# Patient Record
Sex: FEMALE | Race: WHITE | NOT HISPANIC OR LATINO | Employment: OTHER | URBAN - NONMETROPOLITAN AREA
[De-identification: names, ages, dates, MRNs, and addresses within clinical notes are randomized per-mention and may not be internally consistent; named-entity substitution may affect disease eponyms.]

---

## 2017-02-08 ENCOUNTER — OFFICE VISIT (OUTPATIENT)
Dept: CARDIOLOGY | Facility: CLINIC | Age: 67
End: 2017-02-08

## 2017-02-08 VITALS
HEIGHT: 67 IN | SYSTOLIC BLOOD PRESSURE: 141 MMHG | HEART RATE: 101 BPM | DIASTOLIC BLOOD PRESSURE: 78 MMHG | OXYGEN SATURATION: 90 % | BODY MASS INDEX: 45.04 KG/M2 | WEIGHT: 287 LBS

## 2017-02-08 DIAGNOSIS — R60.9 EDEMA, UNSPECIFIED TYPE: ICD-10-CM

## 2017-02-08 DIAGNOSIS — I25.10 CORONARY ARTERIOSCLEROSIS IN NATIVE ARTERY: Primary | ICD-10-CM

## 2017-02-08 DIAGNOSIS — I50.32 CHRONIC DIASTOLIC CONGESTIVE HEART FAILURE (HCC): ICD-10-CM

## 2017-02-08 DIAGNOSIS — G45.9 TRANSIENT CEREBRAL ISCHEMIA, UNSPECIFIED TYPE: ICD-10-CM

## 2017-02-08 DIAGNOSIS — E78.2 MIXED HYPERLIPIDEMIA: ICD-10-CM

## 2017-02-08 DIAGNOSIS — R06.02 SHORTNESS OF BREATH: ICD-10-CM

## 2017-02-08 DIAGNOSIS — I51.89 DIASTOLIC DYSFUNCTION: ICD-10-CM

## 2017-02-08 DIAGNOSIS — I10 ESSENTIAL HYPERTENSION: ICD-10-CM

## 2017-02-08 DIAGNOSIS — R07.2 PRECORDIAL PAIN: ICD-10-CM

## 2017-02-08 DIAGNOSIS — R00.2 PALPITATIONS: ICD-10-CM

## 2017-02-08 PROCEDURE — 99214 OFFICE O/P EST MOD 30 MIN: CPT | Performed by: INTERNAL MEDICINE

## 2017-02-08 RX ORDER — POTASSIUM CHLORIDE 20 MEQ/1
20 TABLET, EXTENDED RELEASE ORAL DAILY
Qty: 30 TABLET | Refills: 5 | Status: SHIPPED | OUTPATIENT
Start: 2017-02-08 | End: 2017-02-20 | Stop reason: SDUPTHER

## 2017-02-08 RX ORDER — FLECAINIDE ACETATE 50 MG/1
50 TABLET ORAL EVERY 12 HOURS
Qty: 60 TABLET | Refills: 5 | Status: SHIPPED | OUTPATIENT
Start: 2017-02-08 | End: 2017-02-14

## 2017-02-13 ENCOUNTER — CLINICAL SUPPORT (OUTPATIENT)
Dept: CARDIOLOGY | Facility: CLINIC | Age: 67
End: 2017-02-13

## 2017-02-13 VITALS
WEIGHT: 292.2 LBS | SYSTOLIC BLOOD PRESSURE: 123 MMHG | HEART RATE: 96 BPM | DIASTOLIC BLOOD PRESSURE: 60 MMHG | BODY MASS INDEX: 45.86 KG/M2 | HEIGHT: 67 IN | OXYGEN SATURATION: 94 %

## 2017-02-13 DIAGNOSIS — R00.2 PALPITATIONS: Primary | ICD-10-CM

## 2017-02-13 DIAGNOSIS — I49.1 PAC (PREMATURE ATRIAL CONTRACTION): ICD-10-CM

## 2017-02-13 PROCEDURE — 99211 OFF/OP EST MAY X REQ PHY/QHP: CPT | Performed by: PHYSICIAN ASSISTANT

## 2017-02-13 PROCEDURE — 93000 ELECTROCARDIOGRAM COMPLETE: CPT | Performed by: PHYSICIAN ASSISTANT

## 2017-02-13 NOTE — PROGRESS NOTES
Mandi Jacob  1950 2/13/2017   ?   Chief Complaint   Patient presents with   • Follow-up     Day #1 EKG on Flecainide therapy due to PAC'S      ?   HPI:   ? Patient here for day # 1 EKG due to HX pac's and started Flecainide 50 mg po bid on Sunday am. Has had x3 total doses so far and has tolerated it well. States that before starting Flecainide she was having occas. Sharp chest pains across her chest and had lots of fluttering, but since starting flec. Has had a squeezing and sharp chest pains to center and left chest and still having some of the fluttering mostly at night. EKG done per orders. PH,LPN  ?   ?     Current Outpatient Prescriptions:   •  albuterol (PROVENTIL HFA;VENTOLIN HFA) 108 (90 BASE) MCG/ACT inhaler, Albuterol 90 MCG/ACT AERS; Patient Sig: Albuterol 90 MCG/ACT AERS INHALE 1 TO 2 PUFFS EVERY 4 TO 6 HOURS AS NEEDED.; 0; 11-Sep-2013; Active, Disp: , Rfl:   •  albuterol (PROVENTIL) (2.5 MG/3ML) 0.083% nebulizer solution, Albuterol Sulfate (2.5 MG/3ML) 0.083% Inhalation Nebulization Solution; Patient Sig: Albuterol Sulfate (2.5 MG/3ML) 0.083% Inhalation Nebulization Solution USE 1 UNIT DOSE IN NEBULIZER EVERY 4 TO 6 HOURS AS NEEDED.; 0; 11-Sep-2013; Active, Disp: , Rfl:   •  aspirin 81 MG tablet, Take 1 tablet by mouth daily., Disp: , Rfl:   •  Cholecalciferol (VITAMIN D-3) 1000 UNITS capsule, Take 3 tablets by mouth daily., Disp: , Rfl:   •  ferrous sulfate 325 (65 FE) MG tablet, Take 1 tablet by mouth daily., Disp: , Rfl:   •  flecainide (TAMBOCOR) 50 MG tablet, Take 1 tablet by mouth Every 12 (Twelve) Hours., Disp: 60 tablet, Rfl: 5  •  furosemide (LASIX) 40 MG tablet, TAKE 1 TABLET TWICE DAILY, Disp: 60 tablet, Rfl: 5  •  glipiZIDE (GLUCOTROL) 5 MG tablet, Take 1 tablet by mouth 2 (two) times a day., Disp: , Rfl:   •  Glucosamine-Chondroit-Vit C-Mn (GLUCOSAMINE-CHONDROITIN) tablet, Take 1 tablet by mouth 2 (two) times a day., Disp: , Rfl:   •  HYDROcodone-acetaminophen (NORCO) 5-325 MG per  tablet, Take  by mouth. 1 tablet every 4-6 hours as needed, Disp: , Rfl:   •  Insulin NPH Isophane & Regular (70-30) 100 UNIT/ML suspension pen-injector, Inject 60 Units under the skin 2 (Two) Times a Day., Disp: 1 pen, Rfl: 5  •  isosorbide mononitrate (IMDUR) 30 MG 24 hr tablet, Take 1 tablet by mouth daily., Disp: , Rfl:   •  losartan (COZAAR) 25 MG tablet, 25 mg 2 (two) times a day., Disp: , Rfl:   •  magnesium oxide (MAGOX) 400 (241.3 MG) MG tablet tablet, Take 1 tablet by mouth daily., Disp: , Rfl:   •  meloxicam (MOBIC) 7.5 MG tablet, Take 1 tablet by mouth 2 (two) times a day with meals., Disp: , Rfl:   •  metolazone (ZAROXOLYN) 2.5 MG tablet, Take 2.5 mg by mouth. 1 tablet 30 minutes prior to lasix as needed PRN , Disp: , Rfl:   •  metoprolol tartrate (LOPRESSOR) 25 MG tablet, Take 1 tablet by mouth 2 (two) times a day., Disp: 180 tablet, Rfl: 3  •  montelukast (SINGULAIR) 10 MG tablet, Take 1 tablet by mouth daily., Disp: , Rfl:   •  nitroglycerin (NITROSTAT) 0.4 MG SL tablet, Place  under the tongue., Disp: , Rfl:   •  Omega-3 Fatty Acids (FISH OIL) 1000 MG capsule capsule, Take 1 capsule by mouth daily., Disp: , Rfl:   •  omeprazole (PriLOSEC) 20 MG capsule, Take 1 capsule by mouth 2 (two) times a day., Disp: , Rfl:   •  potassium chloride (K-DUR,KLOR-CON) 20 MEQ CR tablet, Take 1 tablet by mouth every morning., Disp: 30 tablet, Rfl: 5  •  potassium chloride (K-DUR,KLOR-CON) 20 MEQ CR tablet, Take 1 tablet by mouth Daily. In the evening, Disp: 30 tablet, Rfl: 5  •  propranolol (INDERAL) 20 MG tablet, Take 1 tablet by mouth 2 (two) times a day., Disp: , Rfl:   •  tiotropium (SPIRIVA) 18 MCG per inhalation capsule, Place 1 capsule into inhaler and inhale daily., Disp: , Rfl:   •  triamterene-hydrochlorothiazide (MAXZIDE-25) 37.5-25 MG per tablet, Take 1 tablet by mouth daily., Disp: , Rfl:    ?   ?   Ace inhibitors; Budesonide-formoterol fumarate; Buspirone; Ciprofloxacin; Citalopram; Codeine; Diltiazem;  Incruse ellipta [umeclidinium bromide]; Ketorolac tromethamine; Liraglutide; Lisinopril; Lorazepam; Niacin; Sertraline; Statins; and Sulfamethoxazole-trimethoprim         ECG 12 Lead  Date/Time: 2/13/2017 2:05 PM  Performed by: ZAFAR JHAVERI  Authorized by: ZAFAR JHAVERI   Comments: EKG indication palpitations. EKG demonstrates sinus mechanism at 85 bpm. Normal axis, nonspecific IVCD, no acute ST changes             ?   Assessment/Plan    ?   ?   ?     1. Palpitations    2. HX PAC'S per recent ER visit.        EKG reviewed per CHERYL Candelaria verbal order to continue Flecainide therapy and keep f/u appt. Jaswinder. For day # 2 EKG. Patient aware, verbalized she understood. PH,LPN    EKG reviewed. Return tomorrow for repeat EKG

## 2017-02-14 ENCOUNTER — CLINICAL SUPPORT (OUTPATIENT)
Dept: CARDIOLOGY | Facility: CLINIC | Age: 67
End: 2017-02-14

## 2017-02-14 VITALS
HEIGHT: 67 IN | OXYGEN SATURATION: 93 % | BODY MASS INDEX: 45.83 KG/M2 | SYSTOLIC BLOOD PRESSURE: 147 MMHG | HEART RATE: 88 BPM | WEIGHT: 292 LBS | DIASTOLIC BLOOD PRESSURE: 76 MMHG

## 2017-02-14 DIAGNOSIS — I50.9 CONGESTIVE HEART FAILURE, UNSPECIFIED CONGESTIVE HEART FAILURE CHRONICITY, UNSPECIFIED CONGESTIVE HEART FAILURE TYPE: ICD-10-CM

## 2017-02-14 DIAGNOSIS — I49.1 PAC (PREMATURE ATRIAL CONTRACTION): ICD-10-CM

## 2017-02-14 DIAGNOSIS — Z79.899 LONG-TERM USE OF HIGH-RISK MEDICATION: ICD-10-CM

## 2017-02-14 DIAGNOSIS — R00.2 PALPITATIONS: ICD-10-CM

## 2017-02-14 DIAGNOSIS — Z79.01 CURRENT USE OF ANTICOAGULANT THERAPY: ICD-10-CM

## 2017-02-14 DIAGNOSIS — R06.02 SHORTNESS OF BREATH: Primary | ICD-10-CM

## 2017-02-14 DIAGNOSIS — R60.9 EDEMA, UNSPECIFIED TYPE: ICD-10-CM

## 2017-02-14 RX ORDER — METHYLPREDNISOLONE 4 MG/1
TABLET ORAL
Qty: 21 TABLET | Refills: 0 | Status: SHIPPED | OUTPATIENT
Start: 2017-02-14 | End: 2017-02-20

## 2017-02-14 NOTE — PROGRESS NOTES
Mandi Jacob  1950 2/14/2017   ?   Chief Complaint   Patient presents with   • Palpitations     PAC's, nurse visit       ?   HPI:   ? Patient was  Scheduled today for Day # 2 EKg for Flecainide therapy, but at 2:30 am she was awoken with facial numbness and edema, itching all over, and increased shortness of breath. She had taken 2 full days of the Flecainide. Will address all above with Leonardo Rosales, PAC and proceed accordingly. PH,LPN  ?   ?     Current Outpatient Prescriptions:   •  albuterol (PROVENTIL HFA;VENTOLIN HFA) 108 (90 BASE) MCG/ACT inhaler, Albuterol 90 MCG/ACT AERS; Patient Sig: Albuterol 90 MCG/ACT AERS INHALE 1 TO 2 PUFFS EVERY 4 TO 6 HOURS AS NEEDED.; 0; 11-Sep-2013; Active, Disp: , Rfl:   •  albuterol (PROVENTIL) (2.5 MG/3ML) 0.083% nebulizer solution, Albuterol Sulfate (2.5 MG/3ML) 0.083% Inhalation Nebulization Solution; Patient Sig: Albuterol Sulfate (2.5 MG/3ML) 0.083% Inhalation Nebulization Solution USE 1 UNIT DOSE IN NEBULIZER EVERY 4 TO 6 HOURS AS NEEDED.; 0; 11-Sep-2013; Active, Disp: , Rfl:   •  aspirin 81 MG tablet, Take 1 tablet by mouth daily., Disp: , Rfl:   •  Cholecalciferol (VITAMIN D-3) 1000 UNITS capsule, Take 3 tablets by mouth daily., Disp: , Rfl:   •  ferrous sulfate 325 (65 FE) MG tablet, Take 1 tablet by mouth daily., Disp: , Rfl:   •  furosemide (LASIX) 40 MG tablet, TAKE 1 TABLET TWICE DAILY, Disp: 60 tablet, Rfl: 5  •  glipiZIDE (GLUCOTROL) 5 MG tablet, Take 1 tablet by mouth 2 (two) times a day., Disp: , Rfl:   •  Glucosamine-Chondroit-Vit C-Mn (GLUCOSAMINE-CHONDROITIN) tablet, Take 1 tablet by mouth 2 (two) times a day., Disp: , Rfl:   •  HYDROcodone-acetaminophen (NORCO) 5-325 MG per tablet, Take  by mouth. 1 tablet every 4-6 hours as needed, Disp: , Rfl:   •  Insulin NPH Isophane & Regular (70-30) 100 UNIT/ML suspension pen-injector, Inject 60 Units under the skin 2 (Two) Times a Day., Disp: 1 pen, Rfl: 5  •  isosorbide mononitrate (IMDUR) 30 MG 24 hr  tablet, Take 1 tablet by mouth daily., Disp: , Rfl:   •  losartan (COZAAR) 25 MG tablet, 25 mg 2 (two) times a day., Disp: , Rfl:   •  magnesium oxide (MAGOX) 400 (241.3 MG) MG tablet tablet, Take 1 tablet by mouth daily., Disp: , Rfl:   •  meloxicam (MOBIC) 7.5 MG tablet, Take 1 tablet by mouth 2 (two) times a day with meals., Disp: , Rfl:   •  metolazone (ZAROXOLYN) 2.5 MG tablet, Take 2.5 mg by mouth. 1 tablet 30 minutes prior to lasix as needed PRN , Disp: , Rfl:   •  metoprolol tartrate (LOPRESSOR) 25 MG tablet, Take 1 tablet by mouth 2 (two) times a day., Disp: 180 tablet, Rfl: 3  •  montelukast (SINGULAIR) 10 MG tablet, Take 1 tablet by mouth daily., Disp: , Rfl:   •  Omega-3 Fatty Acids (FISH OIL) 1000 MG capsule capsule, Take 1 capsule by mouth daily., Disp: , Rfl:   •  omeprazole (PriLOSEC) 20 MG capsule, Take 1 capsule by mouth 2 (two) times a day., Disp: , Rfl:   •  potassium chloride (K-DUR,KLOR-CON) 20 MEQ CR tablet, Take 1 tablet by mouth every morning., Disp: 30 tablet, Rfl: 5  •  potassium chloride (K-DUR,KLOR-CON) 20 MEQ CR tablet, Take 1 tablet by mouth Daily. In the evening, Disp: 30 tablet, Rfl: 5  •  propranolol (INDERAL) 20 MG tablet, Take 1 tablet by mouth 2 (two) times a day., Disp: , Rfl:   •  tiotropium (SPIRIVA) 18 MCG per inhalation capsule, Place 1 capsule into inhaler and inhale daily., Disp: , Rfl:   •  triamterene-hydrochlorothiazide (MAXZIDE-25) 37.5-25 MG per tablet, Take 1 tablet by mouth daily., Disp: , Rfl:   •  nitroglycerin (NITROSTAT) 0.4 MG SL tablet, Place  under the tongue., Disp: , Rfl:    ?   ?   Ace inhibitors; Budesonide-formoterol fumarate; Buspirone; Ciprofloxacin; Citalopram; Codeine; Diltiazem; Flecainide; Incruse ellipta [umeclidinium bromide]; Ketorolac tromethamine; Liraglutide; Lisinopril; Lorazepam; Niacin; Sertraline; Statins; and Sulfamethoxazole-trimethoprim       Procedures     ?   Assessment/Plan    ?   ?   ?     1. Palpitations    2. PAC'S      Leonardo  Bobby, PAC in to see patient. Verbal orders received for CXR, CBC,CMP,BNP,TRop. 1, DX; SOB,edema, chf, palpitations, pacs' d/c Flecainide, start Sotalol 80 mg po bid on Sunday am and come for EKG on Monday, medrol dose pack. Patient aware of all above, verbalized she understood. PH,LPN

## 2017-02-20 ENCOUNTER — CLINICAL SUPPORT (OUTPATIENT)
Dept: CARDIOLOGY | Facility: CLINIC | Age: 67
End: 2017-02-20

## 2017-02-20 VITALS
OXYGEN SATURATION: 93 % | BODY MASS INDEX: 45.11 KG/M2 | HEART RATE: 74 BPM | DIASTOLIC BLOOD PRESSURE: 66 MMHG | HEIGHT: 67 IN | WEIGHT: 287.4 LBS | SYSTOLIC BLOOD PRESSURE: 129 MMHG

## 2017-02-20 NOTE — PROGRESS NOTES
Mandi Jacob  1950 2/20/2017   ?   Chief Complaint   Patient presents with   • Follow-up     palps., PAC's      ?   HPI:   ?  Patient here for Day # 1 EKG for Sotalol therapy due to HX palps and PAC'S. Sotalol 80 mg po bid started yest. Am, so has had x3 doses so far and tolerating it well. Patient still with some fatigue and shortness of breath ( on 02 at 2 L via N/C) but breathing little better. States Fluttering is a little better. EKG done per orders. Will have CHERYL Candelaria review and proceed accordingly. PH,LPN  ?   ?     Current Outpatient Prescriptions:   •  albuterol (PROVENTIL HFA;VENTOLIN HFA) 108 (90 BASE) MCG/ACT inhaler, Albuterol 90 MCG/ACT AERS; Patient Sig: Albuterol 90 MCG/ACT AERS INHALE 1 TO 2 PUFFS EVERY 4 TO 6 HOURS AS NEEDED.; 0; 11-Sep-2013; Active, Disp: , Rfl:   •  albuterol (PROVENTIL) (2.5 MG/3ML) 0.083% nebulizer solution, Albuterol Sulfate (2.5 MG/3ML) 0.083% Inhalation Nebulization Solution; Patient Sig: Albuterol Sulfate (2.5 MG/3ML) 0.083% Inhalation Nebulization Solution USE 1 UNIT DOSE IN NEBULIZER EVERY 4 TO 6 HOURS AS NEEDED.; 0; 11-Sep-2013; Active, Disp: , Rfl:   •  aspirin 81 MG tablet, Take 1 tablet by mouth daily., Disp: , Rfl:   •  Cholecalciferol (VITAMIN D-3) 1000 UNITS capsule, Take 3 tablets by mouth daily., Disp: , Rfl:   •  ferrous sulfate 325 (65 FE) MG tablet, Take 1 tablet by mouth daily., Disp: , Rfl:   •  furosemide (LASIX) 40 MG tablet, TAKE 1 TABLET TWICE DAILY, Disp: 60 tablet, Rfl: 5  •  glipiZIDE (GLUCOTROL) 5 MG tablet, Take 1 tablet by mouth 2 (two) times a day., Disp: , Rfl:   •  Glucosamine-Chondroit-Vit C-Mn (GLUCOSAMINE-CHONDROITIN) tablet, Take 1 tablet by mouth 2 (two) times a day., Disp: , Rfl:   •  Insulin NPH Isophane & Regular (70-30) 100 UNIT/ML suspension pen-injector, Inject 60 Units under the skin 2 (Two) Times a Day., Disp: 1 pen, Rfl: 5  •  isosorbide mononitrate (IMDUR) 30 MG 24 hr tablet, Take 1 tablet by mouth daily., Disp:  , Rfl:   •  losartan (COZAAR) 25 MG tablet, 25 mg Daily., Disp: , Rfl:   •  magnesium oxide (MAGOX) 400 (241.3 MG) MG tablet tablet, Take 1 tablet by mouth daily., Disp: , Rfl:   •  meloxicam (MOBIC) 7.5 MG tablet, Take 1 tablet by mouth 2 (two) times a day with meals., Disp: , Rfl:   •  metolazone (ZAROXOLYN) 2.5 MG tablet, Take 2.5 mg by mouth. 1 tablet 30 minutes prior to lasix as needed PRN , Disp: , Rfl:   •  metoprolol tartrate (LOPRESSOR) 25 MG tablet, Take 1 tablet by mouth 2 (two) times a day., Disp: 180 tablet, Rfl: 3  •  montelukast (SINGULAIR) 10 MG tablet, Take 1 tablet by mouth daily., Disp: , Rfl:   •  Omega-3 Fatty Acids (FISH OIL) 1000 MG capsule capsule, Take 1 capsule by mouth daily., Disp: , Rfl:   •  potassium chloride (K-DUR,KLOR-CON) 20 MEQ CR tablet, Take 1 tablet by mouth every morning. (Patient taking differently: Take 20 mEq by mouth 2 (Two) Times a Day.), Disp: 30 tablet, Rfl: 5  •  Sotalol HCl AF 80 MG tablet, Take 1 tablet by mouth 2 (Two) Times a Day. START ON Sunday AM, Disp: 60 tablet, Rfl: 5  •  tiotropium (SPIRIVA) 18 MCG per inhalation capsule, Place 1 capsule into inhaler and inhale daily., Disp: , Rfl:   •  triamterene-hydrochlorothiazide (MAXZIDE-25) 37.5-25 MG per tablet, Take 1 tablet by mouth daily., Disp: , Rfl:   •  nitroglycerin (NITROSTAT) 0.4 MG SL tablet, Place  under the tongue., Disp: , Rfl:    ?   ?   Ace inhibitors; Buspirone; Ciprofloxacin; Citalopram; Codeine; Diltiazem; Flecainide; Incruse ellipta [umeclidinium bromide]; Ketorolac tromethamine; Levaquin [levofloxacin]; Lipitor [atorvastatin]; Liraglutide; Lisinopril; Lorazepam; Niacin; Sertraline; Statins; and Sulfamethoxazole-trimethoprim       Procedures     ?   Assessment/Plan    ?   ?   ?     1. Palpitations    2. Pac's    EKG reviewed by Leonardo Rosales, PAC and verbal order received to continue Sotalol therapy and schedule nurse visit for zacarias. Patient aware, verbalized ok. PH,LPN

## 2017-02-21 ENCOUNTER — CLINICAL SUPPORT (OUTPATIENT)
Dept: CARDIOLOGY | Facility: CLINIC | Age: 67
End: 2017-02-21

## 2017-02-21 VITALS
SYSTOLIC BLOOD PRESSURE: 144 MMHG | OXYGEN SATURATION: 91 % | HEART RATE: 90 BPM | BODY MASS INDEX: 45.04 KG/M2 | WEIGHT: 287 LBS | DIASTOLIC BLOOD PRESSURE: 70 MMHG | HEIGHT: 67 IN

## 2017-02-21 DIAGNOSIS — I49.1 PAC (PREMATURE ATRIAL CONTRACTION): ICD-10-CM

## 2017-02-21 DIAGNOSIS — R00.2 PALPITATIONS: Primary | ICD-10-CM

## 2017-02-21 PROCEDURE — 93000 ELECTROCARDIOGRAM COMPLETE: CPT | Performed by: PHYSICIAN ASSISTANT

## 2017-02-21 NOTE — PROGRESS NOTES
Mandi Jacob  1950 2/21/2017   ?   Chief Complaint   Patient presents with   • Follow-up     Day # 2 EKG for Sotalol therapy      ?   HPI:   ? Patient here for Day # 2 EKG for Sotalol therapy due to HX palps and PAC'S. Started on Sotalol 80 mg po bid on Sunday, so has had x5 doses of Sotalol so far and has tolerated it well. She states palps have decreased, no change in shortness of breath. EKG done per orders. PH,LPN  ?   ?     Current Outpatient Prescriptions:   •  albuterol (PROVENTIL HFA;VENTOLIN HFA) 108 (90 BASE) MCG/ACT inhaler, Albuterol 90 MCG/ACT AERS; Patient Sig: Albuterol 90 MCG/ACT AERS INHALE 1 TO 2 PUFFS EVERY 4 TO 6 HOURS AS NEEDED.; 0; 11-Sep-2013; Active, Disp: , Rfl:   •  albuterol (PROVENTIL) (2.5 MG/3ML) 0.083% nebulizer solution, Albuterol Sulfate (2.5 MG/3ML) 0.083% Inhalation Nebulization Solution; Patient Sig: Albuterol Sulfate (2.5 MG/3ML) 0.083% Inhalation Nebulization Solution USE 1 UNIT DOSE IN NEBULIZER EVERY 4 TO 6 HOURS AS NEEDED.; 0; 11-Sep-2013; Active, Disp: , Rfl:   •  aspirin 81 MG tablet, Take 1 tablet by mouth daily., Disp: , Rfl:   •  Cholecalciferol (VITAMIN D-3) 1000 UNITS capsule, Take 3 tablets by mouth daily., Disp: , Rfl:   •  ferrous sulfate 325 (65 FE) MG tablet, Take 1 tablet by mouth daily., Disp: , Rfl:   •  furosemide (LASIX) 40 MG tablet, TAKE 1 TABLET TWICE DAILY, Disp: 60 tablet, Rfl: 5  •  glipiZIDE (GLUCOTROL) 5 MG tablet, Take 1 tablet by mouth 2 (two) times a day., Disp: , Rfl:   •  Glucosamine-Chondroit-Vit C-Mn (GLUCOSAMINE-CHONDROITIN) tablet, Take 1 tablet by mouth 2 (two) times a day., Disp: , Rfl:   •  Insulin NPH Isophane & Regular (70-30) 100 UNIT/ML suspension pen-injector, Inject 60 Units under the skin 2 (Two) Times a Day., Disp: 1 pen, Rfl: 5  •  isosorbide mononitrate (IMDUR) 30 MG 24 hr tablet, Take 1 tablet by mouth daily., Disp: , Rfl:   •  losartan (COZAAR) 25 MG tablet, 25 mg Daily., Disp: , Rfl:   •  magnesium oxide (MAGOX) 400  (241.3 MG) MG tablet tablet, Take 1 tablet by mouth daily., Disp: , Rfl:   •  meloxicam (MOBIC) 7.5 MG tablet, Take 1 tablet by mouth 2 (two) times a day with meals., Disp: , Rfl:   •  metoprolol tartrate (LOPRESSOR) 25 MG tablet, Take 1 tablet by mouth 2 (two) times a day., Disp: 180 tablet, Rfl: 3  •  montelukast (SINGULAIR) 10 MG tablet, Take 1 tablet by mouth daily., Disp: , Rfl:   •  Omega-3 Fatty Acids (FISH OIL) 1000 MG capsule capsule, Take 1 capsule by mouth daily., Disp: , Rfl:   •  potassium chloride (K-DUR,KLOR-CON) 20 MEQ CR tablet, Take 1 tablet by mouth every morning. (Patient taking differently: Take 20 mEq by mouth 2 (Two) Times a Day.), Disp: 30 tablet, Rfl: 5  •  Sotalol HCl AF 80 MG tablet, Take 1 tablet by mouth 2 (Two) Times a Day. START ON Sunday AM, Disp: 60 tablet, Rfl: 5  •  tiotropium (SPIRIVA) 18 MCG per inhalation capsule, Place 1 capsule into inhaler and inhale daily., Disp: , Rfl:   •  triamterene-hydrochlorothiazide (MAXZIDE-25) 37.5-25 MG per tablet, Take 1 tablet by mouth daily., Disp: , Rfl:   •  metolazone (ZAROXOLYN) 2.5 MG tablet, Take 2.5 mg by mouth. 1 tablet 30 minutes prior to lasix as needed PRN , Disp: , Rfl:   •  nitroglycerin (NITROSTAT) 0.4 MG SL tablet, Place  under the tongue., Disp: , Rfl:    ?   ?   Ace inhibitors; Buspirone; Ciprofloxacin; Citalopram; Codeine; Diltiazem; Flecainide; Incruse ellipta [umeclidinium bromide]; Ketorolac tromethamine; Levaquin [levofloxacin]; Lipitor [atorvastatin]; Liraglutide; Lisinopril; Lorazepam; Niacin; Sertraline; Statins; and Sulfamethoxazole-trimethoprim         ECG 12 Lead  Date/Time: 2/21/2017 3:52 PM  Performed by: ZAFAR ROSALES  Authorized by: ZAFAR ROSALES                ?   Assessment/Plan    ?   ?   ?     1. Palpitations, on Sotalol therapy    2. PAC'S      EKG reviewed by Zafar Rosales, PAC and verbal order received to continue Sotalol as prescribed,make a 1 mo. F/u appt., monitor b/p and h/r, and call office  with any concerns/problems. Patient verbalized she understood. PH,LPN

## 2017-03-29 ENCOUNTER — OFFICE VISIT (OUTPATIENT)
Dept: CARDIOLOGY | Facility: CLINIC | Age: 67
End: 2017-03-29

## 2017-03-29 VITALS
SYSTOLIC BLOOD PRESSURE: 146 MMHG | OXYGEN SATURATION: 93 % | WEIGHT: 291.2 LBS | HEIGHT: 67 IN | HEART RATE: 70 BPM | DIASTOLIC BLOOD PRESSURE: 68 MMHG | BODY MASS INDEX: 45.71 KG/M2

## 2017-03-29 DIAGNOSIS — I49.1 PAC (PREMATURE ATRIAL CONTRACTION): ICD-10-CM

## 2017-03-29 DIAGNOSIS — R00.2 PALPITATION: Primary | ICD-10-CM

## 2017-03-29 DIAGNOSIS — I10 ESSENTIAL HYPERTENSION: ICD-10-CM

## 2017-03-29 PROCEDURE — 99213 OFFICE O/P EST LOW 20 MIN: CPT | Performed by: PHYSICIAN ASSISTANT

## 2017-03-29 RX ORDER — POTASSIUM CHLORIDE 750 MG/1
TABLET, EXTENDED RELEASE ORAL 2 TIMES DAILY
Refills: 5 | Status: ON HOLD | COMMUNITY
Start: 2017-03-12 | End: 2019-06-05

## 2017-03-29 RX ORDER — LOSARTAN POTASSIUM 50 MG/1
50 TABLET ORAL EVERY MORNING
Refills: 6 | COMMUNITY
Start: 2017-03-12 | End: 2021-01-19 | Stop reason: SDUPTHER

## 2017-03-29 RX ORDER — METOPROLOL TARTRATE 50 MG/1
25 TABLET, FILM COATED ORAL 2 TIMES DAILY
Refills: 2 | COMMUNITY
Start: 2017-03-12 | End: 2017-09-07 | Stop reason: ALTCHOICE

## 2017-03-29 NOTE — PROGRESS NOTES
Problem list     Subjective   Mandi Jacob is a 66 y.o. female     Chief Complaint   Patient presents with   • Follow-up     1 mo.   Problem List:  1.  Nonobstructive CAD by cath February 2012.  2.  Preserved systolic function  3.  History of CHF  4.  Diastolic dysfunction  5.  Hypertension      6.  Dyslipidemia. The patient reports she is intolerant to statins.      7.  Chronic palpitations.  Premature atrial contractions have been noted by telemetry, correlating with the patient's symptoms.          HPI  The patient presents back for evaluation in for review with medication change.  She was placed on flecainide for suppression of her PACs.  She has had significant issues with palpitations/PACs in the past.  Unfortunately, the patient cannot tolerate flecainide.  She was subsequent change to sotalol, 80 mg twice a day to the clinic.  We wanted to see her back today just to evaluate response to medications.  I did review with her the previous testing which was performed during admission at [facility] in September, 2016.  Her stress test and echo at that time were largely unremarkable.  Since being on sotalol, the patient notes increasing dyspnea and fatigue.  Her palpitations are improved however.  She reports that she feels very poorly on current dosing of her sotalol.  She would like to discuss a lower dose of that therapy.  The patient otherwise has no chest pain.  She has chronic, severe dyspnea secondary to her pulmonary status.  She has no further complaints otherwise.    Current Outpatient Prescriptions   Medication Sig Dispense Refill   • albuterol (PROVENTIL HFA;VENTOLIN HFA) 108 (90 BASE) MCG/ACT inhaler Albuterol 90 MCG/ACT AERS; Patient Sig: Albuterol 90 MCG/ACT AERS INHALE 1 TO 2 PUFFS EVERY 4 TO 6 HOURS AS NEEDED.; 0; 11-Sep-2013; Active     • albuterol (PROVENTIL) (2.5 MG/3ML) 0.083% nebulizer solution Albuterol Sulfate (2.5 MG/3ML) 0.083% Inhalation Nebulization Solution;  Patient Sig: Albuterol Sulfate (2.5 MG/3ML) 0.083% Inhalation Nebulization Solution USE 1 UNIT DOSE IN NEBULIZER EVERY 4 TO 6 HOURS AS NEEDED.; 0; 11-Sep-2013; Active     • aspirin 81 MG tablet Take 1 tablet by mouth daily.     • Cholecalciferol (VITAMIN D-3) 1000 UNITS capsule Take 3 tablets by mouth daily.     • ferrous sulfate 325 (65 FE) MG tablet Take 1 tablet by mouth daily.     • furosemide (LASIX) 40 MG tablet TAKE 1 TABLET TWICE DAILY 60 tablet 5   • Glucosamine-Chondroit-Vit C-Mn (GLUCOSAMINE-CHONDROITIN) tablet Take 1 tablet by mouth Daily.     • Insulin NPH Isophane & Regular (70-30) 100 UNIT/ML suspension pen-injector Inject 60 Units under the skin 2 (Two) Times a Day. 1 pen 5   • isosorbide mononitrate (IMDUR) 30 MG 24 hr tablet Take 1 tablet by mouth daily.     • losartan (COZAAR) 50 MG tablet Daily.  6   • magnesium oxide (MAGOX) 400 (241.3 MG) MG tablet tablet Take 1 tablet by mouth daily.     • meloxicam (MOBIC) 7.5 MG tablet Take 1 tablet by mouth 2 (two) times a day with meals.     • metolazone (ZAROXOLYN) 2.5 MG tablet Take 2.5 mg by mouth 1 (One) Time Per Week. 1 tablet 30 minutes prior to lasix as needed PRN       • metoprolol tartrate (LOPRESSOR) 50 MG tablet 25 mg 2 (Two) Times a Day.  2   • montelukast (SINGULAIR) 10 MG tablet Take 1 tablet by mouth daily.     • Omega-3 Fatty Acids (FISH OIL) 1000 MG capsule capsule Take 1 capsule by mouth daily.     • potassium chloride (K-DUR,KLOR-CON) 10 MEQ CR tablet Daily.  5   • potassium chloride (K-DUR,KLOR-CON) 20 MEQ CR tablet Take 1 tablet by mouth every morning. (Patient taking differently: Take 20 mEq by mouth Daily.) 30 tablet 5   • tiotropium (SPIRIVA) 18 MCG per inhalation capsule Place 1 capsule into inhaler and inhale daily.     • triamterene-hydrochlorothiazide (MAXZIDE-25) 37.5-25 MG per tablet Take 1 tablet by mouth daily.     • nitroglycerin (NITROSTAT) 0.4 MG SL tablet Place  under the tongue.       No current facility-administered  medications for this visit.        Ace inhibitors; Buspirone; Ciprofloxacin; Citalopram; Codeine; Diltiazem; Flecainide; Incruse ellipta [umeclidinium bromide]; Ketorolac tromethamine; Levaquin [levofloxacin]; Lipitor [atorvastatin]; Liraglutide; Lisinopril; Lorazepam; Niacin; Sertraline; Statins; and Sulfamethoxazole-trimethoprim    Past Medical History:   Diagnosis Date   • Asthma    • CAD (coronary artery disease)     non-obstructive by cath 02/2012   • CHF (congestive heart failure)    • COPD (chronic obstructive pulmonary disease)    • Diabetes mellitus    • Diastolic dysfunction    • Dizziness    • Edema    • Esophageal spasm    • Hyperlipidemia     intolerant to statins    • Hypertension    • Osteoarthritis    • Palpitations    • SOB (shortness of breath)    • Stroke-like symptoms    • Unstable angina        Social History     Social History   • Marital status:      Spouse name: N/A   • Number of children: N/A   • Years of education: N/A     Occupational History   • Not on file.     Social History Main Topics   • Smoking status: Former Smoker   • Smokeless tobacco: Not on file   • Alcohol use No   • Drug use: No   • Sexual activity: Not on file     Other Topics Concern   • Not on file     Social History Narrative       Family History   Problem Relation Age of Onset   • Heart attack Mother    • Heart failure Mother      congestive   • Hyperlipidemia Mother    • Hypertension Mother    • Peripheral vascular disease Mother    • Heart attack Father    • Heart failure Father      congestive   • Diabetes Father    • Hyperlipidemia Father    • Hypertension Father    • Peripheral vascular disease Father    • Heart failure Other      congestive   • Diabetes Other    • Hyperlipidemia Other    • Hypertension Other    • Peripheral vascular disease Other        Review of Systems   Constitutional: Positive for fatigue.   HENT: Negative.    Eyes: Positive for visual disturbance (wears glasses).   Respiratory: Positive  "for shortness of breath (wears 02 at 2 L via N/C).    Cardiovascular: Positive for chest pain (occas.) and leg swelling (occas.). Negative for palpitations.   Gastrointestinal: Positive for abdominal distention (with every meal) and nausea (with distention).   Endocrine: Negative.    Genitourinary: Negative.    Musculoskeletal: Positive for arthralgias, gait problem (ambulates with cane) and myalgias.   Skin: Negative.    Allergic/Immunologic: Positive for environmental allergies (seasonal).   Neurological: Positive for dizziness (occas.) and light-headedness (occas.).   Hematological: Bruises/bleeds easily.   Psychiatric/Behavioral: Positive for sleep disturbance.       Objective   /68 (BP Location: Left arm, Patient Position: Sitting)  Pulse 70  Ht 67\" (170.2 cm)  Wt 291 lb 3.2 oz (132 kg)  SpO2 93% Comment: WEARS 02 @ 2 LITERS VIA NC  BMI 45.61 kg/m2  Lab Results (most recent)     None        Physical Exam   Constitutional: She is oriented to person, place, and time. She appears well-developed and well-nourished. No distress (Patient appears dyspnic.  She is on O2 per NC.  The patient appears chronically ill. ).   HENT:   Head: Normocephalic and atraumatic.   Eyes: Conjunctivae and EOM are normal. Pupils are equal, round, and reactive to light.   Neck: Normal range of motion. Neck supple. No JVD present. No tracheal deviation present.   Cardiovascular: Normal rate, regular rhythm, normal heart sounds and intact distal pulses.    Pulmonary/Chest: Tachypnea noted. She has decreased breath sounds. She has wheezes. She has rales (Dry.).   Abdominal: Soft. Bowel sounds are normal. She exhibits no distension and no mass. There is no tenderness. There is no rebound and no guarding.   Musculoskeletal: Normal range of motion. She exhibits no edema, tenderness or deformity.   Neurological: She is alert and oriented to person, place, and time.   Skin: Skin is warm and dry. No rash noted. No erythema. No pallor. "   Psychiatric: She has a normal mood and affect. Her behavior is normal. Judgment and thought content normal.   Nursing note and vitals reviewed.        Procedure   Procedures       Assessment/Plan      Diagnosis Plan   1. Palpitation  Sotalol HCl AF 80 MG tablet   2. PAC (premature atrial contraction)     3. Essential hypertension         With regards to palpitations, the patient feels improved on sotalol.  Unfortunately, she has noted increasing fatigue and dyspnea.  We will decrease that to 80 mg half a pill twice a day.  I would like to see her back for an EKG within the next 3-5 days just to evaluate response to change in medications, symptoms, etc.  I will make no further changes in medications.  Outside of her palpitations, she seems stable from cardiac standpoint.  Further pending above.

## 2017-04-10 DIAGNOSIS — R60.9 EDEMA, UNSPECIFIED TYPE: ICD-10-CM

## 2017-04-10 RX ORDER — POTASSIUM CHLORIDE 750 MG/1
TABLET, EXTENDED RELEASE ORAL
Qty: 30 TABLET | Refills: 5 | Status: SHIPPED | OUTPATIENT
Start: 2017-04-10 | End: 2017-09-07 | Stop reason: ALTCHOICE

## 2017-04-12 ENCOUNTER — CLINICAL SUPPORT (OUTPATIENT)
Dept: CARDIOLOGY | Facility: CLINIC | Age: 67
End: 2017-04-12

## 2017-04-12 VITALS
SYSTOLIC BLOOD PRESSURE: 125 MMHG | HEIGHT: 67 IN | WEIGHT: 285 LBS | DIASTOLIC BLOOD PRESSURE: 72 MMHG | BODY MASS INDEX: 44.73 KG/M2 | OXYGEN SATURATION: 95 % | HEART RATE: 88 BPM

## 2017-04-12 DIAGNOSIS — R00.2 HEART PALPITATIONS: Primary | ICD-10-CM

## 2017-04-12 DIAGNOSIS — I49.1 PAC (PREMATURE ATRIAL CONTRACTION): ICD-10-CM

## 2017-04-12 PROCEDURE — 93000 ELECTROCARDIOGRAM COMPLETE: CPT | Performed by: PHYSICIAN ASSISTANT

## 2017-04-12 NOTE — PROGRESS NOTES
"Mandi Jacob  1950 4/12/2017   ?   Chief Complaint   Patient presents with   • Palpitations     h/o PAC's on Sotalol therapy      ?   HPI:   Pt here for nurse visit for EKG; due to H/O chronic palpitations and PAC's. Currently on Sotalol 80 mg. 1/2 tablet P.O.BID; since being on decreased Sotalol dosing patient states fatigue and SOB being a lot better; denies any \"squeezing\" type palpitations; stating she is feeling pretty good. EKG as ordered -[facility] ?   ?   ?     Current Outpatient Prescriptions:   •  albuterol (PROVENTIL HFA;VENTOLIN HFA) 108 (90 BASE) MCG/ACT inhaler, Albuterol 90 MCG/ACT AERS; Patient Sig: Albuterol 90 MCG/ACT AERS INHALE 1 TO 2 PUFFS EVERY 4 TO 6 HOURS AS NEEDED.; 0; 11-Sep-2013; Active, Disp: , Rfl:   •  albuterol (PROVENTIL) (2.5 MG/3ML) 0.083% nebulizer solution, Albuterol Sulfate (2.5 MG/3ML) 0.083% Inhalation Nebulization Solution; Patient Sig: Albuterol Sulfate (2.5 MG/3ML) 0.083% Inhalation Nebulization Solution USE 1 UNIT DOSE IN NEBULIZER EVERY 4 TO 6 HOURS AS NEEDED.; 0; 11-Sep-2013; Active, Disp: , Rfl:   •  aspirin 81 MG tablet, Take 1 tablet by mouth daily., Disp: , Rfl:   •  Cholecalciferol (VITAMIN D-3) 1000 UNITS capsule, Take 3 tablets by mouth daily., Disp: , Rfl:   •  ferrous sulfate 325 (65 FE) MG tablet, Take 1 tablet by mouth daily., Disp: , Rfl:   •  furosemide (LASIX) 40 MG tablet, TAKE 1 TABLET TWICE DAILY, Disp: 60 tablet, Rfl: 5  •  Glucosamine-Chondroit-Vit C-Mn (GLUCOSAMINE-CHONDROITIN) tablet, Take 1 tablet by mouth Daily., Disp: , Rfl:   •  Insulin NPH Isophane & Regular (70-30) 100 UNIT/ML suspension pen-injector, Inject 60 Units under the skin 2 (Two) Times a Day., Disp: 1 pen, Rfl: 5  •  isosorbide mononitrate (IMDUR) 30 MG 24 hr tablet, Take 1 tablet by mouth daily., Disp: , Rfl:   •  losartan (COZAAR) 50 MG tablet, Daily., Disp: , Rfl: 6  •  magnesium oxide (MAGOX) 400 (241.3 MG) MG tablet tablet, Take 1 tablet by mouth daily., Disp: , Rfl:   •  " meloxicam (MOBIC) 7.5 MG tablet, Take 1 tablet by mouth 2 (two) times a day with meals., Disp: , Rfl:   •  metolazone (ZAROXOLYN) 2.5 MG tablet, Take 2.5 mg by mouth 1 (One) Time Per Week. 1 tablet 30 minutes prior to lasix as needed PRN , Disp: , Rfl:   •  metoprolol tartrate (LOPRESSOR) 50 MG tablet, 25 mg 2 (Two) Times a Day., Disp: , Rfl: 2  •  montelukast (SINGULAIR) 10 MG tablet, Take 1 tablet by mouth daily., Disp: , Rfl:   •  nitroglycerin (NITROSTAT) 0.4 MG SL tablet, Place  under the tongue., Disp: , Rfl:   •  Omega-3 Fatty Acids (FISH OIL) 1000 MG capsule capsule, Take 1 capsule by mouth daily., Disp: , Rfl:   •  potassium chloride (K-DUR,KLOR-CON) 10 MEQ CR tablet, Daily., Disp: , Rfl: 5  •  potassium chloride (K-DUR,KLOR-CON) 10 MEQ CR tablet, TAKE 1 TABLET BY MOUTH EVERY NIGHT., Disp: 30 tablet, Rfl: 5  •  potassium chloride (K-DUR,KLOR-CON) 20 MEQ CR tablet, Take 1 tablet by mouth every morning. (Patient taking differently: Take 20 mEq by mouth Daily.), Disp: 30 tablet, Rfl: 5  •  Sotalol HCl AF 80 MG tablet, Take 0.5 tablets by mouth 2 (Two) Times a Day., Disp: 30 tablet, Rfl: 5  •  tiotropium (SPIRIVA) 18 MCG per inhalation capsule, Place 1 capsule into inhaler and inhale daily., Disp: , Rfl:   •  triamterene-hydrochlorothiazide (MAXZIDE-25) 37.5-25 MG per tablet, Take 1 tablet by mouth daily., Disp: , Rfl:    ?   ?   Ace inhibitors; Buspirone; Ciprofloxacin; Citalopram; Codeine; Diltiazem; Flecainide; Incruse ellipta [umeclidinium bromide]; Ketorolac tromethamine; Levaquin [levofloxacin]; Lipitor [atorvastatin]; Liraglutide; Lisinopril; Lorazepam; Niacin; Sertraline; Statins; and Sulfamethoxazole-trimethoprim         ECG 12 Lead  Date/Time: 4/12/2017 3:51 PM  Performed by: PABLO BRITO  Authorized by: PABLO BRITO   Comments: PALPITATIONS  PACs             ?   Assessment/Plan      1. Chronic Palpitations; on Sotalol therpay  2. PAC's    EKG reviewed by Pablo Brito PA-C; V/O received to  continue Sotalol @ decreased dosing; pt to return in 2 months for follow up ; patient to return/call office with any concerns/questions. V/O read back and verified by Mark Brito PA-C. Pt follow up appointment has been scheduled ; pat verbalized understanding.  [facility]   ?   ?   ?

## 2017-06-07 ENCOUNTER — OFFICE VISIT (OUTPATIENT)
Dept: CARDIOLOGY | Facility: CLINIC | Age: 67
End: 2017-06-07

## 2017-06-07 VITALS
HEART RATE: 77 BPM | HEIGHT: 67 IN | BODY MASS INDEX: 45.27 KG/M2 | OXYGEN SATURATION: 93 % | SYSTOLIC BLOOD PRESSURE: 136 MMHG | DIASTOLIC BLOOD PRESSURE: 60 MMHG | WEIGHT: 288.4 LBS

## 2017-06-07 DIAGNOSIS — I49.1 PAC (PREMATURE ATRIAL CONTRACTION): ICD-10-CM

## 2017-06-07 DIAGNOSIS — R00.2 PALPITATIONS: Primary | ICD-10-CM

## 2017-06-07 DIAGNOSIS — I10 ESSENTIAL HYPERTENSION: ICD-10-CM

## 2017-06-07 DIAGNOSIS — R06.02 SHORTNESS OF BREATH: ICD-10-CM

## 2017-06-07 PROCEDURE — 99213 OFFICE O/P EST LOW 20 MIN: CPT | Performed by: PHYSICIAN ASSISTANT

## 2017-06-07 NOTE — PROGRESS NOTES
Problem list     Subjective   Mandi Jacob is a 66 y.o. female     Chief Complaint   Patient presents with   • Follow-up     patient appears in office today for follow up    Problem List:  1. Nonobstructive CAD by cath February 2012.  2. Preserved systolic function  3. History of CHF  4. Diastolic dysfunction  5. Hypertension      6. Dyslipidemia. The patient reports she is intolerant to statins.      7. Chronic palpitations. Premature atrial contractions have been noted by telemetry, correlating with the patient's symptoms.           HPI  The patient presents back in today for routine evaluation and follow-up.  She had initially been placed on flecainide because of chronic recurrent palpitations.  This corresponded to PACs by telemetry.  She cannot tolerate flecainide.  This was discontinued in favor of sotalol.  She was placed initially on 80 mg twice a day.  She cannot tolerate that dosing because of fatigue and dyspnea.  We decreased that to half a tablet twice a day.  QT/QTC intervals remained normal.  The patient feels markedly improved at this time.  Her palpitations are basically nonexistent.  She has far less dyspnea and weakness on current dosing of sotalol.  She has stable, albeit severe, dyspnea.  This is related to her pulmonary condition.  She denies chest pain.  She has no dizziness or syncope.  She has no further complaints otherwise.    Current Outpatient Prescriptions   Medication Sig Dispense Refill   • albuterol (PROVENTIL HFA;VENTOLIN HFA) 108 (90 BASE) MCG/ACT inhaler Albuterol 90 MCG/ACT AERS; Patient Sig: Albuterol 90 MCG/ACT AERS INHALE 1 TO 2 PUFFS EVERY 4 TO 6 HOURS AS NEEDED.; 0; 11-Sep-2013; Active     • albuterol (PROVENTIL) (2.5 MG/3ML) 0.083% nebulizer solution Albuterol Sulfate (2.5 MG/3ML) 0.083% Inhalation Nebulization Solution; Patient Sig: Albuterol Sulfate (2.5 MG/3ML) 0.083% Inhalation Nebulization Solution USE 1 UNIT DOSE IN NEBULIZER EVERY 4 TO 6 HOURS AS NEEDED.; 0;  11-Sep-2013; Active     • aspirin 81 MG tablet Take 1 tablet by mouth daily.     • furosemide (LASIX) 40 MG tablet TAKE 1 TABLET TWICE DAILY 60 tablet 5   • Insulin NPH Isophane & Regular (70-30) 100 UNIT/ML suspension pen-injector Inject 60 Units under the skin 2 (Two) Times a Day. (Patient taking differently: Inject 60 Units under the skin 2 (Two) Times a Day. 65 units in AM; 55 units at lunch; 30 units of PM) 1 pen 5   • isosorbide mononitrate (IMDUR) 30 MG 24 hr tablet Take 1 tablet by mouth daily.     • losartan (COZAAR) 50 MG tablet Daily.  6   • magnesium oxide (MAGOX) 400 (241.3 MG) MG tablet tablet Take 1 tablet by mouth daily.     • meloxicam (MOBIC) 7.5 MG tablet Take 1 tablet by mouth 2 (two) times a day with meals.     • metolazone (ZAROXOLYN) 2.5 MG tablet Take 2.5 mg by mouth 1 (One) Time Per Week. 1 tablet 30 minutes prior to lasix as needed PRN       • metoprolol tartrate (LOPRESSOR) 50 MG tablet 25 mg 2 (Two) Times a Day.  2   • montelukast (SINGULAIR) 10 MG tablet Take 1 tablet by mouth daily.     • nitroglycerin (NITROSTAT) 0.4 MG SL tablet Place  under the tongue.     • Omega-3 Fatty Acids (FISH OIL) 1000 MG capsule capsule Take 1 capsule by mouth daily.     • potassium chloride (K-DUR,KLOR-CON) 10 MEQ CR tablet Daily.  5   • potassium chloride (K-DUR,KLOR-CON) 10 MEQ CR tablet TAKE 1 TABLET BY MOUTH EVERY NIGHT. 30 tablet 5   • potassium chloride (K-DUR,KLOR-CON) 20 MEQ CR tablet Take 1 tablet by mouth every morning. (Patient taking differently: Take 20 mEq by mouth Daily.) 30 tablet 5   • Sotalol HCl AF 80 MG tablet Take 0.5 tablets by mouth 2 (Two) Times a Day. 30 tablet 5   • tiotropium (SPIRIVA) 18 MCG per inhalation capsule Place 1 capsule into inhaler and inhale daily.     • triamterene-hydrochlorothiazide (MAXZIDE-25) 37.5-25 MG per tablet Take 1 tablet by mouth daily.       No current facility-administered medications for this visit.        Ace inhibitors; Buspirone; Ciprofloxacin;  Citalopram; Codeine; Diltiazem; Flecainide; Incruse ellipta [umeclidinium bromide]; Ketorolac tromethamine; Levaquin [levofloxacin]; Lipitor [atorvastatin]; Liraglutide; Lisinopril; Lorazepam; Niacin; Sertraline; Statins; and Sulfamethoxazole-trimethoprim    Past Medical History:   Diagnosis Date   • Asthma    • CAD (coronary artery disease)     non-obstructive by cath 02/2012   • CHF (congestive heart failure)    • COPD (chronic obstructive pulmonary disease)    • Diabetes mellitus    • Diastolic dysfunction    • Dizziness    • Edema    • Esophageal spasm    • Hyperlipidemia     intolerant to statins    • Hypertension    • Osteoarthritis    • Palpitations    • SOB (shortness of breath)    • Stroke-like symptoms    • Unstable angina        Social History     Social History   • Marital status:      Spouse name: N/A   • Number of children: N/A   • Years of education: N/A     Occupational History   • Not on file.     Social History Main Topics   • Smoking status: Former Smoker   • Smokeless tobacco: Never Used   • Alcohol use No   • Drug use: No   • Sexual activity: Defer     Other Topics Concern   • Not on file     Social History Narrative       Family History   Problem Relation Age of Onset   • Heart attack Mother    • Heart failure Mother      congestive   • Hyperlipidemia Mother    • Hypertension Mother    • Peripheral vascular disease Mother    • Heart attack Father    • Heart failure Father      congestive   • Diabetes Father    • Hyperlipidemia Father    • Hypertension Father    • Peripheral vascular disease Father    • Heart failure Other      congestive   • Diabetes Other    • Hyperlipidemia Other    • Hypertension Other    • Peripheral vascular disease Other        Review of Systems   Constitutional: Negative for fatigue.   HENT: Negative.    Eyes: Positive for visual disturbance (wears glasses daily).   Respiratory: Positive for shortness of breath. Negative for cough, chest tightness and wheezing.   "  Cardiovascular: Positive for leg swelling (occasional BLE swelling). Negative for chest pain and palpitations.   Gastrointestinal: Negative.  Negative for abdominal pain, nausea and vomiting.   Endocrine: Negative.  Negative for cold intolerance, heat intolerance and polyuria.   Genitourinary: Positive for frequency. Negative for difficulty urinating and urgency.   Musculoskeletal: Positive for arthralgias, back pain and myalgias. Negative for neck pain and neck stiffness.   Skin: Negative.  Negative for rash and wound.   Allergic/Immunologic: Positive for environmental allergies (seasonal allergies). Negative for food allergies.   Neurological: Negative for dizziness, weakness, light-headedness, numbness and headaches.   Hematological: Bruises/bleeds easily.   Psychiatric/Behavioral: Negative for agitation, confusion and sleep disturbance. The patient is not nervous/anxious.        Objective   /60 (BP Location: Left arm, Patient Position: Sitting)  Pulse 77  Ht 67\" (170.2 cm)  Wt 288 lb 6.4 oz (131 kg)  SpO2 93% Comment: @2L  BMI 45.17 kg/m2  Lab Results (most recent)     None        Physical Exam   Constitutional: She is oriented to person, place, and time. She appears well-developed and well-nourished. No distress (Patient appears dyspnic.  She is on O2 per NC.  The patient appears chronically ill. ).   HENT:   Head: Normocephalic and atraumatic.   Eyes: Conjunctivae and EOM are normal. Pupils are equal, round, and reactive to light.   Neck: Normal range of motion. Neck supple. No JVD present. No tracheal deviation present.   Cardiovascular: Normal rate, regular rhythm, normal heart sounds and intact distal pulses.    Pulmonary/Chest: Tachypnea noted. She has decreased breath sounds. She has wheezes. She has rales (Dry.).   Abdominal: Soft. Bowel sounds are normal. She exhibits no distension and no mass. There is no tenderness. There is no rebound and no guarding.   Musculoskeletal: Normal range of " motion. She exhibits no edema, tenderness or deformity.   Neurological: She is alert and oriented to person, place, and time.   Skin: Skin is warm and dry. No rash noted. No erythema. No pallor.   Psychiatric: She has a normal mood and affect. Her behavior is normal. Judgment and thought content normal.   Nursing note and vitals reviewed.        Procedure   Procedures       Assessment/Plan      Diagnosis Plan   1. Palpitations     2. PAC (premature atrial contraction)     3. Essential hypertension     4. Shortness of breath       The patient is markedly improved on sotalol therapy at current dosing.  Prior stress test and echo were unremarkable, as reviewed with the patient today.  I will make no changes.  She is very much stable.  We'll see her back in 3 months and reevaluate clinical course at that time.  She will call for any issues prior to follow-up.

## 2017-07-03 DIAGNOSIS — R60.9 EDEMA, UNSPECIFIED TYPE: ICD-10-CM

## 2017-07-05 RX ORDER — POTASSIUM CHLORIDE 20 MEQ/1
TABLET, EXTENDED RELEASE ORAL
Qty: 30 TABLET | Refills: 5 | Status: SHIPPED | OUTPATIENT
Start: 2017-07-05 | End: 2018-07-11 | Stop reason: SDUPTHER

## 2017-09-07 ENCOUNTER — OFFICE VISIT (OUTPATIENT)
Dept: CARDIOLOGY | Facility: CLINIC | Age: 67
End: 2017-09-07

## 2017-09-07 VITALS
SYSTOLIC BLOOD PRESSURE: 136 MMHG | BODY MASS INDEX: 44.54 KG/M2 | HEART RATE: 88 BPM | DIASTOLIC BLOOD PRESSURE: 72 MMHG | WEIGHT: 283.8 LBS | OXYGEN SATURATION: 95 % | HEIGHT: 67 IN

## 2017-09-07 DIAGNOSIS — R00.2 PALPITATIONS: Primary | ICD-10-CM

## 2017-09-07 DIAGNOSIS — I10 ESSENTIAL HYPERTENSION: ICD-10-CM

## 2017-09-07 DIAGNOSIS — R06.02 SHORTNESS OF BREATH: ICD-10-CM

## 2017-09-07 DIAGNOSIS — I49.1 PAC (PREMATURE ATRIAL CONTRACTION): ICD-10-CM

## 2017-09-07 PROCEDURE — 99213 OFFICE O/P EST LOW 20 MIN: CPT | Performed by: PHYSICIAN ASSISTANT

## 2017-09-07 RX ORDER — INSULIN DEGLUDEC 200 U/ML
100 INJECTION, SOLUTION SUBCUTANEOUS EVERY MORNING
COMMUNITY
Start: 2017-08-14

## 2017-09-07 RX ORDER — INSULIN ASPART 100 [IU]/ML
INJECTION, SOLUTION INTRAVENOUS; SUBCUTANEOUS
Status: ON HOLD | COMMUNITY
Start: 2017-08-23 | End: 2019-06-05

## 2017-09-07 RX ORDER — NITROGLYCERIN 0.4 MG/1
0.4 TABLET SUBLINGUAL
Qty: 25 TABLET | Refills: 2 | Status: SHIPPED | OUTPATIENT
Start: 2017-09-07

## 2017-09-07 NOTE — PROGRESS NOTES
Problem list     Subjective   Mandi Jacob is a 67 y.o. female     Chief Complaint   Patient presents with   • Coronary Artery Disease     Here for 3 mo. f/u   • Congestive Heart Failure   • Hypertension   • Hyperlipidemia   • Diabetes   • COPD   Problem List:  1. Nonobstructive CAD by cath February 2012.  2. Preserved systolic function  3. History of CHF  4. Diastolic dysfunction  5. Hypertension      6. Dyslipidemia. The patient reports she is intolerant to statins.      7. Chronic palpitations. Premature atrial contractions have been noted by telemetry, correlating with the patient's symptoms.           HPI  The patient presents in today for routine evaluation.  She has continued to do well since her last appointment here.  She reports minimal palpitations on current sotalol therapy.  She was recently discontinued from numerous medications by her primary care provider.  She feels markedly improved.  She has more energy.  She even seems to have less dyspnea.  She relates to no current chest pain.  Blood pressures up in well-controlled.  The patient relates to no further failure type symptoms.  She has no PND orthopnea.  Her edema is well-controlled on current medical regimen.  We did review her stress test and echo from September of last year.  Those studies indicated stable findings for this patient and were unremarkable otherwise.    Current Outpatient Prescriptions   Medication Sig Dispense Refill   • albuterol (PROVENTIL HFA;VENTOLIN HFA) 108 (90 BASE) MCG/ACT inhaler Albuterol 90 MCG/ACT AERS; Patient Sig: Albuterol 90 MCG/ACT AERS INHALE 1 TO 2 PUFFS EVERY 4 TO 6 HOURS AS NEEDED.; 0; 11-Sep-2013; Active     • albuterol (PROVENTIL) (2.5 MG/3ML) 0.083% nebulizer solution Albuterol Sulfate (2.5 MG/3ML) 0.083% Inhalation Nebulization Solution; Patient Sig: Albuterol Sulfate (2.5 MG/3ML) 0.083% Inhalation Nebulization Solution USE 1 UNIT DOSE IN NEBULIZER EVERY 4 TO 6 HOURS AS NEEDED.; 0; 11-Sep-2013; Active      • aspirin 81 MG tablet Take 1 tablet by mouth daily.     • furosemide (LASIX) 40 MG tablet TAKE 1 TABLET TWICE DAILY 60 tablet 5   • isosorbide mononitrate (IMDUR) 30 MG 24 hr tablet Take 1 tablet by mouth daily.     • losartan (COZAAR) 50 MG tablet Daily.  6   • magnesium oxide (MAGOX) 400 (241.3 MG) MG tablet tablet Take 1 tablet by mouth daily.     • meloxicam (MOBIC) 7.5 MG tablet Take 1 tablet by mouth 2 (two) times a day with meals.     • metolazone (ZAROXOLYN) 2.5 MG tablet Take 2.5 mg by mouth Daily As Needed. 1 tablet 30 minutes prior to lasix as needed PRN       • metoprolol tartrate (LOPRESSOR) 25 MG tablet 2 (Two) Times a Day.     • montelukast (SINGULAIR) 10 MG tablet Take 1 tablet by mouth daily.     • NOVOLOG FLEXPEN 100 UNIT/ML solution pen-injector sc pen 7 units tid     • potassium chloride (K-DUR,KLOR-CON) 10 MEQ CR tablet 10 mEq. In evening  5   • potassium chloride (K-DUR,KLOR-CON) 20 MEQ CR tablet Take 1 tablet by mouth every morning. (Patient taking differently: Take 20 mEq by mouth Daily.) 30 tablet 5   • potassium chloride (K-DUR,KLOR-CON) 20 MEQ CR tablet TAKE 1 TABLET BY MOUTH DAILY. IN THE EVENING (Patient taking differently: in am) 30 tablet 5   • Sotalol HCl AF 80 MG tablet Take 0.5 tablets by mouth 2 (Two) Times a Day. 30 tablet 5   • tiotropium (SPIRIVA) 18 MCG per inhalation capsule Place 1 capsule into inhaler and inhale daily.     • TRESIBA FLEXTOUCH 200 UNIT/ML solution pen-injector 95 units daily     • triamterene-hydrochlorothiazide (MAXZIDE-25) 37.5-25 MG per tablet Take 1 tablet by mouth daily.     • nitroglycerin (NITROSTAT) 0.4 MG SL tablet Place 1 tablet under the tongue Every 5 (Five) Minutes As Needed for Chest Pain. 25 tablet 2     No current facility-administered medications for this visit.        Ace inhibitors; Buspirone; Ciprofloxacin; Citalopram; Clindamycin/lincomycin; Codeine; Diltiazem; Flecainide; Incruse ellipta [umeclidinium bromide]; Ketorolac tromethamine;  Levaquin [levofloxacin]; Lipitor [atorvastatin]; Liraglutide; Lisinopril; Lorazepam; Niacin; Sertraline; Statins; Sulfamethoxazole-trimethoprim; and Trulicity [dulaglutide]    Past Medical History:   Diagnosis Date   • Asthma    • CAD (coronary artery disease)     non-obstructive by cath 02/2012   • CHF (congestive heart failure)    • COPD (chronic obstructive pulmonary disease)    • Diabetes mellitus    • Diastolic dysfunction    • Dizziness    • Edema    • Esophageal spasm    • Hyperlipidemia     intolerant to statins    • Hypertension    • Osteoarthritis    • Palpitations    • SOB (shortness of breath)    • Stroke-like symptoms    • Unstable angina        Social History     Social History   • Marital status:      Spouse name: N/A   • Number of children: N/A   • Years of education: N/A     Occupational History   • Not on file.     Social History Main Topics   • Smoking status: Former Smoker   • Smokeless tobacco: Never Used   • Alcohol use No   • Drug use: No   • Sexual activity: Defer     Other Topics Concern   • Not on file     Social History Narrative       Family History   Problem Relation Age of Onset   • Heart attack Mother    • Heart failure Mother      congestive   • Hyperlipidemia Mother    • Hypertension Mother    • Peripheral vascular disease Mother    • Heart attack Father    • Heart failure Father      congestive   • Diabetes Father    • Hyperlipidemia Father    • Hypertension Father    • Peripheral vascular disease Father    • Heart failure Other      congestive   • Diabetes Other    • Hyperlipidemia Other    • Hypertension Other    • Peripheral vascular disease Other        Review of Systems   Constitutional: Positive for fatigue (much improved).   HENT: Negative.    Eyes: Positive for visual disturbance (wears glasses).   Respiratory: Positive for shortness of breath (wears 02 at 2 L via N/C).    Cardiovascular: Positive for chest pain (only occas. usually with over exertion) and leg  "swelling (occas.). Negative for palpitations.   Gastrointestinal: Negative.    Endocrine: Negative.    Genitourinary: Negative.    Musculoskeletal: Positive for arthralgias and myalgias.   Skin: Negative.    Allergic/Immunologic: Positive for environmental allergies.   Neurological: Negative.    Hematological: Negative.    Psychiatric/Behavioral: Positive for sleep disturbance (off and on, up to BR).       Objective   /72 (BP Location: Left arm, Patient Position: Sitting)  Pulse 88  Ht 67\" (170.2 cm)  Wt 283 lb 12.8 oz (129 kg)  SpO2 95% Comment: wears 02 at 2 L via N/C  BMI 44.45 kg/m2  Lab Results (most recent)     None        Physical Exam   Constitutional: She is oriented to person, place, and time. She appears well-developed and well-nourished. No distress (Patient appears dyspnic.  She is on O2 per NC.  The patient appears chronically ill. ).   HENT:   Head: Normocephalic and atraumatic.   Eyes: Conjunctivae and EOM are normal. Pupils are equal, round, and reactive to light.   Neck: Normal range of motion. Neck supple. No JVD present. No tracheal deviation present.   Cardiovascular: Normal rate, regular rhythm, normal heart sounds and intact distal pulses.    Pulmonary/Chest: Tachypnea noted. She has decreased breath sounds. She has wheezes. She has rales (Dry.).   Abdominal: Soft. Bowel sounds are normal. She exhibits no distension and no mass. There is no tenderness. There is no rebound and no guarding.   Musculoskeletal: Normal range of motion. She exhibits no edema, tenderness or deformity.   Neurological: She is alert and oriented to person, place, and time.   Skin: Skin is warm and dry. No rash noted. No erythema. No pallor.   Psychiatric: She has a normal mood and affect. Her behavior is normal. Judgment and thought content normal.   Nursing note and vitals reviewed.        Procedure   Procedures       Assessment/Plan      Diagnosis Plan   1. Palpitations     2. PAC (premature atrial " contraction)     3. Essential hypertension     4. Shortness of breath       The patient is doing well at this time from cardiac standpoint.  Previous cardiac workup, as above, has been unremarkable with regards to her recent stress test and echocardiogram.  Symptoms of palpitations are now minimal on current medical regimen.  I think further at this time from cardiac standpoint.  We will see her back in 6 months, sooner if indicated by course.

## 2018-03-07 ENCOUNTER — OFFICE VISIT (OUTPATIENT)
Dept: CARDIOLOGY | Facility: CLINIC | Age: 68
End: 2018-03-07

## 2018-03-07 VITALS
WEIGHT: 284.2 LBS | HEART RATE: 99 BPM | BODY MASS INDEX: 44.61 KG/M2 | HEIGHT: 67 IN | SYSTOLIC BLOOD PRESSURE: 124 MMHG | DIASTOLIC BLOOD PRESSURE: 63 MMHG | OXYGEN SATURATION: 95 %

## 2018-03-07 DIAGNOSIS — I10 ESSENTIAL HYPERTENSION: ICD-10-CM

## 2018-03-07 DIAGNOSIS — R06.02 SHORTNESS OF BREATH: ICD-10-CM

## 2018-03-07 DIAGNOSIS — R06.02 SOB (SHORTNESS OF BREATH): Primary | ICD-10-CM

## 2018-03-07 DIAGNOSIS — R07.2 PRECORDIAL PAIN: ICD-10-CM

## 2018-03-07 PROCEDURE — 99214 OFFICE O/P EST MOD 30 MIN: CPT | Performed by: PHYSICIAN ASSISTANT

## 2018-03-07 PROCEDURE — 93000 ELECTROCARDIOGRAM COMPLETE: CPT | Performed by: PHYSICIAN ASSISTANT

## 2018-03-07 RX ORDER — METFORMIN HYDROCHLORIDE 500 MG/1
500 TABLET, EXTENDED RELEASE ORAL
Refills: 11 | COMMUNITY
Start: 2018-02-06

## 2018-03-07 RX ORDER — EMPAGLIFLOZIN 25 MG/1
25 TABLET, FILM COATED ORAL DAILY
COMMUNITY
Start: 2018-03-05 | End: 2018-05-15

## 2018-03-07 NOTE — PATIENT INSTRUCTIONS
"Fat and Cholesterol Restricted Diet  Getting too much fat and cholesterol in your diet may cause health problems. Following this diet helps keep your fat and cholesterol at normal levels. This can keep you from getting sick.  What types of fat should I choose?  · Choose monosaturated and polyunsaturated fats. These are found in foods such as olive oil, canola oil, flaxseeds, walnuts, almonds, and seeds.  · Eat more omega-3 fats. Good choices include salmon, mackerel, sardines, tuna, flaxseed oil, and ground flaxseeds.  · Limit saturated fats. These are in animal products such as meats, butter, and cream. They can also be in plant products such as palm oil, palm kernel oil, and coconut oil.  · Avoid foods with partially hydrogenated oils in them. These contain trans fats. Examples of foods that have trans fats are stick margarine, some tub margarines, cookies, crackers, and other baked goods.  What general guidelines do I need to follow?  · Check food labels. Look for the words \"trans fat\" and \"saturated fat.\"  · When preparing a meal:  ¨ Fill half of your plate with vegetables and green salads.  ¨ Fill one fourth of your plate with whole grains. Look for the word \"whole\" as the first word in the ingredient list.  ¨ Fill one fourth of your plate with lean protein foods.  · Eat more foods that have fiber, like apples, carrots, beans, peas, and barley.  · Eat more home-cooked foods. Eat less at restaurants and buffets.  · Limit or avoid alcohol.  · Limit foods high in starch and sugar.  · Limit fried foods.  · Cook foods without frying them. Baking, boiling, grilling, and broiling are all great options.  · Lose weight if you are overweight. Losing even a small amount of weight can help your overall health. It can also help prevent diseases such as diabetes and heart disease.  What foods can I eat?  Grains   Whole grains, such as whole wheat or whole grain breads, crackers, cereals, and pasta. Unsweetened oatmeal, " bulgur, barley, quinoa, or brown rice. Corn or whole wheat flour tortillas.  Vegetables   Fresh or frozen vegetables (raw, steamed, roasted, or grilled). Green salads.  Fruits   All fresh, canned (in natural juice), or frozen fruits.  Meat and Other Protein Products   Ground beef (85% or leaner), grass-fed beef, or beef trimmed of fat. Skinless chicken or turkey. Ground chicken or turkey. Pork trimmed of fat. All fish and seafood. Eggs. Dried beans, peas, or lentils. Unsalted nuts or seeds. Unsalted canned or dry beans.  Dairy   Low-fat dairy products, such as skim or 1% milk, 2% or reduced-fat cheeses, low-fat ricotta or cottage cheese, or plain low-fat yogurt.  Fats and Oils   Tub margarines without trans fats. Light or reduced-fat mayonnaise and salad dressings. Avocado. Olive, canola, sesame, or safflower oils. Natural peanut or almond butter (choose ones without added sugar and oil).  The items listed above may not be a complete list of recommended foods or beverages. Contact your dietitian for more options.   What foods are not recommended?  Grains   White bread. White pasta. White rice. Cornbread. Bagels, pastries, and croissants. Crackers that contain trans fat.  Vegetables   White potatoes. Corn. Creamed or fried vegetables. Vegetables in a cheese sauce.  Fruits   Dried fruits. Canned fruit in light or heavy syrup. Fruit juice.  Meat and Other Protein Products   Fatty cuts of meat. Ribs, chicken wings, perez, sausage, bologna, salami, chitterlings, fatback, hot dogs, bratwurst, and packaged luncheon meats. Liver and organ meats.  Dairy   Whole or 2% milk, cream, half-and-half, and cream cheese. Whole milk cheeses. Whole-fat or sweetened yogurt. Full-fat cheeses. Nondairy creamers and whipped toppings. Processed cheese, cheese spreads, or cheese curds.  Sweets and Desserts   Corn syrup, sugars, honey, and molasses. Candy. Jam and jelly. Syrup. Sweetened cereals. Cookies, pies, cakes, donuts, muffins, and ice  cream.  Fats and Oils   Butter, stick margarine, lard, shortening, ghee, or perez fat. Coconut, palm kernel, or palm oils.  Beverages   Alcohol. Sweetened drinks (such as sodas, lemonade, and fruit drinks or punches).  The items listed above may not be a complete list of foods and beverages to avoid. Contact your dietitian for more information.   This information is not intended to replace advice given to you by your health care provider. Make sure you discuss any questions you have with your health care provider.  Document Released: 06/18/2013 Document Revised: 08/24/2017 Document Reviewed: 03/19/2015  [employer] Patient Education © 2017 [employer]  BMI for Adults  Body mass index (BMI) is a number that is calculated from a person's weight and height. In most adults, the number is used to find how much of an adult's weight is made up of fat. BMI is not as accurate as a direct measure of body fat.  How is BMI calculated?  BMI is calculated by dividing weight in kilograms by height in meters squared. It can also be calculated by dividing weight in pounds by height in inches squared, then multiplying the resulting number by 703. Charts are available to help you find your BMI quickly and easily without doing this calculation.  How is BMI interpreted?  Health care professionals use BMI charts to identify whether an adult is underweight, at a normal weight, or overweight based on the following guidelines:  · Underweight: BMI less than 18.5.  · Normal weight: BMI between 18.5 and 24.9.  · Overweight: BMI between 25 and 29.9.  · Obese: BMI of 30 and above.  BMI is usually interpreted the same for males and females.  Weight includes both fat and muscle, so someone with a muscular build, such as an athlete, may have a BMI that is higher than 24.9. In cases like these, BMI may not accurately depict body fat. To determine if excess body fat is the cause of a BMI of 25 or higher, further assessments may need to be  done by a health care provider.  Why is BMI a useful tool?  BMI is used to identify a possible weight problem that may be related to a medical problem or may increase the risk for medical problems. BMI can also be used to promote changes to reach a healthy weight.  This information is not intended to replace advice given to you by your health care provider. Make sure you discuss any questions you have with your health care provider.  Document Released: 08/29/2005 Document Revised: 04/27/2017 Document Reviewed: 05/15/2015  [employer] Interactive Patient Education © 2017 [employer]

## 2018-03-07 NOTE — PROGRESS NOTES
Problem list     Subjective   Mandi Jacob is a 67 y.o. female     Chief Complaint   Patient presents with   • Follow-up     patient appears in office today for 6 month follow up    • Chest Pain   • Palpitations   • Shortness of Breath   Problem List:  1. Nonobstructive CAD by cath February 2012.  2. Preserved systolic function  3. History of CHF  4. Diastolic dysfunction  5. Hypertension      6. Dyslipidemia. The patient reports she is intolerant to statins.      7. Chronic palpitations. Premature atrial contractions have been noted by telemetry, correlating with the patient's symptoms.        HPI  The patient presents back in today for review.  She tells me that as of recent, she has started noticing more chest pain.  She describes a precordial sticking sensation.  She also has increasing tightness.  This occurs typically at rest.  She really has no exercise capacity because of limitation with chronic lung disease.  Her dyspnea seems to have worsened as well.  She saw her pulmonologist recently who felt that her pulmonary status was stable.  She did not feel that symptoms were coming from the same.  The patient denies PND or orthopnea.  She has tachycardia when hypoxic.  She has no significant dysrhythmic activity otherwise.  The patient has no dizziness or syncope.  She has no further complaints otherwise.    Current Outpatient Prescriptions   Medication Sig Dispense Refill   • albuterol (PROVENTIL HFA;VENTOLIN HFA) 108 (90 BASE) MCG/ACT inhaler Albuterol 90 MCG/ACT AERS; Patient Sig: Albuterol 90 MCG/ACT AERS INHALE 1 TO 2 PUFFS EVERY 4 TO 6 HOURS AS NEEDED.; 0; 11-Sep-2013; Active     • albuterol (PROVENTIL) (2.5 MG/3ML) 0.083% nebulizer solution Albuterol Sulfate (2.5 MG/3ML) 0.083% Inhalation Nebulization Solution; Patient Sig: Albuterol Sulfate (2.5 MG/3ML) 0.083% Inhalation Nebulization Solution USE 1 UNIT DOSE IN NEBULIZER EVERY 4 TO 6 HOURS AS NEEDED.; 0; 11-Sep-2013; Active     • aspirin 81 MG tablet  Take 1 tablet by mouth daily.     • furosemide (LASIX) 40 MG tablet TAKE 1 TABLET TWICE DAILY 60 tablet 5   • isosorbide mononitrate (IMDUR) 30 MG 24 hr tablet Take 30 mg by mouth Daily.     • JARDIANCE 25 MG tablet 25 mg Daily.     • losartan (COZAAR) 50 MG tablet Take 50 mg by mouth Daily.  6   • magnesium oxide (MAGOX) 400 (241.3 MG) MG tablet tablet Take 1 tablet by mouth daily.     • meloxicam (MOBIC) 7.5 MG tablet Take 1 tablet by mouth 2 (two) times a day with meals.     • metFORMIN ER (GLUCOPHAGE-XR) 500 MG 24 hr tablet Take 500 mg by mouth Daily With Breakfast.  11   • metolazone (ZAROXOLYN) 2.5 MG tablet Take 2.5 mg by mouth Daily As Needed. 1 tablet 30 minutes prior to lasix as needed PRN       • montelukast (SINGULAIR) 10 MG tablet Take 10 mg by mouth Daily.     • nitroglycerin (NITROSTAT) 0.4 MG SL tablet Place 1 tablet under the tongue Every 5 (Five) Minutes As Needed for Chest Pain. 25 tablet 2   • NOVOLOG FLEXPEN 100 UNIT/ML solution pen-injector sc pen 15 units with lunch; SS nightly     • potassium chloride (K-DUR,KLOR-CON) 10 MEQ CR tablet 10 mEq. In evening  5   • potassium chloride (K-DUR,KLOR-CON) 20 MEQ CR tablet TAKE 1 TABLET BY MOUTH DAILY. IN THE EVENING (Patient taking differently: in am) 30 tablet 5   • Sotalol HCl AF 80 MG tablet Take 0.5 tablets by mouth 2 (Two) Times a Day. 30 tablet 5   • tiotropium (SPIRIVA) 18 MCG per inhalation capsule Place 1 capsule into inhaler and inhale daily.     • TRESIBA FLEXTOUCH 200 UNIT/ML solution pen-injector 100 Units Daily. 100 units daily     • triamterene-hydrochlorothiazide (MAXZIDE-25) 37.5-25 MG per tablet Take 1 tablet by mouth daily.       No current facility-administered medications for this visit.        Ace inhibitors; Buspirone; Ciprofloxacin; Citalopram; Clindamycin/lincomycin; Codeine; Diltiazem; Flecainide; Incruse ellipta [umeclidinium bromide]; Ketorolac tromethamine; Levaquin [levofloxacin]; Lipitor [atorvastatin]; Liraglutide;  Lisinopril; Lorazepam; Niacin; Sertraline; Statins; Sulfamethoxazole-trimethoprim; and Trulicity [dulaglutide]    Past Medical History:   Diagnosis Date   • Asthma    • CAD (coronary artery disease)     non-obstructive by cath 02/2012   • CHF (congestive heart failure)    • COPD (chronic obstructive pulmonary disease)    • Diabetes mellitus    • Diastolic dysfunction    • Dizziness    • Edema    • Esophageal spasm    • Hyperlipidemia     intolerant to statins    • Hypertension    • Osteoarthritis    • Palpitations    • SOB (shortness of breath)    • Stroke-like symptoms    • Unstable angina        Social History     Social History   • Marital status:      Spouse name: N/A   • Number of children: N/A   • Years of education: N/A     Occupational History   • Not on file.     Social History Main Topics   • Smoking status: Former Smoker   • Smokeless tobacco: Never Used   • Alcohol use No   • Drug use: No   • Sexual activity: Defer     Other Topics Concern   • Not on file     Social History Narrative       Family History   Problem Relation Age of Onset   • Heart attack Mother    • Heart failure Mother      congestive   • Hyperlipidemia Mother    • Hypertension Mother    • Peripheral vascular disease Mother    • Heart attack Father    • Heart failure Father      congestive   • Diabetes Father    • Hyperlipidemia Father    • Hypertension Father    • Peripheral vascular disease Father    • Heart failure Other      congestive   • Diabetes Other    • Hyperlipidemia Other    • Hypertension Other    • Peripheral vascular disease Other        Review of Systems   Constitutional: Negative.  Negative for fatigue.   HENT: Positive for rhinorrhea (due to allergies) and sneezing (due to allergies). Negative for congestion and sore throat.    Eyes: Positive for visual disturbance (wears glasses daily).   Respiratory: Positive for shortness of breath (easily SOA ; worse on exertion ; H/O COPD). Negative for apnea, cough and chest  "tightness.         Wears continuous o2 @ 2L      Cardiovascular: Positive for chest pain (midsternal CP ; ) and leg swelling (BLE swelling/edema). Negative for palpitations (denies palpitations/flutters).   Gastrointestinal: Negative.  Negative for abdominal distention, abdominal pain, nausea and vomiting.   Endocrine: Negative.  Negative for cold intolerance, heat intolerance, polyphagia and polyuria.   Genitourinary: Positive for frequency (due to Lasix use). Negative for difficulty urinating and urgency.   Musculoskeletal: Positive for arthralgias (bacnk/knees/elbows and wrist) and back pain (from arthritis).   Skin: Negative.  Negative for rash and wound.   Allergic/Immunologic: Positive for environmental allergies (seasonal allergies). Negative for food allergies.   Neurological: Negative.  Negative for dizziness, weakness, light-headedness and headaches.   Hematological: Bruises/bleeds easily (bruises easily).   Psychiatric/Behavioral: Positive for sleep disturbance (does wake up on occasion smothering/SOA). Negative for agitation and confusion. The patient is not nervous/anxious.        Objective   Vitals:    03/07/18 1255   BP: 124/63   BP Location: Left arm   Patient Position: Sitting   Pulse: 99   SpO2: 95%   Weight: 129 kg (284 lb 3.2 oz)   Height: 170.2 cm (67\")      /63 (BP Location: Left arm, Patient Position: Sitting)  Pulse 99  Ht 170.2 cm (67\")  Wt 129 kg (284 lb 3.2 oz)  SpO2 95% Comment: @ 2L  BMI 44.51 kg/m2   Lab Results (most recent)     None        Physical Exam   Constitutional: She is oriented to person, place, and time. She appears well-developed and well-nourished. No distress (Patient appears dyspnic.  She is on O2 per NC.  The patient appears chronically ill. ).   HENT:   Head: Normocephalic and atraumatic.   Eyes: Conjunctivae and EOM are normal. Pupils are equal, round, and reactive to light.   Neck: Normal range of motion. Neck supple. No JVD present. No tracheal deviation " present.   Cardiovascular: Normal rate, regular rhythm, normal heart sounds and intact distal pulses.    Pulmonary/Chest: Tachypnea noted. She has decreased breath sounds. She has wheezes. She has rales (Dry.).   Abdominal: Soft. Bowel sounds are normal. She exhibits no distension and no mass. There is no tenderness. There is no rebound and no guarding.   Musculoskeletal: Normal range of motion. She exhibits no edema, tenderness or deformity.   Neurological: She is alert and oriented to person, place, and time.   Skin: Skin is warm and dry. No rash noted. No erythema. No pallor.   Psychiatric: She has a normal mood and affect. Her behavior is normal. Judgment and thought content normal.   Nursing note and vitals reviewed.        Procedure     ECG 12 Lead  Date/Time: 3/7/2018 1:03 PM  Performed by: PABLO LOPEZ  Authorized by: PABLO LOPEZ   Comments: SOB    Sinus rhythm at 98, normal axis, minor nonspecific ST and T-wave changes, no acute changes noted.               Assessment/Plan      Diagnosis Plan   1. SOB (shortness of breath)  ECG 12 Lead    Adult Transthoracic Echo Complete W/ Cont if Necessary Per Protocol    Stress Test With Myocardial Perfusion One Day   2. Precordial pain  Adult Transthoracic Echo Complete W/ Cont if Necessary Per Protocol    Stress Test With Myocardial Perfusion One Day   3. Shortness of breath  Adult Transthoracic Echo Complete W/ Cont if Necessary Per Protocol    Stress Test With Myocardial Perfusion One Day   4. Essential hypertension  Adult Transthoracic Echo Complete W/ Cont if Necessary Per Protocol    Stress Test With Myocardial Perfusion One Day     With recurrent chest pain, I will schedule the patient for nuclear stress test and an echo.  I will make no changes in medications.  We discussed statin therapy.  The patient cannot tolerate that previously.  We will try to review labs and discuss further options with her at some time.              Patient's BMI is above  normal parameters. Follow-up plan includes:  educational material and referral to primary care.             Electronically signed by:

## 2018-03-26 ENCOUNTER — HOSPITAL ENCOUNTER (OUTPATIENT)
Dept: CARDIOLOGY | Facility: HOSPITAL | Age: 68
Discharge: HOME | End: 2018-03-26

## 2018-03-26 ENCOUNTER — OUTSIDE FACILITY SERVICE (OUTPATIENT)
Dept: CARDIOLOGY | Facility: CLINIC | Age: 68
End: 2018-03-26

## 2018-03-26 LAB
MAXIMAL PREDICTED HEART RATE: 154 BPM
MAXIMAL PREDICTED HEART RATE: 154 BPM
STRESS TARGET HR: 131 BPM
STRESS TARGET HR: 131 BPM

## 2018-03-26 PROCEDURE — 93017 CV STRESS TEST TRACING ONLY: CPT

## 2018-03-26 PROCEDURE — 0 TECHNETIUM SESTAMIBI: Performed by: INTERNAL MEDICINE

## 2018-03-26 PROCEDURE — 25010000002 DOBUTAMINE PER 250 MG: Performed by: INTERNAL MEDICINE

## 2018-03-26 PROCEDURE — 78452 HT MUSCLE IMAGE SPECT MULT: CPT

## 2018-03-26 PROCEDURE — A9500 TC99M SESTAMIBI: HCPCS | Performed by: INTERNAL MEDICINE

## 2018-03-26 PROCEDURE — 93018 CV STRESS TEST I&R ONLY: CPT | Performed by: INTERNAL MEDICINE

## 2018-03-26 PROCEDURE — 93306 TTE W/DOPPLER COMPLETE: CPT | Performed by: INTERNAL MEDICINE

## 2018-03-26 PROCEDURE — 78452 HT MUSCLE IMAGE SPECT MULT: CPT | Performed by: INTERNAL MEDICINE

## 2018-03-26 PROCEDURE — 93306 TTE W/DOPPLER COMPLETE: CPT

## 2018-03-26 RX ORDER — DOBUTAMINE HYDROCHLORIDE 200 MG/100ML
10 INJECTION INTRAVENOUS
Status: COMPLETED | OUTPATIENT
Start: 2018-03-26 | End: 2018-03-26

## 2018-03-26 RX ADMIN — TECHNETIUM TC 99M SESTAMIBI 1 DOSE: 1 INJECTION INTRAVENOUS at 10:50

## 2018-03-26 RX ADMIN — DOBUTAMINE HYDROCHLORIDE 10 MCG/KG/MIN: 200 INJECTION INTRAVENOUS at 10:50

## 2018-03-26 RX ADMIN — TECHNETIUM TC 99M SESTAMIBI 1 DOSE: 1 INJECTION INTRAVENOUS at 08:15

## 2018-03-29 ENCOUNTER — DOCUMENTATION (OUTPATIENT)
Dept: CARDIOLOGY | Facility: CLINIC | Age: 68
End: 2018-03-29

## 2018-03-29 NOTE — PROGRESS NOTES
PATIENT WAS NOTIFIED OF STRESS TEST RESULTS AND TO KEEP FOLLOW UP AS SCHEDULED. PATIENT WAS NOTIFIED WE DO NOT HAVE ECHO RESULTS BACK YET AND WILL CALL WHEN WE RECEIVE THOSE. -SHAWNA;NATHANAEL

## 2018-04-04 ENCOUNTER — DOCUMENTATION (OUTPATIENT)
Dept: CARDIOLOGY | Facility: CLINIC | Age: 68
End: 2018-04-04

## 2018-04-12 DIAGNOSIS — R07.9 CHEST PAIN, UNSPECIFIED TYPE: Primary | ICD-10-CM

## 2018-04-12 RX ORDER — ISOSORBIDE MONONITRATE 30 MG/1
30 TABLET, EXTENDED RELEASE ORAL DAILY
Qty: 30 TABLET | Refills: 6 | Status: SHIPPED | OUTPATIENT
Start: 2018-04-12 | End: 2018-09-26 | Stop reason: SDUPTHER

## 2018-05-15 ENCOUNTER — OFFICE VISIT (OUTPATIENT)
Dept: CARDIOLOGY | Facility: CLINIC | Age: 68
End: 2018-05-15

## 2018-05-15 VITALS
WEIGHT: 281 LBS | SYSTOLIC BLOOD PRESSURE: 149 MMHG | DIASTOLIC BLOOD PRESSURE: 90 MMHG | BODY MASS INDEX: 45.16 KG/M2 | HEART RATE: 110 BPM | OXYGEN SATURATION: 92 % | HEIGHT: 66 IN

## 2018-05-15 DIAGNOSIS — R06.02 SHORTNESS OF BREATH: ICD-10-CM

## 2018-05-15 DIAGNOSIS — R00.2 PALPITATIONS: ICD-10-CM

## 2018-05-15 DIAGNOSIS — R07.2 PRECORDIAL PAIN: Primary | ICD-10-CM

## 2018-05-15 PROCEDURE — 99213 OFFICE O/P EST LOW 20 MIN: CPT | Performed by: PHYSICIAN ASSISTANT

## 2018-05-15 RX ORDER — IBUPROFEN 600 MG/1
600 TABLET ORAL 3 TIMES DAILY
Qty: 30 TABLET | Refills: 0 | Status: ON HOLD | OUTPATIENT
Start: 2018-05-15 | End: 2019-06-05

## 2018-05-15 NOTE — PATIENT INSTRUCTIONS

## 2018-05-15 NOTE — PROGRESS NOTES
Problem list     Subjective   Mandi Jacob is a 67 y.o. female     Chief Complaint   Patient presents with   • Follow-up     patient appears in office today for follow up on test results    • Shortness of Breath   Problem List:  1. Nonobstructive CAD by cath February 2012.  2. Preserved systolic function  3. History of CHF  4. Diastolic dysfunction  5. Hypertension      6. Dyslipidemia. The patient reports she is intolerant to statins.      7. Chronic palpitations. Premature atrial contractions have been noted by telemetry, correlating with the patient's symptoms.    HPI  The patient present back to review stress and echo findings.  We had scheduled her for that because of episodes of chest discomfort at last visit.  Her stress test indicated no ischemia.  Echo indicated low normal systolic function.  Of note, EF was normal by stress test.  There was a small effusion noted by echo but not physiology.  the patient had no significant valvular issues.  she still has chest discomfort at times.  she describes a sharp sticking sensation worsen with deep breaths.  this is not necessarily positional.  she has no evidence of pericardial rub by exam.  the patient tells me she still has palpitations.  she has tachycardia at times.  this is nonproblematic for the most part and at baseline.  the patient has no syncope.  she has dizziness consistent with orthostasis.  the patient has no further complaints otherwise.    Current Outpatient Prescriptions   Medication Sig Dispense Refill   • albuterol (PROVENTIL HFA;VENTOLIN HFA) 108 (90 BASE) MCG/ACT inhaler Albuterol 90 MCG/ACT AERS; Patient Sig: Albuterol 90 MCG/ACT AERS INHALE 1 TO 2 PUFFS EVERY 4 TO 6 HOURS AS NEEDED.; 0; 11-Sep-2013; Active     • albuterol (PROVENTIL) (2.5 MG/3ML) 0.083% nebulizer solution Albuterol Sulfate (2.5 MG/3ML) 0.083% Inhalation Nebulization Solution; Patient Sig: Albuterol Sulfate (2.5 MG/3ML) 0.083% Inhalation Nebulization Solution USE 1 UNIT DOSE  IN NEBULIZER EVERY 4 TO 6 HOURS AS NEEDED.; 0; 11-Sep-2013; Active     • aspirin 81 MG tablet Take 1 tablet by mouth daily.     • furosemide (LASIX) 40 MG tablet TAKE 1 TABLET TWICE DAILY 60 tablet 5   • isosorbide mononitrate (IMDUR) 30 MG 24 hr tablet Take 1 tablet by mouth Daily. 30 tablet 6   • losartan (COZAAR) 50 MG tablet Take 50 mg by mouth Daily.  6   • magnesium oxide (MAGOX) 400 (241.3 MG) MG tablet tablet Take 1 tablet by mouth daily.     • meloxicam (MOBIC) 7.5 MG tablet Take 1 tablet by mouth 2 (two) times a day with meals.     • metFORMIN ER (GLUCOPHAGE-XR) 500 MG 24 hr tablet Take 500 mg by mouth Daily With Breakfast.  11   • metolazone (ZAROXOLYN) 2.5 MG tablet Take 2.5 mg by mouth Daily As Needed. 1 tablet 30 minutes prior to lasix as needed PRN       • montelukast (SINGULAIR) 10 MG tablet Take 10 mg by mouth Daily.     • nitroglycerin (NITROSTAT) 0.4 MG SL tablet Place 1 tablet under the tongue Every 5 (Five) Minutes As Needed for Chest Pain. 25 tablet 2   • NOVOLOG FLEXPEN 100 UNIT/ML solution pen-injector sc pen 15 units with lunch; SS nightly     • potassium chloride (K-DUR,KLOR-CON) 10 MEQ CR tablet 10 mEq. In evening  5   • potassium chloride (K-DUR,KLOR-CON) 20 MEQ CR tablet TAKE 1 TABLET BY MOUTH DAILY. IN THE EVENING (Patient taking differently: in am) 30 tablet 5   • Sotalol HCl AF 80 MG tablet Take 0.5 tablets by mouth 2 (Two) Times a Day. 30 tablet 5   • tiotropium (SPIRIVA) 18 MCG per inhalation capsule Place 1 capsule into inhaler and inhale daily.     • TRESIBA FLEXTOUCH 200 UNIT/ML solution pen-injector 100 Units Daily. 100 units daily     • triamterene-hydrochlorothiazide (MAXZIDE-25) 37.5-25 MG per tablet Take 1 tablet by mouth daily.       No current facility-administered medications for this visit.        Ace inhibitors; Buspirone; Ciprofloxacin; Citalopram; Clindamycin/lincomycin; Codeine; Diltiazem; Flecainide; Incruse ellipta [umeclidinium bromide]; Ketorolac tromethamine;  Levaquin [levofloxacin]; Lipitor [atorvastatin]; Liraglutide; Lisinopril; Lorazepam; Niacin; Sertraline; Statins; Sulfamethoxazole-trimethoprim; Trulicity [dulaglutide]; and Xigduo xr [dapagliflozin-metformin hcl er]    Past Medical History:   Diagnosis Date   • Arrhythmia    • Asthma    • CAD (coronary artery disease)     non-obstructive by cath 02/2012   • CHF (congestive heart failure)    • COPD (chronic obstructive pulmonary disease)    • Diabetes mellitus    • Diastolic dysfunction    • Dizziness    • Edema    • Esophageal spasm    • Hyperlipidemia     intolerant to statins    • Hypertension    • Osteoarthritis    • Palpitations    • SOB (shortness of breath)    • Stroke-like symptoms    • Unstable angina        Social History     Social History   • Marital status:      Spouse name: N/A   • Number of children: N/A   • Years of education: N/A     Occupational History   • Not on file.     Social History Main Topics   • Smoking status: Former Smoker     Packs/day: 1.50     Years: 41.00     Start date: 4/8/1968     Quit date: 3/22/2009   • Smokeless tobacco: Never Used   • Alcohol use No   • Drug use: No   • Sexual activity: No     Other Topics Concern   • Not on file     Social History Narrative   • No narrative on file       Family History   Problem Relation Age of Onset   • Heart attack Mother    • Heart failure Mother      congestive   • Hyperlipidemia Mother    • Hypertension Mother    • Peripheral vascular disease Mother    • Heart attack Father    • Heart failure Father      congestive   • Diabetes Father    • Hyperlipidemia Father    • Hypertension Father    • Peripheral vascular disease Father    • Heart failure Other      congestive   • Diabetes Other    • Hyperlipidemia Other    • Hypertension Other    • Peripheral vascular disease Other    • Hypertension Brother        Review of Systems   Constitutional: Negative.  Negative for fatigue.   HENT: Negative.  Negative for congestion, rhinorrhea,  "sneezing and sore throat.    Eyes: Positive for visual disturbance (wears glasses daily).   Respiratory: Positive for shortness of breath (always SOA; worse on exertion ). Negative for apnea, cough, chest tightness and wheezing.    Cardiovascular: Positive for chest pain (midsternal CP at random ) and palpitations (occasional flutters in PM). Negative for leg swelling.   Gastrointestinal: Negative.  Negative for abdominal distention, abdominal pain, nausea and vomiting.   Endocrine: Negative.  Negative for cold intolerance, heat intolerance, polyphagia and polyuria.   Genitourinary: Negative.  Negative for difficulty urinating, frequency and urgency.   Musculoskeletal: Positive for arthralgias (joints). Negative for back pain, myalgias, neck pain and neck stiffness.   Skin: Negative.  Negative for rash and wound.   Allergic/Immunologic: Negative.  Negative for environmental allergies (seasonal allergies) and food allergies.   Neurological: Negative.  Negative for dizziness (occasional dizziness), weakness, light-headedness and headaches.   Hematological: Bruises/bleeds easily (bruises easily).   Psychiatric/Behavioral: Negative.  Negative for agitation (easily aggitated), confusion and sleep disturbance (denies waking up smothering/SOA). The patient is not nervous/anxious.        Objective   Vitals:    05/15/18 1419   BP: 149/90   BP Location: Left arm   Patient Position: Sitting   Pulse: 110   SpO2: 92%   Weight: 127 kg (281 lb)   Height: 167.6 cm (66\")      /90 (BP Location: Left arm, Patient Position: Sitting)   Pulse 110   Ht 167.6 cm (66\")   Wt 127 kg (281 lb)   SpO2 92% Comment: @ 2L  BMI 45.35 kg/m²    Lab Results (most recent)     None        Physical Exam   Constitutional: She is oriented to person, place, and time. She appears well-developed and well-nourished. No distress (Patient appears dyspnic.  She is on O2 per NC.  The patient appears chronically ill. ).   HENT:   Head: Normocephalic and " atraumatic.   Eyes: Conjunctivae and EOM are normal. Pupils are equal, round, and reactive to light.   Neck: Normal range of motion. Neck supple. No JVD present. No tracheal deviation present.   Cardiovascular: Normal rate, regular rhythm, normal heart sounds and intact distal pulses.    Pulmonary/Chest: Tachypnea noted. She has decreased breath sounds. She has wheezes. She has rales (Dry.).   Abdominal: Soft. Bowel sounds are normal. She exhibits no distension and no mass. There is no tenderness. There is no rebound and no guarding.   Musculoskeletal: Normal range of motion. She exhibits no edema, tenderness or deformity.   Neurological: She is alert and oriented to person, place, and time.   Skin: Skin is warm and dry. No rash noted. No erythema. No pallor.   Psychiatric: She has a normal mood and affect. Her behavior is normal. Judgment and thought content normal.   Nursing note and vitals reviewed.        Procedure   Procedures       Assessment/Plan      Diagnosis Plan   1. Precordial pain     2. Shortness of breath     3. Palpitations         The patient stress and echo studies were basically unremarkable.  Her chest pain does persist.  She did have evidence of pericardial effusion by echo.  I will empirically treat that as a potential cause of chest pain.  We reviewed anti-inflammatories for pericarditis.  Her chest pain which is described as atypical for pericarditis, but I will empirically treated nonetheless.  We will give her NSAID therapy which is artery been called to her pharmacy.  I will see her back in 3 months for repeat evaluation.  We can reevaluate symptoms at that time.  She will call for any issues prior to that.         Patient's Body mass index is 45.35 kg/m². BMI is above normal parameters. Recommendations include: educational material and referral to primary care.             Electronically signed by:

## 2018-05-22 ENCOUNTER — TELEPHONE (OUTPATIENT)
Dept: CARDIOLOGY | Facility: CLINIC | Age: 68
End: 2018-05-22

## 2018-05-22 NOTE — TELEPHONE ENCOUNTER
----- Message from Maria A Baum LPN sent at 5/22/2018  9:34 AM EDT -----  Contact: PATIENT  STATED SAW PABLO LAST WEEK AND HE PUT HER  MG IBUPROFEN AND SHE WAS SUPPOSED TO CALL BACK THIS WEEK WITH SX UPDATE. STATES STILL WITH C/P AND PALPS WORSE. PLEASE CALL HER BACK [phone].

## 2018-05-22 NOTE — TELEPHONE ENCOUNTER
Called patient and discussed sx's with her. She stated fluttering and CP has increased since starting Ibuprofen. Flutters were worse and increased and still having mid sternal CP that is not radiating at this time.      After discussion with Mark Brito PA-C patient is to D/C Ibuprofen at this time and call me back on Thursday and let me know now she is doing. Patient verbalized understanding and had no further questions at this time. -;NATHANAEL

## 2018-05-24 ENCOUNTER — TELEPHONE (OUTPATIENT)
Dept: CARDIOLOGY | Facility: CLINIC | Age: 68
End: 2018-05-24

## 2018-05-24 RX ORDER — PREDNISONE 10 MG/1
TABLET ORAL
Qty: 10 TABLET | Refills: 0 | Status: SHIPPED | OUTPATIENT
Start: 2018-05-24 | End: 2018-08-15

## 2018-05-24 NOTE — TELEPHONE ENCOUNTER
After discussion with Mark Brito PA-C it was decided for patient to take Prednisone. Patient was notified of rx and to call back if this did not help. -SHAWNA;NATHANAEL

## 2018-05-24 NOTE — TELEPHONE ENCOUNTER
----- Message from Elsa Abarca MA sent at 5/24/2018  9:24 AM EDT -----  Patient called back stating that she is feeling better since stopping ibuprofen

## 2018-05-29 ENCOUNTER — TELEPHONE (OUTPATIENT)
Dept: CARDIOLOGY | Facility: CLINIC | Age: 68
End: 2018-05-29

## 2018-05-29 NOTE — TELEPHONE ENCOUNTER
Called patient and discussed s/s with her. She stated she was much better cardiac wise, however sugar was elevated. Patient was notified while on prednisone to watch her diet while on Prednisone and call with any cardiac issues. -SHAWNA;NATHANAEL

## 2018-05-29 NOTE — TELEPHONE ENCOUNTER
----- Message from Elsa Abarca MA sent at 5/29/2018 10:18 AM EDT -----  Patient called stating she is feeling ok with her heart but now she feels like crap because the prednisone has her sugar up to 300  Call back is [phone]

## 2018-07-11 ENCOUNTER — TELEPHONE (OUTPATIENT)
Dept: CARDIOLOGY | Facility: CLINIC | Age: 68
End: 2018-07-11

## 2018-07-11 DIAGNOSIS — R60.9 EDEMA, UNSPECIFIED TYPE: ICD-10-CM

## 2018-07-11 RX ORDER — POTASSIUM CHLORIDE 20 MEQ/1
20 TABLET, EXTENDED RELEASE ORAL EVERY EVENING
Qty: 30 TABLET | Refills: 5 | Status: SHIPPED | OUTPATIENT
Start: 2018-07-11 | End: 2019-02-18

## 2018-07-11 NOTE — TELEPHONE ENCOUNTER
----- Message from Elsa Abarca MA sent at 7/11/2018 11:34 AM EDT -----  Patient is needing potassium refill [facility]

## 2018-08-15 ENCOUNTER — OFFICE VISIT (OUTPATIENT)
Dept: CARDIOLOGY | Facility: CLINIC | Age: 68
End: 2018-08-15

## 2018-08-15 VITALS
BODY MASS INDEX: 43.98 KG/M2 | OXYGEN SATURATION: 94 % | WEIGHT: 280.2 LBS | SYSTOLIC BLOOD PRESSURE: 115 MMHG | DIASTOLIC BLOOD PRESSURE: 66 MMHG | HEIGHT: 67 IN

## 2018-08-15 DIAGNOSIS — R06.02 SHORTNESS OF BREATH: ICD-10-CM

## 2018-08-15 DIAGNOSIS — I10 ESSENTIAL HYPERTENSION: ICD-10-CM

## 2018-08-15 DIAGNOSIS — R00.2 PALPITATIONS: ICD-10-CM

## 2018-08-15 DIAGNOSIS — I25.10 CORONARY ARTERIOSCLEROSIS IN NATIVE ARTERY: Primary | ICD-10-CM

## 2018-08-15 PROCEDURE — 99213 OFFICE O/P EST LOW 20 MIN: CPT | Performed by: PHYSICIAN ASSISTANT

## 2018-08-15 RX ORDER — NABUMETONE 750 MG/1
750 TABLET, FILM COATED ORAL 2 TIMES DAILY PRN
Refills: 1 | COMMUNITY
Start: 2018-07-30 | End: 2019-02-18

## 2018-08-15 RX ORDER — DAPAGLIFLOZIN 10 MG/1
1 TABLET, FILM COATED ORAL DAILY
Refills: 3 | COMMUNITY
Start: 2018-07-11 | End: 2019-02-18

## 2018-08-15 NOTE — PROGRESS NOTES
Problem list     Subjective   Mandi Jacob is a 68 y.o. female     Chief Complaint   Patient presents with   • Follow-up     patient appears in office today for follow up    • Chest Pain   • Shortness of Breath   Problem List:  1. Nonobstructive CAD by cath February 2012.  2. Preserved systolic function  3. History of CHF  4. Diastolic dysfunction  5. Hypertension      6. Dyslipidemia. The patient reports she is intolerant to statins.      7. Chronic palpitations. Premature atrial contractions have been noted by telemetry, correlating with the patient's symptoms.    HPI  The patient presents today for follow-up.  She tells me that she has done progressively better at home.  She has started physical therapy for her back.  Now that she can be more mobile, she feels markedly improved.  Her palpitations have basically resolved.  Her dyspnea is chronic and severe, but at baseline secondary to pulmonary status.  She denies chest pain.  She has tachycardia only when overexerting.  She denies dizziness or syncope.  Blood pressures have been very well-controlled.  She feels better now than she has in several months.    Current Outpatient Prescriptions   Medication Sig Dispense Refill   • albuterol (PROVENTIL HFA;VENTOLIN HFA) 108 (90 BASE) MCG/ACT inhaler Albuterol 90 MCG/ACT AERS; Patient Sig: Albuterol 90 MCG/ACT AERS INHALE 1 TO 2 PUFFS EVERY 4 TO 6 HOURS AS NEEDED.; 0; 11-Sep-2013; Active     • albuterol (PROVENTIL) (2.5 MG/3ML) 0.083% nebulizer solution Albuterol Sulfate (2.5 MG/3ML) 0.083% Inhalation Nebulization Solution; Patient Sig: Albuterol Sulfate (2.5 MG/3ML) 0.083% Inhalation Nebulization Solution USE 1 UNIT DOSE IN NEBULIZER EVERY 4 TO 6 HOURS AS NEEDED.; 0; 11-Sep-2013; Active     • aspirin 81 MG tablet Take 1 tablet by mouth daily.     • FARXIGA 10 MG tablet Take 1 tablet by mouth Daily.  3   • furosemide (LASIX) 40 MG tablet TAKE 1 TABLET TWICE DAILY 60 tablet 5   • ibuprofen (ADVIL,MOTRIN) 600 MG tablet  Take 1 tablet by mouth 3 (Three) Times a Day. 30 tablet 0   • isosorbide mononitrate (IMDUR) 30 MG 24 hr tablet Take 1 tablet by mouth Daily. 30 tablet 6   • losartan (COZAAR) 50 MG tablet Take 50 mg by mouth Daily.  6   • magnesium oxide (MAGOX) 400 (241.3 MG) MG tablet tablet Take 1 tablet by mouth daily.     • metFORMIN ER (GLUCOPHAGE-XR) 500 MG 24 hr tablet Take 500 mg by mouth Daily With Breakfast.  11   • metolazone (ZAROXOLYN) 2.5 MG tablet Take 2.5 mg by mouth Daily As Needed. 1 tablet 30 minutes prior to lasix as needed PRN       • metoprolol tartrate (LOPRESSOR) 25 MG tablet Take 25 mg by mouth 2 (Two) Times a Day.  1   • montelukast (SINGULAIR) 10 MG tablet Take 10 mg by mouth Daily.     • nabumetone (RELAFEN) 750 MG tablet Take 750 mg by mouth 2 (Two) Times a Day As Needed.  1   • nitroglycerin (NITROSTAT) 0.4 MG SL tablet Place 1 tablet under the tongue Every 5 (Five) Minutes As Needed for Chest Pain. 25 tablet 2   • NOVOLOG FLEXPEN 100 UNIT/ML solution pen-injector sc pen 15 units with lunch; SS nightly     • potassium chloride (K-DUR,KLOR-CON) 10 MEQ CR tablet 10 mEq. In evening  5   • potassium chloride (K-DUR,KLOR-CON) 20 MEQ CR tablet Take 1 tablet by mouth Every Evening. 30 tablet 5   • Sotalol HCl AF 80 MG tablet Take 0.5 tablets by mouth 2 (Two) Times a Day. 30 tablet 5   • tiotropium (SPIRIVA) 18 MCG per inhalation capsule Place 1 capsule into inhaler and inhale daily.     • TRESIBA FLEXTOUCH 200 UNIT/ML solution pen-injector 100 Units Daily. 100 units daily     • triamterene-hydrochlorothiazide (MAXZIDE-25) 37.5-25 MG per tablet Take 1 tablet by mouth daily.       No current facility-administered medications for this visit.        Ace inhibitors; Buspirone; Ciprofloxacin; Citalopram; Clindamycin/lincomycin; Codeine; Diltiazem; Flecainide; Incruse ellipta [umeclidinium bromide]; Ketorolac tromethamine; Levaquin [levofloxacin]; Lipitor [atorvastatin]; Liraglutide; Lisinopril; Lorazepam; Niacin;  Sertraline; Statins; Sulfamethoxazole-trimethoprim; Trulicity [dulaglutide]; and Xigduo xr [dapagliflozin-metformin hcl er]    Past Medical History:   Diagnosis Date   • Arrhythmia    • Asthma    • CAD (coronary artery disease)     non-obstructive by cath 02/2012   • CHF (congestive heart failure) (CMS/[facility])    • COPD (chronic obstructive pulmonary disease) (CMS/[facility])    • Diabetes mellitus (CMS/[facility])    • Diastolic dysfunction    • Dizziness    • Edema    • Esophageal spasm    • Hyperlipidemia     intolerant to statins    • Hypertension    • Osteoarthritis    • Palpitations    • SOB (shortness of breath)    • Stroke-like symptoms    • Unstable angina (CMS/[facility])        Social History     Social History   • Marital status:      Spouse name: N/A   • Number of children: N/A   • Years of education: N/A     Occupational History   • Not on file.     Social History Main Topics   • Smoking status: Former Smoker     Packs/day: 1.50     Years: 41.00     Start date: 4/8/1968     Quit date: 3/22/2009   • Smokeless tobacco: Never Used   • Alcohol use No   • Drug use: No   • Sexual activity: No     Other Topics Concern   • Not on file     Social History Narrative   • No narrative on file       Family History   Problem Relation Age of Onset   • Heart attack Mother    • Heart failure Mother         congestive   • Hyperlipidemia Mother    • Hypertension Mother    • Peripheral vascular disease Mother    • Heart attack Father    • Heart failure Father         congestive   • Diabetes Father    • Hyperlipidemia Father    • Hypertension Father    • Peripheral vascular disease Father    • Heart failure Other         congestive   • Diabetes Other    • Hyperlipidemia Other    • Hypertension Other    • Peripheral vascular disease Other    • Hypertension Brother        Review of Systems   Constitutional: Negative.  Negative for fatigue.   HENT: Negative.  Negative for congestion, rhinorrhea, sneezing and sore throat.    Eyes: Positive for  "visual disturbance (wears glasses daily).   Respiratory: Positive for shortness of breath (easily SOA; worse on exertion ). Negative for apnea, cough, chest tightness and wheezing.         Wears o2 continuous     Cardiovascular: Negative.  Negative for chest pain (denies CP since last OV), palpitations (denies palpitations) and leg swelling.   Gastrointestinal: Negative.  Negative for abdominal distention, abdominal pain, nausea and vomiting.   Endocrine: Negative.  Negative for cold intolerance, heat intolerance, polyphagia and polyuria.   Genitourinary: Positive for frequency (urine frequency due to water pill). Negative for difficulty urinating and urgency.   Musculoskeletal: Positive for arthralgias (joints). Negative for back pain, myalgias, neck pain and neck stiffness.   Skin: Negative.  Negative for rash and wound.   Allergic/Immunologic: Positive for environmental allergies (seasonal allergies). Negative for food allergies.   Neurological: Negative.  Negative for dizziness, syncope, weakness, light-headedness and headaches.   Hematological: Bruises/bleeds easily (bruises easily).   Psychiatric/Behavioral: Positive for agitation (easily agitated ). Negative for confusion and sleep disturbance (denies waking up smothering/SOA). The patient is not nervous/anxious.        Objective   Vitals:    08/15/18 1308   BP: 115/66   BP Location: Left arm   Patient Position: Sitting   SpO2: 94%   Weight: 127 kg (280 lb 3.2 oz)   Height: 170.2 cm (67\")      /66 (BP Location: Left arm, Patient Position: Sitting)   Ht 170.2 cm (67\")   Wt 127 kg (280 lb 3.2 oz)   SpO2 94% Comment: @2L  BMI 43.89 kg/m²    Lab Results (most recent)     None        Physical Exam   Constitutional: She is oriented to person, place, and time. She appears well-developed and well-nourished. No distress (Patient appears dyspnic.  She is on O2 per NC.  The patient appears chronically ill. ).   HENT:   Head: Normocephalic and atraumatic. "   Eyes: Pupils are equal, round, and reactive to light. Conjunctivae and EOM are normal.   Neck: Normal range of motion. Neck supple. No JVD present. No tracheal deviation present.   Cardiovascular: Normal rate, regular rhythm, normal heart sounds and intact distal pulses.    Pulmonary/Chest: Tachypnea noted. She has decreased breath sounds. She has wheezes. She has rales (Dry.).   Abdominal: Soft. Bowel sounds are normal. She exhibits no distension and no mass. There is no tenderness. There is no rebound and no guarding.   Musculoskeletal: Normal range of motion. She exhibits no edema, tenderness or deformity.   Neurological: She is alert and oriented to person, place, and time.   Skin: Skin is warm and dry. No rash noted. No erythema. No pallor.   Psychiatric: She has a normal mood and affect. Her behavior is normal. Judgment and thought content normal.   Nursing note and vitals reviewed.        Procedure   Procedures       Assessment/Plan      Diagnosis Plan   1. Coronary arteriosclerosis in native artery     2. Essential hypertension     3. Palpitations     4. Shortness of breath         The patient is stable at this time.  She feels that she is doing very well from cardiovascular standpoint.  She has no specific cardiac issues.  I will make no changes.  We can see the patient back in 6 months, sooner if indicated by course.           Patient's Body mass index is 43.89 kg/m². BMI is above normal parameters. Recommendations include: educational material and referral to primary care.             Electronically signed by:

## 2018-08-15 NOTE — PATIENT INSTRUCTIONS

## 2018-09-10 ENCOUNTER — TELEPHONE (OUTPATIENT)
Dept: CARDIOLOGY | Facility: CLINIC | Age: 68
End: 2018-09-10

## 2018-09-10 NOTE — TELEPHONE ENCOUNTER
Called patient @ 0752 PM; she stated her PCP started her on Praluent 75 mg every 2 weeks injectable. -SHAWNA;NATHANAEL

## 2018-09-10 NOTE — TELEPHONE ENCOUNTER
----- Message from Maria A Baum LPN sent at 9/10/2018  3:12 PM EDT -----  PATIENT L/M STATED SHE SAW GOLDY BUTLER AND WAS TOLD BY HER TO LET OUR OFFICE KNOW SHE IS STARTING HER ON PRALUENT 75 MG TWICE WEEKLY FOR HER CHOLESTEROL.

## 2018-09-12 ENCOUNTER — TELEPHONE (OUTPATIENT)
Dept: CARDIOLOGY | Facility: CLINIC | Age: 68
End: 2018-09-12

## 2018-09-12 NOTE — TELEPHONE ENCOUNTER
Called patient, she stated she had labs done last month at Dr Montelongo's office. PCP to fax labs to office for med NATHANAEL [facility]     ----- Message from Maria A Baum LPN sent at 9/12/2018  1:34 PM EDT -----  NANO FROM [facility] NEEDING RECENT LABS AND OFFICE NOTES FAXED TO THEM [phone].

## 2018-09-26 DIAGNOSIS — R07.9 CHEST PAIN, UNSPECIFIED TYPE: ICD-10-CM

## 2018-09-26 RX ORDER — ISOSORBIDE MONONITRATE 30 MG/1
TABLET, EXTENDED RELEASE ORAL
Qty: 90 TABLET | Refills: 11 | Status: SHIPPED | OUTPATIENT
Start: 2018-09-26 | End: 2019-10-23 | Stop reason: SDUPTHER

## 2019-01-14 ENCOUNTER — TELEPHONE (OUTPATIENT)
Dept: CARDIOLOGY | Facility: CLINIC | Age: 69
End: 2019-01-14

## 2019-01-14 NOTE — TELEPHONE ENCOUNTER
----- Message from Bisi Barker sent at 1/11/2019 10:50 AM EST -----  Regarding: MEDICATIONS  Contact: [phone]  LORENE @ DR MORALES OFFICE CALLED AND WAS CONCERNED ABOUT A POSSIBLE INTERACTION BETWEEN SOTALOL  AND LEXAPRO. THE PT IS DOING WELL ON LEXAPRO AND WANTS TO CONTINUE BUT DR GALVEZ JUST WANTED TO MAKE SURE THAT WOULD BE OKAY WITH PABLO.     OFFICE IS CLOSED UNTIL Monday.

## 2019-01-14 NOTE — TELEPHONE ENCOUNTER
Comments   01/14/2019: spoke with Sheeba @ 0154 PM, notified her provider reviewed patients chart and advised it is okay for patient to continue Sotalol and Lexapro. -SHAWNA;NATHANAEL  01/11/2019: tried returning call @ 1143 AM. Office closed, will try again on Monday. Patient is okay to proceed with taking both Lexapro and Sotalol. -SHAWNA;NATHANAEL        01/14/2019: called @ 1151 AM and left message to return call. -SHAWNA;NATHANAEL

## 2019-02-18 ENCOUNTER — OFFICE VISIT (OUTPATIENT)
Dept: CARDIOLOGY | Facility: CLINIC | Age: 69
End: 2019-02-18

## 2019-02-18 VITALS
HEART RATE: 85 BPM | WEIGHT: 286.6 LBS | BODY MASS INDEX: 46.06 KG/M2 | DIASTOLIC BLOOD PRESSURE: 72 MMHG | SYSTOLIC BLOOD PRESSURE: 139 MMHG | HEIGHT: 66 IN | OXYGEN SATURATION: 95 %

## 2019-02-18 DIAGNOSIS — R06.02 SHORTNESS OF BREATH: Primary | ICD-10-CM

## 2019-02-18 DIAGNOSIS — R42 DIZZINESS: ICD-10-CM

## 2019-02-18 DIAGNOSIS — R00.2 PALPITATIONS: ICD-10-CM

## 2019-02-18 PROCEDURE — 99213 OFFICE O/P EST LOW 20 MIN: CPT | Performed by: PHYSICIAN ASSISTANT

## 2019-02-18 PROCEDURE — 93000 ELECTROCARDIOGRAM COMPLETE: CPT | Performed by: PHYSICIAN ASSISTANT

## 2019-02-18 RX ORDER — CELECOXIB 200 MG/1
200 CAPSULE ORAL EVERY MORNING
COMMUNITY
End: 2019-10-15

## 2019-02-18 NOTE — PATIENT INSTRUCTIONS
"Fat and Cholesterol Restricted Diet  Getting too much fat and cholesterol in your diet may cause health problems. Following this diet helps keep your fat and cholesterol at normal levels. This can keep you from getting sick.  What types of fat should I choose?  · Choose monosaturated and polyunsaturated fats. These are found in foods such as olive oil, canola oil, flaxseeds, walnuts, almonds, and seeds.  · Eat more omega-3 fats. Good choices include salmon, mackerel, sardines, tuna, flaxseed oil, and ground flaxseeds.  · Limit saturated fats. These are in animal products such as meats, butter, and cream. They can also be in plant products such as palm oil, palm kernel oil, and coconut oil.  · Avoid foods with partially hydrogenated oils in them. These contain trans fats. Examples of foods that have trans fats are stick margarine, some tub margarines, cookies, crackers, and other baked goods.  What general guidelines do I need to follow?  · Check food labels. Look for the words \"trans fat\" and \"saturated fat.\"  · When preparing a meal:  ? Fill half of your plate with vegetables and green salads.  ? Fill one fourth of your plate with whole grains. Look for the word \"whole\" as the first word in the ingredient list.  ? Fill one fourth of your plate with lean protein foods.  · Eat more foods that have fiber, like apples, carrots, beans, peas, and barley.  · Eat more home-cooked foods. Eat less at restaurants and buffets.  · Limit or avoid alcohol.  · Limit foods high in starch and sugar.  · Limit fried foods.  · Cook foods without frying them. Baking, boiling, grilling, and broiling are all great options.  · Lose weight if you are overweight. Losing even a small amount of weight can help your overall health. It can also help prevent diseases such as diabetes and heart disease.  What foods can I eat?  Grains  Whole grains, such as whole wheat or whole grain breads, crackers, cereals, and pasta. Unsweetened oatmeal, " bulgur, barley, quinoa, or brown rice. Corn or whole wheat flour tortillas.  Vegetables  Fresh or frozen vegetables (raw, steamed, roasted, or grilled). Green salads.  Fruits  All fresh, canned (in natural juice), or frozen fruits.  Meat and Other Protein Products  Ground beef (85% or leaner), grass-fed beef, or beef trimmed of fat. Skinless chicken or turkey. Ground chicken or turkey. Pork trimmed of fat. All fish and seafood. Eggs. Dried beans, peas, or lentils. Unsalted nuts or seeds. Unsalted canned or dry beans.  Dairy  Low-fat dairy products, such as skim or 1% milk, 2% or reduced-fat cheeses, low-fat ricotta or cottage cheese, or plain low-fat yogurt.  Fats and Oils  Tub margarines without trans fats. Light or reduced-fat mayonnaise and salad dressings. Avocado. Olive, canola, sesame, or safflower oils. Natural peanut or almond butter (choose ones without added sugar and oil).  The items listed above may not be a complete list of recommended foods or beverages. Contact your dietitian for more options.  What foods are not recommended?  Grains  White bread. White pasta. White rice. Cornbread. Bagels, pastries, and croissants. Crackers that contain trans fat.  Vegetables  White potatoes. Corn. Creamed or fried vegetables. Vegetables in a cheese sauce.  Fruits  Dried fruits. Canned fruit in light or heavy syrup. Fruit juice.  Meat and Other Protein Products  Fatty cuts of meat. Ribs, chicken wings, perez, sausage, bologna, salami, chitterlings, fatback, hot dogs, bratwurst, and packaged luncheon meats. Liver and organ meats.  Dairy  Whole or 2% milk, cream, half-and-half, and cream cheese. Whole milk cheeses. Whole-fat or sweetened yogurt. Full-fat cheeses. Nondairy creamers and whipped toppings. Processed cheese, cheese spreads, or cheese curds.  Sweets and Desserts  Corn syrup, sugars, honey, and molasses. Candy. Jam and jelly. Syrup. Sweetened cereals. Cookies, pies, cakes, donuts, muffins, and ice  cream.  Fats and Oils  Butter, stick margarine, lard, shortening, ghee, or perez fat. Coconut, palm kernel, or palm oils.  Beverages  Alcohol. Sweetened drinks (such as sodas, lemonade, and fruit drinks or punches).  The items listed above may not be a complete list of foods and beverages to avoid. Contact your dietitian for more information.  This information is not intended to replace advice given to you by your health care provider. Make sure you discuss any questions you have with your health care provider.  Document Released: 06/18/2013 Document Revised: 08/24/2017 Document Reviewed: 03/19/2015  [employer] Interactive Patient Education © 2018 [employer]  BMI for Adults  Body mass index (BMI) is a number that is calculated from a person's weight and height. In most adults, the number is used to find how much of an adult's weight is made up of fat. BMI is not as accurate as a direct measure of body fat.  How is BMI calculated?  BMI is calculated by dividing weight in kilograms by height in meters squared. It can also be calculated by dividing weight in pounds by height in inches squared, then multiplying the resulting number by 703. Charts are available to help you find your BMI quickly and easily without doing this calculation.  How is BMI interpreted?  Health care professionals use BMI charts to identify whether an adult is underweight, at a normal weight, or overweight based on the following guidelines:  · Underweight: BMI less than 18.5.  · Normal weight: BMI between 18.5 and 24.9.  · Overweight: BMI between 25 and 29.9.  · Obese: BMI of 30 and above.    BMI is usually interpreted the same for males and females.  Weight includes both fat and muscle, so someone with a muscular build, such as an athlete, may have a BMI that is higher than 24.9. In cases like these, BMI may not accurately depict body fat. To determine if excess body fat is the cause of a BMI of 25 or higher, further assessments may need to be  done by a health care provider.  Why is BMI a useful tool?  BMI is used to identify a possible weight problem that may be related to a medical problem or may increase the risk for medical problems. BMI can also be used to promote changes to reach a healthy weight.  This information is not intended to replace advice given to you by your health care provider. Make sure you discuss any questions you have with your health care provider.  Document Released: 08/29/2005 Document Revised: 04/27/2017 Document Reviewed: 05/15/2015  [employer] Interactive Patient Education © 2018 [employer]

## 2019-02-18 NOTE — PROGRESS NOTES
Problem list     Subjective   Mandi Jacob is a 68 y.o. female     Chief Complaint   Patient presents with   • Coronary Artery Disease   • Shortness of Breath   Problem List:  1. Nonobstructive CAD by cath February 2012.  2. Preserved systolic function  3. History of CHF  4. Diastolic dysfunction  5. Hypertension      6. Dyslipidemia. The patient reports she is intolerant to statins.      7. Chronic palpitations. Premature atrial contractions have been noted by telemetry, correlating with the patient's symptoms.       HPI  The patient presents in today for routine evaluation follow-up.  She has had increasing dizziness recently.  She described mostly vertigo but describes orthostatic changes at times as well.  She does get presyncopal.  She has had no syncopal episodes.  She has only rare episodes of chest discomfort at this time which she feels is mostly pulmonary related.  She has chronic severe dyspnea again which she feels is pulmonary related.  She has no PND orthopnea.  She has stable lower extremity edema.  She has chronic palpitations which historically have correlated more PACs.  The patient has no further complaints otherwise.  For the most part, she feels at baseline.    Current Outpatient Medications   Medication Sig Dispense Refill   • albuterol (PROVENTIL HFA;VENTOLIN HFA) 108 (90 BASE) MCG/ACT inhaler Albuterol 90 MCG/ACT AERS; Patient Sig: Albuterol 90 MCG/ACT AERS INHALE 1 TO 2 PUFFS EVERY 4 TO 6 HOURS AS NEEDED.; 0; 11-Sep-2013; Active     • albuterol (PROVENTIL) (2.5 MG/3ML) 0.083% nebulizer solution Albuterol Sulfate (2.5 MG/3ML) 0.083% Inhalation Nebulization Solution; Patient Sig: Albuterol Sulfate (2.5 MG/3ML) 0.083% Inhalation Nebulization Solution USE 1 UNIT DOSE IN NEBULIZER EVERY 4 TO 6 HOURS AS NEEDED.; 0; 11-Sep-2013; Active     • Alirocumab 75 MG/ML solution pen-injector Inject 75 mg under the skin into the appropriate area as directed Every 14 (Fourteen) Days. 1 pen 2   • aspirin 81  MG tablet Take 1 tablet by mouth daily.     • celecoxib (CeleBREX) 200 MG capsule Take 200 mg by mouth Daily.     • furosemide (LASIX) 40 MG tablet TAKE 1 TABLET TWICE DAILY 60 tablet 5   • ibuprofen (ADVIL,MOTRIN) 600 MG tablet Take 1 tablet by mouth 3 (Three) Times a Day. 30 tablet 0   • isosorbide mononitrate (IMDUR) 30 MG 24 hr tablet TAKE 1 TABLET DAILY 90 tablet 11   • losartan (COZAAR) 50 MG tablet Take 50 mg by mouth Daily.  6   • magnesium oxide (MAGOX) 400 (241.3 MG) MG tablet tablet Take 1 tablet by mouth daily.     • metFORMIN ER (GLUCOPHAGE-XR) 500 MG 24 hr tablet Take 500 mg by mouth Daily With Breakfast.  11   • metolazone (ZAROXOLYN) 2.5 MG tablet Take 2.5 mg by mouth Daily As Needed. 1 tablet 30 minutes prior to lasix as needed PRN       • metoprolol tartrate (LOPRESSOR) 25 MG tablet Take 25 mg by mouth 2 (Two) Times a Day.  1   • montelukast (SINGULAIR) 10 MG tablet Take 10 mg by mouth Daily.     • nitroglycerin (NITROSTAT) 0.4 MG SL tablet Place 1 tablet under the tongue Every 5 (Five) Minutes As Needed for Chest Pain. 25 tablet 2   • NOVOLOG FLEXPEN 100 UNIT/ML solution pen-injector sc pen 20 Units. 20 units with lunch; SS nightly     • potassium chloride (K-DUR,KLOR-CON) 10 MEQ CR tablet 10 mEq. 20meq qam and 10meq at night  5   • Sotalol HCl AF 80 MG tablet Take 0.5 tablets by mouth 2 (Two) Times a Day. 30 tablet 5   • tiotropium (SPIRIVA) 18 MCG per inhalation capsule Place 1 capsule into inhaler and inhale daily.     • TRESIBA FLEXTOUCH 200 UNIT/ML solution pen-injector 100 Units Daily. 100 units daily     • triamterene-hydrochlorothiazide (MAXZIDE-25) 37.5-25 MG per tablet Take 1 tablet by mouth daily.       No current facility-administered medications for this visit.        Ace inhibitors; Buspirone; Ciprofloxacin; Citalopram; Clindamycin/lincomycin; Codeine; Diltiazem; Flecainide; Incruse ellipta [umeclidinium bromide]; Ketorolac tromethamine; Levaquin [levofloxacin]; Lipitor  [atorvastatin]; Liraglutide; Lisinopril; Lorazepam; Niacin; Sertraline; Statins; Sulfamethoxazole-trimethoprim; Trulicity [dulaglutide]; and Xigduo xr [dapagliflozin-metformin hcl er]    Past Medical History:   Diagnosis Date   • Arrhythmia    • Asthma    • CAD (coronary artery disease)     non-obstructive by cath 2012   • CHF (congestive heart failure) (CMS/[facility])    • COPD (chronic obstructive pulmonary disease) (CMS/[facility])    • Diabetes mellitus (CMS/[facility])    • Diastolic dysfunction    • Dizziness    • Edema    • Esophageal spasm    • Herniated lumbar intervertebral disc     L5   • Hyperlipidemia     intolerant to statins    • Hypertension    • Osteoarthritis    • Palpitations    • SOB (shortness of breath)    • Stroke-like symptoms    • TIA (transient ischemic attack)    • Unstable angina (CMS/[facility])        Social History     Socioeconomic History   • Marital status:      Spouse name: Not on file   • Number of children: Not on file   • Years of education: Not on file   • Highest education level: Not on file   Social Needs   • Financial resource strain: Not on file   • Food insecurity - worry: Not on file   • Food insecurity - inability: Not on file   • Transportation needs - medical: Not on file   • Transportation needs - non-medical: Not on file   Occupational History   • Not on file   Tobacco Use   • Smoking status: Former Smoker     Packs/day: 1.50     Years: 41.00     Pack years: 61.50     Start date: 1968     Last attempt to quit: 3/22/2009     Years since quittin.9   • Smokeless tobacco: Never Used   Substance and Sexual Activity   • Alcohol use: No   • Drug use: No   • Sexual activity: No   Other Topics Concern   • Not on file   Social History Narrative   • Not on file       Family History   Problem Relation Age of Onset   • Heart attack Mother    • Heart failure Mother         congestive   • Hyperlipidemia Mother    • Hypertension Mother    • Peripheral vascular disease Mother    • Heart  attack Father    • Heart failure Father         congestive   • Diabetes Father    • Hyperlipidemia Father    • Hypertension Father    • Peripheral vascular disease Father    • Heart failure Other         congestive   • Diabetes Other    • Hyperlipidemia Other    • Hypertension Other    • Peripheral vascular disease Other    • Hypertension Brother        Review of Systems   Constitutional: Positive for fatigue (Tired daily ). Negative for chills and fever.   HENT: Positive for rhinorrhea (Runny nose ). Negative for congestion and sore throat.    Eyes: Positive for visual disturbance. Photophobia: Glasses daily    Respiratory: Positive for chest tightness (Was having some chest tightness while on Lexapro, but it has eased up since stopping it. ) and shortness of breath (With cold temperatures ).         Uses oxygen at 2lpm per nasal cannula 24/7   Cardiovascular: Negative.  Negative for chest pain, palpitations and leg swelling.   Gastrointestinal: Negative.  Negative for abdominal pain, nausea and vomiting.   Endocrine: Positive for heat intolerance (Always hot ). Negative for cold intolerance.   Genitourinary: Positive for frequency (On diuretics ). Negative for dysuria and urgency.   Musculoskeletal: Positive for arthralgias (Joints ) and back pain (Lower back pain ).   Skin: Negative.  Negative for rash and wound.   Allergic/Immunologic: Negative.  Negative for environmental allergies and food allergies.   Neurological: Positive for light-headedness (Occasional lightheadedness ). Negative for dizziness and weakness.   Hematological: Bruises/bleeds easily (Bruising and bleeds easily ).   Psychiatric/Behavioral: Positive for agitation (Easily agitated ). Negative for confusion and sleep disturbance (Denies waking with smothering or SOA). The patient is not nervous/anxious.        Objective   Vitals:    02/18/19 1302   BP: 139/72   BP Location: Left arm   Patient Position: Sitting   Pulse: 85   SpO2: 95%   Weight:  "130 kg (286 lb 9.6 oz)   Height: 167.6 cm (66\")      /72 (BP Location: Left arm, Patient Position: Sitting)   Pulse 85   Ht 167.6 cm (66\")   Wt 130 kg (286 lb 9.6 oz)   SpO2 95%   BMI 46.26 kg/m²    Lab Results (most recent)     None        Physical Exam   Constitutional: She is oriented to person, place, and time. She appears well-developed and well-nourished. No distress (Patient appears dyspnic.  She is on O2 per NC.  The patient appears chronically ill. ).   HENT:   Head: Normocephalic and atraumatic.   Eyes: Conjunctivae and EOM are normal. Pupils are equal, round, and reactive to light.   Neck: Normal range of motion. Neck supple. No JVD present. No tracheal deviation present.   Cardiovascular: Normal rate, regular rhythm, normal heart sounds and intact distal pulses.  Extrasystoles are present.   Pulmonary/Chest: Tachypnea noted. She has decreased breath sounds. She has wheezes. She has rales (Dry.).   Abdominal: Soft. Bowel sounds are normal. She exhibits no distension and no mass. There is no tenderness. There is no rebound and no guarding.   Musculoskeletal: Normal range of motion. She exhibits no edema, tenderness or deformity.   Neurological: She is alert and oriented to person, place, and time.   Skin: Skin is warm and dry. No rash noted. No erythema. No pallor.   Psychiatric: She has a normal mood and affect. Her behavior is normal. Judgment and thought content normal.   Nursing note and vitals reviewed.        Procedure     ECG 12 Lead  Date/Time: 2/18/2019 1:18 PM  Performed by: Mark Brito PA  Authorized by: Mark Brito PA   Comments: SOA    Wandering atrial pacemaker, normal axis, minor nonspecific ST-T wave changes, overall low voltage in precordial leads, no acute changes noted.               Assessment/Plan      Diagnosis Plan   1. Shortness of breath  ECG 12 Lead    Adult Transthoracic Echo Complete W/ Cont if Necessary Per Protocol    Cardiac Event Monitor    Duplex " Carotid Ultrasound CAR   2. Palpitations  Adult Transthoracic Echo Complete W/ Cont if Necessary Per Protocol    Cardiac Event Monitor    Duplex Carotid Ultrasound CAR   3. Dizziness  Adult Transthoracic Echo Complete W/ Cont if Necessary Per Protocol    Cardiac Event Monitor    Duplex Carotid Ultrasound CAR     With increasing episodes of dizziness and presyncope, I will schedule the patient for an event monitor to evaluate for any further dysrhythmic activity.  She will need a carotid duplex as well.  I would like to schedule for an echo to evaluate her structurally because of symptoms and issues otherwise as above.  For now, I will make no changes in medical regimen.  We can see her back after the above and recommend further at that time.  She will call for any issues prior to follow-up.          Patient's Body mass index is 46.26 kg/m². BMI is above normal parameters. Recommendations include: educational material and referral to primary care.             Electronically signed by:

## 2019-02-27 ENCOUNTER — OUTSIDE FACILITY SERVICE (OUTPATIENT)
Dept: CARDIOLOGY | Facility: CLINIC | Age: 69
End: 2019-02-27

## 2019-02-27 PROCEDURE — 93228 REMOTE 30 DAY ECG REV/REPORT: CPT | Performed by: INTERNAL MEDICINE

## 2019-03-05 ENCOUNTER — TELEPHONE (OUTPATIENT)
Dept: CARDIOLOGY | Facility: CLINIC | Age: 69
End: 2019-03-05

## 2019-03-05 NOTE — TELEPHONE ENCOUNTER
Called and spoke with patient, notified her that monitor results had been received and she is still have episodes of A-Fib. Provider Mark Brito PA-C has reviewed chart and medications and verbal order was given to increase Sotalol to 80 mg BID. Patient verbalized understanding and had no further questions at this time, notified to call office if she has any new issues or questions. -;NATHANAEL

## 2019-03-18 ENCOUNTER — HOSPITAL ENCOUNTER (OUTPATIENT)
Dept: CARDIOLOGY | Facility: HOSPITAL | Age: 69
Discharge: HOME | End: 2019-03-18
Admitting: PHYSICIAN ASSISTANT

## 2019-03-18 ENCOUNTER — HOSPITAL ENCOUNTER (OUTPATIENT)
Dept: CARDIOLOGY | Facility: HOSPITAL | Age: 69
Discharge: HOME | End: 2019-03-18

## 2019-03-18 DIAGNOSIS — R00.2 PALPITATIONS: ICD-10-CM

## 2019-03-18 DIAGNOSIS — R42 DIZZINESS: ICD-10-CM

## 2019-03-18 DIAGNOSIS — R06.02 SHORTNESS OF BREATH: ICD-10-CM

## 2019-03-18 LAB
BH CV ECHO MEAS - ACS: 1.9 CM
BH CV ECHO MEAS - AO MEAN PG: 4 MMHG
BH CV ECHO MEAS - AO ROOT AREA (BSA CORRECTED): 1.2
BH CV ECHO MEAS - AO ROOT AREA: 6 CM^2
BH CV ECHO MEAS - AO ROOT DIAM: 2.8 CM
BH CV ECHO MEAS - AO V2 MEAN: 93.9 CM/SEC
BH CV ECHO MEAS - AO V2 VTI: 30.5 CM
BH CV ECHO MEAS - BSA(HAYCOCK): 2.5 M^2
BH CV ECHO MEAS - BSA(HAYCOCK): 2.5 M^2
BH CV ECHO MEAS - BSA: 2.3 M^2
BH CV ECHO MEAS - BSA: 2.3 M^2
BH CV ECHO MEAS - BZI_BMI: 46.2 KILOGRAMS/M^2
BH CV ECHO MEAS - BZI_BMI: 46.2 KILOGRAMS/M^2
BH CV ECHO MEAS - BZI_METRIC_HEIGHT: 167.6 CM
BH CV ECHO MEAS - BZI_METRIC_HEIGHT: 167.6 CM
BH CV ECHO MEAS - BZI_METRIC_WEIGHT: 129.7 KG
BH CV ECHO MEAS - BZI_METRIC_WEIGHT: 129.7 KG
BH CV ECHO MEAS - EDV(CUBED): 50.2 ML
BH CV ECHO MEAS - EDV(MOD-SP4): 38 ML
BH CV ECHO MEAS - EDV(TEICH): 57.7 ML
BH CV ECHO MEAS - EF(CUBED): 68.2 %
BH CV ECHO MEAS - EF(MOD-SP4): 31.6 %
BH CV ECHO MEAS - EF(TEICH): 60.6 %
BH CV ECHO MEAS - ESV(CUBED): 16 ML
BH CV ECHO MEAS - ESV(MOD-SP4): 26 ML
BH CV ECHO MEAS - ESV(TEICH): 22.7 ML
BH CV ECHO MEAS - FS: 31.7 %
BH CV ECHO MEAS - IVS/LVPW: 1
BH CV ECHO MEAS - IVSD: 1.5 CM
BH CV ECHO MEAS - LA DIMENSION: 4.8 CM
BH CV ECHO MEAS - LA/AO: 1.8
BH CV ECHO MEAS - LV DIASTOLIC VOL/BSA (35-75): 16.3 ML/M^2
BH CV ECHO MEAS - LV IVRT: 0.1 SEC
BH CV ECHO MEAS - LV MASS(C)D: 209.1 GRAMS
BH CV ECHO MEAS - LV MASS(C)DI: 89.8 GRAMS/M^2
BH CV ECHO MEAS - LV SYSTOLIC VOL/BSA (12-30): 11.2 ML/M^2
BH CV ECHO MEAS - LVIDD: 3.7 CM
BH CV ECHO MEAS - LVIDS: 2.5 CM
BH CV ECHO MEAS - LVLD AP4: 5.8 CM
BH CV ECHO MEAS - LVLS AP4: 5.5 CM
BH CV ECHO MEAS - LVOT AREA (M): 4.5 CM^2
BH CV ECHO MEAS - LVOT AREA: 4.5 CM^2
BH CV ECHO MEAS - LVOT DIAM: 2.4 CM
BH CV ECHO MEAS - LVPWD: 1.5 CM
BH CV ECHO MEAS - MV A MAX VEL: 82.9 CM/SEC
BH CV ECHO MEAS - MV DEC SLOPE: 504.7 CM/SEC^2
BH CV ECHO MEAS - MV E MAX VEL: 106.6 CM/SEC
BH CV ECHO MEAS - MV E/A: 1.3
BH CV ECHO MEAS - RVDD: 3 CM
BH CV ECHO MEAS - SI(AO): 78.5 ML/M^2
BH CV ECHO MEAS - SI(CUBED): 14.7 ML/M^2
BH CV ECHO MEAS - SI(MOD-SP4): 5.2 ML/M^2
BH CV ECHO MEAS - SI(TEICH): 15 ML/M^2
BH CV ECHO MEAS - SV(AO): 182.7 ML
BH CV ECHO MEAS - SV(CUBED): 34.2 ML
BH CV ECHO MEAS - SV(MOD-SP4): 12 ML
BH CV ECHO MEAS - SV(TEICH): 35 ML
BH CV XLRA MEAS LEFT BULB EDV: 34.4 CM/SEC
BH CV XLRA MEAS LEFT BULB PSV: 150 CM/SEC
BH CV XLRA MEAS LEFT CCA RATIO VEL: 96.3 CM/SEC
BH CV XLRA MEAS LEFT DIST CCA EDV: 22 CM/SEC
BH CV XLRA MEAS LEFT DIST CCA PSV: 96.3 CM/SEC
BH CV XLRA MEAS LEFT DIST ICA EDV: -41.9 CM/SEC
BH CV XLRA MEAS LEFT DIST ICA PSV: -174 CM/SEC
BH CV XLRA MEAS LEFT ICA RATIO VEL: -221 CM/SEC
BH CV XLRA MEAS LEFT ICA/CCA RATIO: -2.3
BH CV XLRA MEAS LEFT MID CCA EDV: 20.6 CM/SEC
BH CV XLRA MEAS LEFT MID CCA PSV: 122 CM/SEC
BH CV XLRA MEAS LEFT MID ICA EDV: -52.6 CM/SEC
BH CV XLRA MEAS LEFT MID ICA PSV: -192 CM/SEC
BH CV XLRA MEAS LEFT PROX CCA EDV: 20.6 CM/SEC
BH CV XLRA MEAS LEFT PROX CCA PSV: 153 CM/SEC
BH CV XLRA MEAS LEFT PROX ECA EDV: 0 CM/SEC
BH CV XLRA MEAS LEFT PROX ECA PSV: -143 CM/SEC
BH CV XLRA MEAS LEFT PROX ICA EDV: -54.8 CM/SEC
BH CV XLRA MEAS LEFT PROX ICA PSV: -221 CM/SEC
BH CV XLRA MEAS LEFT VERTEBRAL A EDV: 10.7 CM/SEC
BH CV XLRA MEAS LEFT VERTEBRAL A PSV: 49.6 CM/SEC
BH CV XLRA MEAS RIGHT BULB EDV: 14.6 CM/SEC
BH CV XLRA MEAS RIGHT BULB PSV: 125 CM/SEC
BH CV XLRA MEAS RIGHT CCA RATIO VEL: 119 CM/SEC
BH CV XLRA MEAS RIGHT DIST CCA EDV: 16.3 CM/SEC
BH CV XLRA MEAS RIGHT DIST CCA PSV: 119 CM/SEC
BH CV XLRA MEAS RIGHT DIST ICA EDV: -21.3 CM/SEC
BH CV XLRA MEAS RIGHT DIST ICA PSV: -86.6 CM/SEC
BH CV XLRA MEAS RIGHT ICA RATIO VEL: -290 CM/SEC
BH CV XLRA MEAS RIGHT ICA/CCA RATIO: -2.4
BH CV XLRA MEAS RIGHT MID CCA EDV: 12.9 CM/SEC
BH CV XLRA MEAS RIGHT MID CCA PSV: 92 CM/SEC
BH CV XLRA MEAS RIGHT MID ICA EDV: -20.6 CM/SEC
BH CV XLRA MEAS RIGHT MID ICA PSV: -86.6 CM/SEC
BH CV XLRA MEAS RIGHT PROX CCA EDV: 0 CM/SEC
BH CV XLRA MEAS RIGHT PROX CCA PSV: 93.7 CM/SEC
BH CV XLRA MEAS RIGHT PROX ECA EDV: 0 CM/SEC
BH CV XLRA MEAS RIGHT PROX ECA PSV: -140 CM/SEC
BH CV XLRA MEAS RIGHT PROX ICA EDV: -66.8 CM/SEC
BH CV XLRA MEAS RIGHT PROX ICA PSV: -290 CM/SEC
BH CV XLRA MEAS RIGHT VERTEBRAL A EDV: 0 CM/SEC
BH CV XLRA MEAS RIGHT VERTEBRAL A PSV: 30.3 CM/SEC
MAXIMAL PREDICTED HEART RATE: 153 BPM
STRESS TARGET HR: 130 BPM

## 2019-03-18 PROCEDURE — 93306 TTE W/DOPPLER COMPLETE: CPT

## 2019-03-18 PROCEDURE — 93880 EXTRACRANIAL BILAT STUDY: CPT

## 2019-03-18 PROCEDURE — 93306 TTE W/DOPPLER COMPLETE: CPT | Performed by: INTERNAL MEDICINE

## 2019-03-18 PROCEDURE — 93880 EXTRACRANIAL BILAT STUDY: CPT | Performed by: INTERNAL MEDICINE

## 2019-03-20 ENCOUNTER — TELEPHONE (OUTPATIENT)
Dept: CARDIOLOGY | Facility: CLINIC | Age: 69
End: 2019-03-20

## 2019-03-20 DIAGNOSIS — I65.23 SYMPTOMATIC STENOSIS OF BOTH CAROTID ARTERIES WITHOUT INFARCTION: Primary | ICD-10-CM

## 2019-03-20 DIAGNOSIS — R42 DIZZINESS: ICD-10-CM

## 2019-03-20 DIAGNOSIS — R00.2 PALPITATIONS: ICD-10-CM

## 2019-03-20 DIAGNOSIS — R06.02 SHORTNESS OF BREATH: ICD-10-CM

## 2019-03-20 NOTE — TELEPHONE ENCOUNTER
Called patient at 0528 PM, notified her of carotid u/s results. Provider recommends having CTA carotids. Patient was notified that we will place order for testing to be scheduled, notified to HOLD Metformin x24 hours prior to scan, she denies any allergy to contrast. Labs to be drawn prior to scan. -SHAWNA;NATHANAEL    ----- Message from RICKEY Yen sent at 3/18/2019  3:06 PM EDT -----  Consider CTA of the carotids.  Will need laboratories before that procedure if performed.  Screen for metformin, etc.      Result Notes for Duplex Carotid Ultrasound CAR     Notes recorded by Cate Santana MA on 3/19/2019 at 8:20 PM EDT  Called patient at 0820 PM and left message to return call. -SHAWNA;NATHANAEL  ------    Notes recorded by Mark Brito PA on 3/18/2019 at 3:06 PM EDT  Consider CTA of the carotids.  Will need laboratories before that procedure if performed.  Screen for metformin, etc.   Duplex Carotid Ultrasound CAR   Order: [id number]   Status:  Final result   Visible to patient:  Yes (MyChart) Dx:  Palpitations; Shortness of breath; Di...   Details     Reading Physician Reading Date Result Priority   Manpreet Mcgee MD 3/18/2019 Routine      Result Text     1.  Both common carotid arteries are patent.     2.  There is moderately severe calcified plaque in the bulb and bifurcation on the right extending into the proximal right internal carotid artery were 75% or greater stenosis is identified.  Dopplers compatible with 50-75% stenosis in the proximal left internal carotid artery with only mild to moderate bifurcation disease on that side.     3.  The antegrade flow is demonstrated in both vertebral arteries.     Summary: Potentially hemodynamically significant stenosis at the origin and proximally in the right internal carotid artery with a lesser degree (but still potentially hemodynamically significant) stenosis in the proximal and mid left internal carotid artery.  Antegrade flow in both vertebral arteries.

## 2019-04-11 ENCOUNTER — TELEPHONE (OUTPATIENT)
Dept: CARDIOLOGY | Facility: CLINIC | Age: 69
End: 2019-04-11

## 2019-04-11 DIAGNOSIS — I65.23 BILATERAL CAROTID ARTERY STENOSIS: Primary | ICD-10-CM

## 2019-04-11 NOTE — TELEPHONE ENCOUNTER
Called patient and notified her of carotid CTA results. Patient was notified that we will be referring her to CT surgeon for evaluation. Patient verbalized understanding and had no further questions at this time. Patient would like to be referred to someone with  facilities -;NATHANAEL

## 2019-04-17 ENCOUNTER — OFFICE VISIT (OUTPATIENT)
Dept: CARDIOLOGY | Facility: CLINIC | Age: 69
End: 2019-04-17

## 2019-04-17 VITALS
SYSTOLIC BLOOD PRESSURE: 143 MMHG | HEART RATE: 79 BPM | HEIGHT: 66 IN | DIASTOLIC BLOOD PRESSURE: 75 MMHG | WEIGHT: 284 LBS | OXYGEN SATURATION: 95 % | BODY MASS INDEX: 45.64 KG/M2

## 2019-04-17 DIAGNOSIS — I48.0 PAROXYSMAL ATRIAL FIBRILLATION (HCC): ICD-10-CM

## 2019-04-17 DIAGNOSIS — I10 ESSENTIAL HYPERTENSION: ICD-10-CM

## 2019-04-17 DIAGNOSIS — R00.2 PALPITATIONS: ICD-10-CM

## 2019-04-17 DIAGNOSIS — I65.23 BILATERAL CAROTID ARTERY STENOSIS: Primary | ICD-10-CM

## 2019-04-17 PROCEDURE — 99213 OFFICE O/P EST LOW 20 MIN: CPT | Performed by: PHYSICIAN ASSISTANT

## 2019-04-17 RX ORDER — PAROXETINE 10 MG/1
10 TABLET, FILM COATED ORAL EVERY MORNING
Status: ON HOLD | COMMUNITY
End: 2019-05-16

## 2019-04-17 NOTE — PROGRESS NOTES
Problem list     Subjective   Mandi Jacob is a 68 y.o. female     Chief Complaint   Patient presents with   • Shortness of Breath   • Palpitations   • Dizziness   Problem List:  1. Nonobstructive CAD by cath February 2012.  2. Preserved systolic function  3. History of CHF  4. Diastolic dysfunction  5. Hypertension      6. Dyslipidemia. The patient reports she is intolerant to statins.      7. Chronic palpitations.      7.1.  Recurrent PACs noted by telemetry historically.  The patient was treated with sotalol therapy for the same.      7.2.  A. fib with rapid ventricular response noted by event monitor, 2019.    HPI  The patient presents back today to review event monitor and carotid duplex findings.  Event monitor did suggest episodes of atrial fibrillation with intermittent rapid ventricular response rates.  Carotid duplex suggested potentially significant disease bilateral carotid systems.  She is now been referred up for consideration of intervention for carotid artery disease.  It would be preferable for this chronic pulmonary patient to have stenting if possible.  She is currently being evaluated by CT surgery in that regard.  She does me that since increasing sotalol therapy, originally used to treat her chronic palpitations/recurrent ectopic issues, that she has felt improved.  She has fewer episodes of rapid ventricular response rates.  Otherwise, she has chronic chest pain episodes.  This is unchanged from her baseline.  She has chronic dyspnea which can be severe at times, questionable secondary to pulmonary status.  The patient has no PND or orthopnea.  She has no further complaints.    Current Outpatient Medications   Medication Sig Dispense Refill   • albuterol (PROVENTIL HFA;VENTOLIN HFA) 108 (90 BASE) MCG/ACT inhaler Albuterol 90 MCG/ACT AERS; Patient Sig: Albuterol 90 MCG/ACT AERS INHALE 1 TO 2 PUFFS EVERY 4 TO 6 HOURS AS NEEDED.; 0; 11-Sep-2013; Active     • albuterol (PROVENTIL) (2.5 MG/3ML)  0.083% nebulizer solution Albuterol Sulfate (2.5 MG/3ML) 0.083% Inhalation Nebulization Solution; Patient Sig: Albuterol Sulfate (2.5 MG/3ML) 0.083% Inhalation Nebulization Solution USE 1 UNIT DOSE IN NEBULIZER EVERY 4 TO 6 HOURS AS NEEDED.; 0; 11-Sep-2013; Active     • Alirocumab 75 MG/ML solution pen-injector Inject 75 mg under the skin into the appropriate area as directed Every 14 (Fourteen) Days. 1 pen 2   • aspirin 81 MG tablet Take 1 tablet by mouth daily.     • celecoxib (CeleBREX) 200 MG capsule Take 200 mg by mouth Daily.     • furosemide (LASIX) 40 MG tablet TAKE 1 TABLET TWICE DAILY 60 tablet 5   • ibuprofen (ADVIL,MOTRIN) 600 MG tablet Take 1 tablet by mouth 3 (Three) Times a Day. 30 tablet 0   • isosorbide mononitrate (IMDUR) 30 MG 24 hr tablet TAKE 1 TABLET DAILY 90 tablet 11   • losartan (COZAAR) 50 MG tablet Take 50 mg by mouth Daily.  6   • magnesium oxide (MAGOX) 400 (241.3 MG) MG tablet tablet Take 1 tablet by mouth daily.     • metFORMIN ER (GLUCOPHAGE-XR) 500 MG 24 hr tablet Take 500 mg by mouth Daily With Breakfast.  11   • metolazone (ZAROXOLYN) 2.5 MG tablet Take 2.5 mg by mouth Daily As Needed. 1 tablet 30 minutes prior to lasix as needed PRN       • metoprolol tartrate (LOPRESSOR) 25 MG tablet Take 25 mg by mouth 2 (Two) Times a Day.  1   • montelukast (SINGULAIR) 10 MG tablet Take 10 mg by mouth Daily.     • nitroglycerin (NITROSTAT) 0.4 MG SL tablet Place 1 tablet under the tongue Every 5 (Five) Minutes As Needed for Chest Pain. 25 tablet 2   • NOVOLOG FLEXPEN 100 UNIT/ML solution pen-injector sc pen 20 Units. 20 units with lunch; SS nightly     • PARoxetine (PAXIL) 10 MG tablet Take 10 mg by mouth Every Morning. Takes 1/2 tablet qd     • potassium chloride (K-DUR,KLOR-CON) 10 MEQ CR tablet 10 mEq. 20meq qam and 10meq at night  5   • Sotalol HCl AF 80 MG tablet Take 1 tablet by mouth 2 (Two) Times a Day. 180 tablet 3   • tiotropium (SPIRIVA) 18 MCG per inhalation capsule Place 1 capsule  into inhaler and inhale daily.     • TRESIBA FLEXTOUCH 200 UNIT/ML solution pen-injector 100 Units Daily. 100 units daily     • triamterene-hydrochlorothiazide (MAXZIDE-25) 37.5-25 MG per tablet Take 1 tablet by mouth daily.       No current facility-administered medications for this visit.        Ace inhibitors; Buspirone; Ciprofloxacin; Citalopram; Clindamycin/lincomycin; Codeine; Diltiazem; Flecainide; Incruse ellipta [umeclidinium bromide]; Ketorolac tromethamine; Levaquin [levofloxacin]; Lipitor [atorvastatin]; Liraglutide; Lisinopril; Lorazepam; Niacin; Sertraline; Statins; Sulfamethoxazole-trimethoprim; Trulicity [dulaglutide]; and Xigduo xr [dapagliflozin-metformin hcl er]    Past Medical History:   Diagnosis Date   • Arrhythmia    • Asthma    • CAD (coronary artery disease)     non-obstructive by cath 02/2012   • CHF (congestive heart failure) (CMS/[facility])    • COPD (chronic obstructive pulmonary disease) (CMS/[facility])    • Diabetes mellitus (CMS/[facility])    • Diastolic dysfunction    • Dizziness    • Edema    • Esophageal spasm    • Herniated lumbar intervertebral disc     L5   • Hyperlipidemia     intolerant to statins    • Hypertension    • Osteoarthritis    • Palpitations    • SOB (shortness of breath)    • Stroke-like symptoms    • TIA (transient ischemic attack)    • Unstable angina (CMS/[facility])        Social History     Socioeconomic History   • Marital status:      Spouse name: Not on file   • Number of children: Not on file   • Years of education: Not on file   • Highest education level: Not on file   Tobacco Use   • Smoking status: Former Smoker     Packs/day: 1.50     Years: 41.00     Pack years: 61.50     Start date: 4/8/1968     Last attempt to quit: 3/22/2009     Years since quitting: 10.0   • Smokeless tobacco: Never Used   Substance and Sexual Activity   • Alcohol use: No   • Drug use: No   • Sexual activity: No       Family History   Problem Relation Age of Onset   • Heart attack Mother    • Heart  failure Mother         congestive   • Hyperlipidemia Mother    • Hypertension Mother    • Peripheral vascular disease Mother    • Heart attack Father    • Heart failure Father         congestive   • Diabetes Father    • Hyperlipidemia Father    • Hypertension Father    • Peripheral vascular disease Father    • Heart failure Other         congestive   • Diabetes Other    • Hyperlipidemia Other    • Hypertension Other    • Peripheral vascular disease Other    • Hypertension Brother        Review of Systems   Constitutional: Negative for chills, fatigue and fever.   HENT: Positive for rhinorrhea (runny nose ). Negative for congestion and sore throat.    Eyes: Positive for visual disturbance (glasses daily ).   Respiratory: Positive for shortness of breath (some increased SOA). Negative for chest tightness.         Uses oxygen at 2-3 lpm per nasal cannula 24/7   Cardiovascular: Positive for palpitations (occasional flutters ). Negative for chest pain and leg swelling.   Gastrointestinal: Negative.  Negative for abdominal pain, nausea and vomiting.   Endocrine: Positive for heat intolerance (feets hot most of the time ). Negative for cold intolerance.   Genitourinary: Positive for frequency. Negative for dysuria and urgency.   Musculoskeletal: Positive for arthralgias (joints ) and gait problem (uses a walker ). Negative for back pain.   Skin: Positive for rash (rash on right elbow ). Negative for wound.   Allergic/Immunologic: Positive for environmental allergies (seasonal ). Negative for food allergies.   Neurological: Positive for light-headedness (when at rest ). Negative for dizziness and weakness.   Hematological: Does not bruise/bleed easily (bruises ).   Psychiatric/Behavioral: Positive for agitation. Negative for confusion and sleep disturbance. Behavioral problem: easily agitated  The patient is nervous/anxious (easily nervous ).         Has been more depressed since her  passed away from cancer in  "march. Recently changed from Lexapro to Paxil.        Objective   Vitals:    04/17/19 1137   BP: 143/75   BP Location: Left arm   Patient Position: Sitting   Pulse: 79   SpO2: 95%   Weight: 129 kg (284 lb)   Height: 167.6 cm (66\")      /75 (BP Location: Left arm, Patient Position: Sitting)   Pulse 79   Ht 167.6 cm (66\")   Wt 129 kg (284 lb)   SpO2 95%   BMI 45.84 kg/m²    Lab Results (most recent)     None        Physical Exam   Constitutional: She is oriented to person, place, and time. She appears well-developed and well-nourished. No distress (Patient appears dyspnic.  She is on O2 per NC.  The patient appears chronically ill. ).   HENT:   Head: Normocephalic and atraumatic.   Eyes: Conjunctivae and EOM are normal. Pupils are equal, round, and reactive to light.   Neck: Normal range of motion. Neck supple. No JVD present. No tracheal deviation present.   Cardiovascular: Normal rate, regular rhythm, normal heart sounds and intact distal pulses.  Extrasystoles are present.   Pulmonary/Chest: Tachypnea noted. She has decreased breath sounds. She has wheezes. She has rales (Dry.).   Abdominal: Soft. Bowel sounds are normal. She exhibits no distension and no mass. There is no tenderness. There is no rebound and no guarding.   Musculoskeletal: Normal range of motion. She exhibits no edema, tenderness or deformity.   Neurological: She is alert and oriented to person, place, and time.   Skin: Skin is warm and dry. No rash noted. No erythema. No pallor.   Psychiatric: She has a normal mood and affect. Her behavior is normal. Judgment and thought content normal.   Nursing note and vitals reviewed.        Procedure   Procedures       Assessment/Plan      Diagnosis Plan   1. Bilateral carotid artery stenosis  Ambulatory Referral to Neurosurgery   2. Paroxysmal atrial fibrillation (CMS/HCC)     3. Palpitations     4. Essential hypertension           The patient will be referred on for evaluation of carotid artery " disease.  She may have to have consideration for carotid stenting, if felt a candidate for the same.  I would be concerned about surgery for this patient given her pulmonary status.    Of note, since increasing sotalol therapy, she tells me she feels improved with regards to atrial fibrillation with rapid ventricular response rates.  I did discuss consideration for anticoagulation therapy given risk for CVA.  She acknowledges that.  She wants to hold on that for now.  If she changes her mind about anticoagulation, she will call to me.  I would is to do the same at that time.    Otherwise, blood pressures have been largely controlled on current antihypertensive regimen.  I will make no changes in that regard.    We will see the patient back within 1-2 months at which time she will have had evaluation for potential mechanical intervention of carotid systems.  She will call for any issues prior to follow-up.  At this time she is felt largely stable from general cardiac standpoint however.  Should that change, she will call immediately.           Patient's Body mass index is 45.84 kg/m². BMI is above normal parameters. Recommendations include: educational material and referral to primary care.             Electronically signed by:

## 2019-04-17 NOTE — PATIENT INSTRUCTIONS
"Fat and Cholesterol Restricted Eating Plan  Getting too much fat and cholesterol in your diet may cause health problems. Choosing the right foods helps keep your fat and cholesterol at normal levels. This can keep you from getting certain diseases.  Your doctor may recommend an eating plan that includes:  · Total fat: ______% or less of total calories a day.  · Saturated fat: ______% or less of total calories a day.  · Cholesterol: less than _________mg a day.  · Fiber: ______g a day.    What are tips for following this plan?  General tips  · Work with your doctor to lose weight if you need to.  · Avoid:  ? Foods with added sugar.  ? Fried foods.  ? Foods with partially hydrogenated oils.  · Limit alcohol intake to no more than 1 drink a day for nonpregnant women and 2 drinks a day for men. One drink equals 12 oz of beer, 5 oz of wine, or 1½ oz of hard liquor.  Reading food labels  · Check food labels for:  ? Trans fats.  ? Partially hydrogenated oils.  ? Saturated fat (g) in each serving.  ? Cholesterol (mg) in each serving.  ? Fiber (g) in each serving.  · Choose foods with healthy fats, such as:  ? Monounsaturated fats.  ? Polyunsaturated fats.  ? Omega-3 fats.  · Choose grain products that have whole grains. Look for the word \"whole\" as the first word in the ingredient list.  Cooking  · Cook foods using low-fat methods. These include baking, boiling, grilling, and broiling.  · Eat more home-cooked foods. Eat at restaurants and buffets less often.  · Avoid cooking using saturated fats, such as butter, cream, palm oil, palm kernel oil, and coconut oil.  Meal planning  · At meals, divide your plate into four equal parts:  ? Fill one-half of your plate with vegetables and green salads.  ? Fill one-fourth of your plate with whole grains.  ? Fill one-fourth of your plate with low-fat (lean) protein foods.  · Eat fish that is high in omega-3 fats at least two times a week. This includes mackerel, tuna, sardines, and " salmon.  · Eat foods that are high in fiber, such as whole grains, beans, apples, broccoli, carrots, peas, and barley.  Recommended foods  Grains  · Whole grains, such as whole wheat or whole grain breads, crackers, cereals, and pasta. Unsweetened oatmeal, bulgur, barley, quinoa, or brown rice. Corn or whole wheat flour tortillas.  Vegetables  · Fresh or frozen vegetables (raw, steamed, roasted, or grilled). Green salads.  Fruits  · All fresh, canned (in natural juice), or frozen fruits.  Meats and other protein foods  · Ground beef (85% or leaner), grass-fed beef, or beef trimmed of fat. Skinless chicken or turkey. Ground chicken or turkey. Pork trimmed of fat. All fish and seafood. Egg whites. Dried beans, peas, or lentils. Unsalted nuts or seeds. Unsalted canned beans. Nut butters without added sugar or oil.  Dairy  · Low-fat or nonfat dairy products, such as skim or 1% milk, 2% or reduced-fat cheeses, low-fat and fat-free ricotta or cottage cheese, or plain low-fat and nonfat yogurt.  Fats and oils  · Tub margarine without trans fats. Light or reduced-fat mayonnaise and salad dressings. Avocado. Olive, canola, sesame, or safflower oils.  The items listed above may not be a complete list of recommended foods or beverages. Contact your dietitian for more options.  Foods to avoid  Grains  · White bread. White pasta. White rice. Cornbread. Bagels, pastries, and croissants. Crackers and snack foods that contain trans fat and hydrogenated oils.  Vegetables  · Vegetables cooked in cheese, cream, or butter sauce. Fried vegetables.  Fruits  · Canned fruit in heavy syrup. Fruit in cream or butter sauce. Fried fruit.  Meats and other protein foods  · Fatty cuts of meat. Ribs, chicken wings, perez, sausage, bologna, salami, chitterlings, fatback, hot dogs, bratwurst, and packaged lunch meats. Liver and organ meats. Whole eggs and egg yolks. Chicken and turkey with skin. Fried meat.  Dairy  · Whole or 2% milk, cream,  half-and-half, and cream cheese. Whole milk cheeses. Whole-fat or sweetened yogurt. Full-fat cheeses. Nondairy creamers and whipped toppings. Processed cheese, cheese spreads, and cheese curds.  Beverages  · Alcohol. Sugar-sweetened drinks such as sodas, lemonade, and fruit drinks.  Fats and oils  · Butter, stick margarine, lard, shortening, ghee, or perez fat. Coconut, palm kernel, and palm oils.  Sweets and desserts  · Corn syrup, sugars, honey, and molasses. Candy. Jam and jelly. Syrup. Sweetened cereals. Cookies, pies, cakes, donuts, muffins, and ice cream.  The items listed above may not be a complete list of foods and beverages to avoid. Contact your dietitian for more information.  Summary  · Choosing the right foods helps keep your fat and cholesterol at normal levels. This can keep you from getting certain diseases.  · At meals, fill one-half of your plate with vegetables and green salads.  · Eat high-fiber foods, like whole grains, beans, apples, carrots, peas, and barley.  · Limit added sugar, saturated fats, alcohol, and fried foods.  This information is not intended to replace advice given to you by your health care provider. Make sure you discuss any questions you have with your health care provider.  Document Released: 06/18/2013 Document Revised: 09/04/2018 Document Reviewed: 09/04/2018  [employer] Interactive Patient Education © 2019 [employer]  BMI for Adults  Body mass index (BMI) is a number that is calculated from a person's weight and height. In most adults, the number is used to find how much of an adult's weight is made up of fat. BMI is not as accurate as a direct measure of body fat.  How is BMI calculated?  BMI is calculated by dividing weight in kilograms by height in meters squared. It can also be calculated by dividing weight in pounds by height in inches squared, then multiplying the resulting number by 703. Charts are available to help you find your BMI quickly and easily without  doing this calculation.  How is BMI interpreted?  Health care professionals use BMI charts to identify whether an adult is underweight, at a normal weight, or overweight based on the following guidelines:  · Underweight: BMI less than 18.5.  · Normal weight: BMI between 18.5 and 24.9.  · Overweight: BMI between 25 and 29.9.  · Obese: BMI of 30 and above.    BMI is usually interpreted the same for males and females.  Weight includes both fat and muscle, so someone with a muscular build, such as an athlete, may have a BMI that is higher than 24.9. In cases like these, BMI may not accurately depict body fat. To determine if excess body fat is the cause of a BMI of 25 or higher, further assessments may need to be done by a health care provider.  Why is BMI a useful tool?  BMI is used to identify a possible weight problem that may be related to a medical problem or may increase the risk for medical problems. BMI can also be used to promote changes to reach a healthy weight.  This information is not intended to replace advice given to you by your health care provider. Make sure you discuss any questions you have with your health care provider.  Document Released: 08/29/2005 Document Revised: 04/27/2017 Document Reviewed: 05/15/2015  [employer] Interactive Patient Education © 2018 [employer]

## 2019-04-19 ENCOUNTER — RESULTS ENCOUNTER (OUTPATIENT)
Dept: CARDIOLOGY | Facility: CLINIC | Age: 69
End: 2019-04-19

## 2019-04-19 DIAGNOSIS — I65.23 SYMPTOMATIC STENOSIS OF BOTH CAROTID ARTERIES WITHOUT INFARCTION: ICD-10-CM

## 2019-04-24 ENCOUNTER — OFFICE VISIT (OUTPATIENT)
Dept: NEUROSURGERY | Facility: CLINIC | Age: 69
End: 2019-04-24

## 2019-04-24 ENCOUNTER — PREP FOR SURGERY (OUTPATIENT)
Dept: OTHER | Facility: HOSPITAL | Age: 69
End: 2019-04-24

## 2019-04-24 VITALS
DIASTOLIC BLOOD PRESSURE: 60 MMHG | TEMPERATURE: 99.4 F | HEIGHT: 66 IN | SYSTOLIC BLOOD PRESSURE: 142 MMHG | WEIGHT: 286 LBS | BODY MASS INDEX: 45.96 KG/M2

## 2019-04-24 DIAGNOSIS — I65.21 CAROTID STENOSIS, ASYMPTOMATIC, RIGHT: Primary | ICD-10-CM

## 2019-04-24 PROCEDURE — 99204 OFFICE O/P NEW MOD 45 MIN: CPT | Performed by: RADIOLOGY

## 2019-04-24 RX ORDER — SODIUM CHLORIDE 9 MG/ML
100 INJECTION, SOLUTION INTRAVENOUS CONTINUOUS
Status: CANCELLED | OUTPATIENT
Start: 2019-04-24

## 2019-04-24 RX ORDER — SODIUM CHLORIDE 0.9 % (FLUSH) 0.9 %
3 SYRINGE (ML) INJECTION EVERY 12 HOURS SCHEDULED
Status: CANCELLED | OUTPATIENT
Start: 2019-04-24

## 2019-04-24 RX ORDER — SODIUM CHLORIDE 0.9 % (FLUSH) 0.9 %
1-10 SYRINGE (ML) INJECTION AS NEEDED
Status: CANCELLED | OUTPATIENT
Start: 2019-04-24

## 2019-04-24 NOTE — PROGRESS NOTES
NAME: COLE EUBANKS   DOS: 2019  : 1950  PCP: John Montelongo MD    Chief Complaint:    Chief Complaint   Patient presents with   • Carotid Artery Disease       History of Present Illness:  68 y.o. female presenting for evaluation of asymptomatic carotid disease.  She did suffer what sounds like a left hemispheric TIA/stroke in  with speech difficulties and right-sided facial droop.  She made a good recovery from this, and denies any recent symptoms of stroke or TIA, specifically no unilateral weakness/numbness, no recurrent speech or vision changes.  She does have extensive cardiac history and has newly diagnosed atrial fibrillation.  She also has oxygen dependent COPD.    She has history of orthostatic hypotension, but is having spells of dizziness and vertigo, and underwent noninvasive imaging study suggesting bilateral carotid artery disease.    Past Medical History:  Past Medical History:   Diagnosis Date   • Arrhythmia    • Asthma    • Atrial fibrillation (CMS/HCC)    • CAD (coronary artery disease)     non-obstructive by cath 2012   • CHF (congestive heart failure) (CMS/HCC)    • COPD (chronic obstructive pulmonary disease) (CMS/HCC)    • Diabetes mellitus (CMS/HCC)    • Diastolic dysfunction    • Dizziness    • Edema    • Esophageal spasm    • Herniated lumbar intervertebral disc     L5   • Hyperlipidemia     intolerant to statins    • Hypertension    • Osteoarthritis    • Palpitations    • SOB (shortness of breath)    • Stroke-like symptoms    • TIA (transient ischemic attack) 2015    left hemispheric TIA/CVA   • Unstable angina (CMS/HCC)        Past Surgical History:  Past Surgical History:   Procedure Laterality Date   • CARDIAC CATHETERIZATION     • TUBAL ABDOMINAL LIGATION     • WRIST SURGERY         Review of Systems:        Review of Systems   Constitutional: Positive for activity change and fatigue. Negative for appetite change, chills, diaphoresis, fever and unexpected  weight change.   HENT: Negative for congestion, dental problem, drooling, ear discharge, ear pain, facial swelling, hearing loss, mouth sores, nosebleeds, postnasal drip, rhinorrhea, sinus pressure, sneezing, sore throat, tinnitus, trouble swallowing and voice change.    Eyes: Negative for photophobia, pain, discharge, redness, itching and visual disturbance.   Respiratory: Negative for apnea, cough, choking, chest tightness, shortness of breath, wheezing and stridor.    Cardiovascular: Negative for chest pain, palpitations and leg swelling.   Gastrointestinal: Negative for abdominal distention, abdominal pain, anal bleeding, blood in stool, constipation, diarrhea, nausea, rectal pain and vomiting.   Endocrine: Negative for cold intolerance, heat intolerance, polydipsia, polyphagia and polyuria.   Genitourinary: Negative for decreased urine volume, difficulty urinating, dysuria, enuresis, flank pain, frequency, genital sores, hematuria and urgency.   Musculoskeletal: Negative for arthralgias, back pain, gait problem, joint swelling, myalgias, neck pain and neck stiffness.   Skin: Negative for color change, pallor, rash and wound.   Allergic/Immunologic: Negative for environmental allergies, food allergies and immunocompromised state.   Neurological: Positive for dizziness. Negative for tremors, seizures, syncope, facial asymmetry, speech difficulty, weakness, light-headedness, numbness and headaches.   Hematological: Negative for adenopathy. Does not bruise/bleed easily.   Psychiatric/Behavioral: Negative for agitation, behavioral problems, confusion, decreased concentration, dysphoric mood, hallucinations, self-injury, sleep disturbance and suicidal ideas. The patient is not nervous/anxious and is not hyperactive.         Medications    Current Outpatient Medications:   •  albuterol (PROVENTIL HFA;VENTOLIN HFA) 108 (90 BASE) MCG/ACT inhaler, Albuterol 90 MCG/ACT AERS; Patient Sig: Albuterol 90 MCG/ACT AERS INHALE 1  TO 2 PUFFS EVERY 4 TO 6 HOURS AS NEEDED.; 0; 11-Sep-2013; Active, Disp: , Rfl:   •  albuterol (PROVENTIL) (2.5 MG/3ML) 0.083% nebulizer solution, Albuterol Sulfate (2.5 MG/3ML) 0.083% Inhalation Nebulization Solution; Patient Sig: Albuterol Sulfate (2.5 MG/3ML) 0.083% Inhalation Nebulization Solution USE 1 UNIT DOSE IN NEBULIZER EVERY 4 TO 6 HOURS AS NEEDED.; 0; 11-Sep-2013; Active, Disp: , Rfl:   •  Alirocumab 75 MG/ML solution pen-injector, Inject 75 mg under the skin into the appropriate area as directed Every 14 (Fourteen) Days., Disp: 1 pen, Rfl: 2  •  aspirin 81 MG tablet, Take 1 tablet by mouth daily., Disp: , Rfl:   •  celecoxib (CeleBREX) 200 MG capsule, Take 200 mg by mouth Daily., Disp: , Rfl:   •  furosemide (LASIX) 40 MG tablet, TAKE 1 TABLET TWICE DAILY, Disp: 60 tablet, Rfl: 5  •  ibuprofen (ADVIL,MOTRIN) 600 MG tablet, Take 1 tablet by mouth 3 (Three) Times a Day., Disp: 30 tablet, Rfl: 0  •  isosorbide mononitrate (IMDUR) 30 MG 24 hr tablet, TAKE 1 TABLET DAILY, Disp: 90 tablet, Rfl: 11  •  losartan (COZAAR) 50 MG tablet, Take 50 mg by mouth Daily., Disp: , Rfl: 6  •  magnesium oxide (MAGOX) 400 (241.3 MG) MG tablet tablet, Take 1 tablet by mouth daily., Disp: , Rfl:   •  metFORMIN ER (GLUCOPHAGE-XR) 500 MG 24 hr tablet, Take 500 mg by mouth Daily With Breakfast., Disp: , Rfl: 11  •  metolazone (ZAROXOLYN) 2.5 MG tablet, Take 2.5 mg by mouth Daily As Needed. 1 tablet 30 minutes prior to lasix as needed PRN , Disp: , Rfl:   •  metoprolol tartrate (LOPRESSOR) 25 MG tablet, Take 25 mg by mouth 2 (Two) Times a Day., Disp: , Rfl: 1  •  montelukast (SINGULAIR) 10 MG tablet, Take 10 mg by mouth Daily., Disp: , Rfl:   •  nitroglycerin (NITROSTAT) 0.4 MG SL tablet, Place 1 tablet under the tongue Every 5 (Five) Minutes As Needed for Chest Pain., Disp: 25 tablet, Rfl: 2  •  NOVOLOG FLEXPEN 100 UNIT/ML solution pen-injector sc pen, 20 Units. 20 units with lunch; SS nightly, Disp: , Rfl:   •  PARoxetine (PAXIL)  10 MG tablet, Take 10 mg by mouth Every Morning. Takes 1/2 tablet qd, Disp: , Rfl:   •  potassium chloride (K-DUR,KLOR-CON) 10 MEQ CR tablet, 10 mEq. 20meq qam and 10meq at night, Disp: , Rfl: 5  •  Sotalol HCl AF 80 MG tablet, Take 1 tablet by mouth 2 (Two) Times a Day., Disp: 180 tablet, Rfl: 3  •  tiotropium (SPIRIVA) 18 MCG per inhalation capsule, Place 1 capsule into inhaler and inhale daily., Disp: , Rfl:   •  TRESIBA FLEXTOUCH 200 UNIT/ML solution pen-injector, 100 Units Daily. 100 units daily, Disp: , Rfl:   •  triamterene-hydrochlorothiazide (MAXZIDE-25) 37.5-25 MG per tablet, Take 1 tablet by mouth daily., Disp: , Rfl:     Allergies:  Allergies   Allergen Reactions   • Ace Inhibitors    • Buspirone    • Ciprofloxacin    • Citalopram    • Clindamycin/Lincomycin    • Codeine    • Diltiazem    • Flecainide      Facial numbness and edema, itching all over and shortness of breath   • Incruse Ellipta [Umeclidinium Bromide]    • Ketorolac Tromethamine    • Levaquin [Levofloxacin]    • Lipitor [Atorvastatin]    • Liraglutide    • Lisinopril    • Lorazepam    • Niacin    • Sertraline    • Statins    • Sulfamethoxazole-Trimethoprim    • Trulicity [Dulaglutide]    • Xigduo Xr [Dapagliflozin-Metformin Hcl Er] Diarrhea       Social Hx:  Social History     Tobacco Use   • Smoking status: Former Smoker     Packs/day: 1.50     Years: 41.00     Pack years: 61.50     Start date: 4/8/1968     Last attempt to quit: 3/22/2009     Years since quitting: 10.0   • Smokeless tobacco: Never Used   Substance Use Topics   • Alcohol use: No   • Drug use: No       Family Hx:  Family History   Problem Relation Age of Onset   • Heart attack Mother    • Heart failure Mother         congestive   • Hyperlipidemia Mother    • Hypertension Mother    • Peripheral vascular disease Mother    • Heart attack Father    • Heart failure Father         congestive   • Diabetes Father    • Hyperlipidemia Father    • Hypertension Father    • Peripheral  vascular disease Father    • Heart failure Other         congestive   • Diabetes Other    • Hyperlipidemia Other    • Hypertension Other    • Peripheral vascular disease Other    • Hypertension Brother        Review of Imaging:  Carotid duplex dated 3/18/2019 from [facility] was reviewed along with its corresponding radiologic report.  This demonstrates plaque formation at the bilateral carotid bifurcations (right greater than left).  Peak velocities within the right carotid vasculature are 290/67 cm/s, with an ICA/CCA ratio of 2.4.  Peak velocities within the left carotid vasculature are 221/55 cm/s, with a ratio of 2.3.  The report states that this represents a greater than 75% stenosis of her left carotid vasculature and a 50-75% stenosis of her right carotid vasculature.    The report of CT angiogram from [facility] dated 4/9/2019 is reviewed, the images are not available for direct interrogation.  The report suggests a greater than 80% stenosis of her right carotid vasculature and a 60% stenosis of her left carotid vasculature.  Her left vertebral artery is noted to be dominant in nature.    Physical Examination:  Vitals:    04/24/19 1404   BP: 142/60   Temp: 99.4 °F (37.4 °C)        General Appearance:   Well developed, well nourished, well groomed, alert, and cooperative.  Cardiovascular: Regular rate and rhythm. No carotid bruits      Neurological examination:  Neurologic Exam     Mental Status   Oriented to person, place, and time.   Speech: speech is normal   Level of consciousness: alert    Cranial Nerves   Cranial nerves II through XII intact.     Motor Exam     Strength   Strength 5/5 throughout.     Sensory Exam   Light touch normal.     Gait, Coordination, and Reflexes     Gait  Gait: normalAmbulates with assistance of a cane for stability purposes, predominantly secondary to arthritis and knee problems       Diagnoses/Plan:    Ms. Jacob is a  68 y.o. female with newly diagnosed atrial fibrillation and oxygen dependent COPD.  She has a history of orthostatic hypotension, but had noninvasive imaging studies for work-up of increasing dizziness and vertigo.  As part of her work-up/evaluation, she has had duplex and CT angiogram suggesting significant bilateral carotid artery disease.  I have reviewed the studies, and feel that she has a borderline hemodynamically significant right internal carotid stenosis, and that her left carotid vasculature is likely without hemodynamically significant disease.    I discussed these findings with Ms. Jacob and family, and offered them a catheter angiogram to definitively evaluate not only severity of her carotid occlusive disease, but also interrogate her posterior circulation for any stenosis/obstruction that might be contributing to her dizziness symptoms.  They are agreeable to this and wish to pursue angiography in the next 2-4 weeks, which certainly seems reasonable.  We will tentatively schedule her for a diagnostic angiogram, deferring any intervention pending results of the angiogram.    From a cerebrovascular standpoint, I think she certainly would benefit from a anticoagulation (as recommended by cardiology), and reiterated this to the patient/family.  She is going to contact her cardiologist, and is agreeable to any anticoagulation strategy for her atrial fibrillation.  She will NOT need to discontinue her anticoagulation for the diagnostic angiogram.

## 2019-04-26 ENCOUNTER — TELEPHONE (OUTPATIENT)
Dept: CARDIOLOGY | Facility: CLINIC | Age: 69
End: 2019-04-26

## 2019-04-26 NOTE — TELEPHONE ENCOUNTER
Returned patients call, she stated she had saw Dr. Brock and was scheduled for surgery. Patient has agreed to proceed with taking anticoagulation therapy medication. Both providers have agreed they would like to HOLD on this until after procedure has been performed. Patient verbalized understanding, was notified to call after procedure when recovered so we can initiate this medication. Patient will need labs prior to starting this mediation for renal protocol. -SHAWNA;NATHANAEL

## 2019-05-16 ENCOUNTER — HOSPITAL ENCOUNTER (OUTPATIENT)
Facility: HOSPITAL | Age: 69
Setting detail: HOSPITAL OUTPATIENT SURGERY
Discharge: HOME | End: 2019-05-16
Attending: RADIOLOGY | Admitting: RADIOLOGY

## 2019-05-16 ENCOUNTER — PREP FOR SURGERY (OUTPATIENT)
Dept: OTHER | Facility: HOSPITAL | Age: 69
End: 2019-05-16

## 2019-05-16 VITALS
BODY MASS INDEX: 44.21 KG/M2 | WEIGHT: 281.7 LBS | HEART RATE: 72 BPM | DIASTOLIC BLOOD PRESSURE: 81 MMHG | TEMPERATURE: 97 F | HEIGHT: 67 IN | RESPIRATION RATE: 18 BRPM | OXYGEN SATURATION: 95 % | SYSTOLIC BLOOD PRESSURE: 131 MMHG

## 2019-05-16 DIAGNOSIS — I65.21 CAROTID STENOSIS, ASYMPTOMATIC, RIGHT: ICD-10-CM

## 2019-05-16 DIAGNOSIS — I65.21 CAROTID STENOSIS, ASYMPTOMATIC, RIGHT: Primary | ICD-10-CM

## 2019-05-16 LAB
ANION GAP SERPL CALCULATED.3IONS-SCNC: 15 MMOL/L
BUN BLD-MCNC: 20 MG/DL (ref 8–23)
BUN/CREAT SERPL: 27.8 (ref 7–25)
CALCIUM SPEC-SCNC: 10.4 MG/DL (ref 8.6–10.5)
CHLORIDE SERPL-SCNC: 101 MMOL/L (ref 98–107)
CO2 SERPL-SCNC: 26 MMOL/L (ref 22–29)
CREAT BLD-MCNC: 0.72 MG/DL (ref 0.57–1)
DEPRECATED RDW RBC AUTO: 45.8 FL (ref 37–54)
ERYTHROCYTE [DISTWIDTH] IN BLOOD BY AUTOMATED COUNT: 13.8 % (ref 12.3–15.4)
GFR SERPL CREATININE-BSD FRML MDRD: 81 ML/MIN/1.73
GLUCOSE BLD-MCNC: 201 MG/DL (ref 65–99)
HCT VFR BLD AUTO: 42.3 % (ref 34–46.6)
HGB BLD-MCNC: 14 G/DL (ref 12–15.9)
MCH RBC QN AUTO: 29.7 PG (ref 26.6–33)
MCHC RBC AUTO-ENTMCNC: 33.1 G/DL (ref 31.5–35.7)
MCV RBC AUTO: 89.6 FL (ref 79–97)
PLATELET # BLD AUTO: 266 10*3/MM3 (ref 140–450)
PMV BLD AUTO: 12 FL (ref 6–12)
POTASSIUM BLD-SCNC: 3.9 MMOL/L (ref 3.5–5.2)
RBC # BLD AUTO: 4.72 10*6/MM3 (ref 3.77–5.28)
SODIUM BLD-SCNC: 142 MMOL/L (ref 136–145)
WBC NRBC COR # BLD: 11.16 10*3/MM3 (ref 3.4–10.8)

## 2019-05-16 PROCEDURE — 80048 BASIC METABOLIC PNL TOTAL CA: CPT | Performed by: RADIOLOGY

## 2019-05-16 PROCEDURE — C1769 GUIDE WIRE: HCPCS | Performed by: RADIOLOGY

## 2019-05-16 PROCEDURE — C1760 CLOSURE DEV, VASC: HCPCS | Performed by: RADIOLOGY

## 2019-05-16 PROCEDURE — 36223 PLACE CATH CAROTID/INOM ART: CPT | Performed by: RADIOLOGY

## 2019-05-16 PROCEDURE — 25010000002 FENTANYL CITRATE (PF) 100 MCG/2ML SOLUTION: Performed by: RADIOLOGY

## 2019-05-16 PROCEDURE — 0 IODIXANOL PER 1 ML: Performed by: RADIOLOGY

## 2019-05-16 PROCEDURE — C1894 INTRO/SHEATH, NON-LASER: HCPCS | Performed by: RADIOLOGY

## 2019-05-16 PROCEDURE — 25010000002 MIDAZOLAM PER 1 MG: Performed by: RADIOLOGY

## 2019-05-16 PROCEDURE — 36226 PLACE CATH VERTEBRAL ART: CPT | Performed by: RADIOLOGY

## 2019-05-16 PROCEDURE — 36415 COLL VENOUS BLD VENIPUNCTURE: CPT

## 2019-05-16 PROCEDURE — 85027 COMPLETE CBC AUTOMATED: CPT | Performed by: RADIOLOGY

## 2019-05-16 PROCEDURE — 36225 PLACE CATH SUBCLAVIAN ART: CPT | Performed by: RADIOLOGY

## 2019-05-16 RX ORDER — SODIUM CHLORIDE 0.9 % (FLUSH) 0.9 %
1-10 SYRINGE (ML) INJECTION AS NEEDED
Status: DISCONTINUED | OUTPATIENT
Start: 2019-05-16 | End: 2019-05-16 | Stop reason: HOSPADM

## 2019-05-16 RX ORDER — LIDOCAINE HYDROCHLORIDE 10 MG/ML
INJECTION, SOLUTION EPIDURAL; INFILTRATION; INTRACAUDAL; PERINEURAL AS NEEDED
Status: DISCONTINUED | OUTPATIENT
Start: 2019-05-16 | End: 2019-05-16 | Stop reason: HOSPADM

## 2019-05-16 RX ORDER — FENTANYL CITRATE 50 UG/ML
INJECTION, SOLUTION INTRAMUSCULAR; INTRAVENOUS AS NEEDED
Status: DISCONTINUED | OUTPATIENT
Start: 2019-05-16 | End: 2019-05-16 | Stop reason: HOSPADM

## 2019-05-16 RX ORDER — SODIUM CHLORIDE 0.9 % (FLUSH) 0.9 %
3 SYRINGE (ML) INJECTION EVERY 12 HOURS SCHEDULED
Status: CANCELLED | OUTPATIENT
Start: 2019-05-16

## 2019-05-16 RX ORDER — SODIUM CHLORIDE 9 MG/ML
75 INJECTION, SOLUTION INTRAVENOUS CONTINUOUS
Status: ACTIVE | OUTPATIENT
Start: 2019-05-16 | End: 2019-05-16

## 2019-05-16 RX ORDER — MIDAZOLAM HYDROCHLORIDE 1 MG/ML
INJECTION INTRAMUSCULAR; INTRAVENOUS AS NEEDED
Status: DISCONTINUED | OUTPATIENT
Start: 2019-05-16 | End: 2019-05-16 | Stop reason: HOSPADM

## 2019-05-16 RX ORDER — IODIXANOL 320 MG/ML
INJECTION, SOLUTION INTRAVASCULAR AS NEEDED
Status: DISCONTINUED | OUTPATIENT
Start: 2019-05-16 | End: 2019-05-16 | Stop reason: HOSPADM

## 2019-05-16 RX ORDER — CLOPIDOGREL BISULFATE 75 MG/1
75 TABLET ORAL DAILY
Qty: 30 TABLET | Refills: 5 | Status: ON HOLD | OUTPATIENT
Start: 2019-05-16 | End: 2019-06-05

## 2019-05-16 RX ORDER — SODIUM CHLORIDE 0.9 % (FLUSH) 0.9 %
3-10 SYRINGE (ML) INJECTION AS NEEDED
Status: DISCONTINUED | OUTPATIENT
Start: 2019-05-16 | End: 2019-05-16 | Stop reason: HOSPADM

## 2019-05-16 RX ORDER — SODIUM CHLORIDE 0.9 % (FLUSH) 0.9 %
1-10 SYRINGE (ML) INJECTION AS NEEDED
Status: CANCELLED | OUTPATIENT
Start: 2019-05-16

## 2019-05-16 RX ORDER — FUROSEMIDE 40 MG/1
40 TABLET ORAL 2 TIMES DAILY
COMMUNITY
End: 2021-01-19 | Stop reason: SDUPTHER

## 2019-05-16 RX ORDER — SODIUM CHLORIDE 0.9 % (FLUSH) 0.9 %
3 SYRINGE (ML) INJECTION EVERY 12 HOURS SCHEDULED
Status: DISCONTINUED | OUTPATIENT
Start: 2019-05-16 | End: 2019-05-16 | Stop reason: HOSPADM

## 2019-05-16 RX ORDER — SODIUM CHLORIDE 9 MG/ML
100 INJECTION, SOLUTION INTRAVENOUS CONTINUOUS
Status: DISCONTINUED | OUTPATIENT
Start: 2019-05-16 | End: 2019-05-16 | Stop reason: HOSPADM

## 2019-05-16 RX ORDER — SODIUM CHLORIDE 9 MG/ML
50 INJECTION, SOLUTION INTRAVENOUS CONTINUOUS
Status: CANCELLED | OUTPATIENT
Start: 2019-05-16

## 2019-05-16 RX ADMIN — SODIUM CHLORIDE 100 ML/HR: 9 INJECTION, SOLUTION INTRAVENOUS at 07:44

## 2019-06-05 ENCOUNTER — APPOINTMENT (OUTPATIENT)
Dept: CARDIOLOGY | Facility: HOSPITAL | Age: 69
End: 2019-06-05

## 2019-06-05 ENCOUNTER — HOSPITAL ENCOUNTER (INPATIENT)
Facility: HOSPITAL | Age: 69
LOS: 1 days | Discharge: HOME | End: 2019-06-06
Attending: RADIOLOGY | Admitting: RADIOLOGY

## 2019-06-05 DIAGNOSIS — I65.21 CAROTID STENOSIS, ASYMPTOMATIC, RIGHT: ICD-10-CM

## 2019-06-05 LAB
ACT BLD: 279 SECONDS (ref 82–152)
ANION GAP SERPL CALCULATED.3IONS-SCNC: 15 MMOL/L
BH CV ECHO MEAS - BSA(HAYCOCK): 2.5 M^2
BH CV ECHO MEAS - BSA: 2.3 M^2
BH CV ECHO MEAS - BZI_BMI: 44.2 KILOGRAMS/M^2
BH CV ECHO MEAS - BZI_METRIC_HEIGHT: 170.2 CM
BH CV ECHO MEAS - BZI_METRIC_WEIGHT: 127.9 KG
BH CV MID RIGHT ICA HIDDEN LRR: 1 CM
BH CV RIGHT CCA HIDDEN LRR: 1 CM/S
BH CV VAS CAROTID RIGHT DISTAL STENT EDV: 32 CM/S
BH CV VAS CAROTID RIGHT DISTAL STENT PSV: 152 CM/S
BH CV VAS CAROTID RIGHT DISTAL TO STENT NATIVE VESSEL E: 34 CM/S
BH CV VAS CAROTID RIGHT DISTAL TO STENT PSV: 134 CM/S
BH CV VAS CAROTID RIGHT MID STENT EDV: 14 CM/S
BH CV VAS CAROTID RIGHT MID STENT PSV: 84 CM/S
BH CV VAS CAROTID RIGHT PROXIMAL STENT EDV: 9 CM/S
BH CV VAS CAROTID RIGHT PROXIMAL STENT PSV: 64 CM/S
BH CV VAS CAROTID RIGHT STENT NATIVE VESSEL PROXIMAL EDV: 13 CM/S
BH CV VAS CAROTID RIGHT STENT NATIVE VESSEL PROXIMAL PS: 83 CM/S
BH CV XLRA MEAS RIGHT BULB EDV: 20.1 CM/SEC
BH CV XLRA MEAS RIGHT BULB PSV: 114.4 CM/SEC
BH CV XLRA MEAS RIGHT CCA RATIO VEL: 85.5 CM/SEC
BH CV XLRA MEAS RIGHT DIST CCA EDV: 15 CM/SEC
BH CV XLRA MEAS RIGHT DIST CCA PSV: 85 CM/SEC
BH CV XLRA MEAS RIGHT DIST ICA EDV: 27 CM/SEC
BH CV XLRA MEAS RIGHT DIST ICA PSV: 101 CM/SEC
BH CV XLRA MEAS RIGHT ICA RATIO VEL: 171 CM/SEC
BH CV XLRA MEAS RIGHT ICA/CCA RATIO: 2
BH CV XLRA MEAS RIGHT MID CCA EDV: 15 CM/SEC
BH CV XLRA MEAS RIGHT MID CCA PSV: 86 CM/SEC
BH CV XLRA MEAS RIGHT MID ICA EDV: 33 CM/SEC
BH CV XLRA MEAS RIGHT MID ICA PSV: 153 CM/SEC
BH CV XLRA MEAS RIGHT PROX CCA EDV: 12 CM/SEC
BH CV XLRA MEAS RIGHT PROX CCA PSV: 96 CM/SEC
BH CV XLRA MEAS RIGHT PROX ECA PSV: 227 CM/SEC
BH CV XLRA MEAS RIGHT PROX ICA EDV: 36 CM/SEC
BH CV XLRA MEAS RIGHT PROX ICA PSV: 172 CM/SEC
BH CV XLRA MEAS RIGHT PROX SCLA PSV: 175 CM/SEC
BH CV XLRA MEAS RIGHT VERTEBRAL A EDV: 14 CM/SEC
BH CV XLRA MEAS RIGHT VERTEBRAL A PSV: 78 CM/SEC
BH CVPROX RIGHT ICA HIDDEN LRR: 1 CM
BUN BLD-MCNC: 19 MG/DL (ref 8–23)
BUN/CREAT SERPL: 28.4 (ref 7–25)
CALCIUM SPEC-SCNC: 10.2 MG/DL (ref 8.6–10.5)
CHLORIDE SERPL-SCNC: 102 MMOL/L (ref 98–107)
CO2 SERPL-SCNC: 27 MMOL/L (ref 22–29)
CREAT BLD-MCNC: 0.67 MG/DL (ref 0.57–1)
DEPRECATED RDW RBC AUTO: 45.2 FL (ref 37–54)
ERYTHROCYTE [DISTWIDTH] IN BLOOD BY AUTOMATED COUNT: 13.7 % (ref 12.3–15.4)
GFR SERPL CREATININE-BSD FRML MDRD: 88 ML/MIN/1.73
GLUCOSE BLD-MCNC: 170 MG/DL (ref 65–99)
GLUCOSE BLDC GLUCOMTR-MCNC: 138 MG/DL (ref 70–130)
GLUCOSE BLDC GLUCOMTR-MCNC: 156 MG/DL (ref 70–130)
GLUCOSE BLDC GLUCOMTR-MCNC: 162 MG/DL (ref 70–130)
HCT VFR BLD AUTO: 42.1 % (ref 34–46.6)
HGB BLD-MCNC: 14 G/DL (ref 12–15.9)
MCH RBC QN AUTO: 29.7 PG (ref 26.6–33)
MCHC RBC AUTO-ENTMCNC: 33.3 G/DL (ref 31.5–35.7)
MCV RBC AUTO: 89.2 FL (ref 79–97)
PLATELET # BLD AUTO: 206 10*3/MM3 (ref 140–450)
PMV BLD AUTO: 11.1 FL (ref 6–12)
POTASSIUM BLD-SCNC: 3.8 MMOL/L (ref 3.5–5.2)
RBC # BLD AUTO: 4.72 10*6/MM3 (ref 3.77–5.28)
RIGHT ARM BP: NORMAL MMHG
SODIUM BLD-SCNC: 144 MMOL/L (ref 136–145)
WBC NRBC COR # BLD: 10.94 10*3/MM3 (ref 3.4–10.8)

## 2019-06-05 PROCEDURE — 36415 COLL VENOUS BLD VENIPUNCTURE: CPT

## 2019-06-05 PROCEDURE — 85347 COAGULATION TIME ACTIVATED: CPT

## 2019-06-05 PROCEDURE — 80048 BASIC METABOLIC PNL TOTAL CA: CPT | Performed by: RADIOLOGY

## 2019-06-05 PROCEDURE — C1725 CATH, TRANSLUMIN NON-LASER: HCPCS | Performed by: NEUROLOGICAL SURGERY

## 2019-06-05 PROCEDURE — 93882 EXTRACRANIAL UNI/LTD STUDY: CPT | Performed by: INTERNAL MEDICINE

## 2019-06-05 PROCEDURE — 25010000002 MIDAZOLAM PER 1 MG: Performed by: RADIOLOGY

## 2019-06-05 PROCEDURE — 25010000002 FENTANYL CITRATE (PF) 100 MCG/2ML SOLUTION: Performed by: RADIOLOGY

## 2019-06-05 PROCEDURE — C1894 INTRO/SHEATH, NON-LASER: HCPCS | Performed by: NEUROLOGICAL SURGERY

## 2019-06-05 PROCEDURE — 63710000001 INSULIN LISPRO (HUMAN) PER 5 UNITS: Performed by: INTERNAL MEDICINE

## 2019-06-05 PROCEDURE — 99222 1ST HOSP IP/OBS MODERATE 55: CPT | Performed by: INTERNAL MEDICINE

## 2019-06-05 PROCEDURE — 0 IODIXANOL PER 1 ML: Performed by: NEUROLOGICAL SURGERY

## 2019-06-05 PROCEDURE — 94640 AIRWAY INHALATION TREATMENT: CPT

## 2019-06-05 PROCEDURE — 25010000002 HEPARIN (PORCINE) PER 1000 UNITS: Performed by: NEUROLOGICAL SURGERY

## 2019-06-05 PROCEDURE — 85027 COMPLETE CBC AUTOMATED: CPT | Performed by: RADIOLOGY

## 2019-06-05 PROCEDURE — 63710000001 INSULIN DETEMIR PER 5 UNITS: Performed by: INTERNAL MEDICINE

## 2019-06-05 PROCEDURE — C1769 GUIDE WIRE: HCPCS | Performed by: NEUROLOGICAL SURGERY

## 2019-06-05 PROCEDURE — B313YZZ FLUOROSCOPY OF RIGHT COMMON CAROTID ARTERY USING OTHER CONTRAST: ICD-10-PCS | Performed by: RADIOLOGY

## 2019-06-05 PROCEDURE — 82962 GLUCOSE BLOOD TEST: CPT

## 2019-06-05 PROCEDURE — C1876 STENT, NON-COA/NON-COV W/DEL: HCPCS | Performed by: NEUROLOGICAL SURGERY

## 2019-06-05 PROCEDURE — 93882 EXTRACRANIAL UNI/LTD STUDY: CPT

## 2019-06-05 PROCEDURE — C1884 EMBOLIZATION PROTECT SYST: HCPCS | Performed by: NEUROLOGICAL SURGERY

## 2019-06-05 PROCEDURE — C1760 CLOSURE DEV, VASC: HCPCS | Performed by: NEUROLOGICAL SURGERY

## 2019-06-05 PROCEDURE — 94799 UNLISTED PULMONARY SVC/PX: CPT

## 2019-06-05 PROCEDURE — 037K3DZ DILATION OF RIGHT INTERNAL CAROTID ARTERY WITH INTRALUMINAL DEVICE, PERCUTANEOUS APPROACH: ICD-10-PCS | Performed by: RADIOLOGY

## 2019-06-05 PROCEDURE — 63710000001 INSULIN LISPRO (HUMAN) PER 5 UNITS: Performed by: RADIOLOGY

## 2019-06-05 PROCEDURE — 37215 TRANSCATH STENT CCA W/EPS: CPT | Performed by: NEUROLOGICAL SURGERY

## 2019-06-05 DEVICE — CLOSED CELL SELF-EXPANDING STENT
Type: IMPLANTABLE DEVICE | Status: FUNCTIONAL
Brand: CAROTID WALLSTENT®

## 2019-06-05 RX ORDER — FUROSEMIDE 40 MG/1
40 TABLET ORAL
Status: DISCONTINUED | OUTPATIENT
Start: 2019-06-05 | End: 2019-06-06 | Stop reason: HOSPADM

## 2019-06-05 RX ORDER — POTASSIUM CHLORIDE 750 MG/1
10 CAPSULE, EXTENDED RELEASE ORAL 2 TIMES DAILY WITH MEALS
Status: DISCONTINUED | OUTPATIENT
Start: 2019-06-05 | End: 2019-06-06 | Stop reason: HOSPADM

## 2019-06-05 RX ORDER — NITROGLYCERIN 20 MG/100ML
5-200 INJECTION INTRAVENOUS
Status: DISCONTINUED | OUTPATIENT
Start: 2019-06-05 | End: 2019-06-06 | Stop reason: HOSPADM

## 2019-06-05 RX ORDER — CLOPIDOGREL BISULFATE 75 MG/1
75 TABLET ORAL DAILY
Status: DISCONTINUED | OUTPATIENT
Start: 2019-06-06 | End: 2019-06-06 | Stop reason: HOSPADM

## 2019-06-05 RX ORDER — CELECOXIB 200 MG/1
200 CAPSULE ORAL DAILY
Status: DISCONTINUED | OUTPATIENT
Start: 2019-06-06 | End: 2019-06-06 | Stop reason: HOSPADM

## 2019-06-05 RX ORDER — SODIUM CHLORIDE 0.9 % (FLUSH) 0.9 %
3 SYRINGE (ML) INJECTION EVERY 12 HOURS SCHEDULED
Status: DISCONTINUED | OUTPATIENT
Start: 2019-06-05 | End: 2019-06-05

## 2019-06-05 RX ORDER — ASPIRIN 325 MG
325 TABLET, DELAYED RELEASE (ENTERIC COATED) ORAL DAILY
Status: DISCONTINUED | OUTPATIENT
Start: 2019-06-05 | End: 2019-06-06 | Stop reason: HOSPADM

## 2019-06-05 RX ORDER — SODIUM CHLORIDE 0.9 % (FLUSH) 0.9 %
3-10 SYRINGE (ML) INJECTION AS NEEDED
Status: DISCONTINUED | OUTPATIENT
Start: 2019-06-05 | End: 2019-06-06 | Stop reason: HOSPADM

## 2019-06-05 RX ORDER — IODIXANOL 320 MG/ML
INJECTION, SOLUTION INTRAVASCULAR AS NEEDED
Status: DISCONTINUED | OUTPATIENT
Start: 2019-06-05 | End: 2019-06-05 | Stop reason: HOSPADM

## 2019-06-05 RX ORDER — POTASSIUM CHLORIDE 750 MG/1
10 TABLET, EXTENDED RELEASE ORAL NIGHTLY
COMMUNITY

## 2019-06-05 RX ORDER — FENTANYL CITRATE 50 UG/ML
INJECTION, SOLUTION INTRAMUSCULAR; INTRAVENOUS AS NEEDED
Status: DISCONTINUED | OUTPATIENT
Start: 2019-06-05 | End: 2019-06-05 | Stop reason: HOSPADM

## 2019-06-05 RX ORDER — NITROGLYCERIN 0.4 MG/1
0.4 TABLET SUBLINGUAL
Status: DISCONTINUED | OUTPATIENT
Start: 2019-06-05 | End: 2019-06-06 | Stop reason: HOSPADM

## 2019-06-05 RX ORDER — CLOPIDOGREL BISULFATE 75 MG/1
75 TABLET ORAL DAILY
COMMUNITY
End: 2019-07-08 | Stop reason: ALTCHOICE

## 2019-06-05 RX ORDER — SODIUM CHLORIDE 9 MG/ML
50 INJECTION, SOLUTION INTRAVENOUS CONTINUOUS
Status: DISCONTINUED | OUTPATIENT
Start: 2019-06-05 | End: 2019-06-06 | Stop reason: HOSPADM

## 2019-06-05 RX ORDER — ALBUTEROL SULFATE 2.5 MG/3ML
2.5 SOLUTION RESPIRATORY (INHALATION) EVERY 6 HOURS PRN
Status: DISCONTINUED | OUTPATIENT
Start: 2019-06-05 | End: 2019-06-06 | Stop reason: HOSPADM

## 2019-06-05 RX ORDER — LIDOCAINE HYDROCHLORIDE 10 MG/ML
INJECTION, SOLUTION EPIDURAL; INFILTRATION; INTRACAUDAL; PERINEURAL AS NEEDED
Status: DISCONTINUED | OUTPATIENT
Start: 2019-06-05 | End: 2019-06-05 | Stop reason: HOSPADM

## 2019-06-05 RX ORDER — SOTALOL HYDROCHLORIDE 80 MG/1
80 TABLET ORAL EVERY 12 HOURS SCHEDULED
Status: DISCONTINUED | OUTPATIENT
Start: 2019-06-05 | End: 2019-06-06 | Stop reason: HOSPADM

## 2019-06-05 RX ORDER — HEPARIN SODIUM 1000 [USP'U]/ML
INJECTION, SOLUTION INTRAVENOUS; SUBCUTANEOUS AS NEEDED
Status: DISCONTINUED | OUTPATIENT
Start: 2019-06-05 | End: 2019-06-05 | Stop reason: HOSPADM

## 2019-06-05 RX ORDER — POTASSIUM CHLORIDE 750 MG/1
20 TABLET, EXTENDED RELEASE ORAL EVERY MORNING
COMMUNITY
End: 2020-03-17

## 2019-06-05 RX ORDER — METFORMIN HYDROCHLORIDE 500 MG/1
500 TABLET, EXTENDED RELEASE ORAL
Status: DISCONTINUED | OUTPATIENT
Start: 2019-06-05 | End: 2019-06-05

## 2019-06-05 RX ORDER — PHENYLEPHRINE HCL IN 0.9% NACL 0.5 MG/5ML
.5-3 SYRINGE (ML) INTRAVENOUS
Status: DISCONTINUED | OUTPATIENT
Start: 2019-06-05 | End: 2019-06-06 | Stop reason: HOSPADM

## 2019-06-05 RX ORDER — MIDAZOLAM HYDROCHLORIDE 1 MG/ML
INJECTION INTRAMUSCULAR; INTRAVENOUS AS NEEDED
Status: DISCONTINUED | OUTPATIENT
Start: 2019-06-05 | End: 2019-06-05 | Stop reason: HOSPADM

## 2019-06-05 RX ORDER — ASPIRIN 81 MG/1
81 TABLET, CHEWABLE ORAL DAILY
Status: DISCONTINUED | OUTPATIENT
Start: 2019-06-05 | End: 2019-06-05 | Stop reason: SDUPTHER

## 2019-06-05 RX ORDER — MONTELUKAST SODIUM 10 MG/1
10 TABLET ORAL NIGHTLY
Status: DISCONTINUED | OUTPATIENT
Start: 2019-06-05 | End: 2019-06-06 | Stop reason: HOSPADM

## 2019-06-05 RX ORDER — SODIUM CHLORIDE 0.9 % (FLUSH) 0.9 %
1-10 SYRINGE (ML) INJECTION AS NEEDED
Status: DISCONTINUED | OUTPATIENT
Start: 2019-06-05 | End: 2019-06-05

## 2019-06-05 RX ORDER — SODIUM CHLORIDE 9 MG/ML
75 INJECTION, SOLUTION INTRAVENOUS CONTINUOUS
Status: ACTIVE | OUTPATIENT
Start: 2019-06-05 | End: 2019-06-05

## 2019-06-05 RX ORDER — ALBUTEROL SULFATE 2.5 MG/3ML
2.5 SOLUTION RESPIRATORY (INHALATION) EVERY 6 HOURS PRN
Status: DISCONTINUED | OUTPATIENT
Start: 2019-06-05 | End: 2019-06-05 | Stop reason: SDUPTHER

## 2019-06-05 RX ORDER — SODIUM CHLORIDE 0.9 % (FLUSH) 0.9 %
3 SYRINGE (ML) INJECTION EVERY 12 HOURS SCHEDULED
Status: DISCONTINUED | OUTPATIENT
Start: 2019-06-05 | End: 2019-06-06 | Stop reason: HOSPADM

## 2019-06-05 RX ORDER — CLOPIDOGREL BISULFATE 75 MG/1
TABLET ORAL AS NEEDED
Status: DISCONTINUED | OUTPATIENT
Start: 2019-06-05 | End: 2019-06-05 | Stop reason: HOSPADM

## 2019-06-05 RX ADMIN — SOTALOL HYDROCHLORIDE 80 MG: 80 TABLET ORAL at 20:40

## 2019-06-05 RX ADMIN — Medication 400 MG: at 20:40

## 2019-06-05 RX ADMIN — MONTELUKAST SODIUM 10 MG: 10 TABLET, COATED ORAL at 20:40

## 2019-06-05 RX ADMIN — FUROSEMIDE 40 MG: 40 TABLET ORAL at 17:54

## 2019-06-05 RX ADMIN — ASPIRIN 325 MG: 325 TABLET, DELAYED RELEASE ORAL at 11:10

## 2019-06-05 RX ADMIN — INSULIN DETEMIR 18 UNITS: 100 INJECTION, SOLUTION SUBCUTANEOUS at 21:05

## 2019-06-05 RX ADMIN — IPRATROPIUM BROMIDE 0.5 MG: 0.5 SOLUTION RESPIRATORY (INHALATION) at 19:30

## 2019-06-05 RX ADMIN — INSULIN LISPRO 2 UNITS: 100 INJECTION, SOLUTION INTRAVENOUS; SUBCUTANEOUS at 21:05

## 2019-06-05 RX ADMIN — INSULIN LISPRO 6 UNITS: 100 INJECTION, SOLUTION INTRAVENOUS; SUBCUTANEOUS at 18:04

## 2019-06-05 RX ADMIN — SODIUM CHLORIDE 75 ML/HR: 9 INJECTION, SOLUTION INTRAVENOUS at 09:46

## 2019-06-05 RX ADMIN — INSULIN LISPRO 2 UNITS: 100 INJECTION, SOLUTION INTRAVENOUS; SUBCUTANEOUS at 12:08

## 2019-06-05 RX ADMIN — IPRATROPIUM BROMIDE 0.5 MG: 0.5 SOLUTION RESPIRATORY (INHALATION) at 15:32

## 2019-06-05 RX ADMIN — POTASSIUM CHLORIDE 10 MEQ: 750 CAPSULE, EXTENDED RELEASE ORAL at 17:54

## 2019-06-05 RX ADMIN — METOPROLOL TARTRATE 25 MG: 25 TABLET ORAL at 20:40

## 2019-06-05 RX ADMIN — SODIUM CHLORIDE, PRESERVATIVE FREE 3 ML: 5 INJECTION INTRAVENOUS at 20:40

## 2019-06-05 NOTE — BRIEF OP NOTE
CAROTID STENT  Progress Note    Mandi Jacob  6/5/2019    Pre-op Diagnosis:   Carotid stenosis, asymptomatic, right [I65.21]       Post-Op Diagnosis Codes:  Carotid stenosis, asymptomatic, right [I65.21]    Procedure/CPT® Codes:      Procedure(s):  Carotid Stent    Surgeon(s):  Alex Erickson MD Newman, Charles Benjamin, MD Newman, Charles Benjamin, MD  Anesthesia: Sedation    Staff:   Circulator: Carmenza Dumont RN; Rupinder Raines RN  Scrub Person: Jen Lemon  Documenter: Syeda Chairez    Estimated Blood Loss: minimal    Urine Voided: * No values recorded between 6/5/2019  8:13 AM and 6/5/2019  9:02 AM *    Specimens:                None          Drains:      Findings: High-grade right carotid stenosis treated with angioplasty/stent placement (Carotid Wallstent), restoring a near normal caliber lumen.    Complications: None apparent      Alex Erickson MD     Date: 6/5/2019  Time: 9:09 AM

## 2019-06-05 NOTE — PROGRESS NOTES
INTENSIVIST   PROGRESS NOTE     Hospital:  LOS: 0 days      S     Ms. Mandi Jacob, 68 y.o. female is followed for:  No chief complaint on file.      Carotid stenosis, asymptomatic, right    As an Intensivist, we provide an integrated approach to the ICU patient and family, medical management of comorbid conditions, including but not limited to electrolytes, glycemic control, organ dysfunction, lead interdisciplinary rounds and coordinate the care with all other services, including those from other specialists.     Interval History:  POD: Day of Surgery    Seen in 2B ICU admitted electively for a carotid stent.    She was having episodes of vertigo and dizziness and found to have bilateral carotid disease on carotid duplex.     Carotid angiogram on 5/16 found 80% right ICA stenosis and today she underwent right carotid stent placement per Dr. Erickson.     Post-procedure she is in the neuro ICU and complains of hunger and is ready for lunch. She denies dizziness, weakness, numbness, tingling, vision or speech disturbance.       PMH: She  has a past medical history of Arrhythmia, Asthma, Atrial fibrillation (CMS/[facility]), CAD (coronary artery disease), CHF (congestive heart failure) (CMS/[facility]), COPD (chronic obstructive pulmonary disease) (CMS/[facility]), Diabetes mellitus (CMS/[facility]), Diastolic dysfunction, Dizziness, Edema, Esophageal spasm, Herniated lumbar intervertebral disc, Hyperlipidemia, Hypertension, Osteoarthritis, Palpitations, SOB (shortness of breath), Stroke-like symptoms, TIA (transient ischemic attack) (2015), and Unstable angina (CMS/[facility]).   PSxH: She  has a past surgical history that includes Tubal ligation; Wrist surgery; Cardiac catheterization; Interventional radiology procedure (Bilateral, 5/16/2019); and pr tcat iv stent crv crtd art embolic protecj (N/A, 6/5/2019).      Medications:  No current facility-administered medications on file prior to encounter.      Current Outpatient Medications on  File Prior to Encounter   Medication Sig   • aspirin 81 MG tablet Take 1 tablet by mouth Daily. (Patient taking differently: Take 81 mg by mouth Every Morning.)   • celecoxib (CeleBREX) 200 MG capsule Take 200 mg by mouth Every Morning.   • clopidogrel (PLAVIX) 75 MG tablet Take 75 mg by mouth Daily.   • furosemide (LASIX) 40 MG tablet Take 40 mg by mouth 2 (Two) Times a Day.   • insulin aspart (novoLOG FLEXPEN) 100 UNIT/ML solution pen-injector sc pen Inject 15 Units under the skin into the appropriate area as directed Daily With Breakfast.   • insulin aspart (novoLOG FLEXPEN) 100 UNIT/ML solution pen-injector sc pen Inject 20 Units under the skin into the appropriate area as directed Daily With Lunch.   • insulin aspart (novoLOG FLEXPEN) 100 UNIT/ML solution pen-injector sc pen Inject  under the skin into the appropriate area as directed Daily Before Supper. Per Sliding Scale at home at night   • isosorbide mononitrate (IMDUR) 30 MG 24 hr tablet TAKE 1 TABLET DAILY (Patient taking differently: TAKE 1 TABLET EVERY MORNING)   • losartan (COZAAR) 50 MG tablet Take 50 mg by mouth Every Morning.   • magnesium oxide (MAGOX) 400 (241.3 MG) MG tablet tablet Take 400 mg by mouth Every Night.   • metoprolol tartrate (LOPRESSOR) 25 MG tablet Take 25 mg by mouth 2 (Two) Times a Day.   • montelukast (SINGULAIR) 10 MG tablet Take 10 mg by mouth Every Night.   • potassium chloride (K-DUR,KLOR-CON) 10 MEQ CR tablet Take 10 mEq by mouth Every Night.   • potassium chloride (K-DUR,KLOR-CON) 10 MEQ CR tablet Take 20 mEq by mouth Every Morning.   • Sotalol HCl AF 80 MG tablet Take 1 tablet by mouth 2 (Two) Times a Day.   • tiotropium (SPIRIVA) 18 MCG per inhalation capsule Place 1 capsule into inhaler and inhale Every Morning.   • TRESIBA FLEXTOUCH 200 UNIT/ML solution pen-injector Inject 100 Units under the skin into the appropriate area as directed Every Morning. 100 units daily   • triamterene-hydrochlorothiazide (MAXZIDE-25)  37.5-25 MG per tablet Take 1 tablet by mouth Every Morning.   • [DISCONTINUED] clopidogrel (PLAVIX) 75 MG tablet Take 1 tablet by mouth Daily. (Patient taking differently: Take 75 mg by mouth Every Morning.)   • [DISCONTINUED] NOVOLOG FLEXPEN 100 UNIT/ML solution pen-injector sc pen Inject  under the skin into the appropriate area as directed 3 (Three) Times a Day With Meals. 15 UNITS SQ QAM, 20 units SQ with lunch; SS nightly   • [DISCONTINUED] potassium chloride (K-DUR,KLOR-CON) 10 MEQ CR tablet Take  by mouth 2 (Two) Times a Day. 20meq qam and 10meq at night   • albuterol (PROVENTIL HFA;VENTOLIN HFA) 108 (90 BASE) MCG/ACT inhaler Inhale 2 puffs Every 4 (Four) Hours As Needed. Albuterol 90 MCG/ACT AERS; Patient Sig: Albuterol 90 MCG/ACT AERS INHALE 1 TO 2 PUFFS EVERY 4 TO 6 HOURS AS NEEDED.; 0; 11-Sep-2013; Active   • albuterol (PROVENTIL) (2.5 MG/3ML) 0.083% nebulizer solution Albuterol Sulfate (2.5 MG/3ML) 0.083% Inhalation Nebulization Solution; Patient Sig: Albuterol Sulfate (2.5 MG/3ML) 0.083% Inhalation Nebulization Solution USE 1 UNIT DOSE IN NEBULIZER EVERY 4 TO 6 HOURS AS NEE   • Alirocumab 75 MG/ML solution pen-injector Inject 75 mg under the skin into the appropriate area as directed Every 14 (Fourteen) Days.   • metFORMIN ER (GLUCOPHAGE-XR) 500 MG 24 hr tablet Take 500 mg by mouth Daily With Breakfast.   • metolazone (ZAROXOLYN) 2.5 MG tablet Take 2.5 mg by mouth Daily As Needed. 1 tablet 30 minutes prior to lasix as needed PRN     • nitroglycerin (NITROSTAT) 0.4 MG SL tablet Place 1 tablet under the tongue Every 5 (Five) Minutes As Needed for Chest Pain.   • [DISCONTINUED] ibuprofen (ADVIL,MOTRIN) 600 MG tablet Take 1 tablet by mouth 3 (Three) Times a Day.       Allergies: She is allergic to ace inhibitors; incruse ellipta [umeclidinium bromide]; lisinopril; buspirone; citalopram; codeine; diltiazem; flecainide; ketorolac tromethamine; lipitor [atorvastatin]; liraglutide; lorazepam; niacin;  sertraline; statins; sulfamethoxazole-trimethoprim; trulicity [dulaglutide]; xigduo xr [dapagliflozin-metformin hcl er]; ciprofloxacin; clindamycin/lincomycin; and levaquin [levofloxacin].   FH: Her family history includes Diabetes in her father and other; Heart attack in her father and mother; Heart failure in her father, mother, and other; Hyperlipidemia in her father, mother, and other; Hypertension in her brother, father, mother, and other; Peripheral vascular disease in her father, mother, and other.   SH: She  reports that she quit smoking about 10 years ago. She started smoking about 51 years ago. She has a 61.50 pack-year smoking history. She has never used smokeless tobacco. She reports that she does not drink alcohol or use drugs.     The patient's relevant past medical, surgical and social history were reviewed and updated in Epic as appropriate.     ROS:   See H+P done by Dr. Erickson on 6/5/2019.       O     Vitals:  Temp: 97.8 °F (36.6 °C) (06/05/19 0915) Temp  Min: 97.5 °F (36.4 °C)  Max: 97.8 °F (36.6 °C)   BP: (!) 84/65 (06/05/19 1030) BP  Min: 84/65  Max: 144/72   Pulse: 59 (06/05/19 1030) Pulse  Min: 53  Max: 77   Resp: 20 (06/05/19 0915) Resp  Min: 16  Max: 20   SpO2: 97 % (06/05/19 1030) SpO2  Min: 94 %  Max: 99 %   Device: nasal cannula (06/05/19 0915)    Flow Rate: 2 (06/05/19 0915) Flow (L/min)  Min: 2  Max: 2     Medications (drips):    nitroglycerin    phenylephrine    sodium chloride    sodium chloride Last Rate: 75 mL/hr (06/05/19 0946)       Physical Examination  Telemetry:  Rhythm: sinus bradycardia (06/05/19 1000)     QTc Interval (Sec): 0.4 (06/05/19 0915)   Constitutional:  No acute distress.   Cardiovascular: Normal rate, regular and rhythm. Normal heart sounds.  No murmurs, gallop or rub.   Respiratory: No respiratory distress.  Normal breath sounds  No adventitious sounds    Abdominal:  Soft with no tenderness  No distension. No HSM.   Extremities: Warm with no cyanosis.  No  peripheral edema.  Right groin site no hematoma   Neurological:   Alert and Oriented to person, place, and time.  Best Eye Response: 4-->(E4) spontaneous (06/05/19 1015)  Best Motor Response: 6-->(M6) obeys commands (06/05/19 1015)  Best Verbal Response: 5-->(V5) oriented (06/05/19 1015)  [place] Coma Scale Score: 15 (06/05/19 1015)   Lines/Drains/Airways: Peripheral IV(s)       Hematology:  Results from last 7 days   Lab Units 06/05/19  0635   WBC 10*3/mm3 10.94*   HEMOGLOBIN g/dL 14.0   MCV fL 89.2   PLATELETS 10*3/mm3 206       Chemistry:  Estimated Creatinine Clearance: 95 mL/min (by C-G formula based on SCr of 0.67 mg/dL).    Results from last 7 days   Lab Units 06/05/19  0635   SODIUM mmol/L 144   POTASSIUM mmol/L 3.8   CHLORIDE mmol/L 102   CO2 mmol/L 27.0   ANION GAP mmol/L 15.0   GLUCOSE mg/dL 170*   CALCIUM mg/dL 10.2     Results from last 7 days   Lab Units 06/05/19  0635   BUN mg/dL 19   CREATININE mg/dL 0.67     Images:  No radiology results for the last day    Echo:  Results for orders placed during the hospital encounter of 03/18/19   Adult Transthoracic Echo Complete W/ Cont if Necessary Per Protocol    Narrative 1.  Significantly technically limited study.    2.  Preserved LV size and low normal global LV systolic function with mild   concentric left ventricular hypertrophy, no focal wall motion   abnormalities, and grade 2 diastolic dysfunction.    3.  No significant pericardial or great vessel pathology is identified on   limited imaging.    4.  Pulmonary artery pressures cannot be calculated but are likely not   significantly elevated.       Results: Reviewed.  I reviewed the patient's new laboratory and imaging results.  I independently reviewed the patient's new images.    Medications: Reviewed.    Assessment/Plan   A / P     Assessment:    68 y.o.female, admitted on 6/5/2019 with Carotid stenosis, asymptomatic, right [I65.21]  Carotid stenosis, asymptomatic, right [I65.21]:     1. Carotid  Artery Disease  1. S/p carotid/cerebral angiogram, 5/16/19: 80% right stenosis, 40% left stenosis  Procedure(s) (LRB):  Carotid Stent (N/A)   Dr. Erickson  06/05/19     2. COPD on Home O2  1. Former tobacco, quit 10 y ago.  3. Cardiac  1. CHF  1. EF 46-50%, grade II LV diastolic dysfunction per echo, 3/2019  2. CAD  4. GERD  5. T2DM X 25 yrs on oral antidiabetic medications and insulin.        No results found for: HGBA1C    Nutrition Support: Patient isn't on Tube Feeding   Modulars: Patient doesn't have any tube feeding modular orders   Diet: Diet Regular; Consistent Carbohydrate, Cardiac   Advance Directives: Code Status and Medical Interventions:   Ordered at: 06/05/19 0908     Level Of Support Discussed With:    Patient     Code Status:    CPR     Medical Interventions (Level of Support Prior to Arrest):    Full        Plan:    1. ICU post operative medical management.  2. Hemodynamic management. Adequate preload.  3. Urinary Output > = 0.5 mL/kg/hr  4. Watch for bleeeding.  5. Insulin SQ. Avoid oral antidiabetic medications. Target glucose < = 180 mg/dL, Insulin Basal, prandial and correction.    Plan of care and goals reviewed during interdisciplinary rounds.  I discussed the patient's findings and my recommendations with patient, family and nursing staff    Level of Risk is High due to:  illness with threat to life or bodily function.     FLOWER Alarcon, [facility]AY[facility]-BC  Pulmonary & Critical Care    I have personally seen, interviewed and examined the patient and verified all the key components of the history, physical examination, assessment and plan with FLOWER Alarcon, LOR-BC and reviewed the note, which reflects my changes and contributions.     Nasim Travis MD, FACP, FCCP, CNSC  Intensive Care Medicine, Nutrition Support and Pulmonary Medicine     []  Primary Attending  [x]  Consultant

## 2019-06-05 NOTE — PROGRESS NOTES
Discharge Planning Assessment  [facility]     Patient Name: Mandi Jacob  MRN: [id number]  Today's Date: 6/5/2019    Admit Date: 6/5/2019    Discharge Needs Assessment     Row Name 06/05/19 1054       Living Environment    Lives With  alone    Current Living Arrangements  home/apartment/condo    Primary Care Provided by  self    Provides Primary Care For  no one    Family Caregiver if Needed  child(marija), adult    Family Caregiver Names  Anna  dtr    Quality of Family Relationships  helpful;involved;supportive    Able to Return to Prior Arrangements  yes    Living Arrangement Comments  Pt lives alone in [place], [place]. Her daughter Anna is available to assist if needed.       Resource/Environmental Concerns    Resource/Environmental Concerns  none       Transition Planning    Patient/Family Anticipates Transition to  home    Patient/Family Anticipated Services at Transition  none    Transportation Anticipated  family or friend will provide       Discharge Needs Assessment    Readmission Within the Last 30 Days  no previous admission in last 30 days    Concerns to be Addressed  no discharge needs identified    Equipment Currently Used at Home  cane, straight;oxygen;rollator;wheelchair;walker, rolling;grab bar;shower chair pt has walk in shower with bench. She gets her home 02 thru [employer].    Anticipated Changes Related to Illness  none    Equipment Needed After Discharge  none        Discharge Plan     Row Name 06/05/19 1057       Plan    Plan  Home    Patient/Family in Agreement with Plan  yes    Plan Comments  Met with patient and daughter Anna at . Pt lives alone in [place], [place]. Pt does use st cane to ambulate. She has not had HH. She has insurance with Rx coverage.  Her PCP is John Montelongo.    Final Discharge Disposition Code  01 - home or self-care        Destination      No service coordination in this encounter.      Durable Medical Equipment      No service coordination in this encounter.       Dialysis/Infusion      No service coordination in this encounter.      Home Medical Care      No service coordination in this encounter.      Therapy      No service coordination in this encounter.      Community Resources      No service coordination in this encounter.        Expected Discharge Date and Time     Expected Discharge Date Expected Discharge Time    Jun 8, 2019         Demographic Summary     Row Name 06/05/19 1053       General Information    Admission Type  inpatient    Arrived From  home    Referral Source  admission list    Reason for Consult  discharge planning    Preferred Language  English       Contact Information    Permission Granted to Share Info With          Functional Status     Row Name 06/05/19 1053       Functional Status    Usual Activity Tolerance  good       Functional Status, IADL    Medications  independent    Meal Preparation  independent    Housekeeping  independent    Laundry  independent    Shopping  assistive equipment        Psychosocial    No documentation.       Abuse/Neglect    No documentation.       Legal    No documentation.       Substance Abuse    No documentation.       Patient Forms    No documentation.           Sheeba Lawson RN

## 2019-06-05 NOTE — H&P
"NAME: COLE EUBANKS  : 1950  PCP: John Montelongo MD  Attending MD: Alex Erickson MD    Date of Admission:  2019  Date of Service: 2019    History of Present Illness:  68 y.o.  female known to the neuro interventional service, having been previously seen in consultation regarding asymptomatic carotid disease.  She did suffer what sounds like a left hemispheric TIA/stroke in 2015 with speech difficulties and right-sided facial droop, but made an excellent recovery from this.  She has had no recent episodes of stroke or TIA, specifically no unilateral weakness/numbness, no recurrent speech or vision changes.    She had some dizziness and vertigo (history of orthostatic hypotension), and underwent noninvasive imaging study suggesting bilateral carotid disease.  She subsequently underwent catheter angiogram on 2019, revealing the presence of a \"high-grade\" right internal carotid stenosis.  There was no evidence of vertebrobasilar insufficiency.    She does have extensive comorbidities of atrial fibrillation, oxygen dependent COPD, and extensive cardiac history.  Given her comorbidities, she is deemed a \"high surgical risk\" for endarterectomy and presents today for angioplasty/stent placement for her asymptomatic right carotid disease.    Past Medical History:  Past Medical History:   Diagnosis Date   • Arrhythmia    • Asthma    • Atrial fibrillation (CMS/[facility]ly [facility])    • CAD (coronary artery disease)     non-obstructive by cath 2012   • CHF (congestive heart failure) (CMS/Formerly [facility])    • COPD (chronic obstructive pulmonary disease) (CMS/Formerly [facility])    • Diabetes mellitus (CMS/[facility]ly [facility])    • Diastolic dysfunction    • Dizziness    • Edema    • Esophageal spasm    • Herniated lumbar intervertebral disc     L5   • Hyperlipidemia     intolerant to statins    • Hypertension    • Osteoarthritis    • Palpitations    • SOB (shortness of breath)    • Stroke-like symptoms    • TIA (transient ischemic attack) " 2015    left hemispheric TIA/CVA   • Unstable angina (CMS/HCC)        Past Surgical History:  Past Surgical History:   Procedure Laterality Date   • CARDIAC CATHETERIZATION     • INTERVENTIONAL RADIOLOGY PROCEDURE Bilateral 5/16/2019    Procedure: Carotid Cerebral Angiogram;  Surgeon: Alex Erickson MD;  Location: [facility] INVASIVE LOCATION;  Service: Interventional Radiology   • TUBAL ABDOMINAL LIGATION     • WRIST SURGERY           Medications  Medications Prior to Admission   Medication Sig Dispense Refill Last Dose   • aspirin 81 MG tablet Take 1 tablet by mouth Daily. (Patient taking differently: Take 81 mg by mouth Every Morning.) 30 tablet 5 6/4/2019 at Unknown time   • celecoxib (CeleBREX) 200 MG capsule Take 200 mg by mouth Every Morning.   6/4/2019 at Unknown time   • clopidogrel (PLAVIX) 75 MG tablet Take 1 tablet by mouth Daily. (Patient taking differently: Take 75 mg by mouth Every Morning.) 30 tablet 5 6/4/2019 at Unknown time   • furosemide (LASIX) 40 MG tablet Take 40 mg by mouth 2 (Two) Times a Day.   6/4/2019 at Unknown time   • isosorbide mononitrate (IMDUR) 30 MG 24 hr tablet TAKE 1 TABLET DAILY (Patient taking differently: TAKE 1 TABLET EVERY MORNING) 90 tablet 11 6/4/2019 at Unknown time   • losartan (COZAAR) 50 MG tablet Take 50 mg by mouth Every Morning.  6 6/4/2019 at Unknown time   • magnesium oxide (MAGOX) 400 (241.3 MG) MG tablet tablet Take 400 mg by mouth Every Night.   6/4/2019 at Unknown time   • metoprolol tartrate (LOPRESSOR) 25 MG tablet Take 25 mg by mouth 2 (Two) Times a Day.  1 6/4/2019 at Unknown time   • montelukast (SINGULAIR) 10 MG tablet Take 10 mg by mouth Every Night.   6/4/2019 at Unknown time   • NOVOLOG FLEXPEN 100 UNIT/ML solution pen-injector sc pen Inject  under the skin into the appropriate area as directed 3 (Three) Times a Day With Meals. 15 UNITS SQ QAM, 20 units SQ with lunch; SS nightly   6/4/2019 at Unknown time   • potassium chloride (K-DUR,KLOR-CON) 10  MEQ CR tablet Take  by mouth 2 (Two) Times a Day. 20meq qam and 10meq at night  5 6/4/2019 at Unknown time   • Sotalol HCl AF 80 MG tablet Take 1 tablet by mouth 2 (Two) Times a Day. 180 tablet 3 6/4/2019 at 1800   • tiotropium (SPIRIVA) 18 MCG per inhalation capsule Place 1 capsule into inhaler and inhale Every Morning.   6/4/2019 at Unknown time   • TRESIBA FLEXTOUCH 200 UNIT/ML solution pen-injector Inject 100 Units under the skin into the appropriate area as directed Every Morning. 100 units daily   6/4/2019 at Unknown time   • triamterene-hydrochlorothiazide (MAXZIDE-25) 37.5-25 MG per tablet Take 1 tablet by mouth Every Morning.   6/4/2019 at Unknown time   • albuterol (PROVENTIL HFA;VENTOLIN HFA) 108 (90 BASE) MCG/ACT inhaler Inhale 2 puffs Every 4 (Four) Hours As Needed. Albuterol 90 MCG/ACT AERS; Patient Sig: Albuterol 90 MCG/ACT AERS INHALE 1 TO 2 PUFFS EVERY 4 TO 6 HOURS AS NEEDED.; 0; 11-Sep-2013; Active   More than a month at Unknown time   • albuterol (PROVENTIL) (2.5 MG/3ML) 0.083% nebulizer solution Albuterol Sulfate (2.5 MG/3ML) 0.083% Inhalation Nebulization Solution; Patient Sig: Albuterol Sulfate (2.5 MG/3ML) 0.083% Inhalation Nebulization Solution USE 1 UNIT DOSE IN NEBULIZER EVERY 4 TO 6 HOURS AS NEE   More than a month at Unknown time   • Alirocumab 75 MG/ML solution pen-injector Inject 75 mg under the skin into the appropriate area as directed Every 14 (Fourteen) Days. 1 pen 2 5/24/2019   • metFORMIN ER (GLUCOPHAGE-XR) 500 MG 24 hr tablet Take 500 mg by mouth Daily With Breakfast.  11 6/2/2019   • metolazone (ZAROXOLYN) 2.5 MG tablet Take 2.5 mg by mouth Daily As Needed. 1 tablet 30 minutes prior to lasix as needed PRN     More than a month at Unknown time   • nitroglycerin (NITROSTAT) 0.4 MG SL tablet Place 1 tablet under the tongue Every 5 (Five) Minutes As Needed for Chest Pain. 25 tablet 2 More than a month at Unknown time       Allergies:  Allergies   Allergen Reactions   • Ace  Inhibitors Anaphylaxis   • Incruse Ellipta [Umeclidinium Bromide] Shortness Of Breath   • Lisinopril Anaphylaxis and Hives   • Buspirone Other (See Comments)     THROAT AND LIPS SWELLED    • Citalopram Unknown (See Comments)     PT UNSURE    • Codeine Other (See Comments)     SEVERE HEART BURN   • Diltiazem Angioedema   • Flecainide      Facial numbness and edema, itching all over and shortness of breath   • Ketorolac Tromethamine Arrhythmia   • Lipitor [Atorvastatin] Diarrhea and Other (See Comments)     HEADACHE   • Liraglutide    • Lorazepam Mental Status Change   • Niacin Swelling     LIPS AND TONGUE   • Sertraline Unknown (See Comments)     PT UNSURE OF REACTION    • Statins Diarrhea and Other (See Comments)     HEADACHE   • Sulfamethoxazole-Trimethoprim Unknown (See Comments)     PT UNSURE OF REACTION   • Trulicity [Dulaglutide] Diarrhea     SEVERE DIARRHEA   • Xigduo Xr [Dapagliflozin-Metformin Hcl Er] Diarrhea   • Ciprofloxacin Rash   • Clindamycin/Lincomycin Rash   • Levaquin [Levofloxacin] Rash       Social Hx:  Social History     Socioeconomic History   • Marital status:      Spouse name: Not on file   • Number of children: Not on file   • Years of education: Not on file   • Highest education level: Not on file   Tobacco Use   • Smoking status: Former Smoker     Packs/day: 1.50     Years: 41.00     Pack years: 61.50     Start date: 4/8/1968     Last attempt to quit: 3/22/2009     Years since quitting: 10.2   • Smokeless tobacco: Never Used   Substance and Sexual Activity   • Alcohol use: No   • Drug use: No   • Sexual activity: No       Family Hx:  Family History   Problem Relation Age of Onset   • Heart attack Mother    • Heart failure Mother         congestive   • Hyperlipidemia Mother    • Hypertension Mother    • Peripheral vascular disease Mother    • Heart attack Father    • Heart failure Father         congestive   • Diabetes Father    • Hyperlipidemia Father    • Hypertension Father    •  "Peripheral vascular disease Father    • Heart failure Other         congestive   • Diabetes Other    • Hyperlipidemia Other    • Hypertension Other    • Peripheral vascular disease Other    • Hypertension Brother        Review of Imaging:  Catheter angiogram from 5/16/2019:  1.  Advanced atherosclerotic plaque formation at the right carotid bifurcation, with high-grade (80%) stenosis involving the right internal carotid artery origin.     2.  Mild-moderate plaque formation at the left carotid bifurcation, but without hemodynamically significant disease.     3.  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     4.  Widely patent left vertebral artery and intracranial vertebrobasilar system, without evidence of vertebrobasilar insufficiency or vascular etiology for \"dizziness\".    Laboratory Result:  Lab Results   Component Value Date    WBC 10.94 (H) 06/05/2019    HGB 14.0 06/05/2019    HCT 42.1 06/05/2019    MCV 89.2 06/05/2019     06/05/2019     Lab Results   Component Value Date    GLUCOSE 201 (H) 05/16/2019    CALCIUM 10.4 05/16/2019     05/16/2019    K 3.9 05/16/2019    CO2 26.0 05/16/2019     05/16/2019    BUN 20 05/16/2019    CREATININE 0.72 05/16/2019    EGFRIFAFRI 94 09/21/2015    EGFRIFNONA 81 05/16/2019    BCR 27.8 (H) 05/16/2019    ANIONGAP 15.0 05/16/2019     No results found for: HGBA1C  Lab Results   Component Value Date    CHLPL 200 (H) 09/21/2015    TRIG 255 (H) 09/21/2015    HDL 43 09/21/2015     (H) 09/21/2015       Physical Examination:  Vitals:    06/05/19 0648   BP: 138/72   Pulse:    Resp:    Temp:    SpO2:         General Appearance:   Well developed, well nourished, well groomed, alert, and " "cooperative.  Cardiovascular: Regular rate and rhythm. No carotid bruits    Neurological examination:  Alert and oriented x3.  Nonfocal neurologic exam.  Speech is clear.  No facial droop or pronator drift.  Tongue protrudes midline.  Extraocular muscles are full.  I do not elicit any gross visual field deficits.  She does ambulate with assistance of a cane secondary to instability and arthritis/knee problems    Diagnoses/Plan:    Ms. Jacob is a 68 y.o. female asymptomatic right carotid stenosis.  She presents today for angioplasty/stent placement for her \"high-grade\" right carotid stenosis.                "

## 2019-06-05 NOTE — PLAN OF CARE
Problem: Patient Care Overview  Goal: Plan of Care Review  Outcome: Ongoing (interventions implemented as appropriate)   06/05/19 1377   Coping/Psychosocial   Plan of Care Reviewed With patient;daughter   Plan of Care Review   Progress improving   OTHER   Outcome Summary Post right carotid stent placement this am, doing well. Cath site C/D/I. VSS, will continue to monitor.        Problem: Fall Risk (Adult)  Goal: Identify Related Risk Factors and Signs and Symptoms  Outcome: Ongoing (interventions implemented as appropriate)    Goal: Absence of Fall  Outcome: Ongoing (interventions implemented as appropriate)      Problem: Cardiac Catheterization (Diagnostic/Interventional) (Adult)  Goal: Signs and Symptoms of Listed Potential Problems Will be Absent, Minimized or Managed (Cardiac Catheterization)  Outcome: Ongoing (interventions implemented as appropriate)

## 2019-06-06 VITALS
WEIGHT: 282.85 LBS | SYSTOLIC BLOOD PRESSURE: 134 MMHG | OXYGEN SATURATION: 94 % | HEART RATE: 59 BPM | BODY MASS INDEX: 44.39 KG/M2 | HEIGHT: 67 IN | RESPIRATION RATE: 16 BRPM | TEMPERATURE: 98.6 F | DIASTOLIC BLOOD PRESSURE: 60 MMHG

## 2019-06-06 LAB
ANION GAP SERPL CALCULATED.3IONS-SCNC: 8 MMOL/L
BASOPHILS # BLD AUTO: 0.01 10*3/MM3 (ref 0–0.2)
BASOPHILS NFR BLD AUTO: 0.1 % (ref 0–1.5)
BUN BLD-MCNC: 15 MG/DL (ref 8–23)
BUN/CREAT SERPL: 24.2 (ref 7–25)
CALCIUM SPEC-SCNC: 9.5 MG/DL (ref 8.6–10.5)
CHLORIDE SERPL-SCNC: 102 MMOL/L (ref 98–107)
CO2 SERPL-SCNC: 30 MMOL/L (ref 22–29)
CREAT BLD-MCNC: 0.62 MG/DL (ref 0.57–1)
DEPRECATED RDW RBC AUTO: 46.4 FL (ref 37–54)
EOSINOPHIL # BLD AUTO: 0.12 10*3/MM3 (ref 0–0.4)
EOSINOPHIL NFR BLD AUTO: 1.6 % (ref 0.3–6.2)
ERYTHROCYTE [DISTWIDTH] IN BLOOD BY AUTOMATED COUNT: 13.9 % (ref 12.3–15.4)
GFR SERPL CREATININE-BSD FRML MDRD: 96 ML/MIN/1.73
GLUCOSE BLD-MCNC: 136 MG/DL (ref 65–99)
GLUCOSE BLDC GLUCOMTR-MCNC: 152 MG/DL (ref 70–130)
HCT VFR BLD AUTO: 35.5 % (ref 34–46.6)
HGB BLD-MCNC: 11.5 G/DL (ref 12–15.9)
IMM GRANULOCYTES # BLD AUTO: 0.01 10*3/MM3 (ref 0–0.05)
IMM GRANULOCYTES NFR BLD AUTO: 0.1 % (ref 0–0.5)
LYMPHOCYTES # BLD AUTO: 1.95 10*3/MM3 (ref 0.7–3.1)
LYMPHOCYTES NFR BLD AUTO: 26.2 % (ref 19.6–45.3)
MCH RBC QN AUTO: 29.2 PG (ref 26.6–33)
MCHC RBC AUTO-ENTMCNC: 32.4 G/DL (ref 31.5–35.7)
MCV RBC AUTO: 90.1 FL (ref 79–97)
MONOCYTES # BLD AUTO: 0.84 10*3/MM3 (ref 0.1–0.9)
MONOCYTES NFR BLD AUTO: 11.3 % (ref 5–12)
NEUTROPHILS # BLD AUTO: 4.51 10*3/MM3 (ref 1.7–7)
NEUTROPHILS NFR BLD AUTO: 60.7 % (ref 42.7–76)
PLATELET # BLD AUTO: 162 10*3/MM3 (ref 140–450)
PMV BLD AUTO: 11.4 FL (ref 6–12)
POTASSIUM BLD-SCNC: 3.7 MMOL/L (ref 3.5–5.2)
RBC # BLD AUTO: 3.94 10*6/MM3 (ref 3.77–5.28)
SODIUM BLD-SCNC: 140 MMOL/L (ref 136–145)
WBC NRBC COR # BLD: 7.44 10*3/MM3 (ref 3.4–10.8)

## 2019-06-06 PROCEDURE — 85025 COMPLETE CBC W/AUTO DIFF WBC: CPT | Performed by: RADIOLOGY

## 2019-06-06 PROCEDURE — 63710000001 INSULIN LISPRO (HUMAN) PER 5 UNITS: Performed by: INTERNAL MEDICINE

## 2019-06-06 PROCEDURE — 80048 BASIC METABOLIC PNL TOTAL CA: CPT | Performed by: RADIOLOGY

## 2019-06-06 PROCEDURE — 82962 GLUCOSE BLOOD TEST: CPT

## 2019-06-06 PROCEDURE — 63710000001 INSULIN LISPRO (HUMAN) PER 5 UNITS: Performed by: RADIOLOGY

## 2019-06-06 PROCEDURE — 94799 UNLISTED PULMONARY SVC/PX: CPT

## 2019-06-06 RX ADMIN — INSULIN LISPRO 6 UNITS: 100 INJECTION, SOLUTION INTRAVENOUS; SUBCUTANEOUS at 08:08

## 2019-06-06 RX ADMIN — FUROSEMIDE 40 MG: 40 TABLET ORAL at 08:08

## 2019-06-06 RX ADMIN — INSULIN LISPRO 2 UNITS: 100 INJECTION, SOLUTION INTRAVENOUS; SUBCUTANEOUS at 08:09

## 2019-06-06 RX ADMIN — SOTALOL HYDROCHLORIDE 80 MG: 80 TABLET ORAL at 08:08

## 2019-06-06 RX ADMIN — CELECOXIB 200 MG: 200 CAPSULE ORAL at 08:08

## 2019-06-06 RX ADMIN — POTASSIUM CHLORIDE 10 MEQ: 750 CAPSULE, EXTENDED RELEASE ORAL at 08:08

## 2019-06-06 RX ADMIN — ALBUTEROL SULFATE 2.5 MG: 2.5 SOLUTION RESPIRATORY (INHALATION) at 02:53

## 2019-06-06 RX ADMIN — ASPIRIN 325 MG: 325 TABLET, DELAYED RELEASE ORAL at 08:08

## 2019-06-06 RX ADMIN — IPRATROPIUM BROMIDE 0.5 MG: 0.5 SOLUTION RESPIRATORY (INHALATION) at 07:47

## 2019-06-06 RX ADMIN — METOPROLOL TARTRATE 25 MG: 25 TABLET ORAL at 08:08

## 2019-06-06 RX ADMIN — CLOPIDOGREL BISULFATE 75 MG: 75 TABLET ORAL at 08:08

## 2019-06-06 RX ADMIN — SODIUM CHLORIDE 50 ML/HR: 9 INJECTION, SOLUTION INTRAVENOUS at 04:31

## 2019-06-06 NOTE — PLAN OF CARE
Problem: Patient Care Overview  Goal: Plan of Care Review  Outcome: Ongoing (interventions implemented as appropriate)   06/06/19 0530   Coping/Psychosocial   Plan of Care Reviewed With patient   Plan of Care Review   Progress improving   OTHER   Outcome Summary VSS throughout shift. Afebrile. Cath site remains CDI. patient anticipating discharge today. daughter at bedside. No new concerns or complaints. Will continue to monitor.       Problem: Fall Risk (Adult)  Goal: Absence of Fall  Outcome: Ongoing (interventions implemented as appropriate)   06/06/19 0530   Fall Risk (Adult)   Absence of Fall making progress toward outcome       Problem: Cardiac Catheterization (Diagnostic/Interventional) (Adult)  Goal: Signs and Symptoms of Listed Potential Problems Will be Absent, Minimized or Managed (Cardiac Catheterization)  Outcome: Ongoing (interventions implemented as appropriate)   06/05/19 1507   Goal/Outcome Evaluation   Problems Assessed (Cardiac Catheterization) all   Problems Present (Cardiac Cath) none

## 2019-06-06 NOTE — DISCHARGE SUMMARY
"PATIENT:  COLE JACOB  YOB: 1950  VISIT ID:  [id number]  PRIMARY CARE:  John Montelongo MD  ADMITTING PHYSICIAN:  Alex Erickson MD    DATE OF ADMISSION:  6/5/2019  DATE OF DISCHARGE:  06/06/19      ADMITTING DIAGNOSIS:  1.  High-grade right carotid stenosis, asymptomatic  2.  O2 dependent COPD  3.  Atrial fibrillation  4.  CHF    DISCHARGE DIAGNOSIS:  1.  High-grade right carotid stenosis, asymptomatic, s/p stent placement.  2.  O2 dependent COPD  3.  Atrial fibrillation  4.  CHF    PROCEDURES:  1.  Right cervical carotid angioplasty/stent placement, with EPD  2.  Carotid duplex    BRIEF HOSPITAL COURSE:  Ms. Jacob is a 68 y.o. female known to the neuro interventional service, have been previously seen in consultation regarding asymptomatic carotid disease.  She has had a prior left hemispheric TIA/stroke in 2015, but with no new or recurrent stroke or TIA-like symptoms.  She had an episode of orthostatic hypotension, which prompted noninvasive imaging studies which suggested a \"high-grade\" right carotid stenosis.  She subsequently underwent diagnostic catheter angiography on 5/16/2019, which confirmed the presence of a high-grade right carotid stenosis.  Given her comorbidities of atrial fibrillation, oxygen dependent COPD, and coronary artery disease/congestive heart failure, she was deemed a \"high surgical risk\" for endarterectomy.  She presented on 6/5/2019 for right carotid angioplasty/stent placement, and this was performed with placement of a 10 x 24 mm Carotid Wallstent, restoring a near normal caliber lumen and resulting in improved perfusion to the right cerebral hemisphere.  She made an uneventful recovery in the neuro intensive care unit and was discharged home at her neurologic baseline on 6/6/2019.    She will need to continue with dual antiplatelet regimen for the next month or so, and then at that point I would advocate her following up with her cardiologist to " see about anticoagulation for her atrial fibrillation.  She will follow-up with Dr. Erickson in the neuro interventional clinic in 3-4 weeks time.      DISCHARGE MEDICATIONS:     Discharge Medications      Changes to Medications      Instructions Start Date   aspirin 81 MG tablet  What changed:  when to take this   81 mg, Oral, Daily      isosorbide mononitrate 30 MG 24 hr tablet  Commonly known as:  IMDUR  What changed:    · how much to take  · how to take this  · when to take this   TAKE 1 TABLET DAILY         Continue These Medications      Instructions Start Date   albuterol sulfate  (90 Base) MCG/ACT inhaler  Commonly known as:  PROVENTIL HFA;VENTOLIN HFA;PROAIR HFA   2 puffs, Inhalation, Every 4 Hours PRN, Albuterol 90 MCG/ACT AERS; Patient Sig: Albuterol 90 MCG/ACT AERS INHALE 1 TO 2 PUFFS EVERY 4 TO 6 HOURS AS NEEDED.; 0; 11-Sep-2013; Active      albuterol (2.5 MG/3ML) 0.083% nebulizer solution  Commonly known as:  PROVENTIL   Albuterol Sulfate (2.5 MG/3ML) 0.083% Inhalation Nebulization Solution; Patient Sig: Albuterol Sulfate (2.5 MG/3ML) 0.083% Inhalation Nebulization Solution USE 1 UNIT DOSE IN NEBULIZER EVERY 4 TO 6 HOURS AS NEE      Alirocumab 75 MG/ML solution pen-injector   75 mg, Subcutaneous, Every 14 Days      celecoxib 200 MG capsule  Commonly known as:  CeleBREX   200 mg, Oral, Every Morning      clopidogrel 75 MG tablet  Commonly known as:  PLAVIX   75 mg, Oral, Daily      furosemide 40 MG tablet  Commonly known as:  LASIX   40 mg, Oral, 2 Times Daily      insulin aspart 100 UNIT/ML solution pen-injector sc pen  Commonly known as:  novoLOG FLEXPEN   15 Units, Subcutaneous, Daily With Breakfast      insulin aspart 100 UNIT/ML solution pen-injector sc pen  Commonly known as:  novoLOG FLEXPEN   20 Units, Subcutaneous, Daily With Lunch      insulin aspart 100 UNIT/ML solution pen-injector sc pen  Commonly known as:  novoLOG FLEXPEN   Subcutaneous, Daily Before Supper, Per Sliding Scale at home  at night       losartan 50 MG tablet  Commonly known as:  COZAAR   50 mg, Oral, Every Morning      magnesium oxide 400 (241.3 Mg) MG tablet tablet  Commonly known as:  MAGOX   400 mg, Oral, Nightly      metFORMIN  MG 24 hr tablet  Commonly known as:  GLUCOPHAGE-XR   500 mg, Oral, Daily With Breakfast      metOLazone 2.5 MG tablet  Commonly known as:  ZAROXOLYN   2.5 mg, Oral, Daily PRN, 1 tablet 30 minutes prior to lasix as needed PRN       metoprolol tartrate 25 MG tablet  Commonly known as:  LOPRESSOR   25 mg, Oral, 2 Times Daily      montelukast 10 MG tablet  Commonly known as:  SINGULAIR   10 mg, Oral, Nightly      nitroglycerin 0.4 MG SL tablet  Commonly known as:  NITROSTAT   0.4 mg, Sublingual, Every 5 Minutes PRN      potassium chloride 10 MEQ CR tablet  Commonly known as:  K-DUR,KLOR-CON   10 mEq, Oral, Nightly      potassium chloride 10 MEQ CR tablet  Commonly known as:  K-DUR,KLOR-CON   20 mEq, Oral, Every Morning      Sotalol HCl AF 80 MG tablet   80 mg, Oral, 2 Times Daily      tiotropium 18 MCG per inhalation capsule  Commonly known as:  SPIRIVA   1 capsule, Inhalation, Every Morning      TRESIBA FLEXTOUCH 200 UNIT/ML solution pen-injector  Generic drug:  Insulin Degludec   100 Units, Subcutaneous, Every Morning, 100 units daily      triamterene-hydrochlorothiazide 37.5-25 MG per tablet  Commonly known as:  MAXZIDE-25   1 tablet, Oral, Every Morning               ACTIVITY:  No strenuous activity or heavy lifting for 5-7 days    DIET:  Cardiac diet    FOLLOW UP:    Follow-up Information     John Montelongo MD Follow up.    Specialty:  Internal Medicine  Contact information:  [address] [facility]  [facility] [address] [phone]             Alex Erickson MD Follow up in 3 week(s).    Specialties:  Radiology, Neuroradiology, Neurosurgery  Contact information:  [address] [address] [phone]

## 2019-06-07 ENCOUNTER — READMISSION MANAGEMENT (OUTPATIENT)
Dept: CALL CENTER | Facility: HOSPITAL | Age: 69
End: 2019-06-07

## 2019-06-07 NOTE — OUTREACH NOTE
Prep Survey      Responses   Facility patient discharged from?  [facility]   Is patient eligible?  Yes   Discharge diagnosis  High-grade right carotid stenosis, asymptomatic, s/p stent placement.    Does the patient have one of the following disease processes/diagnoses(primary or secondary)?  Other   Does the patient have Home health ordered?  No   Is there a DME ordered?  No   Prep survey completed?  Yes          Blossom Ambrose RN

## 2019-06-09 ENCOUNTER — READMISSION MANAGEMENT (OUTPATIENT)
Dept: CALL CENTER | Facility: HOSPITAL | Age: 69
End: 2019-06-09

## 2019-06-09 NOTE — OUTREACH NOTE
Medical Week 1 Survey      Responses   Facility patient discharged from?  [facility]   Does the patient have one of the following disease processes/diagnoses(primary or secondary)?  Other   Is there a successful TCM telephone encounter documented?  No   Week 1 attempt successful?  Yes   Call start time  1746   Call end time  1749   Discharge diagnosis  High-grade right carotid stenosis, asymptomatic, s/p stent placement.    Meds reviewed with patient/caregiver?  Yes   Is the patient having any side effects they believe may be caused by any medication additions or changes?  No   Does the patient have all medications ordered at discharge?  N/A   Is the patient taking all medications as directed (includes completed medication regime)?  Yes   Medication comments  [facility] says they thought she had an adverse reaction to Atrovent.   Does the patient have a primary care provider?   Yes   Does the patient have an appointment with their PCP within 7 days of discharge?  Yes   Has the patient kept scheduled appointments due by today?  Yes   Has home health visited the patient within 72 hours of discharge?  N/A   Psychosocial issues?  No   Did the patient receive a copy of their discharge instructions?  Yes   Nursing interventions  Reviewed instructions with patient   What is the patient's perception of their health status since discharge?  Same   Is the patient/caregiver able to teach back signs and symptoms related to disease process for when to call PCP?  Yes   Is the patient/caregiver able to teach back signs and symptoms related to disease process for when to call 911?  Yes   Is the patient/caregiver able to teach back the hierarchy of who to call/visit for symptoms/problems? PCP, Specialist, Home health nurse, Urgent Care, ED, 911  Yes   Additional teach back comments  Feels better from cardio standpoint, but is concerned about continued shortness of breath and right lung pain.   Week 1 call completed?  Yes           Katie Jauregui, RN

## 2019-06-17 ENCOUNTER — READMISSION MANAGEMENT (OUTPATIENT)
Dept: CALL CENTER | Facility: HOSPITAL | Age: 69
End: 2019-06-17

## 2019-06-17 NOTE — OUTREACH NOTE
Medical Week 2 Survey      Responses   Facility patient discharged from?  [facility]   Does the patient have one of the following disease processes/diagnoses(primary or secondary)?  Other   Week 2 attempt successful?  Yes   Call start time  1611   Discharge diagnosis  High-grade right carotid stenosis, asymptomatic, s/p stent placement.    Call end time  1613   Meds reviewed with patient/caregiver?  Yes   Is the patient having any side effects they believe may be caused by any medication additions or changes?  No   Does the patient have all medications ordered at discharge?  N/A   Is the patient taking all medications as directed (includes completed medication regime)?  Yes   Does the patient have a primary care provider?   Yes   Does the patient have an appointment with their PCP within 7 days of discharge?  Yes   Comments regarding PCP  Dr. Montelongo   Has the patient kept scheduled appointments due by today?  Yes   Has home health visited the patient within 72 hours of discharge?  N/A   Psychosocial issues?  No   Did the patient receive a copy of their discharge instructions?  Yes   Nursing interventions  Reviewed instructions with patient   What is the patient's perception of their health status since discharge?  Improving   Is the patient/caregiver able to teach back signs and symptoms related to disease process for when to call PCP?  Yes   Is the patient/caregiver able to teach back signs and symptoms related to disease process for when to call 911?  Yes   Is the patient/caregiver able to teach back the hierarchy of who to call/visit for symptoms/problems? PCP, Specialist, Home health nurse, Urgent Care, ED, 911  Yes   Additional teach back comments  States she is feeling better. Denies any needs at this time.    Week 2 Call Completed?  Yes   Graduated  Yes   Did the patient feel the follow up calls were helpful during their recovery period?  Yes   Graduated/Revoked comments  All goals met.           Miroslava BARNETT  CHUNG Headley

## 2019-06-26 ENCOUNTER — OFFICE VISIT (OUTPATIENT)
Dept: NEUROSURGERY | Facility: CLINIC | Age: 69
End: 2019-06-26

## 2019-06-26 VITALS
HEIGHT: 67 IN | TEMPERATURE: 97.4 F | DIASTOLIC BLOOD PRESSURE: 64 MMHG | SYSTOLIC BLOOD PRESSURE: 110 MMHG | BODY MASS INDEX: 44.26 KG/M2 | WEIGHT: 282 LBS

## 2019-06-26 DIAGNOSIS — I65.21 CAROTID STENOSIS, ASYMPTOMATIC, RIGHT: Primary | ICD-10-CM

## 2019-06-26 PROCEDURE — 99024 POSTOP FOLLOW-UP VISIT: CPT | Performed by: RADIOLOGY

## 2019-06-26 RX ORDER — SOTALOL HYDROCHLORIDE 80 MG/1
TABLET ORAL
Qty: 180 TABLET | Refills: 3 | Status: SHIPPED | OUTPATIENT
Start: 2019-06-26 | End: 2020-05-21

## 2019-06-26 RX ORDER — CLOPIDOGREL BISULFATE 75 MG/1
75 TABLET ORAL DAILY
Qty: 30 TABLET | Refills: 5 | Status: SHIPPED | OUTPATIENT
Start: 2019-06-26 | End: 2019-07-08 | Stop reason: SDUPTHER

## 2019-06-26 NOTE — PROGRESS NOTES
"NAME: COLE EUBANKS   DOS: 2019  : 1950  PCP: John Montelongo MD    Chief Complaint:    Chief Complaint   Patient presents with   • Carotid Artery Disease     Status post right carotid stent placement on 2019       History of Present Illness:  68 y.o. female known to the neuro interventional service, have been previously seen in consultation regarding asymptomatic carotid disease.  She has had a prior left hemispheric TIA/stroke in 2015, but with no new or recurrent stroke or TIA-like symptoms.  She had an episode of orthostatic hypotension, which prompted noninvasive imaging studies which suggested a \"high-grade\" right carotid stenosis.  She subsequently underwent diagnostic catheter angiography on 2019, which confirmed the presence of a high-grade right carotid stenosis. Given her comorbidities of atrial fibrillation, oxygen dependent COPD, and coronary artery disease/congestive heart failure, she was deemed a \"high surgical risk\" for endarterectomy.  She presented on 2019 for right carotid angioplasty/stent placement, and this was performed with placement of a 10 x 24 mm Carotid Wallstent, restoring a near normal caliber lumen and resulting in improved perfusion to the right cerebral hemisphere.  She made an uneventful recovery in the neuro intensive care unit and was discharged home at her neurologic baseline on 2019.     She is done very well from a neurovascular standpoint since her hospitalization, reporting no new stroke or TIA-like symptoms.  She presents today for routine follow-up.     Past Medical History:  Past Medical History:   Diagnosis Date   • Arrhythmia    • Asthma    • Atrial fibrillation (CMS/[facility])    • CAD (coronary artery disease)     non-obstructive by cath 2012   • CHF (congestive heart failure) (CMS/[facility])    • COPD (chronic obstructive pulmonary disease) (CMS/[facility])    • Diabetes mellitus (CMS/[facility])    • Diastolic dysfunction    • Dizziness    • " Edema    • Esophageal spasm    • Herniated lumbar intervertebral disc     L5   • Hyperlipidemia     intolerant to statins    • Hypertension    • Osteoarthritis    • Palpitations    • SOB (shortness of breath)    • Stroke-like symptoms    • TIA (transient ischemic attack) 2015    left hemispheric TIA/CVA   • Unstable angina (CMS/HCC)        Past Surgical History:  Past Surgical History:   Procedure Laterality Date   • CARDIAC CATHETERIZATION     • INTERVENTIONAL RADIOLOGY PROCEDURE Bilateral 5/16/2019    Procedure: Carotid Cerebral Angiogram;  Surgeon: Alex Erickson MD;  Location: [facility] INVASIVE LOCATION;  Service: Interventional Radiology   • ID TCAT IV STENT CRV CRTD ART EMBOLIC PROTECJ N/A 6/5/2019    Right Carotid Stent    • TUBAL ABDOMINAL LIGATION     • WRIST SURGERY         Review of Systems:        Review of Systems   HENT: Positive for hearing loss.    Respiratory: Positive for shortness of breath.    All other systems reviewed and are negative.       Medications    Current Outpatient Medications:   •  albuterol (PROVENTIL HFA;VENTOLIN HFA) 108 (90 BASE) MCG/ACT inhaler, Inhale 2 puffs Every 4 (Four) Hours As Needed. Albuterol 90 MCG/ACT AERS; Patient Sig: Albuterol 90 MCG/ACT AERS INHALE 1 TO 2 PUFFS EVERY 4 TO 6 HOURS AS NEEDED.; 0; 11-Sep-2013; Active, Disp: , Rfl:   •  albuterol (PROVENTIL) (2.5 MG/3ML) 0.083% nebulizer solution, Albuterol Sulfate (2.5 MG/3ML) 0.083% Inhalation Nebulization Solution; Patient Sig: Albuterol Sulfate (2.5 MG/3ML) 0.083% Inhalation Nebulization Solution USE 1 UNIT DOSE IN NEBULIZER EVERY 4 TO 6 HOURS AS NEE, Disp: , Rfl:   •  Alirocumab 75 MG/ML solution pen-injector, Inject 75 mg under the skin into the appropriate area as directed Every 14 (Fourteen) Days., Disp: 1 pen, Rfl: 2  •  aspirin 81 MG tablet, Take 1 tablet by mouth Daily. (Patient taking differently: Take 81 mg by mouth Every Morning.), Disp: 30 tablet, Rfl: 5  •  celecoxib (CeleBREX) 200 MG capsule,  Take 200 mg by mouth Every Morning., Disp: , Rfl:   •  clopidogrel (PLAVIX) 75 MG tablet, Take 75 mg by mouth Daily., Disp: , Rfl:   •  clopidogrel (PLAVIX) 75 MG tablet, TAKE 1 TABLET BY MOUTH DAILY., Disp: 30 tablet, Rfl: 5  •  furosemide (LASIX) 40 MG tablet, Take 40 mg by mouth 2 (Two) Times a Day., Disp: , Rfl:   •  insulin aspart (novoLOG FLEXPEN) 100 UNIT/ML solution pen-injector sc pen, Inject 15 Units under the skin into the appropriate area as directed Daily With Breakfast., Disp: , Rfl:   •  insulin aspart (novoLOG FLEXPEN) 100 UNIT/ML solution pen-injector sc pen, Inject 20 Units under the skin into the appropriate area as directed Daily With Lunch., Disp: , Rfl:   •  insulin aspart (novoLOG FLEXPEN) 100 UNIT/ML solution pen-injector sc pen, Inject  under the skin into the appropriate area as directed Daily Before Supper. Per Sliding Scale at home at night, Disp: , Rfl:   •  isosorbide mononitrate (IMDUR) 30 MG 24 hr tablet, TAKE 1 TABLET DAILY (Patient taking differently: TAKE 1 TABLET EVERY MORNING), Disp: 90 tablet, Rfl: 11  •  losartan (COZAAR) 50 MG tablet, Take 50 mg by mouth Every Morning., Disp: , Rfl: 6  •  magnesium oxide (MAGOX) 400 (241.3 MG) MG tablet tablet, Take 400 mg by mouth Every Night., Disp: , Rfl:   •  metFORMIN ER (GLUCOPHAGE-XR) 500 MG 24 hr tablet, Take 500 mg by mouth Daily With Breakfast., Disp: , Rfl: 11  •  metolazone (ZAROXOLYN) 2.5 MG tablet, Take 2.5 mg by mouth Daily As Needed. 1 tablet 30 minutes prior to lasix as needed PRN , Disp: , Rfl:   •  metoprolol tartrate (LOPRESSOR) 25 MG tablet, Take 25 mg by mouth 2 (Two) Times a Day., Disp: , Rfl: 1  •  montelukast (SINGULAIR) 10 MG tablet, Take 10 mg by mouth Every Night., Disp: , Rfl:   •  nitroglycerin (NITROSTAT) 0.4 MG SL tablet, Place 1 tablet under the tongue Every 5 (Five) Minutes As Needed for Chest Pain., Disp: 25 tablet, Rfl: 2  •  potassium chloride (K-DUR,KLOR-CON) 10 MEQ CR tablet, Take 10 mEq by mouth Every  Night., Disp: , Rfl:   •  potassium chloride (K-DUR,KLOR-CON) 10 MEQ CR tablet, Take 20 mEq by mouth Every Morning., Disp: , Rfl:   •  sotalol (BETAPACE) 80 MG tablet, TAKE 1 TABLET TWICE DAILY, Disp: 180 tablet, Rfl: 3  •  tiotropium (SPIRIVA) 18 MCG per inhalation capsule, Place 1 capsule into inhaler and inhale Every Morning., Disp: , Rfl:   •  TRESIBA FLEXTOUCH 200 UNIT/ML solution pen-injector, Inject 100 Units under the skin into the appropriate area as directed Every Morning. 100 units daily, Disp: , Rfl:   •  triamterene-hydrochlorothiazide (MAXZIDE-25) 37.5-25 MG per tablet, Take 1 tablet by mouth Every Morning., Disp: , Rfl:     Allergies:  Allergies   Allergen Reactions   • Ace Inhibitors Anaphylaxis   • Incruse Ellipta [Umeclidinium Bromide] Shortness Of Breath   • Lisinopril Anaphylaxis and Hives   • Buspirone Other (See Comments)     THROAT AND LIPS SWELLED    • Citalopram Unknown (See Comments)     PT UNSURE    • Codeine Other (See Comments)     SEVERE HEART BURN   • Diltiazem Angioedema   • Flecainide      Facial numbness and edema, itching all over and shortness of breath   • Ketorolac Tromethamine Arrhythmia   • Lipitor [Atorvastatin] Diarrhea and Other (See Comments)     HEADACHE   • Liraglutide    • Lorazepam Mental Status Change   • Niacin Swelling     LIPS AND TONGUE   • Sertraline Unknown (See Comments)     PT UNSURE OF REACTION    • Statins Diarrhea and Other (See Comments)     HEADACHE   • Sulfamethoxazole-Trimethoprim Unknown (See Comments)     PT UNSURE OF REACTION   • Trulicity [Dulaglutide] Diarrhea     SEVERE DIARRHEA   • Xigduo Xr [Dapagliflozin-Metformin Hcl Er] Diarrhea   • Ciprofloxacin Rash   • Clindamycin/Lincomycin Rash   • Levaquin [Levofloxacin] Rash       Social Hx:  Social History     Tobacco Use   • Smoking status: Former Smoker     Packs/day: 1.50     Years: 41.00     Pack years: 61.50     Start date: 4/8/1968     Last attempt to quit: 3/22/2009     Years since quitting:  10.2   • Smokeless tobacco: Never Used   Substance Use Topics   • Alcohol use: No   • Drug use: No       Family Hx:  Family History   Problem Relation Age of Onset   • Heart attack Mother    • Heart failure Mother         congestive   • Hyperlipidemia Mother    • Hypertension Mother    • Peripheral vascular disease Mother    • Heart attack Father    • Heart failure Father         congestive   • Diabetes Father    • Hyperlipidemia Father    • Hypertension Father    • Peripheral vascular disease Father    • Heart failure Other         congestive   • Diabetes Other    • Hyperlipidemia Other    • Hypertension Other    • Peripheral vascular disease Other    • Hypertension Brother        Review of Imaging:  No new imaging    Physical Examination:  Vitals:    06/26/19 1332   BP: 110/64   Temp: 97.4 °F (36.3 °C)        General Appearance:   Well developed, well nourished, well groomed, alert, and cooperative.  Cardiovascular: Regular rate and rhythm. No carotid bruits      Neurological examination:  Neurologic Exam     Mental Status   Oriented to person, place, and time.   Speech: speech is normal   Level of consciousness: alert    Cranial Nerves   Cranial nerves II through XII intact.     Motor Exam     Strength   Strength 5/5 throughout.     Sensory Exam   Light touch normal.     Gait, Coordination, and Reflexes     Gait  Gait: normalAmbulates with assistance of a cane, uses a wheelchair for longer distances, predominantly secondary to COPD and arthritis.       Diagnoses/Plan:    Ms. Jacob is a 68 y.o. female status post angioplasty/stent placement for a high-grade right carotid stenosis.  She is done very well from a neurovascular standpoint with no new stroke or TIA-like symptoms.  At this point, my main concern is her atrial fibrillation, and I think it is reasonable for her to start anticoagulation therapy.  She is going to follow-up with her cardiologist, and I think it is reasonable to discontinue her Plavix and  start a NOAC (and 81 mg ASA) for her atrial fibrillation.    I was planning on following up with her in 6 months time with carotid duplex to ensure stability and exclude any recurrent/progression of disease and might necessitate further treatment/intervention; however, she states it is quite a struggle for her to get the [place], and is going to see if she can have her follow-up carotid duplexes obtained through her cardiologist.  This is certainly reasonable from my standpoint, but I would be happy to see her back at any point should any new issue or concern arise regarding her carotid/cerebral vasculature.

## 2019-06-26 NOTE — TELEPHONE ENCOUNTER
Provider:  Dre  Caller: dickson  Time of call:  n/a   Phone #:   n/a  Last visit: 4/24/19    Next visit:  6/26/19 (today)      Reason for call:         Automated refill request.

## 2019-07-08 ENCOUNTER — OFFICE VISIT (OUTPATIENT)
Dept: CARDIOLOGY | Facility: CLINIC | Age: 69
End: 2019-07-08

## 2019-07-08 VITALS
BODY MASS INDEX: 44.83 KG/M2 | HEART RATE: 60 BPM | SYSTOLIC BLOOD PRESSURE: 155 MMHG | DIASTOLIC BLOOD PRESSURE: 68 MMHG | HEIGHT: 67 IN | OXYGEN SATURATION: 94 % | WEIGHT: 285.6 LBS

## 2019-07-08 DIAGNOSIS — I73.9 PVD (PERIPHERAL VASCULAR DISEASE) (HCC): ICD-10-CM

## 2019-07-08 DIAGNOSIS — I25.10 CORONARY ARTERIOSCLEROSIS IN NATIVE ARTERY: ICD-10-CM

## 2019-07-08 DIAGNOSIS — I48.0 PAROXYSMAL ATRIAL FIBRILLATION (HCC): Primary | ICD-10-CM

## 2019-07-08 PROCEDURE — 99213 OFFICE O/P EST LOW 20 MIN: CPT | Performed by: PHYSICIAN ASSISTANT

## 2019-07-08 NOTE — PROGRESS NOTES
Problem list     Subjective   Mandi Jacob is a 68 y.o. female     Chief Complaint   Patient presents with   • Coronary Artery Disease     Problem List:  1. Nonobstructive CAD by cath February 2012.  2. Preserved systolic function  3. History of CHF  4. Diastolic dysfunction  5. Hypertension      6. Dyslipidemia. The patient reports she is intolerant to statins.      7. Chronic palpitations.      7.1.  Recurrent PACs noted by telemetry historically.  The patient was treated with sotalol therapy for the same.      7.2.  A. fib with rapid ventricular response noted by event monitor, 2019.      8.  Peripheral vascular disease, status post angioplasty and stenting to the right carotid artery system, 06/19 with Dr. erickson      HPI  The patient presents in today for evaluation of follow-up and for evaluation after seeing Dr. Erickson.  She had no neurologic symptoms or complications during the procedure and has had none since.  He did recommend as was discussed with our practice that the patient have consideration for anticoagulation.  She now feels that she is ready to institute that.  We had recommended that before intervention with Dr. Erickson.  He now encouraged her and she does agree to proceed with that.  Symptomatically she is doing well.  She has baseline dyspnea.  She has no chest pain.  She has no PND orthopnea.  She has only rare episodes of tachycardia, certainly no sustained dysrhythmic activity.  She has no further complaints otherwise.    Current Outpatient Medications   Medication Sig Dispense Refill   • albuterol (PROVENTIL HFA;VENTOLIN HFA) 108 (90 BASE) MCG/ACT inhaler Inhale 2 puffs Every 4 (Four) Hours As Needed. Albuterol 90 MCG/ACT AERS; Patient Sig: Albuterol 90 MCG/ACT AERS INHALE 1 TO 2 PUFFS EVERY 4 TO 6 HOURS AS NEEDED.; 0; 11-Sep-2013; Active     • albuterol (PROVENTIL) (2.5 MG/3ML) 0.083% nebulizer solution Albuterol Sulfate (2.5 MG/3ML) 0.083% Inhalation Nebulization Solution; Patient  Sig: Albuterol Sulfate (2.5 MG/3ML) 0.083% Inhalation Nebulization Solution USE 1 UNIT DOSE IN NEBULIZER EVERY 4 TO 6 HOURS AS NEE     • Alirocumab 75 MG/ML solution pen-injector Inject 75 mg under the skin into the appropriate area as directed Every 14 (Fourteen) Days. 1 pen 2   • apixaban (ELIQUIS) 5 MG tablet tablet Take 1 tablet by mouth Every 12 (Twelve) Hours. 60 tablet 11   • aspirin 81 MG tablet Take 1 tablet by mouth Daily. (Patient taking differently: Take 81 mg by mouth Every Morning.) 30 tablet 5   • celecoxib (CeleBREX) 200 MG capsule Take 200 mg by mouth Every Morning.     • furosemide (LASIX) 40 MG tablet Take 40 mg by mouth 2 (Two) Times a Day.     • insulin aspart (novoLOG FLEXPEN) 100 UNIT/ML solution pen-injector sc pen Inject 15 Units under the skin into the appropriate area as directed Daily With Breakfast.     • insulin aspart (novoLOG FLEXPEN) 100 UNIT/ML solution pen-injector sc pen Inject 20 Units under the skin into the appropriate area as directed Daily With Lunch.     • insulin aspart (novoLOG FLEXPEN) 100 UNIT/ML solution pen-injector sc pen Inject  under the skin into the appropriate area as directed Daily Before Supper. Per Sliding Scale at home at night     • isosorbide mononitrate (IMDUR) 30 MG 24 hr tablet TAKE 1 TABLET DAILY (Patient taking differently: TAKE 1 TABLET EVERY MORNING) 90 tablet 11   • losartan (COZAAR) 50 MG tablet Take 50 mg by mouth Every Morning.  6   • magnesium oxide (MAGOX) 400 (241.3 MG) MG tablet tablet Take 400 mg by mouth Every Night.     • metFORMIN ER (GLUCOPHAGE-XR) 500 MG 24 hr tablet Take 500 mg by mouth Daily With Breakfast.  11   • metolazone (ZAROXOLYN) 2.5 MG tablet Take 2.5 mg by mouth Daily As Needed. 1 tablet 30 minutes prior to lasix as needed PRN       • metoprolol tartrate (LOPRESSOR) 25 MG tablet Take 25 mg by mouth 2 (Two) Times a Day.  1   • montelukast (SINGULAIR) 10 MG tablet Take 10 mg by mouth Every Night.     • nitroglycerin  (NITROSTAT) 0.4 MG SL tablet Place 1 tablet under the tongue Every 5 (Five) Minutes As Needed for Chest Pain. 25 tablet 2   • potassium chloride (K-DUR,KLOR-CON) 10 MEQ CR tablet Take 10 mEq by mouth Every Night.     • potassium chloride (K-DUR,KLOR-CON) 10 MEQ CR tablet Take 20 mEq by mouth Every Morning.     • sotalol (BETAPACE) 80 MG tablet TAKE 1 TABLET TWICE DAILY 180 tablet 3   • tiotropium (SPIRIVA) 18 MCG per inhalation capsule Place 1 capsule into inhaler and inhale Every Morning.     • TRESIBA FLEXTOUCH 200 UNIT/ML solution pen-injector Inject 100 Units under the skin into the appropriate area as directed Every Morning. 100 units daily     • triamterene-hydrochlorothiazide (MAXZIDE-25) 37.5-25 MG per tablet Take 1 tablet by mouth Every Morning.       No current facility-administered medications for this visit.        Ace inhibitors; Incruse ellipta [umeclidinium bromide]; Lisinopril; Buspirone; Citalopram; Codeine; Diltiazem; Flecainide; Ketorolac tromethamine; Lipitor [atorvastatin]; Liraglutide; Lorazepam; Niacin; Sertraline; Statins; Sulfamethoxazole-trimethoprim; Trulicity [dulaglutide]; Xigduo xr [dapagliflozin-metformin hcl er]; Ciprofloxacin; Clindamycin/lincomycin; and Levaquin [levofloxacin]    Past Medical History:   Diagnosis Date   • Arrhythmia    • Asthma    • Atrial fibrillation (CMS/[facility])    • CAD (coronary artery disease)     non-obstructive by cath 02/2012   • CHF (congestive heart failure) (CMS/[facility])    • COPD (chronic obstructive pulmonary disease) (CMS/[facility])    • Diabetes mellitus (CMS/[facility])    • Diastolic dysfunction    • Dizziness    • Edema    • Esophageal spasm    • Herniated lumbar intervertebral disc     L5   • Hyperlipidemia     intolerant to statins    • Hypertension    • Osteoarthritis    • Palpitations    • SOB (shortness of breath)    • Stroke-like symptoms    • TIA (transient ischemic attack) 2015    left hemispheric TIA/CVA   • Unstable angina (CMS/[facility])        Social History      Socioeconomic History   • Marital status:      Spouse name: Not on file   • Number of children: Not on file   • Years of education: Not on file   • Highest education level: Not on file   Tobacco Use   • Smoking status: Former Smoker     Packs/day: 1.50     Years: 41.00     Pack years: 61.50     Start date: 4/8/1968     Last attempt to quit: 3/22/2009     Years since quitting: 10.3   • Smokeless tobacco: Never Used   Substance and Sexual Activity   • Alcohol use: No   • Drug use: No   • Sexual activity: No       Family History   Problem Relation Age of Onset   • Heart attack Mother    • Heart failure Mother         congestive   • Hyperlipidemia Mother    • Hypertension Mother    • Peripheral vascular disease Mother    • Heart attack Father    • Heart failure Father         congestive   • Diabetes Father    • Hyperlipidemia Father    • Hypertension Father    • Peripheral vascular disease Father    • Heart failure Other         congestive   • Diabetes Other    • Hyperlipidemia Other    • Hypertension Other    • Peripheral vascular disease Other    • Hypertension Brother        Review of Systems   Constitutional: Positive for fatigue (easily fatigued).   HENT: Negative.  Negative for congestion, rhinorrhea and sneezing.    Eyes: Positive for visual disturbance (wears glasses daily).   Respiratory: Positive for cough (occasional cough), shortness of breath (easily SOA; worse on exertion ) and wheezing (occasional wheezing). Negative for chest tightness.         Wears continuous o2   Cardiovascular: Positive for leg swelling (BLE swelling/edema on occasion). Negative for chest pain and palpitations.   Gastrointestinal: Negative.  Negative for abdominal pain, nausea and vomiting.   Endocrine: Negative.  Negative for cold intolerance and heat intolerance.   Genitourinary: Negative.  Negative for difficulty urinating, frequency and urgency.   Musculoskeletal: Positive for arthralgias (joints) and back pain (back  "pain from arthritis). Negative for neck pain and neck stiffness.   Skin: Negative.  Negative for rash and wound.   Allergic/Immunologic: Positive for environmental allergies (seasonal allergies). Negative for food allergies.   Neurological: Positive for weakness (generalized weakness). Negative for dizziness, syncope, light-headedness and headaches.   Hematological: Negative.  Does not bruise/bleed easily.   Psychiatric/Behavioral: Negative.  Negative for agitation, confusion and sleep disturbance (denies waking up smothering/SOA). The patient is not nervous/anxious.        Objective   Vitals:    07/08/19 1142   BP: 155/68   BP Location: Left arm   Patient Position: Sitting   Pulse: 60   SpO2: 94%   Weight: 130 kg (285 lb 9.6 oz)   Height: 170.2 cm (67\")      /68 (BP Location: Left arm, Patient Position: Sitting)   Pulse 60   Ht 170.2 cm (67\")   Wt 130 kg (285 lb 9.6 oz)   SpO2 94%   BMI 44.73 kg/m²    Lab Results (most recent)     None        Physical Exam   Constitutional: She is oriented to person, place, and time. She appears well-developed and well-nourished. No distress (Patient appears dyspnic.  She is on O2 per NC.  The patient appears chronically ill. ).   HENT:   Head: Normocephalic and atraumatic.   Eyes: Conjunctivae and EOM are normal. Pupils are equal, round, and reactive to light.   Neck: Normal range of motion. Neck supple. No JVD present. No tracheal deviation present.   Cardiovascular: Normal rate, regular rhythm, normal heart sounds and intact distal pulses.  Extrasystoles are present.   Pulmonary/Chest: Tachypnea noted. She has decreased breath sounds. She has wheezes. She has rales (Dry.).   Abdominal: Soft. Bowel sounds are normal. She exhibits no distension and no mass. There is no tenderness. There is no rebound and no guarding.   Musculoskeletal: Normal range of motion. She exhibits no edema, tenderness or deformity.   Neurological: She is alert and oriented to person, place, and " time.   Skin: Skin is warm and dry. No rash noted. No erythema. No pallor.   Psychiatric: She has a normal mood and affect. Her behavior is normal. Judgment and thought content normal.   Nursing note and vitals reviewed.        Procedure   Procedures       Assessment/Plan      Diagnosis Plan   1. Paroxysmal atrial fibrillation (CMS/HCC)     2. Coronary arteriosclerosis in native artery     3. PVD (peripheral vascular disease) (CMS/HCC)       1.  The patient is doing well status post recent carotid stenting.  She has had no neurologic issues or complications otherwise.  Her post procedure.  She does follow long-term with Dr. Erickson who performed the procedure.  The patient would like consideration for scheduling carotid duplex 6 months through the office to save a trip to [place].  We will try to coordinate that with Dr. Erickson's office as able.    2.  The patient tells me that her A. fib and coronary artery disease issues otherwise are stable.  On current medications, A. fib seems to be suppressed for the most part.  Rarely will she have dysrhythmic symptoms.  I am concerned about her long-term CVA risk.  We discussed anticoagulation with her in the past and she now agrees to proceed with that.  We will discontinue Plavix and institute Eliquis at 5 mg twice daily.  She will continue aspirin therapy.    3.  We will continue medications otherwise.  We will continue to see her on routine follow-ups.  I would like to see her back in 3 months just to evaluate clinical course, in particular with medication change.  We reviewed precautions with anticoagulation therapy, etc.  She acknowledges her understanding.  She will call to the clinic for any issues prior to follow-up.         Patient's Body mass index is 44.73 kg/m². BMI is above normal parameters. Recommendations include: educational material and referral to primary care.             Electronically signed by:

## 2019-07-08 NOTE — PATIENT INSTRUCTIONS

## 2019-07-11 ENCOUNTER — TELEPHONE (OUTPATIENT)
Dept: CARDIOLOGY | Facility: CLINIC | Age: 69
End: 2019-07-11

## 2019-07-11 NOTE — TELEPHONE ENCOUNTER
Patient states she is having more SOA since adding Eliquis 5 mg BID two days ago.  Patient would like you to consider changing to something else.  She took 1/2 tab this morning instead of full dose.  Please advise.

## 2019-07-12 ENCOUNTER — TELEPHONE (OUTPATIENT)
Dept: CARDIOLOGY | Facility: CLINIC | Age: 69
End: 2019-07-12

## 2019-07-12 NOTE — TELEPHONE ENCOUNTER
Called patients cell and home number. No answer at either, left message on cell to return call. -SHAWNA;NATHANAEL    --- Message from RICKEY Yen sent at 7/12/2019  1:33 PM EDT -----  As prior orders state, try this medication for now.  If persistent, we can consider changing to Xarelto.  If she is insistent, we will change now.  ----- Message -----  From: Cate Santana MA  Sent: 7/12/2019   8:39 AM  To: RICKEY Yen    Patient called to report since starting Eliquis she has had nausea and diarrhea . She cut it back to 1/2 tablet herself yesterday and this helped with nausea but still having diarrhea. She denies having any blood in her stool at this time. Please advise? -BRADLEY

## 2019-07-12 NOTE — TELEPHONE ENCOUNTER
Consider continuation of the same for now.  If symptoms persist, particularly greater than a week to 2 weeks, I will consider alternate therapy at that time.  If she is insistent, we will change to Xarelto.

## 2019-09-25 DIAGNOSIS — R60.9 EDEMA, UNSPECIFIED TYPE: ICD-10-CM

## 2019-09-25 RX ORDER — POTASSIUM CHLORIDE 20 MEQ/1
20 TABLET, EXTENDED RELEASE ORAL EVERY EVENING
Qty: 90 TABLET | Refills: 3 | Status: SHIPPED | OUTPATIENT
Start: 2019-09-25 | End: 2020-08-20

## 2019-09-25 NOTE — TELEPHONE ENCOUNTER
Pharmacist called about correct dosage of eliquis for patient. Her record showed 5 mg. info given to pharmacist.

## 2019-10-15 ENCOUNTER — OFFICE VISIT (OUTPATIENT)
Dept: CARDIOLOGY | Facility: CLINIC | Age: 69
End: 2019-10-15

## 2019-10-15 VITALS
SYSTOLIC BLOOD PRESSURE: 151 MMHG | WEIGHT: 280.6 LBS | HEART RATE: 65 BPM | BODY MASS INDEX: 44.04 KG/M2 | HEIGHT: 67 IN | DIASTOLIC BLOOD PRESSURE: 74 MMHG | OXYGEN SATURATION: 96 %

## 2019-10-15 DIAGNOSIS — I65.23 BILATERAL CAROTID ARTERY STENOSIS: ICD-10-CM

## 2019-10-15 DIAGNOSIS — R06.02 SHORTNESS OF BREATH: ICD-10-CM

## 2019-10-15 DIAGNOSIS — I48.0 PAROXYSMAL ATRIAL FIBRILLATION (HCC): Primary | ICD-10-CM

## 2019-10-15 PROCEDURE — 93000 ELECTROCARDIOGRAM COMPLETE: CPT | Performed by: PHYSICIAN ASSISTANT

## 2019-10-15 PROCEDURE — 99213 OFFICE O/P EST LOW 20 MIN: CPT | Performed by: PHYSICIAN ASSISTANT

## 2019-10-15 NOTE — PROGRESS NOTES
Problem list     Subjective   Mandi Jacob is a 69 y.o. female     Chief Complaint   Patient presents with   • Atrial Fibrillation     Here for a follow up    • Coronary Artery Disease   Problem List:  1. Nonobstructive CAD by cath February 2012.  2. Preserved systolic function  3. History of CHF  4. Diastolic dysfunction  5. Hypertension      6. Dyslipidemia. The patient reports she is intolerant to statins.      7. Chronic palpitations.      7.1.  Recurrent PACs noted by telemetry historically.  The patient was treated with sotalol therapy for the same.      7.2.  A. fib with rapid ventricular response noted by event monitor, 2019.      8.  Peripheral vascular disease, status post angioplasty and stenting to the right carotid artery system, 06/19 with Dr. Vinod ANN  The patient presents in today for routine follow-up.  Since last evaluation here, the patient tells me that she is continued to do well.  The patient reports mild chest discomfort which is very rare nonproblematic.  She has chronic and fairly severe dyspnea felt secondary to pulmonary status.  Symptoms of A. fib are fairly well treated on antidysrhythmic therapy and anticoagulation at this time.  The patient reports normotensive status for the most part when checked at home.  Labs are followed with her primary care provider and felt to be normal by patient.  She has no further complaints otherwise and feels that she is doing well.    Current Outpatient Medications   Medication Sig Dispense Refill   • albuterol (PROVENTIL HFA;VENTOLIN HFA) 108 (90 BASE) MCG/ACT inhaler Inhale 2 puffs Every 4 (Four) Hours As Needed. Albuterol 90 MCG/ACT AERS; Patient Sig: Albuterol 90 MCG/ACT AERS INHALE 1 TO 2 PUFFS EVERY 4 TO 6 HOURS AS NEEDED.; 0; 11-Sep-2013; Active     • albuterol (PROVENTIL) (2.5 MG/3ML) 0.083% nebulizer solution Albuterol Sulfate (2.5 MG/3ML) 0.083% Inhalation Nebulization Solution; Patient Sig: Albuterol Sulfate (2.5 MG/3ML)  0.083% Inhalation Nebulization Solution USE 1 UNIT DOSE IN NEBULIZER EVERY 4 TO 6 HOURS AS NEE     • Alirocumab 75 MG/ML solution pen-injector Inject 75 mg under the skin into the appropriate area as directed Every 14 (Fourteen) Days. 1 pen 2   • apixaban (ELIQUIS) 5 MG tablet tablet Take 1 tablet by mouth Every 12 (Twelve) Hours. 60 tablet 11   • aspirin 81 MG tablet Take 1 tablet by mouth Daily. (Patient taking differently: Take 81 mg by mouth Every Morning.) 30 tablet 5   • furosemide (LASIX) 40 MG tablet Take 40 mg by mouth 2 (Two) Times a Day.     • insulin aspart (novoLOG FLEXPEN) 100 UNIT/ML solution pen-injector sc pen Inject 15 Units under the skin into the appropriate area as directed Daily With Breakfast.     • insulin aspart (novoLOG FLEXPEN) 100 UNIT/ML solution pen-injector sc pen Inject 20 Units under the skin into the appropriate area as directed Daily With Lunch.     • insulin aspart (novoLOG FLEXPEN) 100 UNIT/ML solution pen-injector sc pen Inject  under the skin into the appropriate area as directed Daily Before Supper. Per Sliding Scale at home at night     • isosorbide mononitrate (IMDUR) 30 MG 24 hr tablet TAKE 1 TABLET DAILY (Patient taking differently: TAKE 1 TABLET EVERY MORNING) 90 tablet 11   • losartan (COZAAR) 50 MG tablet Take 50 mg by mouth Every Morning.  6   • magnesium oxide (MAGOX) 400 (241.3 MG) MG tablet tablet Take 400 mg by mouth Every Night.     • metFORMIN ER (GLUCOPHAGE-XR) 500 MG 24 hr tablet Take 500 mg by mouth Daily With Breakfast.  11   • metolazone (ZAROXOLYN) 2.5 MG tablet Take 2.5 mg by mouth Daily As Needed. 1 tablet 30 minutes prior to lasix as needed PRN       • metoprolol tartrate (LOPRESSOR) 25 MG tablet Take 25 mg by mouth 2 (Two) Times a Day.  1   • montelukast (SINGULAIR) 10 MG tablet Take 10 mg by mouth Every Night.     • nitroglycerin (NITROSTAT) 0.4 MG SL tablet Place 1 tablet under the tongue Every 5 (Five) Minutes As Needed for Chest Pain. 25 tablet 2    • potassium chloride (K-DUR,KLOR-CON) 10 MEQ CR tablet Take 10 mEq by mouth Every Night.     • potassium chloride (K-DUR,KLOR-CON) 10 MEQ CR tablet Take 20 mEq by mouth Every Morning.     • potassium chloride (K-DUR,KLOR-CON) 20 MEQ CR tablet TAKE 1 TABLET BY MOUTH EVERY EVENING. 90 tablet 3   • sotalol (BETAPACE) 80 MG tablet TAKE 1 TABLET TWICE DAILY 180 tablet 3   • tiotropium (SPIRIVA) 18 MCG per inhalation capsule Place 1 capsule into inhaler and inhale Every Morning.     • TRESIBA FLEXTOUCH 200 UNIT/ML solution pen-injector Inject 100 Units under the skin into the appropriate area as directed Every Morning. 100 units daily     • triamterene-hydrochlorothiazide (MAXZIDE-25) 37.5-25 MG per tablet Take 1 tablet by mouth Every Morning.       No current facility-administered medications for this visit.        Ace inhibitors; Incruse ellipta [umeclidinium bromide]; Lisinopril; Buspirone; Citalopram; Codeine; Diltiazem; Flecainide; Ketorolac tromethamine; Lipitor [atorvastatin]; Liraglutide; Lorazepam; Niacin; Sertraline; Statins; Sulfamethoxazole-trimethoprim; Trulicity [dulaglutide]; Xigduo xr [dapagliflozin-metformin hcl er]; Ciprofloxacin; Clindamycin/lincomycin; and Levaquin [levofloxacin]    Past Medical History:   Diagnosis Date   • Arrhythmia    • Asthma    • Atrial fibrillation (CMS/[facility])    • CAD (coronary artery disease)     non-obstructive by cath 02/2012   • CHF (congestive heart failure) (CMS/[facility])    • COPD (chronic obstructive pulmonary disease) (CMS/[facility])    • Diabetes mellitus (CMS/[facility])    • Diastolic dysfunction    • Dizziness    • Edema    • Esophageal spasm    • Herniated lumbar intervertebral disc     L5   • Hyperlipidemia     intolerant to statins    • Hypertension    • Osteoarthritis    • Palpitations    • SOB (shortness of breath)    • Stroke (CMS/[facility])    • Stroke-like symptoms    • TIA (transient ischemic attack) 2015    left hemispheric TIA/CVA   • Unstable angina (CMS/[facility])        Social  History     Socioeconomic History   • Marital status:      Spouse name: Not on file   • Number of children: Not on file   • Years of education: Not on file   • Highest education level: Not on file   Tobacco Use   • Smoking status: Former Smoker     Packs/day: 1.50     Years: 41.00     Pack years: 61.50     Start date: 4/8/1968     Last attempt to quit: 3/22/2009     Years since quitting: 10.5   • Smokeless tobacco: Never Used   Substance and Sexual Activity   • Alcohol use: No   • Drug use: No   • Sexual activity: No       Family History   Problem Relation Age of Onset   • Heart attack Mother    • Heart failure Mother         congestive   • Hyperlipidemia Mother    • Hypertension Mother    • Peripheral vascular disease Mother    • Heart attack Father    • Heart failure Father         congestive   • Diabetes Father    • Hyperlipidemia Father    • Hypertension Father    • Peripheral vascular disease Father    • Asthma Father    • Heart failure Other         congestive   • Diabetes Other    • Hyperlipidemia Other    • Hypertension Other    • Peripheral vascular disease Other    • Hypertension Brother        Review of Systems   Constitutional: Positive for fatigue (easily fatigued).   HENT: Positive for rhinorrhea (runny nose from seasonal allergies). Negative for congestion and sneezing.    Eyes: Positive for visual disturbance (wears glasses daily).   Respiratory: Positive for shortness of breath (easily SOA ; worse on exertion ) and wheezing (occasional wheezing). Negative for cough and chest tightness.    Cardiovascular: Positive for palpitations (occasional palpitations) and leg swelling (BLE swelling/edema). Negative for chest pain.   Gastrointestinal: Negative.  Negative for abdominal pain and nausea.   Endocrine: Negative.  Negative for cold intolerance and heat intolerance.   Genitourinary: Positive for frequency (urinary frequency). Negative for difficulty urinating and urgency.   Musculoskeletal:  "Positive for arthralgias (joints). Negative for back pain, neck pain and neck stiffness.   Skin: Negative.  Negative for rash and wound.   Allergic/Immunologic: Positive for environmental allergies (seasonal allergies). Negative for food allergies.   Neurological: Negative.  Negative for dizziness, syncope, weakness, light-headedness and headaches.   Hematological: Bruises/bleeds easily (bruises and bleeds easily).   Psychiatric/Behavioral: Positive for agitation (easily agitated) and confusion (easily confused). Negative for sleep disturbance (denies waking up smothering/SOA). The patient is nervous/anxious (easily nervous/anxious).        Objective   Vitals:    10/15/19 1045   BP: 151/74   BP Location: Left arm   Patient Position: Sitting   Pulse: 65   SpO2: 96%   Weight: 127 kg (280 lb 9.6 oz)   Height: 170.2 cm (67\")      /74 (BP Location: Left arm, Patient Position: Sitting)   Pulse 65   Ht 170.2 cm (67\")   Wt 127 kg (280 lb 9.6 oz)   SpO2 96% Comment: 2L NC  BMI 43.95 kg/m²    Lab Results (most recent)     None        Physical Exam   Constitutional: She is oriented to person, place, and time. She appears well-developed and well-nourished. No distress (Patient appears dyspnic.  She is on O2 per NC.  The patient appears chronically ill. ).   HENT:   Head: Normocephalic and atraumatic.   Eyes: Conjunctivae and EOM are normal. Pupils are equal, round, and reactive to light.   Neck: Normal range of motion. Neck supple. No JVD present. No tracheal deviation present.   Cardiovascular: Normal rate, regular rhythm, normal heart sounds and intact distal pulses.  Extrasystoles are present.   Pulmonary/Chest: Tachypnea noted. She has decreased breath sounds. She has wheezes. She has rales (Dry.).   Abdominal: Soft. Bowel sounds are normal. She exhibits no distension and no mass. There is no tenderness. There is no rebound and no guarding.   Musculoskeletal: Normal range of motion. She exhibits no edema, " tenderness or deformity.   Neurological: She is alert and oriented to person, place, and time.   Skin: Skin is warm and dry. No rash noted. No erythema. No pallor.   Psychiatric: She has a normal mood and affect. Her behavior is normal. Judgment and thought content normal.   Nursing note and vitals reviewed.        Procedure     ECG 12 Lead  Date/Time: 10/15/2019 10:55 AM  Performed by: Mark Brito PA  Authorized by: Mark Brito PA   Comparison: compared with previous ECG from 2/18/2019  Comparison to previous ECG: Sinus rhythm, rate 74, normal axis, minor nonspecific ST-T wave changes, no acute changes noted.  Comments: A-fib               Assessment/Plan      Diagnosis Plan   1. Paroxysmal atrial fibrillation (CMS/HCC)  ECG 12 Lead   2. Bilateral carotid artery stenosis  Duplex Carotid Ultrasound CAR   3. Shortness of breath         1.  The patient's A. fib is well treated at this time with antidysrhythmic therapy and anticoagulation therapy.  She is tolerating those without complication.  I would continue sotalol and Eliquis without change.    2.  I would like to repeat a carotid duplex given carotid history.  We will have that available for Dr. Brock, who is scheduled to see the patient in December for follow-up.    3.  Shortness of air stable for this patient.  This is chronic and severe and felt secondary to pulmonary status.  She will follow with primary care and pulmonology in that regard.    4.  Otherwise, we will continue to see her on 6-month intervals.  I feel she is stable from general cardiovascular standpoint at this time.  She will call for complications prior to f/u.         Patient's Body mass index is 43.95 kg/m². BMI is above normal parameters. Recommendations include: educational material and referral to primary care.             Electronically signed by:

## 2019-10-15 NOTE — PATIENT INSTRUCTIONS
"Fat and Cholesterol Restricted Eating Plan  Getting too much fat and cholesterol in your diet may cause health problems. Choosing the right foods helps keep your fat and cholesterol at normal levels. This can keep you from getting certain diseases.  Your doctor may recommend an eating plan that includes:  · Total fat: ______% or less of total calories a day.  · Saturated fat: ______% or less of total calories a day.  · Cholesterol: less than _________mg a day.  · Fiber: ______g a day.  What are tips for following this plan?  General tips    · Work with your doctor to lose weight if you need to.  · Avoid:  ? Foods with added sugar.  ? Fried foods.  ? Foods with partially hydrogenated oils.  · Limit alcohol intake to no more than 1 drink a day for nonpregnant women and 2 drinks a day for men. One drink equals 12 oz of beer, 5 oz of wine, or 1½ oz of hard liquor.  Reading food labels  · Check food labels for:  ? Trans fats.  ? Partially hydrogenated oils.  ? Saturated fat (g) in each serving.  ? Cholesterol (mg) in each serving.  ? Fiber (g) in each serving.  · Choose foods with healthy fats, such as:  ? Monounsaturated fats.  ? Polyunsaturated fats.  ? Omega-3 fats.  · Choose grain products that have whole grains. Look for the word \"whole\" as the first word in the ingredient list.  Cooking  · Cook foods using low-fat methods. These include baking, boiling, grilling, and broiling.  · Eat more home-cooked foods. Eat at restaurants and buffets less often.  · Avoid cooking using saturated fats, such as butter, cream, palm oil, palm kernel oil, and coconut oil.  Meal planning    · At meals, divide your plate into four equal parts:  ? Fill one-half of your plate with vegetables and green salads.  ? Fill one-fourth of your plate with whole grains.  ? Fill one-fourth of your plate with low-fat (lean) protein foods.  · Eat fish that is high in omega-3 fats at least two times a week. This includes mackerel, tuna, sardines, and " salmon.  · Eat foods that are high in fiber, such as whole grains, beans, apples, broccoli, carrots, peas, and barley.  Recommended foods  Grains  · Whole grains, such as whole wheat or whole grain breads, crackers, cereals, and pasta. Unsweetened oatmeal, bulgur, barley, quinoa, or brown rice. Corn or whole wheat flour tortillas.  Vegetables  · Fresh or frozen vegetables (raw, steamed, roasted, or grilled). Green salads.  Fruits  · All fresh, canned (in natural juice), or frozen fruits.  Meats and other protein foods  · Ground beef (85% or leaner), grass-fed beef, or beef trimmed of fat. Skinless chicken or turkey. Ground chicken or turkey. Pork trimmed of fat. All fish and seafood. Egg whites. Dried beans, peas, or lentils. Unsalted nuts or seeds. Unsalted canned beans. Nut butters without added sugar or oil.  Dairy  · Low-fat or nonfat dairy products, such as skim or 1% milk, 2% or reduced-fat cheeses, low-fat and fat-free ricotta or cottage cheese, or plain low-fat and nonfat yogurt.  Fats and oils  · Tub margarine without trans fats. Light or reduced-fat mayonnaise and salad dressings. Avocado. Olive, canola, sesame, or safflower oils.  The items listed above may not be a complete list of recommended foods or beverages. Contact your dietitian for more options.  The items listed above may not be a complete list of foods and beverages [you/your child] can eat. Contact a dietitian for more information.  Foods to avoid  Grains  · White bread. White pasta. White rice. Cornbread. Bagels, pastries, and croissants. Crackers and snack foods that contain trans fat and hydrogenated oils.  Vegetables  · Vegetables cooked in cheese, cream, or butter sauce. Fried vegetables.  Fruits  · Canned fruit in heavy syrup. Fruit in cream or butter sauce. Fried fruit.  Meats and other protein foods  · Fatty cuts of meat. Ribs, chicken wings, perez, sausage, bologna, salami, chitterlings, fatback, hot dogs, bratwurst, and packaged  lunch meats. Liver and organ meats. Whole eggs and egg yolks. Chicken and turkey with skin. Fried meat.  Dairy  · Whole or 2% milk, cream, half-and-half, and cream cheese. Whole milk cheeses. Whole-fat or sweetened yogurt. Full-fat cheeses. Nondairy creamers and whipped toppings. Processed cheese, cheese spreads, and cheese curds.  Beverages  · Alcohol. Sugar-sweetened drinks such as sodas, lemonade, and fruit drinks.  Fats and oils  · Butter, stick margarine, lard, shortening, ghee, or perez fat. Coconut, palm kernel, and palm oils.  Sweets and desserts  · Corn syrup, sugars, honey, and molasses. Candy. Jam and jelly. Syrup. Sweetened cereals. Cookies, pies, cakes, donuts, muffins, and ice cream.  The items listed above may not be a complete list of foods and beverages to avoid. Contact your dietitian for more information.  The items listed above may not be a complete list of foods and beverages [you/your child] should avoid. Contact a dietitian for more information.  Summary  · Choosing the right foods helps keep your fat and cholesterol at normal levels. This can keep you from getting certain diseases.  · At meals, fill one-half of your plate with vegetables and green salads.  · Eat high-fiber foods, like whole grains, beans, apples, carrots, peas, and barley.  · Limit added sugar, saturated fats, alcohol, and fried foods.  This information is not intended to replace advice given to you by your health care provider. Make sure you discuss any questions you have with your health care provider.  Document Released: 06/18/2013 Document Revised: 09/04/2018 Document Reviewed: 09/04/2018  [employer] Interactive Patient Education © 2019 [employer]  BMI for Adults    Body mass index (BMI) is a number that is calculated from a person's weight and height. BMI may help to estimate how much of a person's weight is composed of fat. BMI can help identify those who may be at higher risk for certain medical problems.  How is BMI  "used with adults?  BMI is used as a screening tool to identify possible weight problems. It is used to check whether a person is obese, overweight, healthy weight, or underweight.  How is BMI calculated?  BMI measures your weight and compares it to your height. This can be done either in English ([place] or metric measurements. Note that charts are available to help you find your BMI quickly and easily without having to do these calculations yourself.  To calculate your BMI in English ([place] measurements, your health care provider will:  1. Measure your weight in pounds (lb).  2. Multiply the number of pounds by 703.  ? For example, for a person who weighs 180 lb, multiply that number by 703, which equals 126,540.  3. Measure your height in inches (in). Then multiply that number by itself to get a measurement called \"inches squared.\"  ? For example, for a person who is 70 in tall, the \"inches squared\" measurement is 70 in x 70 in, which equals 4900 inches squared.  4. Divide the total from Step 2 (number of lb x 703) by the total from Step 3 (inches squared): 126,540 ÷ 4900 = 25.8. This is your BMI.  To calculate your BMI in metric measurements, your health care provider will:  1. Measure your weight in kilograms (kg).  2. Measure your height in meters (m). Then multiply that number by itself to get a measurement called \"meters squared.\"  ? For example, for a person who is 1.75 m tall, the \"meters squared\" measurement is 1.75 m x 1.75 m, which is equal to 3.1 meters squared.  3. Divide the number of kilograms (your weight) by the meters squared number. In this example: 70 ÷ 3.1 = 22.6. This is your BMI.  How is BMI interpreted?  To interpret your results, your health care provider will use BMI charts to identify whether you are underweight, normal weight, overweight, or obese. The following guidelines will be used:  · Underweight: BMI less than 18.5.  · Normal weight: BMI between 18.5 and 24.9.  · Overweight: BMI " between 25 and 29.9.  · Obese: BMI of 30 and above.  Please note:  · Weight includes both fat and muscle, so someone with a muscular build, such as an athlete, may have a BMI that is higher than 24.9. In cases like these, BMI is not an accurate measure of body fat.  · To determine if excess body fat is the cause of a BMI of 25 or higher, further assessments may need to be done by a health care provider.  · BMI is usually interpreted in the same way for men and women.  Why is BMI a useful tool?  BMI is useful in two ways:  · Identifying a weight problem that may be related to a medical condition, or that may increase the risk for medical problems.  · Promoting lifestyle and diet changes in order to reach a healthy weight.  Summary  · Body mass index (BMI) is a number that is calculated from a person's weight and height.  · BMI may help to estimate how much of a person's weight is composed of fat. BMI can help identify those who may be at higher risk for certain medical problems.  · BMI can be measured using English measurements or metric measurements.  · To interpret your results, your health care provider will use BMI charts to identify whether you are underweight, normal weight, overweight, or obese.  This information is not intended to replace advice given to you by your health care provider. Make sure you discuss any questions you have with your health care provider.  Document Released: 08/29/2005 Document Revised: 10/31/2018 Document Reviewed: 10/31/2018  [employer] Interactive Patient Education © 2019 [employer]

## 2019-10-23 DIAGNOSIS — R07.9 CHEST PAIN, UNSPECIFIED TYPE: ICD-10-CM

## 2019-10-23 RX ORDER — ISOSORBIDE MONONITRATE 30 MG/1
TABLET, EXTENDED RELEASE ORAL
Qty: 90 TABLET | Refills: 11 | Status: SHIPPED | OUTPATIENT
Start: 2019-10-23 | End: 2020-11-16

## 2019-10-28 ENCOUNTER — APPOINTMENT (OUTPATIENT)
Dept: CARDIOLOGY | Facility: HOSPITAL | Age: 69
End: 2019-10-28

## 2019-11-25 ENCOUNTER — HOSPITAL ENCOUNTER (OUTPATIENT)
Dept: CARDIOLOGY | Facility: HOSPITAL | Age: 69
Discharge: HOME | End: 2019-11-25
Admitting: PHYSICIAN ASSISTANT

## 2019-11-25 DIAGNOSIS — I65.23 BILATERAL CAROTID ARTERY STENOSIS: ICD-10-CM

## 2019-11-25 PROCEDURE — 93880 EXTRACRANIAL BILAT STUDY: CPT | Performed by: INTERNAL MEDICINE

## 2019-11-25 PROCEDURE — 93880 EXTRACRANIAL BILAT STUDY: CPT

## 2019-12-08 LAB
BH CV ECHO MEAS - BSA(HAYCOCK): 2.5 M^2
BH CV ECHO MEAS - BSA: 2.3 M^2
BH CV ECHO MEAS - BZI_BMI: 43.9 KILOGRAMS/M^2
BH CV ECHO MEAS - BZI_METRIC_HEIGHT: 170.2 CM
BH CV ECHO MEAS - BZI_METRIC_WEIGHT: 127 KG
BH CV MID RIGHT ICA HIDDEN LRR: 1 CM
BH CV XLRA MEAS LEFT BULB EDV: -11.2 CM/SEC
BH CV XLRA MEAS LEFT BULB PSV: -164 CM/SEC
BH CV XLRA MEAS LEFT CCA RATIO VEL: 98 CM/SEC
BH CV XLRA MEAS LEFT DIST CCA EDV: 12 CM/SEC
BH CV XLRA MEAS LEFT DIST CCA PSV: 98 CM/SEC
BH CV XLRA MEAS LEFT DIST ICA EDV: -42.8 CM/SEC
BH CV XLRA MEAS LEFT DIST ICA PSV: -143.2 CM/SEC
BH CV XLRA MEAS LEFT ICA RATIO VEL: -216 CM/SEC
BH CV XLRA MEAS LEFT ICA/CCA RATIO: -2.2
BH CV XLRA MEAS LEFT MID ICA EDV: -50.5 CM/SEC
BH CV XLRA MEAS LEFT MID ICA PSV: -190.8 CM/SEC
BH CV XLRA MEAS LEFT PROX CCA EDV: 15.5 CM/SEC
BH CV XLRA MEAS LEFT PROX CCA PSV: 149 CM/SEC
BH CV XLRA MEAS LEFT PROX ECA EDV: -1.2 CM/SEC
BH CV XLRA MEAS LEFT PROX ECA PSV: -225 CM/SEC
BH CV XLRA MEAS LEFT PROX ICA EDV: -46 CM/SEC
BH CV XLRA MEAS LEFT PROX ICA PSV: -216 CM/SEC
BH CV XLRA MEAS LEFT VERTEBRAL A EDV: 7.6 CM/SEC
BH CV XLRA MEAS LEFT VERTEBRAL A PSV: 45.8 CM/SEC
BH CV XLRA MEAS RIGHT BULB EDV: 8.6 CM/SEC
BH CV XLRA MEAS RIGHT BULB PSV: 61.6 CM/SEC
BH CV XLRA MEAS RIGHT CCA RATIO VEL: 81.6 CM/SEC
BH CV XLRA MEAS RIGHT DIST CCA EDV: 15.5 CM/SEC
BH CV XLRA MEAS RIGHT DIST CCA PSV: 81.6 CM/SEC
BH CV XLRA MEAS RIGHT DIST ICA EDV: -36.8 CM/SEC
BH CV XLRA MEAS RIGHT DIST ICA PSV: -209 CM/SEC
BH CV XLRA MEAS RIGHT ICA RATIO VEL: -209 CM/SEC
BH CV XLRA MEAS RIGHT ICA/CCA RATIO: -2.6
BH CV XLRA MEAS RIGHT MID ICA EDV: -46.7 CM/SEC
BH CV XLRA MEAS RIGHT MID ICA PSV: -188 CM/SEC
BH CV XLRA MEAS RIGHT PROX CCA EDV: 3.4 CM/SEC
BH CV XLRA MEAS RIGHT PROX CCA PSV: 87.7 CM/SEC
BH CV XLRA MEAS RIGHT PROX ECA EDV: -14.7 CM/SEC
BH CV XLRA MEAS RIGHT PROX ECA PSV: -227 CM/SEC
BH CV XLRA MEAS RIGHT PROX ICA EDV: -33.1 CM/SEC
BH CV XLRA MEAS RIGHT PROX ICA PSV: -172 CM/SEC
BH CV XLRA MEAS RIGHT VERTEBRAL A EDV: 0 CM/SEC
BH CV XLRA MEAS RIGHT VERTEBRAL A PSV: 42.2 CM/SEC
BH CVPROX RIGHT ICA HIDDEN LRR: 1 CM

## 2019-12-18 ENCOUNTER — APPOINTMENT (OUTPATIENT)
Dept: CARDIOLOGY | Facility: HOSPITAL | Age: 69
End: 2019-12-18

## 2019-12-23 RX ORDER — ASPIRIN 81 MG/1
TABLET, COATED ORAL
Qty: 30 TABLET | Refills: 5 | Status: SHIPPED | OUTPATIENT
Start: 2019-12-23 | End: 2020-06-19

## 2019-12-23 NOTE — TELEPHONE ENCOUNTER
Provider:  Dre  Pharmacy: [facility]   Last visit:  6/26/19   Next visit:     SANTI:     Reason for call:       Automated refill request from patient's pharmacy

## 2020-01-27 ENCOUNTER — TELEPHONE (OUTPATIENT)
Dept: CARDIOLOGY | Facility: CLINIC | Age: 70
End: 2020-01-27

## 2020-03-17 ENCOUNTER — OFFICE VISIT (OUTPATIENT)
Dept: CARDIOLOGY | Facility: CLINIC | Age: 70
End: 2020-03-17

## 2020-03-17 VITALS
SYSTOLIC BLOOD PRESSURE: 147 MMHG | HEART RATE: 71 BPM | WEIGHT: 282 LBS | HEIGHT: 67 IN | BODY MASS INDEX: 44.26 KG/M2 | OXYGEN SATURATION: 92 % | DIASTOLIC BLOOD PRESSURE: 70 MMHG

## 2020-03-17 DIAGNOSIS — I48.0 PAROXYSMAL ATRIAL FIBRILLATION (HCC): Primary | ICD-10-CM

## 2020-03-17 DIAGNOSIS — I73.9 PVD (PERIPHERAL VASCULAR DISEASE) (HCC): ICD-10-CM

## 2020-03-17 DIAGNOSIS — I10 ESSENTIAL HYPERTENSION: ICD-10-CM

## 2020-03-17 PROCEDURE — 99213 OFFICE O/P EST LOW 20 MIN: CPT | Performed by: PHYSICIAN ASSISTANT

## 2020-03-17 NOTE — PATIENT INSTRUCTIONS
How to Quarantine at Home  Information for Patients and Families    These instructions are for people with confirmed or suspected COVID-19 who do not need to be hospitalized and those with confirmed COVID-19 who were hospitalized and discharged to care for themselves at home.    If you were tested through the Health Department  The Health Department will monitor your wellbeing.  If it is determined that you do not need to be hospitalized and can be isolated at home, you will be monitored by staff from your local or state health department.     If you were tested through a Commercial [facility]  You will need to monitor yourself and report changes in your symptoms to your doctor.  See the section below called Monitor Your Symptoms.    Follow these steps until a healthcare provider or local or state health department says you can return to your normal activities.    Stay home except to get medical care  • Restrict activities outside your home, except for getting medical care.   • Do not go to work, school, or public areas.   • Avoid using public transportation, ride-sharing, or taxis.    Separate yourself from other people and animals in your home  People  As much as possible, you should stay in a specific room and away from other people in your home. Also, you should use a separate bathroom, if available.    Animals  You should restrict contact with pets and other animals while you are sick with COVID-19, just like you would around other people. When possible, have another member of your household care for your animals while you are sick. If you are sick with COVID-19, avoid contact with your pet, including petting, snuggling, being kissed or licked, and sharing food. If you must care for your pet or be around animals while you are sick, wash your hands before and after you interact with pets and wear a facemask. See COVID-19 and Animals for more information.    Call ahead before visiting your doctor  If you have a medical  appointment, call the healthcare provider and tell them that you have or may have COVID-19. This information will help the healthcare provider’s office take steps to keep other people from getting infected or exposed.    Wear a facemask  You should wear a facemask when you are around other people (e.g., sharing a room or vehicle) or pets and before you enter a healthcare provider’s office.     If you are not able to wear a facemask (for example, because it causes trouble breathing), then people who live with you should not stay in the same room with you, or they should wear a facemask if they enter your room.    Cover your coughs and sneezes  • Cover your mouth and nose with a tissue when you cough or sneeze.   • Throw used tissues in a lined trash can.   • Immediately wash your hands with soap and water for at least 20 seconds or, if soap and water are not available, clean your hands with an alcohol-based hand  that contains at least 60% alcohol.    Clean your hands often  • Wash your hands often with soap and water for at least 20 seconds, especially after blowing your nose, coughing, or sneezing; going to the bathroom; and before eating or preparing food.     • If soap and water are not readily available, use an alcohol-based hand  with at least 60% alcohol, covering all surfaces of your hands and rubbing them together until they feel dry.    • Soap and water are the best option if hands are visibly dirty. Avoid touching your eyes, nose, and mouth with unwashed hands.    Avoid sharing personal household items  • You should not share dishes, drinking glasses, cups, eating utensils, towels, or bedding with other people or pets in your home.   • After using these items, they should be washed thoroughly with soap and water.    Clean all “high-touch” surfaces everyday  • High touch surfaces include counters, tabletops, doorknobs, bathroom fixtures, toilets, phones, keyboards, tablets, and bedside  tables.   • Also, clean any surfaces that may have blood, stool, or body fluids on them.   • Use a household cleaning spray or wipe, according to the label instructions. Labels contain instructions for safe and effective use of the cleaning product, including precautions you should take when applying the product, such as wearing gloves and making sure you have good ventilation during use of the product.    Monitor your symptoms  • Seek prompt medical attention if your illness is worsening (e.g., difficulty breathing).   • Before seeking care, call your healthcare provider and tell them that you have, or are being evaluated for, COVID-19.   • Put on a facemask before you enter the facility.     • These steps will help the healthcare provider’s office to keep other people in the office or waiting room from getting infected or exposed.   • Persons who are placed under active monitoring or facilitated self-monitoring should follow instructions provided by their local health department or occupational health professionals, as appropriate.  • If you have a medical emergency and need to call 911, notify the dispatch personnel that you have, or are being evaluated for COVID-19. If possible, put on a facemask before emergency medical services arrive.    Discontinuing home isolation  Patients with confirmed COVID-19 should remain under home isolation precautions until the risk of secondary transmission to others is thought to be low. The decision to discontinue home isolation precautions should be made on a case-by-case basis, in consultation with healthcare providers and state and local health departments.    The below content are for household members, intimate partners, and caregivers of a patient with symptomatic laboratory-confirmed COVID-19 or a patient under investigation:    Household members, intimate partners, and caregivers may have close contact with a person with symptomatic, laboratory-confirmed COVID-19 or a  person under investigation.     Close contacts should monitor their health; they should call their healthcare provider right away if they develop symptoms suggestive of COVID-19 (e.g., fever, cough, shortness of breath)     Close contacts should also follow these recommendations:  • Make sure that you understand and can help the patient follow their healthcare provider’s instructions for medication(s) and care. You should help the patient with basic needs in the home and provide support for getting groceries, prescriptions, and other personal needs.  • Monitor the patient’s symptoms. If the patient is getting sicker, call his or her healthcare provider and tell them that the patient has laboratory-confirmed COVID-19. This will help the healthcare provider’s office take steps to keep other people in the office or waiting room from getting infected. Ask the healthcare provider to call the local or [facility] health department for additional guidance. If the patient has a medical emergency and you need to call 911, notify the dispatch personnel that the patient has, or is being evaluated for COVID-19.  • Household members should stay in another room or be  from the patient as much as possible. Household members should use a separate bedroom and bathroom, if available.  • Prohibit visitors who do not have an essential need to be in the home.  • Household members should care for any pets in the home. Do not handle pets or other animals while sick.  For more information, see COVID-19 and Animals.  • Make sure that shared spaces in the home have good air flow, such as by an air conditioner or an opened window, weather permitting.  • Perform hand hygiene frequently. Wash your hands often with soap and water for at least 20 seconds or use an alcohol-based hand  that contains 60 to 95% alcohol, covering all surfaces of your hands and rubbing them together until they feel dry. Soap and water should be used  preferentially if hands are visibly dirty.  • Avoid touching your eyes, nose, and mouth with unwashed hands.  • The patient should wear a facemask when you are around other people. If the patient is not able to wear a facemask (for example, because it causes trouble breathing), you, as the caregiver, should wear a mask when you are in the same room as the patient.  • Wear a disposable facemask and gloves when you touch or have contact with the patient’s blood, stool, or body fluids, such as saliva, sputum, nasal mucus, vomit, or urine.   o Throw out disposable facemasks and gloves after using them. Do not reuse.  o When removing personal protective equipment, first remove and dispose of gloves. Then, immediately clean your hands with soap and water or alcohol-based hand . Next, remove and dispose of facemask, and immediately clean your hands again with soap and water or alcohol-based hand .  • Avoid sharing household items with the patient. You should not share dishes, drinking glasses, cups, eating utensils, towels, bedding, or other items. After the patient uses these items, you should wash them thoroughly (see below “Wash laundry thoroughly”).  • Clean all “high-touch” surfaces, such as counters, tabletops, doorknobs, bathroom fixtures, toilets, phones, keyboards, tablets, and bedside tables, every day. Also, clean any surfaces that may have blood, stool, or body fluids on them.   o Use a household cleaning spray or wipe, according to the label instructions. Labels contain instructions for safe and effective use of the cleaning product including precautions you should take when applying the product, such as wearing gloves and making sure you have good ventilation during use of the product.  • Wash laundry thoroughly.   o Immediately remove and wash clothes or bedding that have blood, stool, or body fluids on them.  o Wear disposable gloves while handling soiled items and keep soiled items away  from your body. Clean your hands (with soap and water or an alcohol-based hand ) immediately after removing your gloves.  o Read and follow directions on labels of laundry or clothing items and detergent. In general, using a normal laundry detergent according to washing machine instructions and dry thoroughly using the warmest temperatures recommended on the clothing label.  • Place all used disposable gloves, facemasks, and other contaminated items in a lined container before disposing of them with other household waste. Clean your hands (with soap and water or an alcohol-based hand ) immediately after handling these items. Soap and water should be used preferentially if hands are visibly dirty.  • Discuss any additional questions with your state or local health department or healthcare provider.    Adapted from information provided by the Centers for Disease Control and Prevention.  For more information, visit https://www.cdc.gov/coronavirus/2019-ncov/hcp/guidance-prevent-spread.htmlBMI for Adults    Body mass index (BMI) is a number that is calculated from a person's weight and height. BMI may help to estimate how much of a person's weight is composed of fat. BMI can help identify those who may be at higher risk for certain medical problems.  How is BMI used with adults?  BMI is used as a screening tool to identify possible weight problems. It is used to check whether a person is obese, overweight, healthy weight, or underweight.  How is BMI calculated?  BMI measures your weight and compares it to your height. This can be done either in English ([place] or metric measurements. Note that charts are available to help you find your BMI quickly and easily without having to do these calculations yourself.  To calculate your BMI in English ([place] measurements, your health care provider will:  1. Measure your weight in pounds (lb).  2. Multiply the number of pounds by 703.  ? For example, for a person  "who weighs 180 lb, multiply that number by 703, which equals 126,540.  3. Measure your height in inches (in). Then multiply that number by itself to get a measurement called \"inches squared.\"  ? For example, for a person who is 70 in tall, the \"inches squared\" measurement is 70 in x 70 in, which equals 4900 inches squared.  4. Divide the total from Step 2 (number of lb x 703) by the total from Step 3 (inches squared): 126,540 ÷ 4900 = 25.8. This is your BMI.  To calculate your BMI in metric measurements, your health care provider will:  1. Measure your weight in kilograms (kg).  2. Measure your height in meters (m). Then multiply that number by itself to get a measurement called \"meters squared.\"  ? For example, for a person who is 1.75 m tall, the \"meters squared\" measurement is 1.75 m x 1.75 m, which is equal to 3.1 meters squared.  3. Divide the number of kilograms (your weight) by the meters squared number. In this example: 70 ÷ 3.1 = 22.6. This is your BMI.  How is BMI interpreted?  To interpret your results, your health care provider will use BMI charts to identify whether you are underweight, normal weight, overweight, or obese. The following guidelines will be used:  · Underweight: BMI less than 18.5.  · Normal weight: BMI between 18.5 and 24.9.  · Overweight: BMI between 25 and 29.9.  · Obese: BMI of 30 and above.  Please note:  · Weight includes both fat and muscle, so someone with a muscular build, such as an athlete, may have a BMI that is higher than 24.9. In cases like these, BMI is not an accurate measure of body fat.  · To determine if excess body fat is the cause of a BMI of 25 or higher, further assessments may need to be done by a health care provider.  · BMI is usually interpreted in the same way for men and women.  Why is BMI a useful tool?  BMI is useful in two ways:  · Identifying a weight problem that may be related to a medical condition, or that may increase the risk for medical " problems.  · Promoting lifestyle and diet changes in order to reach a healthy weight.  Summary  · Body mass index (BMI) is a number that is calculated from a person's weight and height.  · BMI may help to estimate how much of a person's weight is composed of fat. BMI can help identify those who may be at higher risk for certain medical problems.  · BMI can be measured using English measurements or metric measurements.  · To interpret your results, your health care provider will use BMI charts to identify whether you are underweight, normal weight, overweight, or obese.  This information is not intended to replace advice given to you by your health care provider. Make sure you discuss any questions you have with your health care provider.  Document Released: 08/29/2005 Document Revised: 10/31/2018 Document Reviewed: 10/31/2018  [employer] Interactive Patient Education © 2020 [employer]

## 2020-03-17 NOTE — PROGRESS NOTES
Problem list     Subjective   Mandi Jacob is a 69 y.o. female     Chief Complaint   Patient presents with   • Follow-up     5 mth f/u   Problem List:  1. Nonobstructive CAD by cath February 2012.  2. Preserved systolic function  3. History of CHF  4. Diastolic dysfunction  5. Hypertension      6. Dyslipidemia. The patient reports she is intolerant to statins.      7. Chronic palpitations.      7.1.  Recurrent PACs noted by telemetry historically.  The patient was treated with sotalol therapy for the same.      7.2.  A. fib with rapid ventricular response noted by event monitor, 2019.      8.  Peripheral vascular disease, status post angioplasty and stenting to the right carotid artery system, 06/19 with Dr. Brock    HPI  She presents in today for evaluation and follow-up.  She was scheduled for carotid duplex in late last year.  She could not tolerate a trip to [place] just to have a carotid duplex and wanted to have that locally.  She was scheduled for that accordingly.  That suggested borderline hemodynamically significant disease in the proximal mid left internal carotid artery noted.  There is also noted to be 50% IntraStent restenosis in the previously stented right.  We did compare results to her previous carotid duplex and follow-up angiography studies.  Similar results were noted in the left carotid artery and that was not felt significant for intervention at that time.  I would like to forward those results on to Dr. Brock however.    Symptomatically, the patient appears to be fairly stable from cardiovascular standpoint.  She has infrequent palpitations, largely stable on current medical regimen.  The patient has no chest pain.  She has chronic but stable dyspnea.  She has stable lower extremity edema.  She infrequently has to utilize titrated diuretic therapies.  She has no PND orthopnea at this time.  Blood pressures appear to be fairly well controlled.  The patient has no further  complaints otherwise.    Current Outpatient Medications on File Prior to Visit   Medication Sig Dispense Refill   • albuterol (PROVENTIL HFA;VENTOLIN HFA) 108 (90 BASE) MCG/ACT inhaler Inhale 2 puffs Every 4 (Four) Hours As Needed. Albuterol 90 MCG/ACT AERS; Patient Sig: Albuterol 90 MCG/ACT AERS INHALE 1 TO 2 PUFFS EVERY 4 TO 6 HOURS AS NEEDED.; 0; 11-Sep-2013; Active     • albuterol (PROVENTIL) (2.5 MG/3ML) 0.083% nebulizer solution Albuterol Sulfate (2.5 MG/3ML) 0.083% Inhalation Nebulization Solution; Patient Sig: Albuterol Sulfate (2.5 MG/3ML) 0.083% Inhalation Nebulization Solution USE 1 UNIT DOSE IN NEBULIZER EVERY 4 TO 6 HOURS AS NEE     • Alirocumab 75 MG/ML solution pen-injector Inject 75 mg under the skin into the appropriate area as directed Every 14 (Fourteen) Days. (Patient taking differently: Inject 150 mg under the skin into the appropriate area as directed Every 14 (Fourteen) Days.) 1 pen 2   • apixaban (ELIQUIS) 5 MG tablet tablet Take 1 tablet by mouth Every 12 (Twelve) Hours. 60 tablet 11   • ASPIRIN LOW DOSE 81 MG EC tablet TAKE 1 TABLET BY MOUTH DAILY. 30 tablet 5   • furosemide (LASIX) 40 MG tablet Take 40 mg by mouth 2 (Two) Times a Day.     • insulin aspart (novoLOG FLEXPEN) 100 UNIT/ML solution pen-injector sc pen Inject 15 Units under the skin into the appropriate area as directed Daily With Breakfast.     • insulin aspart (novoLOG FLEXPEN) 100 UNIT/ML solution pen-injector sc pen Inject 20 Units under the skin into the appropriate area as directed Daily With Lunch.     • insulin aspart (novoLOG FLEXPEN) 100 UNIT/ML solution pen-injector sc pen Inject  under the skin into the appropriate area as directed Daily Before Supper. Per Sliding Scale at home at night     • isosorbide mononitrate (IMDUR) 30 MG 24 hr tablet TAKE 1 TABLET DAILY 90 tablet 11   • losartan (COZAAR) 50 MG tablet Take 50 mg by mouth Every Morning.  6   • magnesium oxide (MAGOX) 400 (241.3 MG) MG tablet tablet Take 400 mg  by mouth Every Night.     • metFORMIN ER (GLUCOPHAGE-XR) 500 MG 24 hr tablet Take 500 mg by mouth Daily With Breakfast.  11   • metolazone (ZAROXOLYN) 2.5 MG tablet Take 2.5 mg by mouth Daily As Needed. 1 tablet 30 minutes prior to lasix as needed PRN       • metoprolol tartrate (LOPRESSOR) 25 MG tablet Take 25 mg by mouth 2 (Two) Times a Day.  1   • montelukast (SINGULAIR) 10 MG tablet Take 10 mg by mouth Every Night.     • nitroglycerin (NITROSTAT) 0.4 MG SL tablet Place 1 tablet under the tongue Every 5 (Five) Minutes As Needed for Chest Pain. 25 tablet 2   • potassium chloride (K-DUR,KLOR-CON) 10 MEQ CR tablet Take 10 mEq by mouth Every Night.     • potassium chloride (K-DUR,KLOR-CON) 20 MEQ CR tablet TAKE 1 TABLET BY MOUTH EVERY EVENING. 90 tablet 3   • sotalol (BETAPACE) 80 MG tablet TAKE 1 TABLET TWICE DAILY 180 tablet 3   • tiotropium (SPIRIVA) 18 MCG per inhalation capsule Place 1 capsule into inhaler and inhale Every Morning.     • TRESIBA FLEXTOUCH 200 UNIT/ML solution pen-injector Inject 100 Units under the skin into the appropriate area as directed Every Morning. 100 units daily     • triamterene-hydrochlorothiazide (MAXZIDE-25) 37.5-25 MG per tablet Take 1 tablet by mouth Every Morning.     • [DISCONTINUED] potassium chloride (K-DUR,KLOR-CON) 10 MEQ CR tablet Take 20 mEq by mouth Every Morning.       No current facility-administered medications on file prior to visit.        Ace inhibitors; Incruse ellipta [umeclidinium bromide]; Lisinopril; Buspirone; Citalopram; Codeine; Diltiazem; Flecainide; Ketorolac tromethamine; Lipitor [atorvastatin]; Liraglutide; Lorazepam; Niacin; Sertraline; Statins; Sulfamethoxazole-trimethoprim; Trulicity [dulaglutide]; Xigduo xr [dapagliflozin-metformin hcl er]; Ciprofloxacin; Clindamycin/lincomycin; and Levaquin [levofloxacin]    Past Medical History:   Diagnosis Date   • Arrhythmia    • Asthma    • Atrial fibrillation (CMS/HCC)    • CAD (coronary artery disease)      non-obstructive by cath 02/2012   • CHF (congestive heart failure) (CMS/[facility])    • COPD (chronic obstructive pulmonary disease) (CMS/[facility])    • Diabetes mellitus (CMS/[facility])    • Diastolic dysfunction    • Dizziness    • Edema    • Esophageal spasm    • Herniated lumbar intervertebral disc     L5   • Hyperlipidemia     intolerant to statins    • Hypertension    • Osteoarthritis    • Palpitations    • SOB (shortness of breath)    • Stroke (CMS/[facility])    • Stroke-like symptoms    • TIA (transient ischemic attack) 2015    left hemispheric TIA/CVA   • Unstable angina (CMS/[facility])        Social History     Socioeconomic History   • Marital status:      Spouse name: Not on file   • Number of children: Not on file   • Years of education: Not on file   • Highest education level: Not on file   Tobacco Use   • Smoking status: Former Smoker     Packs/day: 1.50     Years: 41.00     Pack years: 61.50     Start date: 4/8/1968     Last attempt to quit: 3/22/2009     Years since quitting: 10.9   • Smokeless tobacco: Never Used   Substance and Sexual Activity   • Alcohol use: No   • Drug use: No   • Sexual activity: Never       Family History   Problem Relation Age of Onset   • Heart attack Mother    • Heart failure Mother         congestive   • Hyperlipidemia Mother    • Hypertension Mother    • Peripheral vascular disease Mother    • Heart attack Father    • Heart failure Father         congestive   • Diabetes Father    • Hyperlipidemia Father    • Hypertension Father    • Peripheral vascular disease Father    • Asthma Father    • Heart failure Other         congestive   • Diabetes Other    • Hyperlipidemia Other    • Hypertension Other    • Peripheral vascular disease Other    • Hypertension Brother        Review of Systems   Constitutional: Positive for fatigue (easily fatigued).   HENT: Positive for rhinorrhea. Negative for congestion and sore throat.    Eyes: Positive for visual disturbance (wears glasses daily).   Respiratory:  "Negative.  Negative for chest tightness, shortness of breath and wheezing.    Cardiovascular: Positive for chest pain (occsasional CP pain while sleeping and when over exertion). Negative for palpitations and leg swelling.   Gastrointestinal: Negative.  Negative for abdominal pain, nausea and vomiting.   Endocrine: Negative.  Negative for cold intolerance and heat intolerance.   Genitourinary: Positive for frequency (frequent urgency). Negative for difficulty urinating and urgency.   Musculoskeletal: Positive for arthralgias (joints), back pain (bulging disc) and neck pain (h/o whip lash).   Skin: Negative.  Negative for rash and wound.   Allergic/Immunologic: Positive for environmental allergies (seasonal allergies). Negative for food allergies.   Neurological: Positive for dizziness (occasional dizziness from BP). Negative for syncope and light-headedness.   Hematological: Bruises/bleeds easily (bruises/bldsa easily).   Psychiatric/Behavioral: Positive for agitation (gets agitated) and sleep disturbance (occasionally wakes up smothering/SOA, wears oxygen). Negative for confusion. The patient is nervous/anxious (gets nervous/anxious).        Objective   Vitals:    03/17/20 1016   BP: 147/70   BP Location: Left arm   Patient Position: Sitting   Pulse: 71   SpO2: 92%   Weight: 128 kg (282 lb)   Height: 170.2 cm (67\")      /70 (BP Location: Left arm, Patient Position: Sitting)   Pulse 71   Ht 170.2 cm (67\")   Wt 128 kg (282 lb)   SpO2 92% Comment: wears oxygen set on 3  BMI 44.17 kg/m²    Lab Results (most recent)     None        Physical Exam   Constitutional: She is oriented to person, place, and time. She appears well-developed and well-nourished. No distress (Patient appears dyspnic.  She is on O2 per NC.  The patient appears chronically ill. ).   HENT:   Head: Normocephalic and atraumatic.   Eyes: Pupils are equal, round, and reactive to light. Conjunctivae and EOM are normal.   Neck: Normal range of " motion. Neck supple. No JVD present. No tracheal deviation present.   Cardiovascular: Normal rate, regular rhythm, normal heart sounds and intact distal pulses.  Extrasystoles are present.   Pulmonary/Chest: Tachypnea noted. She has decreased breath sounds. She has wheezes. She has rales (Dry.).   Abdominal: Soft. Bowel sounds are normal. She exhibits no distension and no mass. There is no tenderness. There is no rebound and no guarding.   Musculoskeletal: Normal range of motion. She exhibits no edema, tenderness or deformity.   Neurological: She is alert and oriented to person, place, and time.   Skin: Skin is warm and dry. No rash noted. No erythema. No pallor.   Psychiatric: She has a normal mood and affect. Her behavior is normal. Judgment and thought content normal.   Nursing note and vitals reviewed.        Procedure   Procedures       Assessment/Plan      Diagnosis Plan   1. Paroxysmal atrial fibrillation (CMS/HCC)     2. PVD (peripheral vascular disease) (CMS/[facility])     3. Essential hypertension       1.  At this time, the patient appears stable from general cardiovascular standpoint.  She reports no symptoms of angina or failure.  The patient reports stable symptoms on medical regimen.  I will continue those without change.  Of note, LIAN ko appears to be doing very well on current medical regimen.  We will continue those therapies without change.    2.  We have reviewed her carotid duplex findings all as outlined above.  I would like to have results forwarded to Dr. Brock, which he does have available in the chart at this time.  She would like to eventually have follow-up with him to discuss those results of that study.    3.  Blood pressures appear to be fairly well controlled on current antihypertensive regimen.  The patient will continue those without change.  She will monitor blood pressures closely at home and will call to us for any issues.    4.  As the patient is stable from general cardiovascular  standpoint, nothing further.  We will continue to see her on 3 to 6-month follow-ups.  She will call for complications prior to follow-up.               Patient's Body mass index is 44.17 kg/m². BMI is above normal parameters. Recommendations include: educational material and referral to primary care.             Electronically signed by:

## 2020-05-21 RX ORDER — SOTALOL HYDROCHLORIDE 80 MG/1
TABLET ORAL
Qty: 180 TABLET | Refills: 3 | Status: SHIPPED | OUTPATIENT
Start: 2020-05-21 | End: 2021-01-19 | Stop reason: SDUPTHER

## 2020-05-21 RX ORDER — APIXABAN 5 MG/1
TABLET, FILM COATED ORAL
Qty: 60 TABLET | Refills: 11 | Status: SHIPPED | OUTPATIENT
Start: 2020-05-21 | End: 2021-01-19 | Stop reason: SDUPTHER

## 2020-06-19 RX ORDER — ASPIRIN 81 MG/1
TABLET, COATED ORAL
Qty: 30 TABLET | Refills: 5 | Status: SHIPPED | OUTPATIENT
Start: 2020-06-19 | End: 2020-12-17

## 2020-06-19 NOTE — TELEPHONE ENCOUNTER
Provider:Given   Caller: pharmacy    Surgery:  Carotid stent  Surgery Date: 06/05/19   Last visit: 06/26/19    Next visit: tbd           Reason for call:  Automated refill request, med pended.

## 2020-07-07 ENCOUNTER — OFFICE VISIT (OUTPATIENT)
Dept: CARDIOLOGY | Facility: CLINIC | Age: 70
End: 2020-07-07

## 2020-07-07 VITALS
WEIGHT: 279.6 LBS | HEART RATE: 75 BPM | OXYGEN SATURATION: 97 % | TEMPERATURE: 96.9 F | DIASTOLIC BLOOD PRESSURE: 56 MMHG | HEIGHT: 67 IN | BODY MASS INDEX: 43.88 KG/M2 | SYSTOLIC BLOOD PRESSURE: 169 MMHG

## 2020-07-07 DIAGNOSIS — R06.02 SHORTNESS OF BREATH: Primary | ICD-10-CM

## 2020-07-07 DIAGNOSIS — R00.2 PALPITATIONS: ICD-10-CM

## 2020-07-07 DIAGNOSIS — I73.9 PVD (PERIPHERAL VASCULAR DISEASE) (HCC): ICD-10-CM

## 2020-07-07 DIAGNOSIS — I48.0 PAROXYSMAL ATRIAL FIBRILLATION (HCC): ICD-10-CM

## 2020-07-07 DIAGNOSIS — R42 DIZZINESS: ICD-10-CM

## 2020-07-07 PROCEDURE — 93000 ELECTROCARDIOGRAM COMPLETE: CPT | Performed by: PHYSICIAN ASSISTANT

## 2020-07-07 PROCEDURE — 99214 OFFICE O/P EST MOD 30 MIN: CPT | Performed by: PHYSICIAN ASSISTANT

## 2020-07-07 RX ORDER — EVOLOCUMAB 140 MG/ML
INJECTION, SOLUTION SUBCUTANEOUS
COMMUNITY
Start: 2020-06-24 | End: 2021-01-19 | Stop reason: SDUPTHER

## 2020-07-07 RX ORDER — PAROXETINE HYDROCHLORIDE 20 MG/1
TABLET, FILM COATED ORAL DAILY
COMMUNITY
Start: 2020-06-19

## 2020-07-07 NOTE — PATIENT INSTRUCTIONS

## 2020-07-07 NOTE — PROGRESS NOTES
Problem list     Subjective   Mandi Jacob is a 69 y.o. female     Chief Complaint   Patient presents with   • Atrial Fibrillation     presents for 6 month f/u   • Palpitations   • Shortness of Breath   Problem List:  1. Nonobstructive CAD by cath February 2012.  2. Preserved systolic function  3. History of CHF  4. Diastolic dysfunction  5. Hypertension      6. Dyslipidemia. The patient reports she is intolerant to statins.      7. Chronic palpitations.      7.1.  Recurrent PACs noted by telemetry historically.  The patient was treated with sotalol therapy for the same.      7.2.  A. fib with rapid ventricular response noted by event monitor, 2019.      8.  Peripheral vascular disease, status post angioplasty and stenting to the right carotid artery system, 06/19 with Dr. Brock    HPI  The patient presents in today for routine evaluation and follow-up.  Since last appointment, the patient is remained stable for the most part from cardiovascular standpoint.  She has chronic dyspnea which has been severe, but reports baseline dyspnea.  She has no chest pain.  She denies PND or orthopnea.  She reports only intermittent palpitations and some degree of tachycardia, certainly nothing to suggest sustained dysrhythmic activity.  Exercise capacity is poor but at baseline.  She typically is normotensive.  She is tolerating medications currently without complication.  She has no further complaints.    Current Outpatient Medications on File Prior to Visit   Medication Sig Dispense Refill   • albuterol (PROVENTIL HFA;VENTOLIN HFA) 108 (90 BASE) MCG/ACT inhaler Inhale 2 puffs Every 4 (Four) Hours As Needed. Albuterol 90 MCG/ACT AERS; Patient Sig: Albuterol 90 MCG/ACT AERS INHALE 1 TO 2 PUFFS EVERY 4 TO 6 HOURS AS NEEDED.; 0; 11-Sep-2013; Active     • albuterol (PROVENTIL) (2.5 MG/3ML) 0.083% nebulizer solution Albuterol Sulfate (2.5 MG/3ML) 0.083% Inhalation Nebulization Solution; Patient Sig: Albuterol Sulfate (2.5  MG/3ML) 0.083% Inhalation Nebulization Solution USE 1 UNIT DOSE IN NEBULIZER EVERY 4 TO 6 HOURS AS NEE     • ASPIRIN LOW DOSE 81 MG EC tablet TAKE 1 TABLET BY MOUTH DAILY. 30 tablet 5   • ELIQUIS 5 MG tablet tablet TAKE 1 TABLET EVERY 12 HOURS 60 tablet 11   • furosemide (LASIX) 40 MG tablet Take 40 mg by mouth 2 (Two) Times a Day.     • insulin aspart (novoLOG FLEXPEN) 100 UNIT/ML solution pen-injector sc pen Inject 15 Units under the skin into the appropriate area as directed Daily With Breakfast.     • isosorbide mononitrate (IMDUR) 30 MG 24 hr tablet TAKE 1 TABLET DAILY 90 tablet 11   • losartan (COZAAR) 50 MG tablet Take 50 mg by mouth Every Morning.  6   • magnesium oxide (MAGOX) 400 (241.3 MG) MG tablet tablet Take 400 mg by mouth Every Night.     • metFORMIN ER (GLUCOPHAGE-XR) 500 MG 24 hr tablet Take 500 mg by mouth Daily With Breakfast.  11   • metolazone (ZAROXOLYN) 2.5 MG tablet Take 2.5 mg by mouth Daily As Needed. 1 tablet 30 minutes prior to lasix as needed PRN       • metoprolol tartrate (LOPRESSOR) 25 MG tablet Take 25 mg by mouth 2 (Two) Times a Day.  1   • montelukast (SINGULAIR) 10 MG tablet Take 10 mg by mouth Every Night.     • nitroglycerin (NITROSTAT) 0.4 MG SL tablet Place 1 tablet under the tongue Every 5 (Five) Minutes As Needed for Chest Pain. 25 tablet 2   • PARoxetine (PAXIL) 20 MG tablet Daily.     • potassium chloride (K-DUR,KLOR-CON) 10 MEQ CR tablet Take 10 mEq by mouth Every Night.     • potassium chloride (K-DUR,KLOR-CON) 20 MEQ CR tablet TAKE 1 TABLET BY MOUTH EVERY EVENING. 90 tablet 3   • REPATHA SURECLICK 140 MG/ML solution auto-injector      • sotalol (BETAPACE) 80 MG tablet TAKE 1 TABLET TWICE DAILY 180 tablet 3   • tiotropium (SPIRIVA) 18 MCG per inhalation capsule Place 1 capsule into inhaler and inhale Every Morning.     • TRESIBA FLEXTOUCH 200 UNIT/ML solution pen-injector Inject 100 Units under the skin into the appropriate area as directed Every Morning. 100 units  daily     • triamterene-hydrochlorothiazide (MAXZIDE-25) 37.5-25 MG per tablet Take 1 tablet by mouth Every Morning.     • [DISCONTINUED] Alirocumab 75 MG/ML solution pen-injector Inject 75 mg under the skin into the appropriate area as directed Every 14 (Fourteen) Days. (Patient taking differently: Inject 150 mg under the skin into the appropriate area as directed Every 14 (Fourteen) Days.) 1 pen 2   • [DISCONTINUED] insulin aspart (novoLOG FLEXPEN) 100 UNIT/ML solution pen-injector sc pen Inject 20 Units under the skin into the appropriate area as directed Daily With Lunch.     • [DISCONTINUED] insulin aspart (novoLOG FLEXPEN) 100 UNIT/ML solution pen-injector sc pen Inject  under the skin into the appropriate area as directed Daily Before Supper. Per Sliding Scale at home at night       No current facility-administered medications on file prior to visit.        Ace inhibitors; Diltiazem; Flecainide; Incruse ellipta [umeclidinium bromide]; Lisinopril; Niacin; Ketorolac tromethamine; Buspirone; Ciprofloxacin; Citalopram; Clindamycin/lincomycin; Codeine; Levaquin [levofloxacin]; Lipitor [atorvastatin]; Liraglutide; Lorazepam; Sertraline; Statins; Sulfamethoxazole-trimethoprim; Trulicity [dulaglutide]; and Xigduo xr [dapagliflozin-metformin hcl er]    Past Medical History:   Diagnosis Date   • Arrhythmia    • Asthma    • Atrial fibrillation (CMS/[facility])    • CAD (coronary artery disease)     non-obstructive by cath 02/2012   • CHF (congestive heart failure) (CMS/[facility])    • COPD (chronic obstructive pulmonary disease) (CMS/[facility])    • Diabetes mellitus (CMS/[facility])    • Diastolic dysfunction    • Dizziness    • Edema    • Esophageal spasm    • Herniated lumbar intervertebral disc     L5   • Hyperlipidemia     intolerant to statins    • Hypertension    • Osteoarthritis    • Palpitations    • SOB (shortness of breath)    • Stroke (CMS/[facility])    • Stroke-like symptoms    • TIA (transient ischemic attack) 2015    left hemispheric  TIA/CVA   • Unstable angina (CMS/HCC)        Social History     Socioeconomic History   • Marital status:      Spouse name: Not on file   • Number of children: Not on file   • Years of education: Not on file   • Highest education level: Not on file   Tobacco Use   • Smoking status: Former Smoker     Packs/day: 1.50     Years: 41.00     Pack years: 61.50     Start date: 1968     Last attempt to quit: 3/22/2009     Years since quittin.3   • Smokeless tobacco: Never Used   Substance and Sexual Activity   • Alcohol use: No   • Drug use: No   • Sexual activity: Never       Family History   Problem Relation Age of Onset   • Heart attack Mother    • Heart failure Mother         congestive   • Hyperlipidemia Mother    • Hypertension Mother    • Peripheral vascular disease Mother    • Heart attack Father    • Heart failure Father         congestive   • Diabetes Father    • Hyperlipidemia Father    • Hypertension Father    • Peripheral vascular disease Father    • Asthma Father    • Heart failure Other         congestive   • Diabetes Other    • Hyperlipidemia Other    • Hypertension Other    • Peripheral vascular disease Other    • Hypertension Brother        Review of Systems   Constitutional: Positive for fatigue. Negative for chills, diaphoresis and fever.   HENT: Positive for ear pain (right ear, allergies).    Eyes: Positive for visual disturbance.   Respiratory: Positive for apnea (02 2L) and shortness of breath (02 daily at 2 L). Negative for cough, chest tightness and wheezing.    Cardiovascular: Positive for palpitations (on exertion) and leg swelling (occas). Negative for chest pain.   Gastrointestinal: Positive for constipation and diarrhea. Negative for abdominal pain, blood in stool, nausea and vomiting.   Endocrine: Negative.    Genitourinary: Negative for hematuria.   Musculoskeletal: Positive for arthralgias, back pain, gait problem (uses a cane), myalgias and neck pain.   Skin: Negative.     "  Allergic/Immunologic: Positive for environmental allergies (seasonal). Negative for food allergies.   Neurological: Positive for dizziness (vertigo) and weakness. Negative for syncope, light-headedness and headaches.   Hematological: Bruises/bleeds easily.   Psychiatric/Behavioral: Negative.  Negative for agitation and sleep disturbance. The patient is not nervous/anxious.        Objective   Vitals:    07/07/20 1453   BP: 169/56   BP Location: Left arm   Patient Position: Sitting   Pulse: 75   Temp: 96.9 °F (36.1 °C)   SpO2: 97%   Weight: 127 kg (279 lb 9.6 oz)   Height: 170.2 cm (67.01\")      /56 (BP Location: Left arm, Patient Position: Sitting)   Pulse 75   Temp 96.9 °F (36.1 °C)   Ht 170.2 cm (67.01\")   Wt 127 kg (279 lb 9.6 oz)   SpO2 97% Comment: 2 L 02 via NC  BMI 43.78 kg/m²    Lab Results (most recent)     None        Physical Exam   Constitutional: She is oriented to person, place, and time. She appears well-developed and well-nourished. No distress (Patient appears dyspnic.  She is on O2 per NC.  The patient appears chronically ill. ).   HENT:   Head: Normocephalic and atraumatic.   Eyes: Pupils are equal, round, and reactive to light. Conjunctivae and EOM are normal.   Neck: Normal range of motion. Neck supple. No JVD present. No tracheal deviation present.   Cardiovascular: Normal rate, regular rhythm, normal heart sounds and intact distal pulses.  Extrasystoles are present.   Pulmonary/Chest: Tachypnea noted. She has decreased breath sounds. She has wheezes. She has rales (Dry.).   Abdominal: Soft. Bowel sounds are normal. She exhibits no distension and no mass. There is no tenderness. There is no rebound and no guarding.   Musculoskeletal: Normal range of motion. She exhibits no edema, tenderness or deformity.   Neurological: She is alert and oriented to person, place, and time.   Skin: Skin is warm and dry. No rash noted. No erythema. No pallor.   Psychiatric: She has a normal mood and " affect. Her behavior is normal. Judgment and thought content normal.   Nursing note and vitals reviewed.        Procedure     ECG 12 Lead  Date/Time: 7/7/2020 3:08 PM  Performed by: Mark Brito PA  Authorized by: Mark Brito PA   Comparison: compared with previous ECG from 10/15/2019  Comparison to previous ECG: Sinus rhythm, unusual P wave axis, normal axis, no acute changes noted.                 Assessment/Plan      Diagnosis Plan   1. Shortness of breath  ECG 12 Lead    Adult Transthoracic Echo Complete W/ Cont if Necessary Per Protocol    Duplex Carotid Ultrasound CAR   2. Palpitations  ECG 12 Lead    Adult Transthoracic Echo Complete W/ Cont if Necessary Per Protocol    Duplex Carotid Ultrasound CAR   3. Paroxysmal atrial fibrillation (CMS/HCC)  ECG 12 Lead    Adult Transthoracic Echo Complete W/ Cont if Necessary Per Protocol    Duplex Carotid Ultrasound CAR   4. PVD (peripheral vascular disease) (CMS/HCC)  Adult Transthoracic Echo Complete W/ Cont if Necessary Per Protocol    Duplex Carotid Ultrasound CAR   5. Dizziness  Adult Transthoracic Echo Complete W/ Cont if Necessary Per Protocol    Duplex Carotid Ultrasound CAR     1.  I would like to repeat a carotid duplex.  The patient had moderate disease noted in carotid systems by last study almost a full year ago.  I will repeat that to ensure stability and ensure no progression.  I have asked that a copy be sent to Dr. Brock as well, who has followed her carotid artery disease status in the past.    2.  We will schedule for an echo as well to ensure stability structurally.  We can evaluate LV size and function, right-sided parameters, valvular morphologies, etc.    3.  Otherwise, the patient appears stable from cardiovascular standpoint.  She reports stable dyspnea.  She has no symptoms of angina or failure otherwise.    4.  If the above studies are stable, we will continue to see her on 6-month intervals.  I would continue her medications  without change.  For complications, she will call immediately.           Mandi Jacob  reports that she quit smoking about 11 years ago. She started smoking about 52 years ago. She has a 61.50 pack-year smoking history. She has never used smokeless tobacco.          Patient's Body mass index is 43.78 kg/m². BMI is above normal parameters. Recommendations include: educational material.             Electronically signed by:

## 2020-07-20 ENCOUNTER — HOSPITAL ENCOUNTER (OUTPATIENT)
Dept: CARDIOLOGY | Facility: HOSPITAL | Age: 70
Discharge: HOME | End: 2020-07-20

## 2020-07-20 ENCOUNTER — HOSPITAL ENCOUNTER (OUTPATIENT)
Dept: CARDIOLOGY | Facility: HOSPITAL | Age: 70
Discharge: HOME | End: 2020-07-20
Admitting: PHYSICIAN ASSISTANT

## 2020-07-20 VITALS — WEIGHT: 279.98 LBS | BODY MASS INDEX: 43.94 KG/M2 | HEIGHT: 67 IN

## 2020-07-20 DIAGNOSIS — I73.9 PVD (PERIPHERAL VASCULAR DISEASE) (HCC): ICD-10-CM

## 2020-07-20 DIAGNOSIS — I48.0 PAROXYSMAL ATRIAL FIBRILLATION (HCC): ICD-10-CM

## 2020-07-20 DIAGNOSIS — R00.2 PALPITATIONS: ICD-10-CM

## 2020-07-20 DIAGNOSIS — R42 DIZZINESS: ICD-10-CM

## 2020-07-20 DIAGNOSIS — R06.02 SHORTNESS OF BREATH: ICD-10-CM

## 2020-07-20 PROCEDURE — 93306 TTE W/DOPPLER COMPLETE: CPT | Performed by: INTERNAL MEDICINE

## 2020-07-20 PROCEDURE — 93880 EXTRACRANIAL BILAT STUDY: CPT | Performed by: INTERNAL MEDICINE

## 2020-07-20 PROCEDURE — 93306 TTE W/DOPPLER COMPLETE: CPT

## 2020-07-20 PROCEDURE — 93880 EXTRACRANIAL BILAT STUDY: CPT

## 2020-07-24 ENCOUNTER — TELEPHONE (OUTPATIENT)
Dept: CARDIOLOGY | Facility: CLINIC | Age: 70
End: 2020-07-24

## 2020-07-26 LAB
AORTIC DIMENSIONLESS INDEX: 1 (DI)
BH CV ECHO MEAS - ACS: 1.8 CM
BH CV ECHO MEAS - AO MAX PG (FULL): -0.27 MMHG
BH CV ECHO MEAS - AO MAX PG: 4.8 MMHG
BH CV ECHO MEAS - AO MEAN PG (FULL): 0 MMHG
BH CV ECHO MEAS - AO MEAN PG: 3 MMHG
BH CV ECHO MEAS - AO ROOT AREA (BSA CORRECTED): 1.2
BH CV ECHO MEAS - AO ROOT AREA: 6.6 CM^2
BH CV ECHO MEAS - AO ROOT DIAM: 2.9 CM
BH CV ECHO MEAS - AO V2 MAX: 109 CM/SEC
BH CV ECHO MEAS - AO V2 MEAN: 72.7 CM/SEC
BH CV ECHO MEAS - AO V2 VTI: 26.2 CM
BH CV ECHO MEAS - BSA(HAYCOCK): 2.5 M^2
BH CV ECHO MEAS - BSA(HAYCOCK): 2.5 M^2
BH CV ECHO MEAS - BSA: 2.3 M^2
BH CV ECHO MEAS - BSA: 2.3 M^2
BH CV ECHO MEAS - BZI_BMI: 43.7 KILOGRAMS/M^2
BH CV ECHO MEAS - BZI_BMI: 43.7 KILOGRAMS/M^2
BH CV ECHO MEAS - BZI_METRIC_HEIGHT: 170.2 CM
BH CV ECHO MEAS - BZI_METRIC_HEIGHT: 170.2 CM
BH CV ECHO MEAS - BZI_METRIC_WEIGHT: 126.6 KG
BH CV ECHO MEAS - BZI_METRIC_WEIGHT: 126.6 KG
BH CV ECHO MEAS - EDV(CUBED): 222.5 ML
BH CV ECHO MEAS - EDV(TEICH): 184.1 ML
BH CV ECHO MEAS - EF(CUBED): 67.2 %
BH CV ECHO MEAS - EF(TEICH): 57.8 %
BH CV ECHO MEAS - ESV(CUBED): 73 ML
BH CV ECHO MEAS - ESV(TEICH): 77.7 ML
BH CV ECHO MEAS - FS: 31 %
BH CV ECHO MEAS - IVS/LVPW: 1.2
BH CV ECHO MEAS - IVSD: 1.1 CM
BH CV ECHO MEAS - LA A2CS (ATRIAL LENGTH): 6.2 CM
BH CV ECHO MEAS - LA DIMENSION: 5.2 CM
BH CV ECHO MEAS - LA/AO: 1.8
BH CV ECHO MEAS - LAT PEAK E' VEL: 7.5 CM/SEC
BH CV ECHO MEAS - LV IVRT: 0.11 SEC
BH CV ECHO MEAS - LV MASS(C)D: 265.2 GRAMS
BH CV ECHO MEAS - LV MASS(C)DI: 113.8 GRAMS/M^2
BH CV ECHO MEAS - LV MAX PG: 5 MMHG
BH CV ECHO MEAS - LV MEAN PG: 3 MMHG
BH CV ECHO MEAS - LV V1 MAX: 112 CM/SEC
BH CV ECHO MEAS - LV V1 MEAN: 76.6 CM/SEC
BH CV ECHO MEAS - LV V1 VTI: 32.4 CM
BH CV ECHO MEAS - LVIDD: 6.1 CM
BH CV ECHO MEAS - LVIDS: 4.2 CM
BH CV ECHO MEAS - LVPWD: 0.96 CM
BH CV ECHO MEAS - MED PEAK E' VEL: 6.1 CM/SEC
BH CV ECHO MEAS - MR MAX PG: 18.1 MMHG
BH CV ECHO MEAS - MR MAX VEL: 213 CM/SEC
BH CV ECHO MEAS - MV A MAX VEL: 38.4 CM/SEC
BH CV ECHO MEAS - MV DEC SLOPE: 675 CM/SEC^2
BH CV ECHO MEAS - MV DEC TIME: 0.22 SEC
BH CV ECHO MEAS - MV E MAX VEL: 119 CM/SEC
BH CV ECHO MEAS - MV E/A: 3.1
BH CV ECHO MEAS - MV MAX PG: 7.3 MMHG
BH CV ECHO MEAS - MV MEAN PG: 1 MMHG
BH CV ECHO MEAS - MV P1/2T MAX VEL: 138 CM/SEC
BH CV ECHO MEAS - MV P1/2T: 59.9 MSEC
BH CV ECHO MEAS - MV V2 MAX: 135 CM/SEC
BH CV ECHO MEAS - MV V2 MEAN: 46.8 CM/SEC
BH CV ECHO MEAS - MV V2 VTI: 29.5 CM
BH CV ECHO MEAS - MVA P1/2T LCG: 1.6 CM^2
BH CV ECHO MEAS - MVA(P1/2T): 3.7 CM^2
BH CV ECHO MEAS - PA MAX PG (FULL): 0.41 MMHG
BH CV ECHO MEAS - PA MAX PG: 4.2 MMHG
BH CV ECHO MEAS - PA MEAN PG (FULL): 1 MMHG
BH CV ECHO MEAS - PA MEAN PG: 3 MMHG
BH CV ECHO MEAS - PA V2 MAX: 103 CM/SEC
BH CV ECHO MEAS - PA V2 MEAN: 73.2 CM/SEC
BH CV ECHO MEAS - PA V2 VTI: 27.8 CM
BH CV ECHO MEAS - RAP SYSTOLE: 10 MMHG
BH CV ECHO MEAS - RV MAX PG: 3.8 MMHG
BH CV ECHO MEAS - RV MEAN PG: 2 MMHG
BH CV ECHO MEAS - RV V1 MAX: 97.9 CM/SEC
BH CV ECHO MEAS - RV V1 MEAN: 68.8 CM/SEC
BH CV ECHO MEAS - RV V1 VTI: 27.4 CM
BH CV ECHO MEAS - RVDD: 3.7 CM
BH CV ECHO MEAS - RVSP: 15.5 MMHG
BH CV ECHO MEAS - SI(AO): 74.3 ML/M^2
BH CV ECHO MEAS - SI(CUBED): 64.2 ML/M^2
BH CV ECHO MEAS - SI(TEICH): 45.7 ML/M^2
BH CV ECHO MEAS - SV(AO): 173.1 ML
BH CV ECHO MEAS - SV(CUBED): 149.5 ML
BH CV ECHO MEAS - SV(TEICH): 106.4 ML
BH CV ECHO MEAS - TR MAX VEL: 117 CM/SEC
BH CV ECHO MEASUREMENTS AVERAGE E/E' RATIO: 17.5
BH CV XLRA MEAS LEFT BULB EDV: -17.5 CM/SEC
BH CV XLRA MEAS LEFT BULB PSV: -103.9 CM/SEC
BH CV XLRA MEAS LEFT CCA RATIO VEL: -78.6 CM/SEC
BH CV XLRA MEAS LEFT DIST CCA EDV: -15.7 CM/SEC
BH CV XLRA MEAS LEFT DIST CCA PSV: -79.5 CM/SEC
BH CV XLRA MEAS LEFT DIST ICA EDV: -48.3 CM/SEC
BH CV XLRA MEAS LEFT DIST ICA PSV: -188.6 CM/SEC
BH CV XLRA MEAS LEFT ICA RATIO VEL: -199 CM/SEC
BH CV XLRA MEAS LEFT ICA/CCA RATIO: 2.5
BH CV XLRA MEAS LEFT MID ICA EDV: -47.1 CM/SEC
BH CV XLRA MEAS LEFT MID ICA PSV: -186.3 CM/SEC
BH CV XLRA MEAS LEFT PROX CCA EDV: 14.8 CM/SEC
BH CV XLRA MEAS LEFT PROX CCA PSV: 125.7 CM/SEC
BH CV XLRA MEAS LEFT PROX ECA EDV: -15.7 CM/SEC
BH CV XLRA MEAS LEFT PROX ECA PSV: -180.7 CM/SEC
BH CV XLRA MEAS LEFT PROX ICA EDV: -48.3 CM/SEC
BH CV XLRA MEAS LEFT PROX ICA PSV: -199.8 CM/SEC
BH CV XLRA MEAS LEFT VERTEBRAL A EDV: 14.7 CM/SEC
BH CV XLRA MEAS LEFT VERTEBRAL A PSV: 62.9 CM/SEC
BH CV XLRA MEAS RIGHT BULB EDV: 24.6 CM/SEC
BH CV XLRA MEAS RIGHT BULB PSV: 153.2 CM/SEC
BH CV XLRA MEAS RIGHT CCA RATIO VEL: 60.7 CM/SEC
BH CV XLRA MEAS RIGHT DIST CCA EDV: 14.3 CM/SEC
BH CV XLRA MEAS RIGHT DIST CCA PSV: 61.2 CM/SEC
BH CV XLRA MEAS RIGHT DIST ICA EDV: -30.3 CM/SEC
BH CV XLRA MEAS RIGHT DIST ICA PSV: -114.5 CM/SEC
BH CV XLRA MEAS RIGHT ICA RATIO VEL: -180 CM/SEC
BH CV XLRA MEAS RIGHT ICA/CCA RATIO: -3
BH CV XLRA MEAS RIGHT MID ICA EDV: -51.6 CM/SEC
BH CV XLRA MEAS RIGHT MID ICA PSV: -174 CM/SEC
BH CV XLRA MEAS RIGHT PROX CCA EDV: 9.4 CM/SEC
BH CV XLRA MEAS RIGHT PROX CCA PSV: 87.4 CM/SEC
BH CV XLRA MEAS RIGHT PROX ECA EDV: -27.9 CM/SEC
BH CV XLRA MEAS RIGHT PROX ECA PSV: -193.8 CM/SEC
BH CV XLRA MEAS RIGHT PROX ICA EDV: -50.6 CM/SEC
BH CV XLRA MEAS RIGHT PROX ICA PSV: -181.6 CM/SEC
BH CV XLRA MEAS RIGHT VERTEBRAL A EDV: -13.3 CM/SEC
BH CV XLRA MEAS RIGHT VERTEBRAL A PSV: -48.9 CM/SEC
LEFT ATRIUM VOLUME INDEX: 34 ML/M2
LEFT ATRIUM VOLUME: 80 CM3
MAXIMAL PREDICTED HEART RATE: 152 BPM
STRESS TARGET HR: 129 BPM

## 2020-07-29 ENCOUNTER — TELEPHONE (OUTPATIENT)
Dept: CARDIOLOGY | Facility: CLINIC | Age: 70
End: 2020-07-29

## 2020-07-29 NOTE — TELEPHONE ENCOUNTER
----- Message from RICKEY Yen sent at 7/29/2020  8:53 AM EDT -----  No changes.  Please let patient know.

## 2020-07-29 NOTE — TELEPHONE ENCOUNTER
Patient aware of carotid and echo results. She will keep follow up as scheduled. Ellie Leyva MA      ----- Message from RICKEY Yen sent at 7/29/2020  8:58 AM EDT -----  Routine follow-up.

## 2020-08-20 DIAGNOSIS — R60.9 EDEMA, UNSPECIFIED TYPE: ICD-10-CM

## 2020-08-20 RX ORDER — POTASSIUM CHLORIDE 20 MEQ/1
TABLET, EXTENDED RELEASE ORAL
Qty: 90 TABLET | Refills: 3 | Status: SHIPPED | OUTPATIENT
Start: 2020-08-20 | End: 2021-08-16

## 2020-11-05 ENCOUNTER — OFFICE VISIT (OUTPATIENT)
Dept: CARDIOLOGY | Facility: CLINIC | Age: 70
End: 2020-11-05

## 2020-11-05 VITALS
BODY MASS INDEX: 44.57 KG/M2 | HEIGHT: 67 IN | WEIGHT: 284 LBS | DIASTOLIC BLOOD PRESSURE: 66 MMHG | TEMPERATURE: 98.4 F | HEART RATE: 68 BPM | SYSTOLIC BLOOD PRESSURE: 145 MMHG | OXYGEN SATURATION: 94 %

## 2020-11-05 DIAGNOSIS — R00.2 PALPITATIONS: Primary | ICD-10-CM

## 2020-11-05 DIAGNOSIS — I10 ESSENTIAL HYPERTENSION: ICD-10-CM

## 2020-11-05 DIAGNOSIS — I48.0 PAROXYSMAL ATRIAL FIBRILLATION (HCC): ICD-10-CM

## 2020-11-05 DIAGNOSIS — R06.02 SOB (SHORTNESS OF BREATH): ICD-10-CM

## 2020-11-05 PROCEDURE — 93000 ELECTROCARDIOGRAM COMPLETE: CPT | Performed by: PHYSICIAN ASSISTANT

## 2020-11-05 PROCEDURE — 99213 OFFICE O/P EST LOW 20 MIN: CPT | Performed by: PHYSICIAN ASSISTANT

## 2020-11-05 NOTE — PROGRESS NOTES
Problem list     Subjective   Mandi Jacob is a 70 y.o. female     Chief Complaint   Patient presents with   • Atrial Fibrillation     presents for carotid and echo f/u   • Palpitations   • Shortness of Breath   Problem List:  1. Nonobstructive CAD by cath February 2012.  2. Preserved systolic function  3. History of CHF  4. Diastolic dysfunction  5. Hypertension      6. Dyslipidemia. The patient reports she is intolerant to statins.      7. Chronic palpitations.      7.1.  Recurrent PACs noted by telemetry historically.  The patient was treated with sotalol therapy for the same.      7.2.  A. fib with rapid ventricular response noted by event monitor, 2019.      8.  Peripheral vascular disease, status post angioplasty and stenting to the right carotid artery system, 06/19 with Dr. Vinod ANN  The patient presents into the clinic today for routine evaluation and follow-up.  For the most part, the patient is done well from cardiovascular standpoint since her last evaluation here.  Currently, the patient denies chest pain.  She has chronic but stable dyspnea and fatigue which are baseline symptoms.  The patient denies failure or dysrhythmic symptoms at this time of any significance.  Occasionally, she will have a sensation of palpitations but no sustained dysrhythmic activity.  Bradycardia has been an issue for her recently and she does feel slightly more weak when experiencing bradycardia.  The patient has no further complaints otherwise and feels that she is doing reasonably well from cardiovascular standpoint at this time.    Current Outpatient Medications on File Prior to Visit   Medication Sig Dispense Refill   • albuterol (PROVENTIL HFA;VENTOLIN HFA) 108 (90 BASE) MCG/ACT inhaler Inhale 2 puffs Every 4 (Four) Hours As Needed. Albuterol 90 MCG/ACT AERS; Patient Sig: Albuterol 90 MCG/ACT AERS INHALE 1 TO 2 PUFFS EVERY 4 TO 6 HOURS AS NEEDED.; 0; 11-Sep-2013; Active     • albuterol (PROVENTIL) (2.5  MG/3ML) 0.083% nebulizer solution Albuterol Sulfate (2.5 MG/3ML) 0.083% Inhalation Nebulization Solution; Patient Sig: Albuterol Sulfate (2.5 MG/3ML) 0.083% Inhalation Nebulization Solution USE 1 UNIT DOSE IN NEBULIZER EVERY 4 TO 6 HOURS AS NEE     • ASPIRIN LOW DOSE 81 MG EC tablet TAKE 1 TABLET BY MOUTH DAILY. 30 tablet 5   • ELIQUIS 5 MG tablet tablet TAKE 1 TABLET EVERY 12 HOURS 60 tablet 11   • furosemide (LASIX) 40 MG tablet Take 40 mg by mouth 2 (Two) Times a Day.     • insulin aspart (novoLOG FLEXPEN) 100 UNIT/ML solution pen-injector sc pen Inject 15 Units under the skin into the appropriate area as directed Daily With Breakfast.     • isosorbide mononitrate (IMDUR) 30 MG 24 hr tablet TAKE 1 TABLET DAILY 90 tablet 11   • losartan (COZAAR) 50 MG tablet Take 50 mg by mouth Every Morning.  6   • magnesium oxide (MAGOX) 400 (241.3 MG) MG tablet tablet Take 400 mg by mouth Every Night.     • metFORMIN ER (GLUCOPHAGE-XR) 500 MG 24 hr tablet Take 500 mg by mouth Daily With Breakfast.  11   • metolazone (ZAROXOLYN) 2.5 MG tablet Take 2.5 mg by mouth Daily As Needed. 1 tablet 30 minutes prior to lasix as needed PRN       • metoprolol tartrate (LOPRESSOR) 25 MG tablet Take 25 mg by mouth 2 (Two) Times a Day.  1   • montelukast (SINGULAIR) 10 MG tablet Take 10 mg by mouth Every Night.     • nitroglycerin (NITROSTAT) 0.4 MG SL tablet Place 1 tablet under the tongue Every 5 (Five) Minutes As Needed for Chest Pain. 25 tablet 2   • PARoxetine (PAXIL) 20 MG tablet Daily.     • potassium chloride (K-DUR,KLOR-CON) 10 MEQ CR tablet Take 10 mEq by mouth Every Night.     • potassium chloride (K-DUR,KLOR-CON) 20 MEQ CR tablet TAKE 1 TABLET IN THE MORNING 90 tablet 3   • REPATHA SURECLICK 140 MG/ML solution auto-injector      • sotalol (BETAPACE) 80 MG tablet TAKE 1 TABLET TWICE DAILY 180 tablet 3   • tiotropium (SPIRIVA) 18 MCG per inhalation capsule Place 1 capsule into inhaler and inhale Every Morning.     • TRESIBA FLEXTOUCH  200 UNIT/ML solution pen-injector Inject 100 Units under the skin into the appropriate area as directed Every Morning. 100 units daily     • triamterene-hydrochlorothiazide (MAXZIDE-25) 37.5-25 MG per tablet Take 1 tablet by mouth Every Morning.       No current facility-administered medications on file prior to visit.        Ace inhibitors, Diltiazem, Flecainide, Incruse ellipta [umeclidinium bromide], Lisinopril, Niacin, Ketorolac tromethamine, Buspirone, Ciprofloxacin, Citalopram, Clindamycin/lincomycin, Codeine, Levaquin [levofloxacin], Lipitor [atorvastatin], Liraglutide, Lorazepam, Sertraline, Statins, Sulfamethoxazole-trimethoprim, Trulicity [dulaglutide], and Xigduo xr [dapagliflozin-metformin hcl er]    Past Medical History:   Diagnosis Date   • Arrhythmia    • Asthma    • Atrial fibrillation (CMS/Formerly [facility])    • CAD (coronary artery disease)     non-obstructive by cath 2012   • CHF (congestive heart failure) (CMS/Formerly [facility])    • COPD (chronic obstructive pulmonary disease) (CMS/[facility]ly [facility])    • Diabetes mellitus (CMS/[facility]ly [facility])    • Diastolic dysfunction    • Dizziness    • Edema    • Esophageal spasm    • Herniated lumbar intervertebral disc     L5   • Hyperlipidemia     intolerant to statins    • Hypertension    • Osteoarthritis    • Palpitations    • SOB (shortness of breath)    • Stroke (CMS/[facility])    • Stroke-like symptoms    • TIA (transient ischemic attack) 2015    left hemispheric TIA/CVA   • Unstable angina (CMS/[facility])        Social History     Socioeconomic History   • Marital status:      Spouse name: Not on file   • Number of children: Not on file   • Years of education: Not on file   • Highest education level: Not on file   Tobacco Use   • Smoking status: Former Smoker     Packs/day: 1.50     Years: 41.00     Pack years: 61.50     Start date: 1968     Quit date: 3/22/2009     Years since quittin.6   • Smokeless tobacco: Never Used   Substance and Sexual Activity   • Alcohol use: No   • Drug use: No   •  "Sexual activity: Never       Family History   Problem Relation Age of Onset   • Heart attack Mother    • Heart failure Mother         congestive   • Hyperlipidemia Mother    • Hypertension Mother    • Peripheral vascular disease Mother    • Heart attack Father    • Heart failure Father         congestive   • Diabetes Father    • Hyperlipidemia Father    • Hypertension Father    • Peripheral vascular disease Father    • Asthma Father    • Heart failure Other         congestive   • Diabetes Other    • Hyperlipidemia Other    • Hypertension Other    • Peripheral vascular disease Other    • Hypertension Brother        Review of Systems   Constitutional: Negative for chills, diaphoresis, fatigue and fever.   HENT: Negative.         Sinus issues   Respiratory: Positive for apnea (02) and shortness of breath (02 daily at 2L). Negative for cough, chest tightness and wheezing.    Cardiovascular: Positive for leg swelling. Negative for chest pain and palpitations.   Gastrointestinal: Negative.  Negative for abdominal pain, blood in stool, constipation, diarrhea, nausea and vomiting.   Endocrine: Negative.    Genitourinary: Positive for frequency. Negative for hematuria.   Musculoskeletal: Positive for arthralgias, back pain, gait problem, myalgias and neck pain.   Skin: Negative.  Negative for rash and wound.   Allergic/Immunologic: Positive for environmental allergies. Negative for food allergies.   Neurological: Positive for dizziness (occas) and weakness. Negative for syncope, light-headedness, numbness and headaches.   Hematological: Bruises/bleeds easily.   Psychiatric/Behavioral: Positive for sleep disturbance (bathroom). Negative for agitation. The patient is not nervous/anxious.        Objective   Vitals:    11/05/20 1326   BP: 145/66   BP Location: Left arm   Patient Position: Sitting   Pulse: 68   Temp: 98.4 °F (36.9 °C)   SpO2: 94%   Weight: 129 kg (284 lb)   Height: 170 cm (66.93\")      /66 (BP Location: " "Left arm, Patient Position: Sitting)   Pulse 68   Temp 98.4 °F (36.9 °C)   Ht 170 cm (66.93\")   Wt 129 kg (284 lb)   SpO2 94% Comment: 2L 02  BMI 44.58 kg/m²    Lab Results (most recent)     None        Physical Exam  Vitals signs and nursing note reviewed.   Constitutional:       General: She is not in acute distress.     Appearance: She is well-developed.   HENT:      Head: Normocephalic and atraumatic.   Eyes:      Conjunctiva/sclera: Conjunctivae normal.      Pupils: Pupils are equal, round, and reactive to light.   Neck:      Musculoskeletal: Normal range of motion and neck supple.      Vascular: Carotid bruit (Soft, right.) present. No JVD.      Trachea: No tracheal deviation.   Cardiovascular:      Rate and Rhythm: Normal rate and regular rhythm.      Heart sounds: Heart sounds are distant. Murmur present. Systolic (LLSB) murmur present with a grade of 1/6.   Pulmonary:      Effort: Pulmonary effort is normal.      Breath sounds: Normal breath sounds. Decreased air movement present.   Abdominal:      General: Bowel sounds are normal. There is no distension.      Palpations: Abdomen is soft. There is no mass.      Tenderness: There is no abdominal tenderness. There is no guarding or rebound.   Musculoskeletal: Normal range of motion.         General: No tenderness or deformity.   Skin:     General: Skin is warm and dry.      Coloration: Skin is not pale.      Findings: No erythema or rash.   Neurological:      Mental Status: She is alert and oriented to person, place, and time.   Psychiatric:         Behavior: Behavior normal.         Thought Content: Thought content normal.         Judgment: Judgment normal.           Procedure     ECG 12 Lead    Date/Time: 11/5/2020 1:29 PM  Performed by: Mark Brito PA  Authorized by: Mark Brito PA   Comparison: compared with previous ECG from 7/7/2020  Comparison to previous ECG: Sinus rhythm noted on the EKG, but with junctional rhythm noted at times as " well.  There is probably a premature junctional contraction noted.  Normal axis otherwise.  No acute changes noted.                 Assessment/Plan      Diagnosis Plan   1. Palpitations  ECG 12 Lead   2. Essential hypertension  ECG 12 Lead   3. Paroxysmal atrial fibrillation (CMS/HCC)  ECG 12 Lead   4. SOB (shortness of breath)  ECG 12 Lead     1.  At this time, the patient appears to be doing fairly well from general cardiovascular standpoint.  The patient reports only rare episodes of palpitations, mostly correlating to what she had noticed in the past as PACs.  She has had no further episodes of atrial fibrillation since 2.  I would continue sotalol therapy.    2.  The patient does have bradycardia at times, typically symptomatic.  She will discontinue metoprolol at this time.    3.  Blood pressures appear to be fairly well treated and controlled.  She will continue to monitor blood pressures closely and call to us for any issues.    4.  The patient was recently evaluated with a carotid and echocardiogram study.  Those studies were unremarkable and at baseline for the patient's findings.  I do not feel anything further is warranted at this time.    5.  For any complications, the patient will call immediately.  We will make no further adjustments.  We will continue to follow her routinely through the clinic, every 6 months.           Mandi Jacob  reports that she quit smoking about 11 years ago. She started smoking about 52 years ago. She has a 61.50 pack-year smoking history. She has never used smokeless tobacco..        Patient's Body mass index is 44.58 kg/m². BMI is above normal parameters. Recommendations include: educational material.     Advance Care Planning   ACP discussion was held with the patient during this visit. Patient has an advance directive (not in EMR), copy requested.        Electronically signed by:

## 2020-11-11 NOTE — TELEPHONE ENCOUNTER
"Pt LVM stating she saw JK and he D/C'd her Metoprolol, now her sx are getting worse.   #[phone]      Per chart review, OV note from 11/5/2020 states:  \"The patient does have bradycardia at times, typically symptomatic.  She will discontinue metoprolol at this time.\"        Called pt for more info, stated that as of yesterday her sx got worse. She stated she's having worsening irregular heartbeat, BP is fluctuating more, really SoA, \"just feel like crap.\" States \"that weird heartbeat when it hits, takes my breath.\"   I asked for her BP readings, she stated she could only give me two: 154/71, 100/60, HR staying 71-jerel. Pt denies any changes other than stopping the Metoprolol.   "

## 2020-11-11 NOTE — TELEPHONE ENCOUNTER
Pt's son LVM stating that pt is getting worse and refuses ER, stated that he needs to know what to do.   #[phone]      Per Mark:   Can restart Metoprolol, but 1/2 tab BID.       Called and informed pt of the above, she verbalized understanding.

## 2020-11-16 DIAGNOSIS — R07.9 CHEST PAIN, UNSPECIFIED TYPE: ICD-10-CM

## 2020-11-16 RX ORDER — ISOSORBIDE MONONITRATE 30 MG/1
TABLET, EXTENDED RELEASE ORAL
Qty: 30 TABLET | Refills: 11 | Status: SHIPPED | OUTPATIENT
Start: 2020-11-16 | End: 2021-01-19 | Stop reason: SDUPTHER

## 2020-12-17 ENCOUNTER — TELEPHONE (OUTPATIENT)
Dept: CARDIOLOGY | Facility: CLINIC | Age: 70
End: 2020-12-17

## 2020-12-17 DIAGNOSIS — R42 DIZZINESS: ICD-10-CM

## 2020-12-17 DIAGNOSIS — R00.2 PALPITATIONS: Primary | ICD-10-CM

## 2020-12-17 RX ORDER — ASPIRIN 81 MG/1
TABLET ORAL
Qty: 30 TABLET | Refills: 5 | Status: SHIPPED | OUTPATIENT
Start: 2020-12-17 | End: 2021-08-12

## 2020-12-17 NOTE — TELEPHONE ENCOUNTER
Per Mark, may hold Eliquis 3 days prior to procedure. Continue ASA if able.     Letter faxed to [phone]

## 2020-12-17 NOTE — TELEPHONE ENCOUNTER
Sheeba reinoso/ Dr Montelongo clinic contacts our office stating pt had complaints of palpitations.  Dr Montelongo ordered pt a 24 hr holter monitor x 2.  No abnormal recordings were present.  Would like for our office to do an event monitor.      D/w RICKEY Sheikh.  We will do a 14 day event monitor and see pt after monitoring to follow up results. Sheeba @ Dr Montelongo office and pt were both notified.

## 2021-01-19 ENCOUNTER — OFFICE VISIT (OUTPATIENT)
Dept: CARDIOLOGY | Facility: CLINIC | Age: 71
End: 2021-01-19

## 2021-01-19 VITALS
BODY MASS INDEX: 44.42 KG/M2 | TEMPERATURE: 95.3 F | SYSTOLIC BLOOD PRESSURE: 141 MMHG | DIASTOLIC BLOOD PRESSURE: 71 MMHG | HEIGHT: 67 IN | HEART RATE: 64 BPM | WEIGHT: 283 LBS | OXYGEN SATURATION: 99 %

## 2021-01-19 DIAGNOSIS — R00.2 PALPITATIONS: ICD-10-CM

## 2021-01-19 DIAGNOSIS — I48.0 PAROXYSMAL ATRIAL FIBRILLATION (HCC): Primary | ICD-10-CM

## 2021-01-19 DIAGNOSIS — I73.9 PVD (PERIPHERAL VASCULAR DISEASE) (HCC): ICD-10-CM

## 2021-01-19 DIAGNOSIS — R06.02 SHORTNESS OF BREATH: ICD-10-CM

## 2021-01-19 PROCEDURE — 99214 OFFICE O/P EST MOD 30 MIN: CPT | Performed by: PHYSICIAN ASSISTANT

## 2021-01-19 RX ORDER — SOTALOL HYDROCHLORIDE 80 MG/1
80 TABLET ORAL 2 TIMES DAILY
Qty: 180 TABLET | Refills: 3 | Status: SHIPPED | OUTPATIENT
Start: 2021-01-19 | End: 2022-02-11

## 2021-01-19 RX ORDER — TRIAMTERENE AND HYDROCHLOROTHIAZIDE 37.5; 25 MG/1; MG/1
1 TABLET ORAL EVERY MORNING
Qty: 90 TABLET | Refills: 3 | Status: SHIPPED | OUTPATIENT
Start: 2021-01-19 | End: 2022-02-11

## 2021-01-19 RX ORDER — FUROSEMIDE 40 MG/1
40 TABLET ORAL 2 TIMES DAILY
Qty: 180 TABLET | Refills: 3 | Status: SHIPPED | OUTPATIENT
Start: 2021-01-19 | End: 2022-02-11

## 2021-01-19 RX ORDER — ISOSORBIDE MONONITRATE 30 MG/1
30 TABLET, EXTENDED RELEASE ORAL DAILY
Qty: 90 TABLET | Refills: 3 | Status: SHIPPED | OUTPATIENT
Start: 2021-01-19 | End: 2022-02-11

## 2021-01-19 RX ORDER — LOSARTAN POTASSIUM 50 MG/1
50 TABLET ORAL EVERY MORNING
Qty: 90 TABLET | Refills: 3 | Status: SHIPPED | OUTPATIENT
Start: 2021-01-19 | End: 2022-02-11

## 2021-01-19 RX ORDER — EVOLOCUMAB 140 MG/ML
140 INJECTION, SOLUTION SUBCUTANEOUS
Qty: 6 PEN | Refills: 3 | Status: SHIPPED | OUTPATIENT
Start: 2021-01-19 | End: 2022-03-08

## 2021-01-19 NOTE — PROGRESS NOTES
Problem list     Subjective   Mandi Jacob is a 70 y.o. female     Chief Complaint   Patient presents with   • Atrial Fibrillation     presents for monitor f/u   • Palpitations   • Shortness of Breath   Problem List:  1. Nonobstructive CAD by cath February 2012.  2. Preserved systolic function  3. History of CHF  4. Diastolic dysfunction  5. Hypertension      6. Dyslipidemia. The patient reports she is intolerant to statins.      7. Chronic palpitations.      7.1.  Recurrent PACs noted by telemetry historically.  The patient was treated with sotalol therapy for the same.      7.2.  A. fib with rapid ventricular response noted by event monitor, 2019.      8.  Peripheral vascular disease, status post angioplasty and stenting to the right carotid artery system, 06/19 with Dr. Vinod ANN  The patient presents into the clinic today for routine follow-up and to review event monitor findings.  This pleasant patient was seen previously where she complained of palpitations at times and also bradycardia.  We decreased metoprolol therapy at that time and did schedule for an event monitor.  That indicated sinus rhythm at baseline but with episodes of A. fib noted.  The patient also had what appears to be wandering atrial pacemaker at times.  We have reviewed that in detail with her.  The patient continues to take sotalol and feels that a lot of her palpitations have minimized at this time.  Since decreasing metoprolol, symptoms of bradycardia have now minimized.  The patient feels well otherwise.  She has chronic dyspnea which is at baseline.  The patient has no chest discomfort.  She denies failure symptoms.  She has no further complaints.    Current Outpatient Medications on File Prior to Visit   Medication Sig Dispense Refill   • albuterol (PROVENTIL HFA;VENTOLIN HFA) 108 (90 BASE) MCG/ACT inhaler Inhale 2 puffs Every 4 (Four) Hours As Needed. Albuterol 90 MCG/ACT AERS; Patient Sig: Albuterol 90 MCG/ACT AERS  INHALE 1 TO 2 PUFFS EVERY 4 TO 6 HOURS AS NEEDED.; 0; 11-Sep-2013; Active     • albuterol (PROVENTIL) (2.5 MG/3ML) 0.083% nebulizer solution Albuterol Sulfate (2.5 MG/3ML) 0.083% Inhalation Nebulization Solution; Patient Sig: Albuterol Sulfate (2.5 MG/3ML) 0.083% Inhalation Nebulization Solution USE 1 UNIT DOSE IN NEBULIZER EVERY 4 TO 6 HOURS AS NEE     • Aspirin Adult Low Strength 81 MG EC tablet TAKE 1 TABLET DAILY 30 tablet 5   • ELIQUIS 5 MG tablet tablet TAKE 1 TABLET EVERY 12 HOURS 60 tablet 11   • furosemide (LASIX) 40 MG tablet Take 40 mg by mouth 2 (Two) Times a Day.     • insulin aspart (novoLOG FLEXPEN) 100 UNIT/ML solution pen-injector sc pen Inject 15 Units under the skin into the appropriate area as directed Daily With Breakfast.     • isosorbide mononitrate (IMDUR) 30 MG 24 hr tablet TAKE 1 TABLET DAILY 30 tablet 11   • losartan (COZAAR) 50 MG tablet Take 50 mg by mouth Every Morning.  6   • magnesium oxide (MAGOX) 400 (241.3 MG) MG tablet tablet Take 400 mg by mouth Every Night.     • metFORMIN ER (GLUCOPHAGE-XR) 500 MG 24 hr tablet Take 500 mg by mouth Daily With Breakfast.  11   • metolazone (ZAROXOLYN) 2.5 MG tablet Take 2.5 mg by mouth Daily As Needed. 1 tablet 30 minutes prior to lasix as needed PRN       • metoprolol tartrate (LOPRESSOR) 25 MG tablet Take 0.5 tablets by mouth 2 (Two) Times a Day. 30 tablet 11   • montelukast (SINGULAIR) 10 MG tablet Take 10 mg by mouth Every Night.     • nitroglycerin (NITROSTAT) 0.4 MG SL tablet Place 1 tablet under the tongue Every 5 (Five) Minutes As Needed for Chest Pain. 25 tablet 2   • PARoxetine (PAXIL) 20 MG tablet Daily.     • potassium chloride (K-DUR,KLOR-CON) 10 MEQ CR tablet Take 10 mEq by mouth Every Night.     • potassium chloride (K-DUR,KLOR-CON) 20 MEQ CR tablet TAKE 1 TABLET IN THE MORNING 90 tablet 3   • REPATHA SURECLICK 140 MG/ML solution auto-injector      • sotalol (BETAPACE) 80 MG tablet TAKE 1 TABLET TWICE DAILY 180 tablet 3   •  tiotropium (SPIRIVA) 18 MCG per inhalation capsule Place 1 capsule into inhaler and inhale Every Morning.     • TRESIBA FLEXTOUCH 200 UNIT/ML solution pen-injector Inject 100 Units under the skin into the appropriate area as directed Every Morning. 100 units daily     • triamterene-hydrochlorothiazide (MAXZIDE-25) 37.5-25 MG per tablet Take 1 tablet by mouth Every Morning.       No current facility-administered medications on file prior to visit.        Ace inhibitors, Diltiazem, Flecainide, Incruse ellipta [umeclidinium bromide], Lisinopril, Niacin, Ketorolac tromethamine, Buspirone, Ciprofloxacin, Citalopram, Clindamycin/lincomycin, Codeine, Levaquin [levofloxacin], Lipitor [atorvastatin], Liraglutide, Lorazepam, Sertraline, Statins, Sulfamethoxazole-trimethoprim, Trulicity [dulaglutide], and Xigduo xr [dapagliflozin-metformin hcl er]    Past Medical History:   Diagnosis Date   • Arrhythmia    • Asthma    • Atrial fibrillation (CMS/[facility])    • CAD (coronary artery disease)     non-obstructive by cath 02/2012   • CHF (congestive heart failure) (CMS/[facility])    • COPD (chronic obstructive pulmonary disease) (CMS/[facility])    • Diabetes mellitus (CMS/[facility])    • Diastolic dysfunction    • Dizziness    • Edema    • Esophageal spasm    • Herniated lumbar intervertebral disc     L5   • Hyperlipidemia     intolerant to statins    • Hypertension    • Osteoarthritis    • Palpitations    • SOB (shortness of breath)    • Stroke (CMS/[facility])    • Stroke-like symptoms    • TIA (transient ischemic attack) 2015    left hemispheric TIA/CVA   • Unstable angina (CMS/[facility])        Social History     Socioeconomic History   • Marital status:      Spouse name: Not on file   • Number of children: Not on file   • Years of education: Not on file   • Highest education level: Not on file   Tobacco Use   • Smoking status: Former Smoker     Packs/day: 1.50     Years: 41.00     Pack years: 61.50     Start date: 4/8/1968     Quit date: 3/22/2009      Years since quittin.8   • Smokeless tobacco: Never Used   Substance and Sexual Activity   • Alcohol use: No   • Drug use: No   • Sexual activity: Never       Family History   Problem Relation Age of Onset   • Heart attack Mother    • Heart failure Mother         congestive   • Hyperlipidemia Mother    • Hypertension Mother    • Peripheral vascular disease Mother    • Heart attack Father    • Heart failure Father         congestive   • Diabetes Father    • Hyperlipidemia Father    • Hypertension Father    • Peripheral vascular disease Father    • Asthma Father    • Heart failure Other         congestive   • Diabetes Other    • Hyperlipidemia Other    • Hypertension Other    • Peripheral vascular disease Other    • Hypertension Brother        Review of Systems   Constitutional: Positive for fatigue. Negative for chills, diaphoresis and fever.   HENT: Positive for nosebleeds (last episode this morning, blows clots out of nose, does not drip or pour blood ).    Eyes: Positive for visual disturbance.   Respiratory: Positive for apnea (02), shortness of breath (02 daily at 2L) and wheezing. Negative for cough and chest tightness.    Cardiovascular: Positive for palpitations (flutters) and leg swelling. Negative for chest pain.   Gastrointestinal: Negative.  Negative for abdominal pain, blood in stool, constipation, diarrhea, nausea and vomiting.   Endocrine: Negative.    Genitourinary: Negative.  Negative for hematuria.   Musculoskeletal: Positive for arthralgias, back pain, gait problem, myalgias and neck pain.   Skin: Negative.    Allergic/Immunologic: Positive for environmental allergies. Negative for food allergies.   Neurological: Positive for dizziness (vertigo and dizziness with quick movement) and weakness. Negative for syncope, light-headedness, numbness and headaches.   Hematological: Bruises/bleeds easily.   Psychiatric/Behavioral: Negative.  Negative for agitation and sleep disturbance. The patient is not  "nervous/anxious.        Objective   Vitals:    21 0845   BP: 141/71   BP Location: Left arm   Patient Position: Sitting   Pulse: 64   Temp: 95.3 °F (35.2 °C)   SpO2: 99%   Weight: 128 kg (283 lb)   Height: 170 cm (66.93\")      /71 (BP Location: Left arm, Patient Position: Sitting)   Pulse 64   Temp 95.3 °F (35.2 °C)   Ht 170 cm (66.93\")   Wt 128 kg (283 lb)   SpO2 99% Comment: 02 @ 2L  BMI 44.42 kg/m²    Lab Results (most recent)     None        Physical Exam  Vitals signs and nursing note reviewed.   Constitutional:       General: She is not in acute distress.     Appearance: She is well-developed.   HENT:      Head: Normocephalic and atraumatic.   Eyes:      Conjunctiva/sclera: Conjunctivae normal.      Pupils: Pupils are equal, round, and reactive to light.   Neck:      Musculoskeletal: Normal range of motion and neck supple.      Vascular: Carotid bruit (Right.) present. No JVD.      Trachea: No tracheal deviation.   Cardiovascular:      Rate and Rhythm: Normal rate and regular rhythm.      Heart sounds: Murmur present. Systolic (LSB) murmur present with a grade of 1/6.   Pulmonary:      Effort: Pulmonary effort is normal.      Breath sounds: Decreased breath sounds present.   Abdominal:      General: Bowel sounds are normal. There is no distension.      Palpations: Abdomen is soft. There is no mass.      Tenderness: There is no abdominal tenderness. There is no guarding or rebound.   Musculoskeletal: Normal range of motion.         General: No tenderness or deformity.      Right lower le+ Edema present.      Left lower le+ Edema present.   Skin:     General: Skin is warm and dry.      Coloration: Skin is not pale.      Findings: No erythema or rash.   Neurological:      Mental Status: She is alert and oriented to person, place, and time.   Psychiatric:         Behavior: Behavior normal.         Thought Content: Thought content normal.         Judgment: Judgment normal. "           Procedure   Procedures       Assessment/Plan      Diagnosis Plan   1. Paroxysmal atrial fibrillation (CMS/[facility])     2. Shortness of breath  isosorbide mononitrate (IMDUR) 30 MG 24 hr tablet   3. PVD (peripheral vascular disease) (CMS/[facility])     4. Palpitations       1.  At this time, the patient appears to be doing fairly well from cardiovascular standpoint.  Event monitor did indicate sinus rhythm with brief episodes of A. fib.  Also noted was episodes felt consistent with wandering atrial pacemaker.    2.  At this time, the patient reports that symptoms remain minimal on sotalol therapy.  Since decreasing metoprolol, episodes of bradycardia have basically resolved.    3.  The patient feels well otherwise.  She is doing well on current medical regimen.  She denies symptoms of angina or failure otherwise.  I do not feel anything further is indicated as the patient is doing well at this time and had no significant dysrhythmic activity outside of baseline findings on her event monitor.  I would continue sotalol therapy alone to address her degree of dysrhythmic symptoms.  Again, she has responded well to that therapy.    4.  The patient will maintain a routine follow-up in 3 to 4 months.  We may discuss repeating a carotid duplex at that time.  She historically was evaluated by Dr. Brock for the same, all as above.  She would like to maintain routine maintenance studies locally.    5.  Nothing further otherwise.  The patient appears to be doing fairly well from general cardiovascular standpoint.  She will call immediately for any issues.  Of note, we did give her refills of requested medications which have now been sent to her pharmacy.           Mandi Jacob  reports that she quit smoking about 11 years ago. She started smoking about 52 years ago. She has a 61.50 pack-year smoking history. She has never used smokeless tobacco..      Patient's Body mass index is 44.42 kg/m². BMI is above normal  parameters. Recommendations include: educational material.     Advance Care Planning   ACP discussion was held with the patient during this visit. Patient has an advance directive (not in EMR), copy requested. Patient does not have an advance directive, declines further assistance.        Electronically signed by:

## 2021-01-19 NOTE — PATIENT INSTRUCTIONS

## 2021-07-19 ENCOUNTER — OFFICE VISIT (OUTPATIENT)
Dept: CARDIOLOGY | Facility: CLINIC | Age: 71
End: 2021-07-19

## 2021-07-19 VITALS
HEART RATE: 75 BPM | WEIGHT: 281.2 LBS | OXYGEN SATURATION: 94 % | DIASTOLIC BLOOD PRESSURE: 62 MMHG | BODY MASS INDEX: 44.13 KG/M2 | SYSTOLIC BLOOD PRESSURE: 138 MMHG | HEIGHT: 67 IN

## 2021-07-19 DIAGNOSIS — I73.9 PVD (PERIPHERAL VASCULAR DISEASE) (HCC): Primary | ICD-10-CM

## 2021-07-19 DIAGNOSIS — R42 DIZZINESS: ICD-10-CM

## 2021-07-19 DIAGNOSIS — R09.89 RIGHT CAROTID BRUIT: ICD-10-CM

## 2021-07-19 DIAGNOSIS — R06.02 SHORTNESS OF BREATH: ICD-10-CM

## 2021-07-19 DIAGNOSIS — I48.0 PAROXYSMAL ATRIAL FIBRILLATION (HCC): ICD-10-CM

## 2021-07-19 DIAGNOSIS — R00.2 PALPITATIONS: ICD-10-CM

## 2021-07-19 DIAGNOSIS — I65.22 OCCLUSION OF LEFT CAROTID ARTERY: ICD-10-CM

## 2021-07-19 PROCEDURE — 99214 OFFICE O/P EST MOD 30 MIN: CPT | Performed by: PHYSICIAN ASSISTANT

## 2021-07-19 PROCEDURE — 93000 ELECTROCARDIOGRAM COMPLETE: CPT | Performed by: PHYSICIAN ASSISTANT

## 2021-07-19 NOTE — PROGRESS NOTES
Problem list     Subjective   Mandi Jacob is a 70 y.o. female     Chief Complaint   Patient presents with   • Follow-up     6 month      Problem List:  1. Nonobstructive CAD by cath February 2012.  2. Preserved systolic function  3. History of CHF  4. Diastolic dysfunction  5. Hypertension      6. Dyslipidemia. The patient reports she is intolerant to statins.      7. Chronic palpitations.      7.1.  Recurrent PACs noted by telemetry historically.  The patient was treated with sotalol therapy for the same.      7.2.  A. fib with rapid ventricular response noted by event monitor, 2019.      8.  Peripheral vascular disease, status post angioplasty and stenting to the right carotid artery system, 06/19 with Dr. Vinod ANN     The patient presents in the clinic today for routine evaluation and follow-up.  She tells me that she has done very well since her last appointment here.  She feels as well now she has in months.  She currently denies dysrhythmic symptoms of any significance.  She has done well with that since institution of sotalol therapy.  The patient reports no chest pain.  She has chronic but stable dyspnea secondary to pulmonary status.  She has no failure or symptoms otherwise from cardiovascular standpoint at this time.  Blood pressures appear to be fairly well controlled.  She has no further complaints otherwise and feels well at this time by her report.  Current Outpatient Medications on File Prior to Visit   Medication Sig Dispense Refill   • albuterol (PROVENTIL HFA;VENTOLIN HFA) 108 (90 BASE) MCG/ACT inhaler Inhale 2 puffs Every 4 (Four) Hours As Needed. Albuterol 90 MCG/ACT AERS; Patient Sig: Albuterol 90 MCG/ACT AERS INHALE 1 TO 2 PUFFS EVERY 4 TO 6 HOURS AS NEEDED.; 0; 11-Sep-2013; Active     • albuterol (PROVENTIL) (2.5 MG/3ML) 0.083% nebulizer solution Albuterol Sulfate (2.5 MG/3ML) 0.083% Inhalation Nebulization Solution; Patient Sig: Albuterol Sulfate (2.5 MG/3ML) 0.083%  Inhalation Nebulization Solution USE 1 UNIT DOSE IN NEBULIZER EVERY 4 TO 6 HOURS AS NEE     • apixaban (Eliquis) 5 MG tablet tablet Take 1 tablet by mouth Every 12 (Twelve) Hours. 180 tablet 3   • Aspirin Adult Low Strength 81 MG EC tablet TAKE 1 TABLET DAILY 30 tablet 5   • furosemide (LASIX) 40 MG tablet Take 1 tablet by mouth 2 (Two) Times a Day. 180 tablet 3   • insulin aspart (novoLOG FLEXPEN) 100 UNIT/ML solution pen-injector sc pen Inject 15 Units under the skin into the appropriate area as directed Daily With Breakfast.     • isosorbide mononitrate (IMDUR) 30 MG 24 hr tablet Take 1 tablet by mouth Daily. 90 tablet 3   • losartan (COZAAR) 50 MG tablet Take 1 tablet by mouth Every Morning. 90 tablet 3   • magnesium oxide (MAGOX) 400 (241.3 MG) MG tablet tablet Take 400 mg by mouth Every Night.     • metFORMIN ER (GLUCOPHAGE-XR) 500 MG 24 hr tablet Take 500 mg by mouth Daily With Breakfast.  11   • metolazone (ZAROXOLYN) 2.5 MG tablet Take 2.5 mg by mouth Daily As Needed. 1 tablet 30 minutes prior to lasix as needed PRN       • metoprolol tartrate (LOPRESSOR) 25 MG tablet Take 0.5 tablets by mouth 2 (Two) Times a Day. 90 tablet 3   • montelukast (SINGULAIR) 10 MG tablet Take 10 mg by mouth Every Night.     • nitroglycerin (NITROSTAT) 0.4 MG SL tablet Place 1 tablet under the tongue Every 5 (Five) Minutes As Needed for Chest Pain. 25 tablet 2   • PARoxetine (PAXIL) 20 MG tablet Daily.     • potassium chloride (K-DUR,KLOR-CON) 10 MEQ CR tablet Take 10 mEq by mouth Every Night.     • potassium chloride (K-DUR,KLOR-CON) 20 MEQ CR tablet TAKE 1 TABLET IN THE MORNING 90 tablet 3   • Repatha SureClick solution auto-injector SureClick injection Inject 1 mL under the skin into the appropriate area as directed Every 14 (Fourteen) Days. 6 pen 3   • sotalol (BETAPACE) 80 MG tablet Take 1 tablet by mouth 2 (Two) Times a Day. 180 tablet 3   • tiotropium (SPIRIVA) 18 MCG per inhalation capsule Place 1 capsule into inhaler and  inhale Every Morning.     • TRESIBA FLEXTOUCH 200 UNIT/ML solution pen-injector Inject 100 Units under the skin into the appropriate area as directed Every Morning. 100 units daily     • triamterene-hydrochlorothiazide (MAXZIDE-25) 37.5-25 MG per tablet Take 1 tablet by mouth Every Morning. 90 tablet 3     No current facility-administered medications on file prior to visit.       Ace inhibitors, Diltiazem, Flecainide, Incruse ellipta [umeclidinium bromide], Lisinopril, Niacin, Ketorolac tromethamine, Buspirone, Ciprofloxacin, Citalopram, Clindamycin/lincomycin, Codeine, Levaquin [levofloxacin], Lipitor [atorvastatin], Liraglutide, Lorazepam, Sertraline, Statins, Sulfamethoxazole-trimethoprim, Trulicity [dulaglutide], and Xigduo xr [dapagliflozin-metformin hcl er]    Past Medical History:   Diagnosis Date   • Arrhythmia    • Asthma    • Atrial fibrillation (CMS/[facility])    • CAD (coronary artery disease)     non-obstructive by cath 2012   • CHF (congestive heart failure) (CMS/[facility])    • COPD (chronic obstructive pulmonary disease) (CMS/[facility])    • Diabetes mellitus (CMS/[facility])    • Diastolic dysfunction    • Dizziness    • Edema    • Esophageal spasm    • Herniated lumbar intervertebral disc     L5   • Hyperlipidemia     intolerant to statins    • Hypertension    • Osteoarthritis    • Palpitations    • SOB (shortness of breath)    • Stroke (CMS/[facility])    • Stroke-like symptoms    • TIA (transient ischemic attack) 2015    left hemispheric TIA/CVA   • Unstable angina (CMS/[facility])        Social History     Socioeconomic History   • Marital status:      Spouse name: Not on file   • Number of children: Not on file   • Years of education: Not on file   • Highest education level: Not on file   Tobacco Use   • Smoking status: Former Smoker     Packs/day: 1.50     Years: 41.00     Pack years: 61.50     Start date: 1968     Quit date: 3/22/2009     Years since quittin.3   • Smokeless tobacco: Never Used   Substance and  "Sexual Activity   • Alcohol use: No   • Drug use: No   • Sexual activity: Never       Family History   Problem Relation Age of Onset   • Heart attack Mother    • Heart failure Mother         congestive   • Hyperlipidemia Mother    • Hypertension Mother    • Peripheral vascular disease Mother    • Heart attack Father    • Heart failure Father         congestive   • Diabetes Father    • Hyperlipidemia Father    • Hypertension Father    • Peripheral vascular disease Father    • Asthma Father    • Heart failure Other         congestive   • Diabetes Other    • Hyperlipidemia Other    • Hypertension Other    • Peripheral vascular disease Other    • Hypertension Brother        Review of Systems   Constitutional: Positive for fatigue. Negative for chills and fever.   HENT: Positive for rhinorrhea. Negative for congestion and sore throat.    Eyes: Positive for visual disturbance (glasses).   Respiratory: Positive for shortness of breath. Negative for chest tightness and wheezing.    Cardiovascular: Negative for chest pain, palpitations and leg swelling.   Gastrointestinal: Negative.    Endocrine: Negative.    Genitourinary: Negative.    Musculoskeletal: Positive for arthralgias and back pain. Negative for neck pain.   Skin: Negative.  Negative for rash and wound.   Allergic/Immunologic: Positive for environmental allergies.   Neurological: Positive for dizziness, weakness and headaches. Negative for numbness.   Hematological: Bruises/bleeds easily (bruises/ bleeds).   Psychiatric/Behavioral: Positive for sleep disturbance (oxygen 24/7).       Objective   Vitals:    07/19/21 1016   BP: 138/62   BP Location: Left arm   Patient Position: Sitting   Pulse: 75   SpO2: 94%   Weight: 128 kg (281 lb 3.2 oz)   Height: 170 cm (66.93\")      /62 (BP Location: Left arm, Patient Position: Sitting)   Pulse 75   Ht 170 cm (66.93\")   Wt 128 kg (281 lb 3.2 oz)   SpO2 94%   BMI 44.13 kg/m²    Lab Results (most recent)     None        "     Physical Exam  Vitals and nursing note reviewed.   Constitutional:       General: She is not in acute distress.     Appearance: She is well-developed.   HENT:      Head: Normocephalic and atraumatic.   Eyes:      Conjunctiva/sclera: Conjunctivae normal.      Pupils: Pupils are equal, round, and reactive to light.   Neck:      Vascular: Carotid bruit (Right) present. No JVD.      Trachea: No tracheal deviation.   Cardiovascular:      Rate and Rhythm: Normal rate and regular rhythm.      Heart sounds: Murmur heard.   Systolic (LSB) murmur is present with a grade of 1/6.     Pulmonary:      Effort: Pulmonary effort is normal.      Breath sounds: Normal breath sounds. Decreased air movement present.   Abdominal:      General: Bowel sounds are normal. There is no distension.      Palpations: Abdomen is soft. There is no mass.      Tenderness: There is no abdominal tenderness. There is no guarding or rebound.   Musculoskeletal:         General: No tenderness or deformity. Normal range of motion.      Cervical back: Normal range of motion and neck supple.      Right lower le+ Edema present.      Left lower le+ Edema present.   Skin:     General: Skin is warm and dry.      Coloration: Skin is not pale.      Findings: No erythema or rash.   Neurological:      Mental Status: She is alert and oriented to person, place, and time.   Psychiatric:         Behavior: Behavior normal.         Thought Content: Thought content normal.         Judgment: Judgment normal.           Procedure     ECG 12 Lead    Date/Time: 2021 10:19 AM  Performed by: Mark Brito PA  Authorized by: Mark Brito PA   Comparison: compared with previous ECG from 2020  Comparison to previous ECG: Sinus rhythm, rate 63, normal axis, no acute changes noted.                 Assessment/Plan      Diagnosis Plan   1. PVD (peripheral vascular disease) (CMS/[facility])  Duplex Carotid Ultrasound CAR   2. Right carotid bruit  Duplex Carotid  Ultrasound CAR   3. Paroxysmal atrial fibrillation (CMS/[facility])     4. Shortness of breath       1.  I would like to repeat carotid duplex to evaluate carotid artery status.  She has history of intervention as outlined above.  She has a mild soft right carotid bruit.  We will evaluate a duplex to ensure stability in that regard.    2.  Otherwise, LIAN ko continues to be well treated with sotalol therapy and anticoagulation.  I would make no adjustments in that regimen at this time.    3.  Symptomatically, the patient appears to be doing well otherwise.  Her dyspnea is at baseline.  She denies angina, failure, or dysrhythmic symptoms at this time.  She is doing well, nothing further outside a carotid duplex as above.  We will make no adjustments of medications and continue to see the patient on 6-month follow-ups.                   Electronically signed by:

## 2021-07-19 NOTE — PATIENT INSTRUCTIONS

## 2021-08-05 ENCOUNTER — HOSPITAL ENCOUNTER (OUTPATIENT)
Dept: CARDIOLOGY | Facility: HOSPITAL | Age: 71
Discharge: HOME | End: 2021-08-05
Admitting: PHYSICIAN ASSISTANT

## 2021-08-05 DIAGNOSIS — R09.89 RIGHT CAROTID BRUIT: ICD-10-CM

## 2021-08-05 DIAGNOSIS — I73.9 PVD (PERIPHERAL VASCULAR DISEASE) (HCC): ICD-10-CM

## 2021-08-05 PROCEDURE — 93880 EXTRACRANIAL BILAT STUDY: CPT | Performed by: INTERNAL MEDICINE

## 2021-08-05 PROCEDURE — 93880 EXTRACRANIAL BILAT STUDY: CPT

## 2021-08-10 ENCOUNTER — APPOINTMENT (OUTPATIENT)
Dept: CARDIOLOGY | Facility: HOSPITAL | Age: 71
End: 2021-08-10

## 2021-08-11 DIAGNOSIS — Z86.73 HISTORY OF TIA (TRANSIENT ISCHEMIC ATTACK): Primary | ICD-10-CM

## 2021-08-12 RX ORDER — ASPIRIN 81 MG/1
TABLET, FILM COATED ORAL
Qty: 30 TABLET | Refills: 6 | Status: SHIPPED | OUTPATIENT
Start: 2021-08-12 | End: 2022-02-15

## 2021-08-12 NOTE — TELEPHONE ENCOUNTER
Provider:  Dre  Caller: IBRAHIMA  Time of call:     Phone #:    Surgery:    Surgery Date:    Last visit:   06/26/19  Next visit:     SANTI:         Reason for call:    Patient requests refill on Asa 81 mg.

## 2021-08-13 DIAGNOSIS — R60.9 EDEMA, UNSPECIFIED TYPE: ICD-10-CM

## 2021-08-16 RX ORDER — POTASSIUM CHLORIDE 20 MEQ/1
TABLET, EXTENDED RELEASE ORAL
Qty: 30 TABLET | Refills: 3 | Status: SHIPPED | OUTPATIENT
Start: 2021-08-16 | End: 2021-12-16

## 2021-08-22 LAB
BH CV ECHO MEAS - BSA(HAYCOCK): 2.5 M^2
BH CV ECHO MEAS - BSA: 2.3 M^2
BH CV ECHO MEAS - BZI_BMI: 45.4 KILOGRAMS/M^2
BH CV ECHO MEAS - BZI_METRIC_HEIGHT: 167.6 CM
BH CV ECHO MEAS - BZI_METRIC_WEIGHT: 127.5 KG
BH CV MID RIGHT ICA HIDDEN LRR: 1 CM
BH CV RIGHT CCA HIDDEN LRR: 1 CM/S
BH CV XLRA MEAS LEFT BULB EDV: -16.3 CM/SEC
BH CV XLRA MEAS LEFT BULB PSV: -76.1 CM/SEC
BH CV XLRA MEAS LEFT CCA RATIO VEL: 84.9 CM/SEC
BH CV XLRA MEAS LEFT DIST CCA EDV: 15.1 CM/SEC
BH CV XLRA MEAS LEFT DIST CCA PSV: 85.5 CM/SEC
BH CV XLRA MEAS LEFT DIST ICA EDV: -24.5 CM/SEC
BH CV XLRA MEAS LEFT DIST ICA PSV: -81.1 CM/SEC
BH CV XLRA MEAS LEFT ICA RATIO VEL: -175 CM/SEC
BH CV XLRA MEAS LEFT ICA/CCA RATIO: -2.1
BH CV XLRA MEAS LEFT MID ICA EDV: -43.7 CM/SEC
BH CV XLRA MEAS LEFT MID ICA PSV: -157.2 CM/SEC
BH CV XLRA MEAS LEFT PROX CCA EDV: 8.2 CM/SEC
BH CV XLRA MEAS LEFT PROX CCA PSV: 87.4 CM/SEC
BH CV XLRA MEAS LEFT PROX ECA EDV: -9.6 CM/SEC
BH CV XLRA MEAS LEFT PROX ECA PSV: -144.1 CM/SEC
BH CV XLRA MEAS LEFT PROX ICA EDV: -48 CM/SEC
BH CV XLRA MEAS LEFT PROX ICA PSV: -176.4 CM/SEC
BH CV XLRA MEAS LEFT VERTEBRAL A EDV: 13.6 CM/SEC
BH CV XLRA MEAS LEFT VERTEBRAL A PSV: 48.5 CM/SEC
BH CV XLRA MEAS RIGHT BULB EDV: -14.9 CM/SEC
BH CV XLRA MEAS RIGHT BULB PSV: -91.5 CM/SEC
BH CV XLRA MEAS RIGHT CCA RATIO VEL: 74.4 CM/SEC
BH CV XLRA MEAS RIGHT DIST CCA EDV: 12.7 CM/SEC
BH CV XLRA MEAS RIGHT DIST CCA PSV: 75 CM/SEC
BH CV XLRA MEAS RIGHT DIST ICA EDV: -20.7 CM/SEC
BH CV XLRA MEAS RIGHT DIST ICA PSV: -79.2 CM/SEC
BH CV XLRA MEAS RIGHT ICA RATIO VEL: -173 CM/SEC
BH CV XLRA MEAS RIGHT ICA/CCA RATIO: -2.3
BH CV XLRA MEAS RIGHT MID ICA EDV: -38.4 CM/SEC
BH CV XLRA MEAS RIGHT MID ICA PSV: -173.7 CM/SEC
BH CV XLRA MEAS RIGHT PROX CCA EDV: 16.5 CM/SEC
BH CV XLRA MEAS RIGHT PROX CCA PSV: 91.9 CM/SEC
BH CV XLRA MEAS RIGHT PROX ECA EDV: -12.2 CM/SEC
BH CV XLRA MEAS RIGHT PROX ECA PSV: -165.9 CM/SEC
BH CV XLRA MEAS RIGHT PROX ICA EDV: -28.8 CM/SEC
BH CV XLRA MEAS RIGHT PROX ICA PSV: -158.9 CM/SEC
BH CV XLRA MEAS RIGHT VERTEBRAL A EDV: -15.7 CM/SEC
BH CV XLRA MEAS RIGHT VERTEBRAL A PSV: -52.2 CM/SEC
BH CVPROX RIGHT ICA HIDDEN LRR: 1 CM
MAXIMAL PREDICTED HEART RATE: 150 BPM
STRESS TARGET HR: 128 BPM

## 2021-08-24 ENCOUNTER — TELEPHONE (OUTPATIENT)
Dept: CARDIOLOGY | Facility: CLINIC | Age: 71
End: 2021-08-24

## 2021-08-24 DIAGNOSIS — I65.22 OCCLUSION OF LEFT CAROTID ARTERY: Primary | ICD-10-CM

## 2021-08-24 NOTE — TELEPHONE ENCOUNTER
Spoke to patient on note below     Patient agrees to the CTA she verbalized understanding     [facility]                 Attempted to call patient NO VM / NO ANSWER     Carotid results - blockage has gotten a little worse when compared to the last study that was done.     Per Mark LANG - would like to do a CTA of the carotid to get a better percentage of the blockage.       [facility]             ----- Message from RICKEY Yen sent at 8/23/2021  8:44 AM EDT -----  See me on this.

## 2021-08-31 DIAGNOSIS — R00.2 PALPITATIONS: ICD-10-CM

## 2021-08-31 DIAGNOSIS — R06.02 SHORTNESS OF BREATH: Primary | ICD-10-CM

## 2021-08-31 DIAGNOSIS — R42 DIZZINESS: ICD-10-CM

## 2021-09-23 ENCOUNTER — TELEPHONE (OUTPATIENT)
Dept: CARDIOLOGY | Facility: CLINIC | Age: 71
End: 2021-09-23

## 2021-09-23 NOTE — TELEPHONE ENCOUNTER
Spoke to patent on CTA neck     It is showing an area of blockage - we have sent results to Dr. Brock for second opinion, we will call her after we hear from him on what the next step is.       Patent verbalized understanding     AT [facility]          ----- Message from RICKEY Yen sent at 9/22/2021  6:13 PM EDT -----  Forward to Dr. Brock and await his recommendations please.

## 2021-12-16 DIAGNOSIS — R60.9 EDEMA, UNSPECIFIED TYPE: ICD-10-CM

## 2021-12-16 RX ORDER — POTASSIUM CHLORIDE 20 MEQ/1
TABLET, EXTENDED RELEASE ORAL
Qty: 30 TABLET | Refills: 3 | Status: SHIPPED | OUTPATIENT
Start: 2021-12-16 | End: 2022-04-18 | Stop reason: SDUPTHER

## 2022-02-11 DIAGNOSIS — I10 ESSENTIAL HYPERTENSION: ICD-10-CM

## 2022-02-11 DIAGNOSIS — R60.9 EDEMA, UNSPECIFIED TYPE: ICD-10-CM

## 2022-02-11 DIAGNOSIS — I48.0 PAROXYSMAL ATRIAL FIBRILLATION: Primary | ICD-10-CM

## 2022-02-11 DIAGNOSIS — R06.02 SHORTNESS OF BREATH: ICD-10-CM

## 2022-02-11 DIAGNOSIS — R00.2 PALPITATIONS: ICD-10-CM

## 2022-02-11 RX ORDER — APIXABAN 5 MG/1
TABLET, FILM COATED ORAL
Qty: 60 TABLET | Refills: 5 | Status: SHIPPED | OUTPATIENT
Start: 2022-02-11 | End: 2022-04-14

## 2022-02-11 RX ORDER — SOTALOL HYDROCHLORIDE 80 MG/1
80 TABLET ORAL 2 TIMES DAILY
Qty: 60 TABLET | Refills: 5 | Status: SHIPPED | OUTPATIENT
Start: 2022-02-11 | End: 2022-04-14

## 2022-02-11 RX ORDER — LOSARTAN POTASSIUM 50 MG/1
50 TABLET ORAL EVERY MORNING
Qty: 30 TABLET | Refills: 5 | Status: SHIPPED | OUTPATIENT
Start: 2022-02-11

## 2022-02-11 RX ORDER — ISOSORBIDE MONONITRATE 30 MG/1
30 TABLET, EXTENDED RELEASE ORAL DAILY
Qty: 30 TABLET | Refills: 5 | Status: SHIPPED | OUTPATIENT
Start: 2022-02-11 | End: 2022-04-14

## 2022-02-11 RX ORDER — FUROSEMIDE 40 MG/1
TABLET ORAL
Qty: 60 TABLET | Refills: 5 | Status: SHIPPED | OUTPATIENT
Start: 2022-02-11 | End: 2022-04-14

## 2022-02-11 RX ORDER — TRIAMTERENE AND HYDROCHLOROTHIAZIDE 37.5; 25 MG/1; MG/1
1 TABLET ORAL EVERY MORNING
Qty: 30 TABLET | Refills: 5 | Status: SHIPPED | OUTPATIENT
Start: 2022-02-11 | End: 2022-04-14

## 2022-02-15 DIAGNOSIS — Z86.73 HISTORY OF TIA (TRANSIENT ISCHEMIC ATTACK): ICD-10-CM

## 2022-02-15 RX ORDER — ASPIRIN 81 MG/1
TABLET, COATED ORAL
Qty: 30 TABLET | Refills: 6 | Status: SHIPPED | OUTPATIENT
Start: 2022-02-15 | End: 2022-07-13

## 2022-02-15 NOTE — TELEPHONE ENCOUNTER
Provider:  Dre  Caller:  Automated refill request  Surgery:  Angio  Surgery Date:  05/16/2019  Last visit:  Office Visit with Alex Erickson MD (06/26/2019)  Next visit: NA    Reason for call:    Automated refill request for Aspirin. Medication pending.          Requested Prescriptions     Pending Prescriptions Disp Refills   • Aspirin Low Dose 81 MG EC tablet [Pharmacy Med Name: ASPIRIN 81MG EC TABLET 81 Tablet] 30 tablet 6     Sig: TAKE 1 TABLET DAILY

## 2022-02-21 ENCOUNTER — OFFICE VISIT (OUTPATIENT)
Dept: CARDIOLOGY | Facility: CLINIC | Age: 72
End: 2022-02-21

## 2022-02-21 VITALS
DIASTOLIC BLOOD PRESSURE: 77 MMHG | BODY MASS INDEX: 41.34 KG/M2 | OXYGEN SATURATION: 95 % | WEIGHT: 263.4 LBS | SYSTOLIC BLOOD PRESSURE: 142 MMHG | HEART RATE: 77 BPM

## 2022-02-21 DIAGNOSIS — R09.89 BRUIT OF LEFT CAROTID ARTERY: Primary | ICD-10-CM

## 2022-02-21 DIAGNOSIS — R06.02 SHORTNESS OF BREATH: ICD-10-CM

## 2022-02-21 DIAGNOSIS — I48.0 PAROXYSMAL ATRIAL FIBRILLATION: ICD-10-CM

## 2022-02-21 PROCEDURE — 93000 ELECTROCARDIOGRAM COMPLETE: CPT | Performed by: PHYSICIAN ASSISTANT

## 2022-02-21 PROCEDURE — 99213 OFFICE O/P EST LOW 20 MIN: CPT | Performed by: PHYSICIAN ASSISTANT

## 2022-02-21 NOTE — PROGRESS NOTES
Problem list     Subjective   Mandi Jacob is a 71 y.o. female     Chief Complaint   Patient presents with   • Follow-up     6 month    Problem List:  1. Nonobstructive CAD by cath February 2012.  2. Preserved systolic function  3. History of CHF  4. Diastolic dysfunction  5. Hypertension      6. Dyslipidemia. The patient reports she is intolerant to statins.      7. Chronic palpitations.      7.1.  Recurrent PACs noted by telemetry historically.  The patient was treated with sotalol therapy for the same.      7.2.  A. fib with rapid ventricular response noted by event monitor, 2019.      8.  Peripheral vascular disease, status post angioplasty and stenting to the right carotid artery system, 06/19 with Dr. Brock    HPI  The patient presents into the clinic today for routine evaluation and follow-up.  She did have a carotid duplex after last evaluation Which indicated patent stent to the right carotid system, and borderline hemodynamically significant disease to the left internal carotid artery estimated at approximately 70%.  There is antegrade flow in bilateral vertebral arteries.  As she has seen Dr. Erickson for this in the past, this was referred back to his office.  From cardiovascular standpoint, LIAN ko continues to be well treated with antidysrhythmic and anticoagulation therapy.  She describes atypical chest pain, nothing consistent with angina.  She has stable, at least moderate, dyspnea felt secondary to pulmonary status.  She has no further complaints otherwise and feels that she is doing well from general cardiovascular standpoint.    Current Outpatient Medications on File Prior to Visit   Medication Sig Dispense Refill   • albuterol (PROVENTIL HFA;VENTOLIN HFA) 108 (90 BASE) MCG/ACT inhaler Inhale 2 puffs Every 4 (Four) Hours As Needed. Albuterol 90 MCG/ACT AERS; Patient Sig: Albuterol 90 MCG/ACT AERS INHALE 1 TO 2 PUFFS EVERY 4 TO 6 HOURS AS NEEDED.; 0; 11-Sep-2013; Active     • albuterol  (PROVENTIL) (2.5 MG/3ML) 0.083% nebulizer solution Albuterol Sulfate (2.5 MG/3ML) 0.083% Inhalation Nebulization Solution; Patient Sig: Albuterol Sulfate (2.5 MG/3ML) 0.083% Inhalation Nebulization Solution USE 1 UNIT DOSE IN NEBULIZER EVERY 4 TO 6 HOURS AS NEE     • Aspirin Low Dose 81 MG EC tablet TAKE 1 TABLET DAILY 30 tablet 6   • Eliquis 5 MG tablet tablet TAKE 1 TABLET EVERY 12 HOURS 60 tablet 5   • furosemide (LASIX) 40 MG tablet TAKE 1 TABLET TWICE DAILY 60 tablet 5   • insulin aspart (novoLOG FLEXPEN) 100 UNIT/ML solution pen-injector sc pen Inject 15 Units under the skin into the appropriate area as directed Daily With Breakfast.     • isosorbide mononitrate (IMDUR) 30 MG 24 hr tablet TAKE 1 TABLET BY MOUTH DAILY. 30 tablet 5   • losartan (COZAAR) 50 MG tablet TAKE 1 TABLET BY MOUTH EVERY MORNING. 30 tablet 5   • magnesium oxide (MAGOX) 400 (241.3 MG) MG tablet tablet Take 400 mg by mouth Every Night.     • metFORMIN ER (GLUCOPHAGE-XR) 500 MG 24 hr tablet Take 500 mg by mouth Daily With Breakfast.  11   • metolazone (ZAROXOLYN) 2.5 MG tablet Take 2.5 mg by mouth Daily As Needed. 1 tablet 30 minutes prior to lasix as needed PRN       • metoprolol tartrate (LOPRESSOR) 25 MG tablet ~102Q.5 TAKE 1/2 TABLET TWICE A DAY 30 tablet 5   • montelukast (SINGULAIR) 10 MG tablet Take 10 mg by mouth Every Night.     • nitroglycerin (NITROSTAT) 0.4 MG SL tablet Place 1 tablet under the tongue Every 5 (Five) Minutes As Needed for Chest Pain. 25 tablet 2   • PARoxetine (PAXIL) 20 MG tablet Daily.     • potassium chloride (K-DUR,KLOR-CON) 10 MEQ CR tablet Take 10 mEq by mouth Every Night.     • potassium chloride (K-DUR,KLOR-CON) 20 MEQ CR tablet TAKE 1 TABLET EACH MORNING 30 tablet 3   • Repatha SureClick solution auto-injector SureClick injection Inject 1 mL under the skin into the appropriate area as directed Every 14 (Fourteen) Days. 6 pen 3   • sotalol (BETAPACE) 80 MG tablet TAKE 1 TABLET BY MOUTH 2 (TWO) TIMES A DAY.  60 tablet 5   • tiotropium (SPIRIVA) 18 MCG per inhalation capsule Place 1 capsule into inhaler and inhale Every Morning.     • TRESIBA FLEXTOUCH 200 UNIT/ML solution pen-injector Inject 100 Units under the skin into the appropriate area as directed Every Morning. 100 units daily     • triamterene-hydrochlorothiazide (MAXZIDE-25) 37.5-25 MG per tablet TAKE 1 TABLET BY MOUTH EVERY MORNING. 30 tablet 5     No current facility-administered medications on file prior to visit.       Ace inhibitors, Diltiazem, Flecainide, Incruse ellipta [umeclidinium bromide], Lisinopril, Niacin, Ketorolac tromethamine, Buspirone, Ciprofloxacin, Citalopram, Clindamycin/lincomycin, Codeine, Levaquin [levofloxacin], Lipitor [atorvastatin], Liraglutide, Lorazepam, Sertraline, Statins, Sulfamethoxazole-trimethoprim, Trulicity [dulaglutide], and Xigduo xr [dapagliflozin-metformin hcl er]    Past Medical History:   Diagnosis Date   • Arrhythmia    • Asthma    • Atrial fibrillation (HCC)    • CAD (coronary artery disease)     non-obstructive by cath 2012   • CHF (congestive heart failure) (HCC)    • COPD (chronic obstructive pulmonary disease) (HCC)    • Diabetes mellitus (HCC)    • Diastolic dysfunction    • Dizziness    • Edema    • Esophageal spasm    • Herniated lumbar intervertebral disc     L5   • Hyperlipidemia     intolerant to statins    • Hypertension    • Osteoarthritis    • Palpitations    • SOB (shortness of breath)    • Stroke (HCC)    • Stroke-like symptoms    • TIA (transient ischemic attack) 2015    left hemispheric TIA/CVA   • Unstable angina (HCC)        Social History     Socioeconomic History   • Marital status:    Tobacco Use   • Smoking status: Former Smoker     Packs/day: 1.50     Years: 41.00     Pack years: 61.50     Start date: 1968     Quit date: 3/22/2009     Years since quittin.9   • Smokeless tobacco: Never Used   Substance and Sexual Activity   • Alcohol use: No   • Drug use: No   • Sexual  activity: Never       Family History   Problem Relation Age of Onset   • Heart attack Mother    • Heart failure Mother         congestive   • Hyperlipidemia Mother    • Hypertension Mother    • Peripheral vascular disease Mother    • Heart attack Father    • Heart failure Father         congestive   • Diabetes Father    • Hyperlipidemia Father    • Hypertension Father    • Peripheral vascular disease Father    • Asthma Father    • Heart failure Other         congestive   • Diabetes Other    • Hyperlipidemia Other    • Hypertension Other    • Peripheral vascular disease Other    • Hypertension Brother        Review of Systems   Constitutional: Negative.  Negative for chills, fatigue and fever.   HENT: Negative.  Negative for congestion, rhinorrhea and sore throat.    Eyes: Positive for visual disturbance (glasses).   Respiratory: Positive for chest tightness and shortness of breath (oxygen 24/7). Negative for wheezing.    Cardiovascular: Positive for chest pain. Negative for palpitations and leg swelling.   Gastrointestinal: Negative.    Endocrine: Negative.    Genitourinary: Negative.    Musculoskeletal: Positive for arthralgias, back pain and neck pain.   Skin: Negative.  Negative for rash and wound.   Allergic/Immunologic: Positive for environmental allergies.   Neurological: Negative.  Negative for dizziness, weakness, numbness and headaches.   Hematological: Bruises/bleeds easily (bruises/ bleeds).   Psychiatric/Behavioral: Positive for sleep disturbance (oxygen 24/7).       Objective   Vitals:    02/21/22 1252   BP: 142/77   BP Location: Right arm   Patient Position: Sitting   Pulse: 77   SpO2: 95%   Weight: 119 kg (263 lb 6.4 oz)      /77 (BP Location: Right arm, Patient Position: Sitting)   Pulse 77   Wt 119 kg (263 lb 6.4 oz)   SpO2 95%   BMI 41.34 kg/m²    Lab Results (most recent)     None        Physical Exam  Vitals and nursing note reviewed.   Constitutional:       General: She is not in acute  distress.     Appearance: She is well-developed.   HENT:      Head: Normocephalic and atraumatic.   Eyes:      Conjunctiva/sclera: Conjunctivae normal.      Pupils: Pupils are equal, round, and reactive to light.   Neck:      Vascular: Carotid bruit (Left) present. No JVD.      Trachea: No tracheal deviation.   Cardiovascular:      Rate and Rhythm: Normal rate and regular rhythm.      Heart sounds: Murmur heard.    Systolic (LSB) murmur is present with a grade of 1/6.      Pulmonary:      Effort: Pulmonary effort is normal.      Breath sounds: Decreased air movement present.   Abdominal:      General: Bowel sounds are normal. There is no distension.      Palpations: Abdomen is soft. There is no mass.      Tenderness: There is no abdominal tenderness. There is no guarding or rebound.   Musculoskeletal:         General: No tenderness or deformity. Normal range of motion.      Cervical back: Normal range of motion and neck supple.   Skin:     General: Skin is warm and dry.      Coloration: Skin is not pale.      Findings: No erythema or rash.   Neurological:      Mental Status: She is alert and oriented to person, place, and time.   Psychiatric:         Behavior: Behavior normal.         Thought Content: Thought content normal.         Judgment: Judgment normal.           Procedure     ECG 12 Lead    Date/Time: 2/21/2022 12:59 PM  Performed by: Mark Brito PA  Authorized by: Mark Brito PA   Comparison: compared with previous ECG from 7/19/2021  Comparison to previous ECG: Sinus rhythm, diffuse artifact at baseline, normal axis, RSR prime V1 V2, no acute changes noted, premature atrial contractions noted, no acute changes noted.                 Assessment/Plan      Diagnosis Plan   1. Bruit of left carotid artery  US Carotid Bilateral   2. Paroxysmal atrial fibrillation (HCC)     3. Shortness of breath     1.  The patient needs a repeat carotid duplex.  We did perform that 6 months ago and have forwarded  that on to Dr. Erickson who follows her for the same.  She has requested to have a carotid duplex locally because of complication with transportation.    2.  Otherwise, LIAN ko is well treated with anticoagulation and antidysrhythmic therapy.  We will continue that without change.    3.  Symptomatically, her dyspnea is at baseline.  She describes atypical chest pain.  She really has no further cardiovascular issues and reports that she feels as well now from cardiovascular stand point as she has in some time.  I do not feel that further work-up is warranted.  We will make no adjustments.  We will continue to see her on 6-month intervals, sooner for complication.           Advance Care Planning   ACP discussion was held with the patient during this visit. Patient has an advance directive in EMR which is still valid. {          Electronically signed by:

## 2022-02-21 NOTE — PATIENT INSTRUCTIONS

## 2022-03-08 ENCOUNTER — HOSPITAL ENCOUNTER (OUTPATIENT)
Dept: CARDIOLOGY | Facility: HOSPITAL | Age: 72
Discharge: HOME | End: 2022-03-08
Admitting: PHYSICIAN ASSISTANT

## 2022-03-08 DIAGNOSIS — R09.89 BRUIT OF LEFT CAROTID ARTERY: ICD-10-CM

## 2022-03-08 DIAGNOSIS — I73.9 PVD (PERIPHERAL VASCULAR DISEASE): Primary | ICD-10-CM

## 2022-03-08 PROCEDURE — 93880 EXTRACRANIAL BILAT STUDY: CPT | Performed by: INTERNAL MEDICINE

## 2022-03-08 PROCEDURE — 93880 EXTRACRANIAL BILAT STUDY: CPT

## 2022-03-08 RX ORDER — EVOLOCUMAB 140 MG/ML
140 INJECTION, SOLUTION SUBCUTANEOUS
Qty: 6 PEN | Refills: 3 | Status: SHIPPED | OUTPATIENT
Start: 2022-03-08

## 2022-03-26 LAB
BH CV ECHO MEAS - BSA(HAYCOCK): 2.4 M^2
BH CV ECHO MEAS - BSA: 2.2 M^2
BH CV ECHO MEAS - BZI_BMI: 42.4 KILOGRAMS/M^2
BH CV ECHO MEAS - BZI_METRIC_HEIGHT: 167.6 CM
BH CV ECHO MEAS - BZI_METRIC_WEIGHT: 119.3 KG
BH CV MID RIGHT ICA HIDDEN LRR: 1 CM
BH CV RIGHT CCA HIDDEN LRR: 1 CM/S
BH CV VAS CAROTID RIGHT DISTAL TO STENT PSV: 156 CM/S
BH CV VAS CAROTID RIGHT MID STENT PSV: 169 CM/S
BH CV VAS CAROTID RIGHT PROXIMAL STENT PSV: 148 CM/S
BH CV XLRA MEAS LEFT BULB EDV: -19.6 CM/SEC
BH CV XLRA MEAS LEFT BULB PSV: -99.8 CM/SEC
BH CV XLRA MEAS LEFT CCA RATIO VEL: -78.1 CM/SEC
BH CV XLRA MEAS LEFT DIST CCA EDV: -16.4 CM/SEC
BH CV XLRA MEAS LEFT DIST CCA PSV: -78.7 CM/SEC
BH CV XLRA MEAS LEFT DIST ICA EDV: -38.3 CM/SEC
BH CV XLRA MEAS LEFT DIST ICA PSV: -88.4 CM/SEC
BH CV XLRA MEAS LEFT ICA RATIO VEL: -173 CM/SEC
BH CV XLRA MEAS LEFT ICA/CCA RATIO: 2.2
BH CV XLRA MEAS LEFT MID ICA EDV: -47.1 CM/SEC
BH CV XLRA MEAS LEFT MID ICA PSV: -157.1 CM/SEC
BH CV XLRA MEAS LEFT PROX CCA EDV: 11.2 CM/SEC
BH CV XLRA MEAS LEFT PROX CCA PSV: 119.8 CM/SEC
BH CV XLRA MEAS LEFT PROX ECA EDV: -16.3 CM/SEC
BH CV XLRA MEAS LEFT PROX ECA PSV: -218.7 CM/SEC
BH CV XLRA MEAS LEFT PROX ICA EDV: -50.3 CM/SEC
BH CV XLRA MEAS LEFT PROX ICA PSV: -174.7 CM/SEC
BH CV XLRA MEAS LEFT VERTEBRAL A EDV: 19.6 CM/SEC
BH CV XLRA MEAS LEFT VERTEBRAL A PSV: 58.9 CM/SEC
BH CV XLRA MEAS RIGHT BULB EDV: 11 CM/SEC
BH CV XLRA MEAS RIGHT BULB PSV: 45.6 CM/SEC
BH CV XLRA MEAS RIGHT CCA RATIO VEL: 39.6 CM/SEC
BH CV XLRA MEAS RIGHT DIST CCA EDV: 8.8 CM/SEC
BH CV XLRA MEAS RIGHT DIST CCA PSV: 39.9 CM/SEC
BH CV XLRA MEAS RIGHT DIST ICA EDV: -44.2 CM/SEC
BH CV XLRA MEAS RIGHT DIST ICA PSV: -157.1 CM/SEC
BH CV XLRA MEAS RIGHT ICA RATIO VEL: -169 CM/SEC
BH CV XLRA MEAS RIGHT ICA/CCA RATIO: -4.3
BH CV XLRA MEAS RIGHT MID ICA EDV: -50.1 CM/SEC
BH CV XLRA MEAS RIGHT MID ICA PSV: -169.9 CM/SEC
BH CV XLRA MEAS RIGHT PROX CCA EDV: 10.7 CM/SEC
BH CV XLRA MEAS RIGHT PROX CCA PSV: 76.1 CM/SEC
BH CV XLRA MEAS RIGHT PROX ECA EDV: -17.7 CM/SEC
BH CV XLRA MEAS RIGHT PROX ECA PSV: -184.6 CM/SEC
BH CV XLRA MEAS RIGHT PROX ICA EDV: -40.3 CM/SEC
BH CV XLRA MEAS RIGHT PROX ICA PSV: -149.3 CM/SEC
BH CV XLRA MEAS RIGHT VERTEBRAL A EDV: 10.5 CM/SEC
BH CV XLRA MEAS RIGHT VERTEBRAL A PSV: 48 CM/SEC
BH CVPROX RIGHT ICA HIDDEN LRR: 1 CM
MAXIMAL PREDICTED HEART RATE: 150 BPM
STRESS TARGET HR: 128 BPM

## 2022-03-29 ENCOUNTER — TELEPHONE (OUTPATIENT)
Dept: CARDIOLOGY | Facility: CLINIC | Age: 72
End: 2022-03-29

## 2022-03-29 NOTE — TELEPHONE ENCOUNTER
Spoke to patient on carotid results - there is decreased blood flow , we have sent a copy to Dr. Erickson for review     Patient verbalized understanding     AT [facility]           ----- Message from RICKEY Yen sent at 3/28/2022  9:06 AM EDT -----  Forward to Dr. Erickson please.

## 2022-04-14 DIAGNOSIS — R06.02 SHORTNESS OF BREATH: ICD-10-CM

## 2022-04-14 DIAGNOSIS — I48.0 PAROXYSMAL ATRIAL FIBRILLATION: ICD-10-CM

## 2022-04-14 DIAGNOSIS — R00.2 PALPITATIONS: ICD-10-CM

## 2022-04-14 DIAGNOSIS — R60.9 EDEMA, UNSPECIFIED TYPE: ICD-10-CM

## 2022-04-14 RX ORDER — SOTALOL HYDROCHLORIDE 80 MG/1
TABLET ORAL
Qty: 180 TABLET | Refills: 3 | Status: SHIPPED | OUTPATIENT
Start: 2022-04-14 | End: 2022-08-22 | Stop reason: SDUPTHER

## 2022-04-14 RX ORDER — APIXABAN 5 MG/1
TABLET, FILM COATED ORAL
Qty: 180 TABLET | Refills: 3 | Status: SHIPPED | OUTPATIENT
Start: 2022-04-14

## 2022-04-14 RX ORDER — ISOSORBIDE MONONITRATE 30 MG/1
30 TABLET, EXTENDED RELEASE ORAL DAILY
Qty: 90 TABLET | Refills: 3 | Status: SHIPPED | OUTPATIENT
Start: 2022-04-14

## 2022-04-14 RX ORDER — FUROSEMIDE 40 MG/1
TABLET ORAL
Qty: 180 TABLET | Refills: 3 | Status: SHIPPED | OUTPATIENT
Start: 2022-04-14

## 2022-04-14 RX ORDER — TRIAMTERENE AND HYDROCHLOROTHIAZIDE 37.5; 25 MG/1; MG/1
TABLET ORAL
Qty: 90 TABLET | Refills: 3 | Status: SHIPPED | OUTPATIENT
Start: 2022-04-14 | End: 2022-08-22 | Stop reason: SDUPTHER

## 2022-04-18 ENCOUNTER — TELEPHONE (OUTPATIENT)
Dept: CARDIOLOGY | Facility: CLINIC | Age: 72
End: 2022-04-18

## 2022-04-18 DIAGNOSIS — R60.9 EDEMA, UNSPECIFIED TYPE: ICD-10-CM

## 2022-04-18 RX ORDER — POTASSIUM CHLORIDE 20 MEQ/1
20 TABLET, EXTENDED RELEASE ORAL EVERY MORNING
Qty: 30 TABLET | Refills: 3 | Status: SHIPPED | OUTPATIENT
Start: 2022-04-18

## 2022-07-13 DIAGNOSIS — Z86.73 HISTORY OF TIA (TRANSIENT ISCHEMIC ATTACK): ICD-10-CM

## 2022-07-13 RX ORDER — ASPIRIN 81 MG/1
TABLET, COATED ORAL
Qty: 90 TABLET | Refills: 6 | Status: SHIPPED | OUTPATIENT
Start: 2022-07-13

## 2022-07-13 NOTE — TELEPHONE ENCOUNTER
"Provider:  Reed  Surgery/Procedure: Carotid Stent   Surgery/Procedure Date:  06/05/2019  Last visit: Office Visit with Alex Erickson MD (06/26/2019)    Next visit: NA       Reason for call:       Automated refill request for Aspirin.      Requested Prescriptions     Pending Prescriptions Disp Refills   • Aspirin Low Dose 81 MG EC tablet [Pharmacy Med Name: ASPIRIN 81MG EC TABLET 81 Tablet] 90 tablet 6     Sig: TAKE 1 TABLET ONCE DAILY     Per last OV note:    \"  Diagnoses/Plan:    Ms. Jacob is a 68 y.o. female status post angioplasty/stent placement for a high-grade right carotid stenosis.  She is done very well from a neurovascular standpoint with no new stroke or TIA-like symptoms.  At this point, my main concern is her atrial fibrillation, and I think it is reasonable for her to start anticoagulation therapy.  She is going to follow-up with her cardiologist, and I think it is reasonable to discontinue her Plavix and start a NOAC (and 81 mg ASA) for her atrial fibrillation.     I was planning on following up with her in 6 months time with carotid duplex to ensure stability and exclude any recurrent/progression of disease and might necessitate further treatment/intervention; however, she states it is quite a struggle for her to get the Weber, and is going to see if she can have her follow-up carotid duplexes obtained through her cardiologist.  This is certainly reasonable from my standpoint, but I would be happy to see her back at any point should any new issue or concern arise regarding her carotid/cerebral vasculature.\"         "

## 2022-08-22 ENCOUNTER — OFFICE VISIT (OUTPATIENT)
Dept: CARDIOLOGY | Facility: CLINIC | Age: 72
End: 2022-08-22

## 2022-08-22 VITALS
SYSTOLIC BLOOD PRESSURE: 143 MMHG | HEIGHT: 66 IN | HEART RATE: 78 BPM | DIASTOLIC BLOOD PRESSURE: 70 MMHG | OXYGEN SATURATION: 92 % | WEIGHT: 270 LBS | BODY MASS INDEX: 43.39 KG/M2

## 2022-08-22 DIAGNOSIS — I65.23 BILATERAL CAROTID ARTERY STENOSIS: Primary | ICD-10-CM

## 2022-08-22 DIAGNOSIS — R42 DIZZINESS: ICD-10-CM

## 2022-08-22 DIAGNOSIS — R00.2 PALPITATIONS: ICD-10-CM

## 2022-08-22 DIAGNOSIS — R60.9 EDEMA, UNSPECIFIED TYPE: ICD-10-CM

## 2022-08-22 DIAGNOSIS — I48.0 PAROXYSMAL ATRIAL FIBRILLATION: ICD-10-CM

## 2022-08-22 PROCEDURE — 93000 ELECTROCARDIOGRAM COMPLETE: CPT | Performed by: PHYSICIAN ASSISTANT

## 2022-08-22 PROCEDURE — 99214 OFFICE O/P EST MOD 30 MIN: CPT | Performed by: PHYSICIAN ASSISTANT

## 2022-08-22 RX ORDER — SOTALOL HYDROCHLORIDE 80 MG/1
80 TABLET ORAL 2 TIMES DAILY
Qty: 180 TABLET | Refills: 3 | Status: SHIPPED | OUTPATIENT
Start: 2022-08-22

## 2022-08-22 RX ORDER — TRIAMTERENE AND HYDROCHLOROTHIAZIDE 37.5; 25 MG/1; MG/1
1 TABLET ORAL EVERY MORNING
Qty: 90 TABLET | Refills: 3 | Status: SHIPPED | OUTPATIENT
Start: 2022-08-22

## 2022-08-22 NOTE — PROGRESS NOTES
Problem list     Subjective   Mandi Jacob is a 72 y.o. female     Chief Complaint   Patient presents with   • Atrial Fibrillation     6 month f/u   • Coronary Artery Disease   • Shortness of Breath   Problem List:  1. Nonobstructive CAD by cath February 2012.  2. Preserved systolic function  3. History of CHF  4. Diastolic dysfunction  5. Hypertension      6. Dyslipidemia. The patient reports she is intolerant to statins.      7. Chronic palpitations.      7.1.  Recurrent PACs noted by telemetry historically.  The patient was treated with sotalol therapy for the same.      7.2.  A. fib with rapid ventricular response noted by event monitor, 2019.      8.  Peripheral vascular disease, status post angioplasty and stenting to the right carotid artery system, 06/19 with Dr. Erickson    HPI  The patient presents into the clinic today for routine evaluation and follow-up.  Since last evaluation, the patient has done well.  She did have a carotid duplex after last evaluation to evaluate known disease.  She had a patent stent to the right internal carotid artery.  She had residual 50 to 69% disease in the left internal carotid artery stable from prior study.  We have requested that all that be sent to Dr. Erickson.  From general cardiovascular standpoint, she feels that she has done well.  She denies chest pain.  Dyspnea remains severe but at baseline and secondary to pulmonary status.  The patient denies failure symptoms.  She rarely experiences palpitations.  She has no further complaints at this time.    Current Outpatient Medications on File Prior to Visit   Medication Sig Dispense Refill   • albuterol (PROVENTIL HFA;VENTOLIN HFA) 108 (90 BASE) MCG/ACT inhaler Inhale 2 puffs Every 4 (Four) Hours As Needed. Albuterol 90 MCG/ACT AERS; Patient Sig: Albuterol 90 MCG/ACT AERS INHALE 1 TO 2 PUFFS EVERY 4 TO 6 HOURS AS NEEDED.; 0; 11-Sep-2013; Active     • albuterol (PROVENTIL) (2.5 MG/3ML) 0.083% nebulizer solution  Albuterol Sulfate (2.5 MG/3ML) 0.083% Inhalation Nebulization Solution; Patient Sig: Albuterol Sulfate (2.5 MG/3ML) 0.083% Inhalation Nebulization Solution USE 1 UNIT DOSE IN NEBULIZER EVERY 4 TO 6 HOURS AS NEE     • Aspirin Low Dose 81 MG EC tablet TAKE 1 TABLET ONCE DAILY 90 tablet 6   • Eliquis 5 MG tablet tablet TAKE 1 TABLET EVERY 12 HOURS 180 tablet 3   • furosemide (LASIX) 40 MG tablet TAKE 1 TABLET TWICE DAILY 180 tablet 3   • insulin aspart (novoLOG FLEXPEN) 100 UNIT/ML solution pen-injector sc pen Inject 15 Units under the skin into the appropriate area as directed Daily With Breakfast.     • isosorbide mononitrate (IMDUR) 30 MG 24 hr tablet TAKE 1 TABLET BY MOUTH DAILY. 90 tablet 3   • losartan (COZAAR) 50 MG tablet TAKE 1 TABLET BY MOUTH EVERY MORNING. 30 tablet 5   • magnesium oxide (MAGOX) 400 (241.3 MG) MG tablet tablet Take 400 mg by mouth Every Night.     • metFORMIN ER (GLUCOPHAGE-XR) 500 MG 24 hr tablet Take 500 mg by mouth Daily With Breakfast.  11   • metolazone (ZAROXOLYN) 2.5 MG tablet Take 2.5 mg by mouth Daily As Needed. 1 tablet 30 minutes prior to lasix as needed PRN       • metoprolol tartrate (LOPRESSOR) 25 MG tablet ~102Q.5 TAKE 1/2 TABLET TWICE A DAY 90 tablet 3   • montelukast (SINGULAIR) 10 MG tablet Take 10 mg by mouth Every Night.     • nitroglycerin (NITROSTAT) 0.4 MG SL tablet Place 1 tablet under the tongue Every 5 (Five) Minutes As Needed for Chest Pain. 25 tablet 2   • PARoxetine (PAXIL) 20 MG tablet Daily.     • potassium chloride (K-DUR,KLOR-CON) 10 MEQ CR tablet Take 10 mEq by mouth Every Night.     • potassium chloride (K-DUR,KLOR-CON) 20 MEQ CR tablet Take 1 tablet by mouth Every Morning. 30 tablet 3   • Repatha SureClick solution auto-injector SureClick injection INJECT 1 ML UNDER THE SKIN INTO THE APPROPRIATE AREA AS DIRECTED EVERY 14 (FOURTEEN) DAYS. 6 pen 3   • tiotropium (SPIRIVA) 18 MCG per inhalation capsule Place 1 capsule into inhaler and inhale Every Morning.      • TRESIBA FLEXTOUCH 200 UNIT/ML solution pen-injector Inject 100 Units under the skin into the appropriate area as directed Every Morning. 100 units daily     • [DISCONTINUED] sotalol (BETAPACE) 80 MG tablet TAKE 1 TABLET TWICE DAILY 180 tablet 3   • [DISCONTINUED] triamterene-hydrochlorothiazide (MAXZIDE-25) 37.5-25 MG per tablet TAKE 1 TABLET EACH MORNING 90 tablet 3     No current facility-administered medications on file prior to visit.       Ace inhibitors, Diltiazem, Flecainide, Incruse ellipta [umeclidinium bromide], Lisinopril, Niacin, Ketorolac tromethamine, Buspirone, Ciprofloxacin, Citalopram, Clindamycin/lincomycin, Codeine, Levaquin [levofloxacin], Lipitor [atorvastatin], Liraglutide, Lorazepam, Sertraline, Statins, Sulfamethoxazole-trimethoprim, Trulicity [dulaglutide], and Xigduo xr [dapagliflozin-metformin hcl er]    Past Medical History:   Diagnosis Date   • Arrhythmia    • Asthma    • Atrial fibrillation (HCC)    • CAD (coronary artery disease)     non-obstructive by cath 2012   • CHF (congestive heart failure) ([facility])    • COPD (chronic obstructive pulmonary disease) ([facility])    • Diabetes mellitus ([facility])    • Diastolic dysfunction    • Dizziness    • Edema    • Esophageal spasm    • Herniated lumbar intervertebral disc     L5   • Hyperlipidemia     intolerant to statins    • Hypertension    • Osteoarthritis    • Palpitations    • SOB (shortness of breath)    • Stroke ([facility])    • Stroke-like symptoms    • TIA (transient ischemic attack) 2015    left hemispheric TIA/CVA   • Unstable angina ([facility])        Social History     Socioeconomic History   • Marital status:    Tobacco Use   • Smoking status: Former Smoker     Packs/day: 1.50     Years: 41.00     Pack years: 61.50     Start date: 1968     Quit date: 3/22/2009     Years since quittin.4   • Smokeless tobacco: Never Used   Substance and Sexual Activity   • Alcohol use: No   • Drug use: No   • Sexual activity: Never       Family  "History   Problem Relation Age of Onset   • Heart attack Mother    • Heart failure Mother         congestive   • Hyperlipidemia Mother    • Hypertension Mother    • Peripheral vascular disease Mother    • Heart attack Father    • Heart failure Father         congestive   • Diabetes Father    • Hyperlipidemia Father    • Hypertension Father    • Peripheral vascular disease Father    • Asthma Father    • Heart failure Other         congestive   • Diabetes Other    • Hyperlipidemia Other    • Hypertension Other    • Peripheral vascular disease Other    • Hypertension Brother        Review of Systems   Constitutional: Positive for fatigue. Negative for chills, diaphoresis and fever.   HENT: Negative.    Eyes: Negative.  Negative for visual disturbance (cataract surgery this year).   Respiratory: Positive for apnea (2 L) and shortness of breath (2 L daily). Negative for cough, chest tightness and wheezing.    Cardiovascular: Negative.  Negative for chest pain, palpitations and leg swelling.   Gastrointestinal: Negative.  Negative for abdominal pain, blood in stool, constipation, diarrhea, nausea and vomiting.   Endocrine: Negative.    Genitourinary: Negative.  Negative for hematuria.   Musculoskeletal: Positive for arthralgias, back pain, myalgias and neck pain.   Skin: Negative.    Neurological: Negative.  Negative for dizziness, syncope, weakness, light-headedness, numbness and headaches.   Hematological: Bruises/bleeds easily (on Eliquis).   Psychiatric/Behavioral: Negative.  Negative for sleep disturbance (up to bathroom).       Objective   Vitals:    08/22/22 1437   BP: 143/70   BP Location: Left arm   Patient Position: Sitting   Pulse: 78   SpO2: 92%   Weight: 122 kg (270 lb)   Height: 167.6 cm (66\")      /70 (BP Location: Left arm, Patient Position: Sitting)   Pulse 78   Ht 167.6 cm (66\")   Wt 122 kg (270 lb)   SpO2 92% Comment: 2 L 02  BMI 43.58 kg/m²    Lab Results (most recent)     None    "     Physical Exam  Vitals and nursing note reviewed.   Constitutional:       General: She is not in acute distress.     Appearance: She is well-developed.   HENT:      Head: Normocephalic and atraumatic.   Eyes:      Conjunctiva/sclera: Conjunctivae normal.      Pupils: Pupils are equal, round, and reactive to light.   Neck:      Vascular: No JVD.      Trachea: No tracheal deviation.   Cardiovascular:      Rate and Rhythm: Normal rate and regular rhythm.      Heart sounds: Normal heart sounds.   Pulmonary:      Effort: Pulmonary effort is normal.      Breath sounds: Normal breath sounds.   Abdominal:      General: Bowel sounds are normal. There is no distension.      Palpations: Abdomen is soft. There is no mass.      Tenderness: There is no abdominal tenderness. There is no guarding or rebound.   Musculoskeletal:         General: No tenderness or deformity. Normal range of motion.      Cervical back: Normal range of motion and neck supple.   Skin:     General: Skin is warm and dry.      Coloration: Skin is not pale.      Findings: No erythema or rash.   Neurological:      Mental Status: She is alert and oriented to person, place, and time.   Psychiatric:         Behavior: Behavior normal.         Thought Content: Thought content normal.         Judgment: Judgment normal.           Procedure     ECG 12 Lead    Date/Time: 8/22/2022 2:42 PM  Performed by: Mark Brito PA  Authorized by: Mark Brito PA   Comparison: compared with previous ECG from 2/28/2022  Comparison to previous ECG: Sinus rhythm, rate 70, biphasic P wave, normal axis, no acute changes noted.                 Assessment & Plan      Diagnosis Plan   1. Bilateral carotid artery stenosis  Duplex Carotid Ultrasound CAR   2. Dizziness  Duplex Carotid Ultrasound CAR   3. Edema, unspecified type  triamterene-hydrochlorothiazide (MAXZIDE-25) 37.5-25 MG per tablet   4. Paroxysmal atrial fibrillation (HCC)  sotalol (BETAPACE) 80 MG tablet   5.  Palpitations       1.  At this time, patient appears to be doing well from general cardiovascular standpoint.  A. maikel is well treated on antidysrhythmic and anticoagulation therapy.  We will continue that without change.    2.  I would now repeat a carotid duplex as her last study was almost 6 months ago.  We have requested that carotid duplex findings be forwarded to Dr. Erickson.    3.  We will make no adjustments in medications.  We did give her refills of requested medications.    4.  If carotid duplex findings are stable and she remains mostly asymptomatic from cardiovascular standpoint, nothing further would be indicated.  We would see her still on 6-month intervals at that time.           Mandi Jacob  reports that she quit smoking about 13 years ago. She started smoking about 54 years ago. She has a 61.50 pack-year smoking history. She has never used smokeless tobacco..      Advance Care Planning   ACP discussion was held with the patient during this visit. Patient has an advance directive in EMR which is still valid.          Electronically signed by:

## 2022-09-15 ENCOUNTER — HOSPITAL ENCOUNTER (OUTPATIENT)
Dept: CARDIOLOGY | Facility: HOSPITAL | Age: 72
Discharge: HOME | End: 2022-09-15
Admitting: PHYSICIAN ASSISTANT

## 2022-09-15 DIAGNOSIS — R42 DIZZINESS: ICD-10-CM

## 2022-09-15 DIAGNOSIS — I65.23 BILATERAL CAROTID ARTERY STENOSIS: ICD-10-CM

## 2022-09-15 PROCEDURE — 93880 EXTRACRANIAL BILAT STUDY: CPT

## 2022-09-15 PROCEDURE — 93880 EXTRACRANIAL BILAT STUDY: CPT | Performed by: INTERNAL MEDICINE

## 2022-10-06 LAB
BH CV MID RIGHT ICA HIDDEN LRR: 1 CM
BH CV RIGHT CCA HIDDEN LRR: 1 CM/S
BH CV XLRA MEAS LEFT DIST CCA EDV: -16 CM/SEC
BH CV XLRA MEAS LEFT DIST CCA PSV: -81.1 CM/SEC
BH CV XLRA MEAS LEFT DIST ICA EDV: -41.6 CM/SEC
BH CV XLRA MEAS LEFT DIST ICA PSV: -105.3 CM/SEC
BH CV XLRA MEAS LEFT ICA/CCA RATIO: 2.27
BH CV XLRA MEAS LEFT MID ICA EDV: -42.4 CM/SEC
BH CV XLRA MEAS LEFT MID ICA PSV: -179.1 CM/SEC
BH CV XLRA MEAS LEFT PROX CCA EDV: 15.4 CM/SEC
BH CV XLRA MEAS LEFT PROX CCA PSV: 104.1 CM/SEC
BH CV XLRA MEAS LEFT PROX ECA EDV: -18.1 CM/SEC
BH CV XLRA MEAS LEFT PROX ECA PSV: -167.4 CM/SEC
BH CV XLRA MEAS LEFT PROX ICA EDV: -43.2 CM/SEC
BH CV XLRA MEAS LEFT PROX ICA PSV: -183.7 CM/SEC
BH CV XLRA MEAS LEFT VERTEBRAL A EDV: -9.8 CM/SEC
BH CV XLRA MEAS LEFT VERTEBRAL A PSV: -54.5 CM/SEC
BH CV XLRA MEAS RIGHT DIST CCA EDV: 14.8 CM/SEC
BH CV XLRA MEAS RIGHT DIST CCA PSV: 62.4 CM/SEC
BH CV XLRA MEAS RIGHT DIST ICA EDV: -36.7 CM/SEC
BH CV XLRA MEAS RIGHT DIST ICA PSV: -165.9 CM/SEC
BH CV XLRA MEAS RIGHT ICA/CCA RATIO: -2.7
BH CV XLRA MEAS RIGHT MID ICA EDV: -34.8 CM/SEC
BH CV XLRA MEAS RIGHT MID ICA PSV: -162.8 CM/SEC
BH CV XLRA MEAS RIGHT PROX CCA EDV: 14 CM/SEC
BH CV XLRA MEAS RIGHT PROX CCA PSV: 83.4 CM/SEC
BH CV XLRA MEAS RIGHT PROX ECA EDV: -38.7 CM/SEC
BH CV XLRA MEAS RIGHT PROX ECA PSV: -386.8 CM/SEC
BH CV XLRA MEAS RIGHT PROX ICA EDV: -39.3 CM/SEC
BH CV XLRA MEAS RIGHT PROX ICA PSV: -151.5 CM/SEC
BH CV XLRA MEAS RIGHT VERTEBRAL A EDV: 14.9 CM/SEC
BH CV XLRA MEAS RIGHT VERTEBRAL A PSV: 49.1 CM/SEC
BH CVPROX RIGHT ICA HIDDEN LRR: 1 CM
MAXIMAL PREDICTED HEART RATE: 149 BPM
STRESS TARGET HR: 127 BPM

## 2022-10-07 ENCOUNTER — TELEPHONE (OUTPATIENT)
Dept: CARDIOLOGY | Facility: CLINIC | Age: 72
End: 2022-10-07

## 2022-10-07 NOTE — TELEPHONE ENCOUNTER
Patient informed and results sent to Dr Erickson.     ----- Message from RICKEY Yen sent at 10/7/2022  8:55 AM EDT -----  Forward to her neurosurgical team at patient request.    Result Text  1.  Both common carotid arteries are patent.     2.  There is a stent in the distal right common carotid artery extending through the bifurcation and into the right internal carotid artery.  Although systolic flow velocities are mildly increased, there appears to be no hemodynamically significant IntraStent restenosis.  Doppler is compatible with less than 50% stenosis throughout the stented segment.  There is diffuse atheroma in the bulb and bifurcation on the left extending into the proximal mid left internal carotid artery with up to 50 to 69% stenosis by Doppler with elevated systolic and diastolic flow velocities as well as increased internal carotid artery to common carotid artery ratios.  Echo demonstrates a somewhat lesser degree of stenosis.     3.  Antegrade flow in both vertebral arteries     Summary: Patent stent in the right carotid.  Diffuse moderate (but probably not hemodynamically severe) stenosis in the left internal carotid artery.  If felt clinically indicated CTA could be utilized to further evaluate the left carotid given the disparity between Doppler flow velocities and limited atheroma on somewhat suboptimal echocardiographic imaging.

## 2023-02-28 ENCOUNTER — OFFICE VISIT (OUTPATIENT)
Dept: CARDIOLOGY | Facility: CLINIC | Age: 73
End: 2023-02-28
Payer: MEDICARE

## 2023-02-28 VITALS
DIASTOLIC BLOOD PRESSURE: 60 MMHG | HEART RATE: 67 BPM | HEIGHT: 66 IN | SYSTOLIC BLOOD PRESSURE: 132 MMHG | OXYGEN SATURATION: 89 % | WEIGHT: 270 LBS | BODY MASS INDEX: 43.39 KG/M2

## 2023-02-28 DIAGNOSIS — R06.02 SHORTNESS OF BREATH: ICD-10-CM

## 2023-02-28 DIAGNOSIS — I48.0 PAROXYSMAL ATRIAL FIBRILLATION: ICD-10-CM

## 2023-02-28 DIAGNOSIS — I65.23 BILATERAL CAROTID ARTERY STENOSIS: Primary | ICD-10-CM

## 2023-02-28 PROCEDURE — 93000 ELECTROCARDIOGRAM COMPLETE: CPT | Performed by: PHYSICIAN ASSISTANT

## 2023-02-28 PROCEDURE — 99214 OFFICE O/P EST MOD 30 MIN: CPT | Performed by: PHYSICIAN ASSISTANT

## 2023-02-28 RX ORDER — ACLIDINIUM BROMIDE 400 UG/1
1 POWDER, METERED RESPIRATORY (INHALATION) 2 TIMES DAILY
COMMUNITY
Start: 2022-12-29

## 2023-02-28 NOTE — PROGRESS NOTES
Subjective   Mandi Jacob is a 72 y.o. female     Chief Complaint   Patient presents with   • Follow-up     Bilateral carotid artery stenosis     Problem List:  1. Nonobstructive CAD by cath February 2012.  2. Preserved systolic function  3. History of CHF  4. Diastolic dysfunction  5. Hypertension      6. Dyslipidemia. The patient reports she is intolerant to statins.      7. Chronic palpitations.      7.1.  Recurrent PACs noted by telemetry historically.  The patient was treated with sotalol therapy for the same.      7.2.  A. fib with rapid ventricular response noted by event monitor, 2019.      8.  Peripheral vascular disease, status post angioplasty and stenting to the right carotid artery system, 06/19 with Dr. Dre ANN  The patient presents in clinic today for routine evaluation and follow-up.  The patient has cardiovascular history as outlined above.  For the most part, the patient is done fairly well since last evaluation.  She currently denies chest pain.  Dyspnea remains at baseline and felt secondary to pulmonary status for the most part.  The patient has no PND nor orthopnea.  Lower extremity edema is stable.  Blood pressures are mostly well controlled on current medical regimen.  She denies chest pain or anginal equivalent symptoms otherwise.  The patient has no further complaints and feels at baseline.      Current Outpatient Medications   Medication Sig Dispense Refill   • albuterol (PROVENTIL HFA;VENTOLIN HFA) 108 (90 BASE) MCG/ACT inhaler Inhale 2 puffs Every 4 (Four) Hours As Needed. Albuterol 90 MCG/ACT AERS; Patient Sig: Albuterol 90 MCG/ACT AERS INHALE 1 TO 2 PUFFS EVERY 4 TO 6 HOURS AS NEEDED.; 0; 11-Sep-2013; Active     • albuterol (PROVENTIL) (2.5 MG/3ML) 0.083% nebulizer solution Albuterol Sulfate (2.5 MG/3ML) 0.083% Inhalation Nebulization Solution; Patient Sig: Albuterol Sulfate (2.5 MG/3ML) 0.083% Inhalation Nebulization Solution USE 1 UNIT DOSE IN NEBULIZER EVERY 4 TO 6  HOURS AS NEE     • Aspirin Low Dose 81 MG EC tablet TAKE 1 TABLET ONCE DAILY 90 tablet 6   • Eliquis 5 MG tablet tablet TAKE 1 TABLET EVERY 12 HOURS 180 tablet 3   • furosemide (LASIX) 40 MG tablet TAKE 1 TABLET TWICE DAILY 180 tablet 3   • insulin aspart (novoLOG FLEXPEN) 100 UNIT/ML solution pen-injector sc pen Inject 15 Units under the skin into the appropriate area as directed Daily With Breakfast.     • isosorbide mononitrate (IMDUR) 30 MG 24 hr tablet TAKE 1 TABLET BY MOUTH DAILY. 90 tablet 3   • losartan (COZAAR) 50 MG tablet TAKE 1 TABLET BY MOUTH EVERY MORNING. 30 tablet 5   • magnesium oxide (MAGOX) 400 (241.3 MG) MG tablet tablet Take 1 tablet by mouth Every Night.     • metFORMIN ER (GLUCOPHAGE-XR) 500 MG 24 hr tablet Take 1 tablet by mouth Daily With Breakfast.  11   • metolazone (ZAROXOLYN) 2.5 MG tablet Take 1 tablet by mouth Daily As Needed. 1 tablet 30 minutes prior to lasix as needed PRN     • metoprolol tartrate (LOPRESSOR) 25 MG tablet ~102Q.5 TAKE 1/2 TABLET TWICE A DAY 90 tablet 3   • montelukast (SINGULAIR) 10 MG tablet Take 1 tablet by mouth Every Night.     • nitroglycerin (NITROSTAT) 0.4 MG SL tablet Place 1 tablet under the tongue Every 5 (Five) Minutes As Needed for Chest Pain. 25 tablet 2   • PARoxetine (PAXIL) 20 MG tablet Daily.     • potassium chloride (K-DUR,KLOR-CON) 10 MEQ CR tablet Take 1 tablet by mouth Every Night.     • potassium chloride (K-DUR,KLOR-CON) 20 MEQ CR tablet Take 1 tablet by mouth Every Morning. 30 tablet 3   • Repatha SureClick solution auto-injector SureClick injection INJECT 1 ML UNDER THE SKIN INTO THE APPROPRIATE AREA AS DIRECTED EVERY 14 (FOURTEEN) DAYS. 6 pen 3   • sotalol (BETAPACE) 80 MG tablet Take 1 tablet by mouth 2 (Two) Times a Day. 180 tablet 3   • TRESIBA FLEXTOUCH 200 UNIT/ML solution pen-injector Inject 100 Units under the skin into the appropriate area as directed Every Morning. 100 units daily     • triamterene-hydrochlorothiazide (MAXZIDE-25)  37.5-25 MG per tablet Take 1 tablet by mouth Every Morning. 90 tablet 3   • Tudorza Pressair 400 MCG/ACT aerosol powder  powder for inhalation Inhale 1 puff 2 (Two) Times a Day.       No current facility-administered medications for this visit.       Ace inhibitors, Diltiazem, Flecainide, Incruse ellipta [umeclidinium bromide], Lisinopril, Niacin, Ketorolac tromethamine, Buspirone, Ciprofloxacin, Citalopram, Clindamycin/lincomycin, Codeine, Levaquin [levofloxacin], Lipitor [atorvastatin], Liraglutide, Lorazepam, Sertraline, Statins, Sulfamethoxazole-trimethoprim, Trulicity [dulaglutide], and Xigduo xr [dapagliflozin-metformin hcl er]    Past Medical History:   Diagnosis Date   • Arrhythmia    • Asthma    • Atrial fibrillation (HCC)    • CAD (coronary artery disease)     non-obstructive by cath 2012   • CHF (congestive heart failure) ([facility])    • COPD (chronic obstructive pulmonary disease) ([facility])    • Diabetes mellitus ([facility])    • Diastolic dysfunction    • Dizziness    • Edema    • Esophageal spasm    • Herniated lumbar intervertebral disc     L5   • Hyperlipidemia     intolerant to statins    • Hypertension    • Osteoarthritis    • Palpitations    • SOB (shortness of breath)    • Stroke ([facility])    • Stroke-like symptoms    • TIA (transient ischemic attack) 2015    left hemispheric TIA/CVA   • Unstable angina ([facility])        Social History     Socioeconomic History   • Marital status:    Tobacco Use   • Smoking status: Former     Packs/day: 1.50     Years: 41.00     Pack years: 61.50     Types: Cigarettes     Start date: 1968     Quit date: 3/22/2009     Years since quittin.9   • Smokeless tobacco: Never   Substance and Sexual Activity   • Alcohol use: No   • Drug use: No   • Sexual activity: Never       Family History   Problem Relation Age of Onset   • Heart attack Mother    • Heart failure Mother         congestive   • Hyperlipidemia Mother    • Hypertension Mother    • Peripheral vascular disease  "Mother    • Heart attack Father    • Heart failure Father         congestive   • Diabetes Father    • Hyperlipidemia Father    • Hypertension Father    • Peripheral vascular disease Father    • Asthma Father    • Heart failure Other         congestive   • Diabetes Other    • Hyperlipidemia Other    • Hypertension Other    • Peripheral vascular disease Other    • Hypertension Brother        Review of Systems   Constitutional: Negative.  Negative for activity change, appetite change, chills, fatigue and fever.   HENT: Negative.  Negative for congestion.    Eyes: Negative.  Negative for visual disturbance.   Respiratory: Negative.  Negative for apnea, cough, chest tightness, shortness of breath and wheezing.    Cardiovascular: Negative.  Negative for chest pain, palpitations and leg swelling.   Gastrointestinal: Negative.  Negative for blood in stool.   Endocrine: Negative.  Negative for cold intolerance and heat intolerance.   Genitourinary: Negative.  Negative for hematuria.   Musculoskeletal: Positive for arthralgias. Negative for back pain, gait problem, joint swelling, neck pain and neck stiffness.   Skin: Negative.  Negative for color change, rash and wound.   Allergic/Immunologic: Negative.  Negative for environmental allergies and food allergies.   Neurological: Positive for dizziness and light-headedness. Negative for syncope, weakness, numbness and headaches.   Hematological: Bruises/bleeds easily.   Psychiatric/Behavioral: Negative for sleep disturbance.       Objective     Vitals:    02/28/23 1329   BP: 132/60   BP Location: Left arm   Patient Position: Sitting   Cuff Size: Adult   Pulse: 67   SpO2: (!) 89%   Weight: 122 kg (270 lb)   Height: 167.6 cm (66\")        /60 (BP Location: Left arm, Patient Position: Sitting, Cuff Size: Adult)   Pulse 67   Ht 167.6 cm (66\")   Wt 122 kg (270 lb)   SpO2 (!) 89%   BMI 43.58 kg/m²      Lab Results (most recent)     None          Physical Exam  Vitals and " nursing note reviewed.   Constitutional:       General: She is not in acute distress.     Appearance: She is well-developed.   HENT:      Head: Normocephalic and atraumatic.   Eyes:      Conjunctiva/sclera: Conjunctivae normal.      Pupils: Pupils are equal, round, and reactive to light.   Neck:      Vascular: No JVD.      Trachea: No tracheal deviation.   Cardiovascular:      Rate and Rhythm: Normal rate and regular rhythm.      Pulses:           Dorsalis pedis pulses are 1+ on the right side and 1+ on the left side.        Posterior tibial pulses are 1+ on the right side and 1+ on the left side.      Heart sounds: Murmur heard.    Systolic murmur is present with a grade of 1/6.  Pulmonary:      Effort: Pulmonary effort is normal.      Breath sounds: Normal breath sounds. Decreased air movement present.   Abdominal:      General: Bowel sounds are normal. There is no distension.      Palpations: Abdomen is soft. There is no mass.      Tenderness: There is no abdominal tenderness. There is no guarding or rebound.   Musculoskeletal:         General: No tenderness or deformity. Normal range of motion.      Cervical back: Normal range of motion and neck supple.      Right lower le+ Edema present.      Left lower le+ Edema present.   Skin:     General: Skin is warm and dry.      Coloration: Skin is not pale.      Findings: No erythema or rash.   Neurological:      Mental Status: She is alert and oriented to person, place, and time.   Psychiatric:         Behavior: Behavior normal.         Thought Content: Thought content normal.         Judgment: Judgment normal.         Procedure     ECG 12 Lead    Date/Time: 2023 1:35 PM  Performed by: Mark Brito PA  Authorized by: Mark Brito PA   Comparison: compared with previous ECG from 2022  Comparison to previous ECG: Sinus arrhythmia, unusual P wave axis, PACs are noted, a PVC is noted, no acute changes noted                   Assessment & Plan       Diagnosis Plan   1. Bilateral carotid artery stenosis  Adult Transthoracic Echo Complete W/ Cont if Necessary Per Protocol    Duplex Carotid Ultrasound CAR      2. Paroxysmal atrial fibrillation (HCC)  Adult Transthoracic Echo Complete W/ Cont if Necessary Per Protocol    Duplex Carotid Ultrasound CAR      3. Shortness of breath  Adult Transthoracic Echo Complete W/ Cont if Necessary Per Protocol    Duplex Carotid Ultrasound CAR        1.  She will need a repeat carotid duplex which is to be performed in April, 6 months after her last study.  She had a patent stent to the right carotid artery system and moderate disease in the left carotid system.  We need to reevaluate that.  I have requested that this be forwarded onto Dr. Erickson.    2.  We will schedule for an echo at the same time.  We can evaluate again LV size and function, valvular morphologies, and cardiac structure otherwise.    3.  For the most part, the patient appears very much stable.  I would continue medical regimen without change.  It is noted that A-fib remains well treated with sotalol and anticoagulation therapy.    4.  If the above studies indicate stability, nothing further and we will continue to see her on a 6-month interval.           Patient did not bring med list or medicine bottles to appointment, med list has been reviewed and updated based on patient's knowledge of their meds.     Advance Care Planning   ACP discussion was declined by the patient. Patient has an advance directive in EMR which is still valid.          Electronically signed by:

## 2023-03-30 ENCOUNTER — HOSPITAL ENCOUNTER (OUTPATIENT)
Dept: CARDIOLOGY | Facility: HOSPITAL | Age: 73
Discharge: HOME | End: 2023-03-30
Payer: MEDICARE

## 2023-03-30 VITALS — HEIGHT: 66 IN | BODY MASS INDEX: 43.23 KG/M2 | WEIGHT: 268.96 LBS

## 2023-03-30 DIAGNOSIS — I65.23 BILATERAL CAROTID ARTERY STENOSIS: ICD-10-CM

## 2023-03-30 DIAGNOSIS — R06.02 SHORTNESS OF BREATH: ICD-10-CM

## 2023-03-30 DIAGNOSIS — I48.0 PAROXYSMAL ATRIAL FIBRILLATION: ICD-10-CM

## 2023-03-30 LAB
AORTIC DIMENSIONLESS INDEX: 0.93 (DI)
BH CV DISTAL RIGHT ICA HIDDEN LRR: 1 CM
BH CV ECHO MEAS - ACS: 1.54 CM
BH CV ECHO MEAS - AO MAX PG: 4.8 MMHG
BH CV ECHO MEAS - AO MEAN PG: 2.6 MMHG
BH CV ECHO MEAS - AO ROOT DIAM: 3.1 CM
BH CV ECHO MEAS - AO V2 MAX: 109.2 CM/SEC
BH CV ECHO MEAS - AO V2 VTI: 26.3 CM
BH CV ECHO MEAS - EDV(CUBED): 102.1 ML
BH CV ECHO MEAS - ESV(CUBED): 41.8 ML
BH CV ECHO MEAS - FS: 25.7 %
BH CV ECHO MEAS - IVS/LVPW: 1.05 CM
BH CV ECHO MEAS - IVSD: 1.06 CM
BH CV ECHO MEAS - LA DIMENSION: 4.9 CM
BH CV ECHO MEAS - LAT PEAK E' VEL: 8.8 CM/SEC
BH CV ECHO MEAS - LV MASS(C)D: 171.9 GRAMS
BH CV ECHO MEAS - LV MAX PG: 4 MMHG
BH CV ECHO MEAS - LV MEAN PG: 2 MMHG
BH CV ECHO MEAS - LV V1 MAX: 100.3 CM/SEC
BH CV ECHO MEAS - LV V1 VTI: 23.4 CM
BH CV ECHO MEAS - LVIDD: 4.7 CM
BH CV ECHO MEAS - LVIDS: 3.5 CM
BH CV ECHO MEAS - LVPWD: 1.02 CM
BH CV ECHO MEAS - MED PEAK E' VEL: 7 CM/SEC
BH CV ECHO MEAS - MV A MAX VEL: 75.6 CM/SEC
BH CV ECHO MEAS - MV DEC SLOPE: 427.5 CM/SEC2
BH CV ECHO MEAS - MV DEC TIME: 0.25 MSEC
BH CV ECHO MEAS - MV E MAX VEL: 108 CM/SEC
BH CV ECHO MEAS - MV E/A: 1.43
BH CV ECHO MEAS - MV MAX PG: 5.1 MMHG
BH CV ECHO MEAS - MV MEAN PG: 1.81 MMHG
BH CV ECHO MEAS - MV P1/2T: 74.8 MSEC
BH CV ECHO MEAS - MV V2 VTI: 32.9 CM
BH CV ECHO MEAS - MVA(P1/2T): 2.9 CM2
BH CV ECHO MEAS - PA V2 MAX: 104.3 CM/SEC
BH CV ECHO MEAS - RAP SYSTOLE: 8 MMHG
BH CV ECHO MEAS - RV MAX PG: 3.9 MMHG
BH CV ECHO MEAS - RV V1 MAX: 99.1 CM/SEC
BH CV ECHO MEAS - RV V1 VTI: 27.2 CM
BH CV ECHO MEAS - RVDD: 3.9 CM
BH CV ECHO MEAS - RVSP: 15.7 MMHG
BH CV ECHO MEAS - TAPSE (>1.6): 2.02 CM
BH CV ECHO MEAS - TR MAX PG: 7.7 MMHG
BH CV ECHO MEAS - TR MAX VEL: 139.1 CM/SEC
BH CV ECHO MEASUREMENTS AVERAGE E/E' RATIO: 13.67
BH CV MID RIGHT ICA HIDDEN LRR: 1 CM
BH CV RIGHT CCA HIDDEN LRR: 1 CM/S
BH CV XLRA - TDI S': 12.7 CM/SEC
BH CV XLRA MEAS LEFT DIST CCA EDV: 14.7 CM/SEC
BH CV XLRA MEAS LEFT DIST CCA PSV: 92.3 CM/SEC
BH CV XLRA MEAS LEFT DIST ICA EDV: -33.2 CM/SEC
BH CV XLRA MEAS LEFT DIST ICA PSV: -96.9 CM/SEC
BH CV XLRA MEAS LEFT ICA/CCA RATIO: -2.35
BH CV XLRA MEAS LEFT MID ICA EDV: -65.4 CM/SEC
BH CV XLRA MEAS LEFT MID ICA PSV: -199.9 CM/SEC
BH CV XLRA MEAS LEFT PROX CCA EDV: 14.7 CM/SEC
BH CV XLRA MEAS LEFT PROX CCA PSV: 103.1 CM/SEC
BH CV XLRA MEAS LEFT PROX ECA EDV: -31.4 CM/SEC
BH CV XLRA MEAS LEFT PROX ECA PSV: -279.7 CM/SEC
BH CV XLRA MEAS LEFT PROX ICA EDV: -50.6 CM/SEC
BH CV XLRA MEAS LEFT PROX ICA PSV: -215.7 CM/SEC
BH CV XLRA MEAS LEFT VERTEBRAL A EDV: -7.9 CM/SEC
BH CV XLRA MEAS LEFT VERTEBRAL A PSV: -49.5 CM/SEC
BH CV XLRA MEAS RIGHT DIST CCA EDV: 12.8 CM/SEC
BH CV XLRA MEAS RIGHT DIST CCA PSV: 51.6 CM/SEC
BH CV XLRA MEAS RIGHT DIST ICA EDV: -41 CM/SEC
BH CV XLRA MEAS RIGHT DIST ICA PSV: -175.5 CM/SEC
BH CV XLRA MEAS RIGHT ICA/CCA RATIO: -3.4
BH CV XLRA MEAS RIGHT MID ICA EDV: -39.3 CM/SEC
BH CV XLRA MEAS RIGHT MID ICA PSV: -151.9 CM/SEC
BH CV XLRA MEAS RIGHT PROX CCA EDV: 9.3 CM/SEC
BH CV XLRA MEAS RIGHT PROX CCA PSV: 80.5 CM/SEC
BH CV XLRA MEAS RIGHT PROX ECA EDV: -10.2 CM/SEC
BH CV XLRA MEAS RIGHT PROX ECA PSV: -132 CM/SEC
BH CV XLRA MEAS RIGHT PROX ICA EDV: 23.1 CM/SEC
BH CV XLRA MEAS RIGHT PROX ICA PSV: 78.6 CM/SEC
BH CV XLRA MEAS RIGHT VERTEBRAL A EDV: 13.2 CM/SEC
BH CV XLRA MEAS RIGHT VERTEBRAL A PSV: 54.6 CM/SEC
BH CVPROX RIGHT ICA HIDDEN LRR: 1 CM
LV EF 2D ECHO EST: 50 %
MAXIMAL PREDICTED HEART RATE: 149 BPM
SINUS: 2.9 CM
STRESS TARGET HR: 127 BPM

## 2023-03-30 PROCEDURE — 93880 EXTRACRANIAL BILAT STUDY: CPT

## 2023-03-30 PROCEDURE — 93306 TTE W/DOPPLER COMPLETE: CPT

## 2023-03-30 PROCEDURE — 93880 EXTRACRANIAL BILAT STUDY: CPT | Performed by: RADIOLOGY

## 2023-04-05 ENCOUNTER — TELEPHONE (OUTPATIENT)
Dept: CARDIOLOGY | Facility: CLINIC | Age: 73
End: 2023-04-05
Payer: MEDICARE

## 2023-04-05 NOTE — TELEPHONE ENCOUNTER
Per Mark Brito,PAC patient is aware, send to .      ----- Message from RICKEY Yen sent at 4/3/2023  7:31 PM EDT -----  Please see me on this.    ----- Message -----  From: Interface, Rad Results [place] In  Sent: 3/30/2023  12:49 PM EDT  To: RICKEY Yen

## 2023-04-10 DIAGNOSIS — R06.02 SHORTNESS OF BREATH: ICD-10-CM

## 2023-04-10 DIAGNOSIS — R60.9 EDEMA, UNSPECIFIED TYPE: ICD-10-CM

## 2023-04-10 DIAGNOSIS — I48.0 PAROXYSMAL ATRIAL FIBRILLATION: ICD-10-CM

## 2023-04-10 DIAGNOSIS — R00.2 PALPITATIONS: ICD-10-CM

## 2023-04-10 RX ORDER — APIXABAN 5 MG/1
TABLET, FILM COATED ORAL
Qty: 180 TABLET | Refills: 3 | Status: SHIPPED | OUTPATIENT
Start: 2023-04-10

## 2023-04-10 RX ORDER — FUROSEMIDE 40 MG/1
TABLET ORAL
Qty: 180 TABLET | Refills: 3 | Status: SHIPPED | OUTPATIENT
Start: 2023-04-10

## 2023-04-10 RX ORDER — ISOSORBIDE MONONITRATE 30 MG/1
30 TABLET, EXTENDED RELEASE ORAL DAILY
Qty: 90 TABLET | Refills: 3 | Status: SHIPPED | OUTPATIENT
Start: 2023-04-10

## 2023-04-22 LAB
AORTIC DIMENSIONLESS INDEX: 0.93 (DI)
BH CV ECHO MEAS - ACS: 1.54 CM
BH CV ECHO MEAS - AO MAX PG: 4.8 MMHG
BH CV ECHO MEAS - AO MEAN PG: 2.6 MMHG
BH CV ECHO MEAS - AO ROOT DIAM: 3.1 CM
BH CV ECHO MEAS - AO V2 MAX: 109.2 CM/SEC
BH CV ECHO MEAS - AO V2 VTI: 26.3 CM
BH CV ECHO MEAS - EDV(CUBED): 102.1 ML
BH CV ECHO MEAS - ESV(CUBED): 41.8 ML
BH CV ECHO MEAS - FS: 25.7 %
BH CV ECHO MEAS - IVS/LVPW: 1.05 CM
BH CV ECHO MEAS - IVSD: 1.06 CM
BH CV ECHO MEAS - LA DIMENSION: 4.9 CM
BH CV ECHO MEAS - LAT PEAK E' VEL: 8.8 CM/SEC
BH CV ECHO MEAS - LV MASS(C)D: 171.9 GRAMS
BH CV ECHO MEAS - LV MAX PG: 4 MMHG
BH CV ECHO MEAS - LV MEAN PG: 2 MMHG
BH CV ECHO MEAS - LV V1 MAX: 100.3 CM/SEC
BH CV ECHO MEAS - LV V1 VTI: 23.4 CM
BH CV ECHO MEAS - LVIDD: 4.7 CM
BH CV ECHO MEAS - LVIDS: 3.5 CM
BH CV ECHO MEAS - LVPWD: 1.02 CM
BH CV ECHO MEAS - MED PEAK E' VEL: 7 CM/SEC
BH CV ECHO MEAS - MV A MAX VEL: 75.6 CM/SEC
BH CV ECHO MEAS - MV DEC SLOPE: 427.5 CM/SEC2
BH CV ECHO MEAS - MV DEC TIME: 0.25 MSEC
BH CV ECHO MEAS - MV E MAX VEL: 108 CM/SEC
BH CV ECHO MEAS - MV E/A: 1.43
BH CV ECHO MEAS - MV MAX PG: 5.1 MMHG
BH CV ECHO MEAS - MV MEAN PG: 1.81 MMHG
BH CV ECHO MEAS - MV P1/2T: 74.8 MSEC
BH CV ECHO MEAS - MV V2 VTI: 32.9 CM
BH CV ECHO MEAS - MVA(P1/2T): 2.9 CM2
BH CV ECHO MEAS - PA V2 MAX: 104.3 CM/SEC
BH CV ECHO MEAS - RAP SYSTOLE: 8 MMHG
BH CV ECHO MEAS - RV MAX PG: 3.9 MMHG
BH CV ECHO MEAS - RV V1 MAX: 99.1 CM/SEC
BH CV ECHO MEAS - RV V1 VTI: 27.2 CM
BH CV ECHO MEAS - RVDD: 3.9 CM
BH CV ECHO MEAS - RVSP: 15.7 MMHG
BH CV ECHO MEAS - TAPSE (>1.6): 2.02 CM
BH CV ECHO MEAS - TR MAX PG: 7.7 MMHG
BH CV ECHO MEAS - TR MAX VEL: 139.1 CM/SEC
BH CV ECHO MEASUREMENTS AVERAGE E/E' RATIO: 13.67
BH CV XLRA - TDI S': 12.7 CM/SEC
LV EF 2D ECHO EST: 50 %
MAXIMAL PREDICTED HEART RATE: 149 BPM
SINUS: 2.9 CM
STRESS TARGET HR: 127 BPM

## 2023-04-26 ENCOUNTER — TELEPHONE (OUTPATIENT)
Dept: CARDIOLOGY | Facility: CLINIC | Age: 73
End: 2023-04-26
Payer: MEDICARE

## 2023-04-26 NOTE — TELEPHONE ENCOUNTER
----- Message from ED Herman sent at 4/26/2023 10:31 AM EDT -----    ----- Message -----  From: Francesca Singh MA  Sent: 4/25/2023   8:22 AM EDT  To: ED Herman      ----- Message -----  From: Mark Brito PA  Sent: 4/24/2023   9:40 PM EDT  To: Francesca Singh MA    Routine follow-up.  ----- Message -----  From: Manpreet Mcgee MD  Sent: 4/22/2023  10:02 PM EDT  To: RICKEY Yen

## 2023-08-03 ENCOUNTER — TELEPHONE (OUTPATIENT)
Dept: CARDIOLOGY | Facility: CLINIC | Age: 73
End: 2023-08-03

## 2023-08-03 NOTE — TELEPHONE ENCOUNTER
I reviewed ER records while waiting for provider, there is no mention of pt's VS, no labwork.       Called pt, she stated she tripped on a side walk and fell face first on concrete. Stated she was not having any cardiac issues until she fell. Stated she has not been monitoring her BP and is unable to provide actual readings. I explained that we can't address BP well w/o several actual readings. I asked pt to check BP this am, tonight, and tomorrow am, then call w/ BP readings before we close at 11:30 tomorrow, she verbalized understanding. I asked if she's having any cardiac symptoms other than BP concerns. Pt c/o occasional dizziness, but is unsure if that occurs when BP is running low.       Placed ER records in scan bin up front.

## 2023-09-05 ENCOUNTER — OFFICE VISIT (OUTPATIENT)
Dept: CARDIOLOGY | Facility: CLINIC | Age: 73
End: 2023-09-05
Payer: MEDICARE

## 2023-09-05 VITALS
HEART RATE: 94 BPM | WEIGHT: 274.8 LBS | OXYGEN SATURATION: 93 % | HEIGHT: 66 IN | SYSTOLIC BLOOD PRESSURE: 125 MMHG | DIASTOLIC BLOOD PRESSURE: 76 MMHG | BODY MASS INDEX: 44.16 KG/M2

## 2023-09-05 DIAGNOSIS — R55 SYNCOPE AND COLLAPSE: ICD-10-CM

## 2023-09-05 DIAGNOSIS — R06.02 SHORTNESS OF BREATH: ICD-10-CM

## 2023-09-05 DIAGNOSIS — I65.23 BILATERAL CAROTID ARTERY STENOSIS: ICD-10-CM

## 2023-09-05 DIAGNOSIS — I48.0 PAROXYSMAL ATRIAL FIBRILLATION: ICD-10-CM

## 2023-09-05 DIAGNOSIS — R42 DIZZINESS: Primary | ICD-10-CM

## 2023-09-05 PROCEDURE — 1160F RVW MEDS BY RX/DR IN RCRD: CPT | Performed by: PHYSICIAN ASSISTANT

## 2023-09-05 PROCEDURE — 93000 ELECTROCARDIOGRAM COMPLETE: CPT | Performed by: PHYSICIAN ASSISTANT

## 2023-09-05 PROCEDURE — 1159F MED LIST DOCD IN RCRD: CPT | Performed by: PHYSICIAN ASSISTANT

## 2023-09-05 PROCEDURE — 3078F DIAST BP <80 MM HG: CPT | Performed by: PHYSICIAN ASSISTANT

## 2023-09-05 PROCEDURE — 3074F SYST BP LT 130 MM HG: CPT | Performed by: PHYSICIAN ASSISTANT

## 2023-09-05 PROCEDURE — 99214 OFFICE O/P EST MOD 30 MIN: CPT | Performed by: PHYSICIAN ASSISTANT

## 2023-09-05 NOTE — PROGRESS NOTES
Problem list     Subjective   Mandi Jacob is a 73 y.o. female     Chief Complaint   Patient presents with    Atrial Fibrillation    Hypertension    Shortness of Breath   Problem List:  1. Nonobstructive CAD by cath February 2012.  2. Preserved systolic function  3. History of CHF  4. Diastolic dysfunction  5. Hypertension      6. Dyslipidemia. The patient reports she is intolerant to statins.      7. Chronic palpitations.      7.1.  Recurrent PACs noted by telemetry historically.  The patient was treated with sotalol therapy for the same.      7.2.  A. fib with rapid ventricular response noted by event monitor, 2019.      8.  Peripheral vascular disease, status post angioplasty and stenting to the right carotid artery system, 06/19 with Dr. Dre ANN  The patient presents back to the clinic today for follow-up.  Unfortunately, a few weeks ago, she had a syncopal episode.  She tells me she was walking into a medical clinic and basically woke up on the sidewalk.  She had multiple injuries, which has been evaluated in treated/monitored with her primary care provider.  Since that time, she has still had some degree of weakness and dizziness.  Rarely she will have chest tightness, which occurs with or without her degree of weakness and dizziness.  She has no associated neck, arm, or jaw discomfort.  Baseline dyspnea appears to have intensified.  She denies PND nor orthopnea.  She will have palpitations at times but no sustained dysrhythmic activity.  This is described mostly as a brief irregularities in heart rhythm followed by tachycardia.  She has no further complaints.  She is very interested in pursuing evaluation because of her syncopal episode and symptoms otherwise.  She requested appointment today.    Current Outpatient Medications on File Prior to Visit   Medication Sig Dispense Refill    albuterol (PROVENTIL HFA;VENTOLIN HFA) 108 (90 BASE) MCG/ACT inhaler Inhale 2 puffs Every 4 (Four) Hours As  Needed. Albuterol 90 MCG/ACT AERS; Patient Sig: Albuterol 90 MCG/ACT AERS INHALE 1 TO 2 PUFFS EVERY 4 TO 6 HOURS AS NEEDED.; 0; 11-Sep-2013; Active      albuterol (PROVENTIL) (2.5 MG/3ML) 0.083% nebulizer solution Albuterol Sulfate (2.5 MG/3ML) 0.083% Inhalation Nebulization Solution; Patient Sig: Albuterol Sulfate (2.5 MG/3ML) 0.083% Inhalation Nebulization Solution USE 1 UNIT DOSE IN NEBULIZER EVERY 4 TO 6 HOURS AS NEE      Aspirin Low Dose 81 MG EC tablet TAKE 1 TABLET ONCE DAILY 90 tablet 6    Diclofenac Sodium (VOLTAREN) 1 % gel gel Apply 4 g topically to the appropriate area as directed Daily.      Eliquis 5 MG tablet tablet TAKE 1 TABLET EVERY 12 HOURS 180 tablet 3    furosemide (LASIX) 40 MG tablet TAKE 1 TABLET TWICE DAILY 180 tablet 3    insulin aspart (novoLOG FLEXPEN) 100 UNIT/ML solution pen-injector sc pen Inject 15 Units under the skin into the appropriate area as directed Daily With Breakfast.      isosorbide mononitrate (IMDUR) 30 MG 24 hr tablet TAKE 1 TABLET BY MOUTH DAILY. 90 tablet 5    losartan (COZAAR) 50 MG tablet TAKE 1 TABLET BY MOUTH EVERY MORNING. 30 tablet 5    magnesium oxide (MAGOX) 400 (241.3 MG) MG tablet tablet Take 1 tablet by mouth Every Night.      metolazone (ZAROXOLYN) 2.5 MG tablet Take 1 tablet by mouth Daily As Needed. 1 tablet 30 minutes prior to lasix as needed PRN      metoprolol tartrate (LOPRESSOR) 25 MG tablet ~102Q.5 TAKE 1/2 TABLET TWICE A DAY 90 tablet 5    montelukast (SINGULAIR) 10 MG tablet Take 1 tablet by mouth Every Night.      nitroglycerin (NITROSTAT) 0.4 MG SL tablet Place 1 tablet under the tongue Every 5 (Five) Minutes As Needed for Chest Pain. 25 tablet 2    PARoxetine (PAXIL) 20 MG tablet 0.5 tablets Daily.      potassium chloride (K-DUR,KLOR-CON) 10 MEQ CR tablet Take 1 tablet by mouth Every Night.      potassium chloride (K-DUR,KLOR-CON) 20 MEQ CR tablet Take 1 tablet by mouth Every Morning. 30 tablet 3    Repatha SureClick solution auto-injector  SureClick injection INJECT 1 ML UNDER THE SKIN INTO THE APPROPRIATE AREA AS DIRECTED EVERY 14 (FOURTEEN) DAYS. 6 pen 3    sotalol (BETAPACE) 80 MG tablet TAKE 1 TABLET 2 TIMES A DAY. 180 tablet 3    TRESIBA FLEXTOUCH 200 UNIT/ML solution pen-injector Inject 100 Units under the skin into the appropriate area as directed Every Morning. 100 units daily      triamterene-hydrochlorothiazide (MAXZIDE-25) 37.5-25 MG per tablet TAKE 1 TABLET EVERY MORNING. 90 tablet 3    Tudorza Pressair 400 MCG/ACT aerosol powder  powder for inhalation Inhale 1 puff 2 (Two) Times a Day.      metFORMIN ER (GLUCOPHAGE-XR) 500 MG 24 hr tablet Take 1 tablet by mouth Daily With Breakfast. (Patient not taking: Reported on 9/5/2023)  11     No current facility-administered medications on file prior to visit.       Ace inhibitors, Diltiazem, Flecainide, Incruse ellipta [umeclidinium bromide], Lisinopril, Niacin, Ketorolac tromethamine, Buspirone, Ciprofloxacin, Citalopram, Clindamycin/lincomycin, Codeine, Levaquin [levofloxacin], Lipitor [atorvastatin], Liraglutide, Lorazepam, Sertraline, Statins, Sulfamethoxazole-trimethoprim, Trulicity [dulaglutide], and Xigduo xr [dapagliflozin-metformin hcl er]    Past Medical History:   Diagnosis Date    Arrhythmia     Asthma     Atrial fibrillation     CAD (coronary artery disease)     non-obstructive by cath 02/2012    CHF (congestive heart failure)     COPD (chronic obstructive pulmonary disease)     Diabetes mellitus     Diastolic dysfunction     Dizziness     Edema     Esophageal spasm     Herniated lumbar intervertebral disc     L5    Hyperlipidemia     intolerant to statins     Hypertension     Osteoarthritis     Palpitations     SOB (shortness of breath)     Stroke     Stroke-like symptoms     TIA (transient ischemic attack) 2015    left hemispheric TIA/CVA    Unstable angina        Social History     Socioeconomic History    Marital status:    Tobacco Use    Smoking status: Former      Packs/day: 1.50     Years: 41.00     Pack years: 61.50     Types: Cigarettes     Start date: 1968     Quit date: 3/22/2009     Years since quittin.4    Smokeless tobacco: Never   Substance and Sexual Activity    Alcohol use: No    Drug use: No    Sexual activity: Never       Family History   Problem Relation Age of Onset    Heart attack Mother     Heart failure Mother         congestive    Hyperlipidemia Mother     Hypertension Mother     Peripheral vascular disease Mother     Heart attack Father     Heart failure Father         congestive    Diabetes Father     Hyperlipidemia Father     Hypertension Father     Peripheral vascular disease Father     Asthma Father     Heart failure Other         congestive    Diabetes Other     Hyperlipidemia Other     Hypertension Other     Peripheral vascular disease Other     Hypertension Brother        Review of Systems   Constitutional:  Positive for fatigue. Negative for chills, diaphoresis and fever.   Eyes: Negative.  Negative for visual disturbance.   Respiratory:  Positive for cough, chest tightness (occasionally), shortness of breath and wheezing. Negative for apnea.    Cardiovascular:  Positive for chest pain and palpitations (ocasionally). Negative for leg swelling.   Gastrointestinal: Negative.  Negative for abdominal pain and blood in stool.   Endocrine: Negative.  Negative for cold intolerance and heat intolerance.   Genitourinary: Negative.  Negative for hematuria.   Musculoskeletal:  Positive for arthralgias (osteo), back pain and neck pain. Negative for neck stiffness.   Skin:  Negative for rash and wound.   Allergic/Immunologic: Positive for environmental allergies (seasonal allergies). Negative for food allergies.   Neurological:  Positive for syncope. Negative for dizziness, weakness, light-headedness, numbness and headaches.   Hematological:  Bruises/bleeds easily.   Psychiatric/Behavioral:  Positive for sleep disturbance (smother's at night/ wears  "oxygen).      Objective   Vitals:    23 1033   BP: 125/76   BP Location: Left arm   Patient Position: Sitting   Pulse: 94   SpO2: 93%   Weight: 125 kg (274 lb 12.8 oz)   Height: 167 cm (65.75\")      /76 (BP Location: Left arm, Patient Position: Sitting)   Pulse 94   Ht 167 cm (65.75\")   Wt 125 kg (274 lb 12.8 oz)   SpO2 93%   BMI 44.69 kg/m²    Lab Results (most recent)       None          Physical Exam  Vitals and nursing note reviewed.   Constitutional:       General: She is not in acute distress.     Appearance: She is well-developed.   HENT:      Head: Normocephalic and atraumatic.   Eyes:      Conjunctiva/sclera: Conjunctivae normal.      Pupils: Pupils are equal, round, and reactive to light.   Neck:      Vascular: No JVD.      Trachea: No tracheal deviation.   Cardiovascular:      Rate and Rhythm: Normal rate and regular rhythm.      Pulses:           Dorsalis pedis pulses are 1+ on the right side and 1+ on the left side.        Posterior tibial pulses are 1+ on the right side and 1+ on the left side.      Heart sounds: Normal heart sounds. Heart sounds are distant.      Comments: 1/6 systolic murmur mid lower left sternal border.  Pulmonary:      Effort: Pulmonary effort is normal.      Breath sounds: Decreased air movement present. Decreased breath sounds present.   Abdominal:      General: Bowel sounds are normal. There is no distension.      Palpations: Abdomen is soft. There is no mass.      Tenderness: There is no abdominal tenderness. There is no guarding or rebound.   Musculoskeletal:         General: No tenderness or deformity. Normal range of motion.      Cervical back: Normal range of motion and neck supple.      Right lower le+      Left lower le+   Skin:     General: Skin is warm and dry.      Coloration: Skin is not pale.      Findings: No erythema or rash.   Neurological:      Mental Status: She is alert and oriented to person, place, and time.   Psychiatric:         " Behavior: Behavior normal.         Thought Content: Thought content normal.         Judgment: Judgment normal.         Procedure     ECG 12 Lead    Date/Time: 9/5/2023 10:42 AM  Performed by: Mark Brito PA  Authorized by: Mark Brito PA   Comparison: compared with previous ECG from 7/20/2023  Comparison to previous ECG: Atrial fibrillation, rate 110, normal axis, nonspecific ST and T wave changes, no acute changes noted.           Assessment & Plan      Diagnosis Plan   1. Dizziness  Adult Transthoracic Echo Limited W/ Cont if Necessary Per Protocol    CBC & Differential    Comprehensive Metabolic Panel    Lipid Panel    TSH    Stress Test With Myocardial Perfusion One Day    Duplex Carotid Ultrasound CAR    Mobile Cardiac Outpatient Telemetry      2. Syncope and collapse  Adult Transthoracic Echo Limited W/ Cont if Necessary Per Protocol    CBC & Differential    Comprehensive Metabolic Panel    Lipid Panel    TSH    Stress Test With Myocardial Perfusion One Day    Duplex Carotid Ultrasound CAR    Mobile Cardiac Outpatient Telemetry      3. Paroxysmal atrial fibrillation  Adult Transthoracic Echo Limited W/ Cont if Necessary Per Protocol    CBC & Differential    Comprehensive Metabolic Panel    Lipid Panel    TSH    Stress Test With Myocardial Perfusion One Day    Duplex Carotid Ultrasound CAR    Mobile Cardiac Outpatient Telemetry      4. Shortness of breath  Adult Transthoracic Echo Limited W/ Cont if Necessary Per Protocol    CBC & Differential    Comprehensive Metabolic Panel    Lipid Panel    TSH    Stress Test With Myocardial Perfusion One Day    Duplex Carotid Ultrasound CAR    Mobile Cardiac Outpatient Telemetry      5. Bilateral carotid artery stenosis  Adult Transthoracic Echo Limited W/ Cont if Necessary Per Protocol    CBC & Differential    Comprehensive Metabolic Panel    Lipid Panel    TSH    Stress Test With Myocardial Perfusion One Day    Duplex Carotid Ultrasound CAR    Mobile Cardiac  Outpatient Telemetry          1.  The patient presents back because of dizziness and a recent episode of syncope.  She has symptoms otherwise as outlined above.  Repeat cardiac evaluation has been requested given clinical scenario.    2.  I feel she needs repeat evaluation.  Firstly, we will schedule for nuclear stress test for ischemia assessment given all the above.  She cannot tolerate treadmill protocol and will be scheduled for chemical nuclear protocol.    3.  I would like to repeat a limited echo to reevaluate systolic function.  Her echocardiogram earlier this year was at baseline and largely benign.  She had a carotid duplex at that time which suggested moderate disease bilaterally.  I would like to repeat that as well as it has been approximately 6 months.    4.  We will schedule for an event monitor to evaluate A-fib burden and for potential rate or rhythm disturbance issues otherwise which may be contributing to her dizziness/presyncope/syncope.  We can adjust sotalol accordingly up in dose at that time once we can see results.  If I increase that I would discontinue metoprolol therapy.    5.  She will need laboratories, all as outlined above.    6.  Further pending all the above.  She will return immediately for any ongoing issues.          Advance Care Planning   ACP discussion was held with the patient during this visit. Patient has an advance directive (not in EMR), copy requested.

## 2023-09-06 ENCOUNTER — TELEPHONE (OUTPATIENT)
Dept: CARDIOLOGY | Facility: CLINIC | Age: 73
End: 2023-09-06
Payer: MEDICARE

## 2023-09-06 DIAGNOSIS — I48.0 PAROXYSMAL ATRIAL FIBRILLATION: ICD-10-CM

## 2023-09-06 RX ORDER — SOTALOL HYDROCHLORIDE 120 MG/1
120 TABLET ORAL 2 TIMES DAILY
Qty: 60 TABLET | Refills: 6 | Status: SHIPPED | OUTPATIENT
Start: 2023-09-06

## 2023-09-06 NOTE — TELEPHONE ENCOUNTER
Regarding preventice holter report reviewed by Cl bradley with recommendation's to increase sotalol to 120mg bid starting Saturday, plan to discontinue metoprolol after evening dose on Sunday night, EKG Monday. Rx for sotalol 120mg bid sent to pharmacy.     Patient notified of increase in sotalol to start on Saturday, patient informed on Sunday after bedtime dose to stop metoprolol per recommendation's from cl bradley and to come in Monday for EKG nurse visit (she is aware that someone will be contacting her with appt.). notified Estephania to schedule patient for nurse visit Monday.

## 2023-09-11 ENCOUNTER — CLINICAL SUPPORT (OUTPATIENT)
Dept: CARDIOLOGY | Facility: CLINIC | Age: 73
End: 2023-09-11
Payer: MEDICARE

## 2023-09-11 VITALS
BODY MASS INDEX: 44.56 KG/M2 | OXYGEN SATURATION: 93 % | DIASTOLIC BLOOD PRESSURE: 66 MMHG | HEART RATE: 125 BPM | WEIGHT: 274 LBS | SYSTOLIC BLOOD PRESSURE: 145 MMHG | RESPIRATION RATE: 18 BRPM

## 2023-09-11 DIAGNOSIS — R06.02 SHORTNESS OF BREATH: Primary | ICD-10-CM

## 2023-09-11 PROCEDURE — 93000 ELECTROCARDIOGRAM COMPLETE: CPT | Performed by: PHYSICIAN ASSISTANT

## 2023-09-11 NOTE — PROGRESS NOTES
"Mandi Jacob  1950 9/11/2023       No chief complaint on file.         HPI:        Per Cl Brito office visit 9/5/23:    \"1.  The patient presents back because of dizziness and a recent episode of syncope.  She has symptoms otherwise as outlined above.  Repeat cardiac evaluation has been requested given clinical scenario.     2.  I feel she needs repeat evaluation.  Firstly, we will schedule for nuclear stress test for ischemia assessment given all the above.  She cannot tolerate treadmill protocol and will be scheduled for chemical nuclear protocol.     3.  I would like to repeat a limited echo to reevaluate systolic function.  Her echocardiogram earlier this year was at baseline and largely benign.  She had a carotid duplex at that time which suggested moderate disease bilaterally.  I would like to repeat that as well as it has been approximately 6 months.     4.  We will schedule for an event monitor to evaluate A-fib burden and for potential rate or rhythm disturbance issues otherwise which may be contributing to her dizziness/presyncope/syncope.  We can adjust sotalol accordingly up in dose at that time once we can see results.  If I increase that I would discontinue metoprolol therapy.     5.  She will need laboratories, all as outlined above.     6.  Further pending all the above.  She will return immediately for any ongoing issues.   \"    Per 9/6/23 telephone encounter:    \"Regarding preventice holter report reviewed by Cl bradley with recommendation's to increase sotalol to 120mg bid starting Saturday, plan to discontinue metoprolol after evening dose on Sunday night, EKG Monday. Rx for sotalol 120mg bid sent to pharmacy.      Patient notified of increase in sotalol to start on Saturday, patient informed on Sunday after bedtime dose to stop metoprolol per recommendation's from cl bradley and to come in Monday for EKG nurse visit (she is aware that someone will be " "contacting her with appt.). notified Estephania to schedule patient for nurse visit Monday. \"     Pt states that she is taking the Sotalol as directed and has stopped her Metoprolol as of last night.  Pt states that she doesn't notice any improvements.         Current Outpatient Medications:   •  albuterol (PROVENTIL HFA;VENTOLIN HFA) 108 (90 BASE) MCG/ACT inhaler, Inhale 2 puffs Every 4 (Four) Hours As Needed. Albuterol 90 MCG/ACT AERS; Patient Sig: Albuterol 90 MCG/ACT AERS INHALE 1 TO 2 PUFFS EVERY 4 TO 6 HOURS AS NEEDED.; 0; 11-Sep-2013; Active, Disp: , Rfl:   •  albuterol (PROVENTIL) (2.5 MG/3ML) 0.083% nebulizer solution, Albuterol Sulfate (2.5 MG/3ML) 0.083% Inhalation Nebulization Solution; Patient Sig: Albuterol Sulfate (2.5 MG/3ML) 0.083% Inhalation Nebulization Solution USE 1 UNIT DOSE IN NEBULIZER EVERY 4 TO 6 HOURS AS NEE, Disp: , Rfl:   •  Aspirin Low Dose 81 MG EC tablet, TAKE 1 TABLET ONCE DAILY, Disp: 90 tablet, Rfl: 6  •  Diclofenac Sodium (VOLTAREN) 1 % gel gel, Apply 4 g topically to the appropriate area as directed Daily., Disp: , Rfl:   •  Eliquis 5 MG tablet tablet, TAKE 1 TABLET EVERY 12 HOURS, Disp: 180 tablet, Rfl: 3  •  furosemide (LASIX) 40 MG tablet, TAKE 1 TABLET TWICE DAILY, Disp: 180 tablet, Rfl: 3  •  insulin aspart (novoLOG FLEXPEN) 100 UNIT/ML solution pen-injector sc pen, Inject 15 Units under the skin into the appropriate area as directed Daily With Breakfast., Disp: , Rfl:   •  isosorbide mononitrate (IMDUR) 30 MG 24 hr tablet, TAKE 1 TABLET BY MOUTH DAILY., Disp: 90 tablet, Rfl: 5  •  losartan (COZAAR) 50 MG tablet, TAKE 1 TABLET BY MOUTH EVERY MORNING., Disp: 30 tablet, Rfl: 5  •  magnesium oxide (MAGOX) 400 (241.3 MG) MG tablet tablet, Take 1 tablet by mouth Every Night., Disp: , Rfl:   •  metFORMIN ER (GLUCOPHAGE-XR) 500 MG 24 hr tablet, Take 1 tablet by mouth Daily With Breakfast. (Patient not taking: Reported on 9/5/2023), Disp: , Rfl: 11  •  metolazone (ZAROXOLYN) 2.5 MG " tablet, Take 1 tablet by mouth Daily As Needed. 1 tablet 30 minutes prior to lasix as needed PRN, Disp: , Rfl:   •  montelukast (SINGULAIR) 10 MG tablet, Take 1 tablet by mouth Every Night., Disp: , Rfl:   •  nitroglycerin (NITROSTAT) 0.4 MG SL tablet, Place 1 tablet under the tongue Every 5 (Five) Minutes As Needed for Chest Pain., Disp: 25 tablet, Rfl: 2  •  PARoxetine (PAXIL) 20 MG tablet, 0.5 tablets Daily., Disp: , Rfl:   •  potassium chloride (K-DUR,KLOR-CON) 10 MEQ CR tablet, Take 1 tablet by mouth Every Night., Disp: , Rfl:   •  potassium chloride (K-DUR,KLOR-CON) 20 MEQ CR tablet, Take 1 tablet by mouth Every Morning., Disp: 30 tablet, Rfl: 3  •  Repatha SureClick solution auto-injector SureClick injection, INJECT 1 ML UNDER THE SKIN INTO THE APPROPRIATE AREA AS DIRECTED EVERY 14 (FOURTEEN) DAYS., Disp: 6 pen, Rfl: 3  •  sotalol (Betapace) 120 MG tablet, Take 1 tablet by mouth 2 (Two) Times a Day., Disp: 60 tablet, Rfl: 6  •  TRESIBA FLEXTOUCH 200 UNIT/ML solution pen-injector, Inject 100 Units under the skin into the appropriate area as directed Every Morning. 100 units daily, Disp: , Rfl:   •  triamterene-hydrochlorothiazide (MAXZIDE-25) 37.5-25 MG per tablet, TAKE 1 TABLET EVERY MORNING., Disp: 90 tablet, Rfl: 3  •  Tudorza Pressair 400 MCG/ACT aerosol powder  powder for inhalation, Inhale 1 puff 2 (Two) Times a Day., Disp: , Rfl:            Ace inhibitors, Diltiazem, Flecainide, Incruse ellipta [umeclidinium bromide], Lisinopril, Niacin, Ketorolac tromethamine, Buspirone, Ciprofloxacin, Citalopram, Clindamycin/lincomycin, Codeine, Levaquin [levofloxacin], Lipitor [atorvastatin], Liraglutide, Lorazepam, Sertraline, Statins, Sulfamethoxazole-trimethoprim, Trulicity [dulaglutide], and Xigduo xr [dapagliflozin-metformin hcl er]         ECG 12 Lead    Date/Time: 9/11/2023 1:56 PM  Performed by: Mark Brito PA  Authorized by: Mark Brito PA   Comparison: compared with previous ECG from  9/5/2023             Assessment & Plan

## 2023-09-14 ENCOUNTER — CLINICAL SUPPORT (OUTPATIENT)
Dept: CARDIOLOGY | Facility: CLINIC | Age: 73
End: 2023-09-14
Payer: MEDICARE

## 2023-09-14 VITALS
HEIGHT: 66 IN | OXYGEN SATURATION: 94 % | DIASTOLIC BLOOD PRESSURE: 54 MMHG | WEIGHT: 273.6 LBS | BODY MASS INDEX: 43.97 KG/M2 | HEART RATE: 64 BPM | SYSTOLIC BLOOD PRESSURE: 105 MMHG

## 2023-09-14 DIAGNOSIS — I48.91 ATRIAL FIBRILLATION, UNSPECIFIED TYPE: Primary | ICD-10-CM

## 2023-09-14 NOTE — PROGRESS NOTES
"Mandi Jacob  1950 9/14/2023   ?   Chief Complaint   Patient presents with    Follow-up      ?   HPI:   ?   ? patient with known history of Afib, episode of A-fib with RVR,per event monitor on 9/6/2023. Sotalol was increased from 80mg bid to 120mg bid at that time. Patient returns today for day 2 EKG. patient reports increased shortness of breath, increase in dizziness and \"pounding heart beats\" onset yesterday. EKG done and is to be reviewed by Mark bradley.   ?     Current Outpatient Medications:     albuterol (PROVENTIL HFA;VENTOLIN HFA) 108 (90 BASE) MCG/ACT inhaler, Inhale 2 puffs Every 4 (Four) Hours As Needed. Albuterol 90 MCG/ACT AERS; Patient Sig: Albuterol 90 MCG/ACT AERS INHALE 1 TO 2 PUFFS EVERY 4 TO 6 HOURS AS NEEDED.; 0; 11-Sep-2013; Active, Disp: , Rfl:     albuterol (PROVENTIL) (2.5 MG/3ML) 0.083% nebulizer solution, Albuterol Sulfate (2.5 MG/3ML) 0.083% Inhalation Nebulization Solution; Patient Sig: Albuterol Sulfate (2.5 MG/3ML) 0.083% Inhalation Nebulization Solution USE 1 UNIT DOSE IN NEBULIZER EVERY 4 TO 6 HOURS AS NEE, Disp: , Rfl:     Aspirin Low Dose 81 MG EC tablet, TAKE 1 TABLET ONCE DAILY, Disp: 90 tablet, Rfl: 6    Diclofenac Sodium (VOLTAREN) 1 % gel gel, Apply 4 g topically to the appropriate area as directed Daily., Disp: , Rfl:     Eliquis 5 MG tablet tablet, TAKE 1 TABLET EVERY 12 HOURS, Disp: 180 tablet, Rfl: 3    furosemide (LASIX) 40 MG tablet, TAKE 1 TABLET TWICE DAILY, Disp: 180 tablet, Rfl: 3    insulin aspart (novoLOG FLEXPEN) 100 UNIT/ML solution pen-injector sc pen, Inject 15 Units under the skin into the appropriate area as directed Daily With Breakfast., Disp: , Rfl:     isosorbide mononitrate (IMDUR) 30 MG 24 hr tablet, TAKE 1 TABLET BY MOUTH DAILY., Disp: 90 tablet, Rfl: 5    losartan (COZAAR) 50 MG tablet, TAKE 1 TABLET BY MOUTH EVERY MORNING., Disp: 30 tablet, Rfl: 5    magnesium oxide (MAGOX) 400 (241.3 MG) MG tablet tablet, Take 1 tablet by " mouth Every Night., Disp: , Rfl:     metFORMIN ER (GLUCOPHAGE-XR) 500 MG 24 hr tablet, Take 1 tablet by mouth Daily With Breakfast., Disp: , Rfl: 11    metolazone (ZAROXOLYN) 2.5 MG tablet, Take 1 tablet by mouth Daily As Needed. 1 tablet 30 minutes prior to lasix as needed PRN, Disp: , Rfl:     montelukast (SINGULAIR) 10 MG tablet, Take 1 tablet by mouth Every Night., Disp: , Rfl:     nitroglycerin (NITROSTAT) 0.4 MG SL tablet, Place 1 tablet under the tongue Every 5 (Five) Minutes As Needed for Chest Pain., Disp: 25 tablet, Rfl: 2    PARoxetine (PAXIL) 20 MG tablet, 0.5 tablets Daily., Disp: , Rfl:     potassium chloride (K-DUR,KLOR-CON) 10 MEQ CR tablet, Take 1 tablet by mouth Every Night., Disp: , Rfl:     potassium chloride (K-DUR,KLOR-CON) 20 MEQ CR tablet, Take 1 tablet by mouth Every Morning., Disp: 30 tablet, Rfl: 3    Repatha SureClick solution auto-injector SureClick injection, INJECT 1 ML UNDER THE SKIN INTO THE APPROPRIATE AREA AS DIRECTED EVERY 14 (FOURTEEN) DAYS., Disp: 6 pen, Rfl: 3    sotalol (Betapace) 120 MG tablet, Take 1 tablet by mouth 2 (Two) Times a Day., Disp: 60 tablet, Rfl: 6    TRESIBA FLEXTOUCH 200 UNIT/ML solution pen-injector, Inject 100 Units under the skin into the appropriate area as directed Every Morning. 100 units daily, Disp: , Rfl:     triamterene-hydrochlorothiazide (MAXZIDE-25) 37.5-25 MG per tablet, TAKE 1 TABLET EVERY MORNING., Disp: 90 tablet, Rfl: 3    Tudorza Pressair 400 MCG/ACT aerosol powder  powder for inhalation, Inhale 1 puff 2 (Two) Times a Day., Disp: , Rfl:    ?   ?   Ace inhibitors, Diltiazem, Flecainide, Incruse ellipta [umeclidinium bromide], Lisinopril, Niacin, Ketorolac tromethamine, Buspirone, Ciprofloxacin, Citalopram, Clindamycin/lincomycin, Codeine, Levaquin [levofloxacin], Lipitor [atorvastatin], Liraglutide, Lorazepam, Sertraline, Statins, Sulfamethoxazole-trimethoprim, Trulicity [dulaglutide], and Xigduo xr [dapagliflozin-metformin hcl er]          ECG 12 Lead    Date/Time: 9/14/2023 4:12 PM  Performed by: Mark Brito PA  Authorized by: Mark Brito PA   Comparison: not compared with previous ECG          ?   Assessment & Plan    ?   ? 1. A-fib, on sotalol and eliquis    EKG reviewed by Mark Brito pac order received to continue sotalol as ordered, with worsening symptoms patient can decrease to 80mg twice a day and notify the office. Schedule 1 week nurse visit for repeat nurse visit. Patient aware and verbalizes understanding.       Mis Kimball lpn  ?

## 2023-09-18 ENCOUNTER — TELEPHONE (OUTPATIENT)
Dept: CARDIOLOGY | Facility: CLINIC | Age: 73
End: 2023-09-18
Payer: MEDICARE

## 2023-09-18 NOTE — TELEPHONE ENCOUNTER
Patient left message that she will need to decrease Sotalol back to 80 mg BID as previously discussed with Mark' nurse. She is staying short of breath and smothering at night with increase. She has nurse visit scheduled Thursday.

## 2023-09-20 ENCOUNTER — CLINICAL SUPPORT (OUTPATIENT)
Dept: CARDIOLOGY | Facility: CLINIC | Age: 73
End: 2023-09-20
Payer: MEDICARE

## 2023-09-20 VITALS
DIASTOLIC BLOOD PRESSURE: 65 MMHG | BODY MASS INDEX: 44.23 KG/M2 | OXYGEN SATURATION: 95 % | HEART RATE: 72 BPM | HEIGHT: 66 IN | RESPIRATION RATE: 18 BRPM | WEIGHT: 275.2 LBS | SYSTOLIC BLOOD PRESSURE: 139 MMHG

## 2023-09-20 DIAGNOSIS — I48.91 ATRIAL FIBRILLATION, UNSPECIFIED TYPE: Primary | ICD-10-CM

## 2023-09-20 NOTE — PROGRESS NOTES
Mandi Jacob  1950 9/20/2023       Chief Complaint   Patient presents with   • Nurse/MA visit          HPI:         Atrial Fibrillation         Current Outpatient Medications:   •  albuterol (PROVENTIL HFA;VENTOLIN HFA) 108 (90 BASE) MCG/ACT inhaler, Inhale 2 puffs Every 4 (Four) Hours As Needed. Albuterol 90 MCG/ACT AERS; Patient Sig: Albuterol 90 MCG/ACT AERS INHALE 1 TO 2 PUFFS EVERY 4 TO 6 HOURS AS NEEDED.; 0; 11-Sep-2013; Active, Disp: , Rfl:   •  albuterol (PROVENTIL) (2.5 MG/3ML) 0.083% nebulizer solution, Albuterol Sulfate (2.5 MG/3ML) 0.083% Inhalation Nebulization Solution; Patient Sig: Albuterol Sulfate (2.5 MG/3ML) 0.083% Inhalation Nebulization Solution USE 1 UNIT DOSE IN NEBULIZER EVERY 4 TO 6 HOURS AS NEE, Disp: , Rfl:   •  Aspirin Low Dose 81 MG EC tablet, TAKE 1 TABLET ONCE DAILY, Disp: 90 tablet, Rfl: 6  •  Diclofenac Sodium (VOLTAREN) 1 % gel gel, Apply 4 g topically to the appropriate area as directed Daily., Disp: , Rfl:   •  Eliquis 5 MG tablet tablet, TAKE 1 TABLET EVERY 12 HOURS, Disp: 180 tablet, Rfl: 3  •  furosemide (LASIX) 40 MG tablet, TAKE 1 TABLET TWICE DAILY, Disp: 180 tablet, Rfl: 3  •  insulin aspart (novoLOG FLEXPEN) 100 UNIT/ML solution pen-injector sc pen, Inject 15 Units under the skin into the appropriate area as directed Daily With Breakfast., Disp: , Rfl:   •  isosorbide mononitrate (IMDUR) 30 MG 24 hr tablet, TAKE 1 TABLET BY MOUTH DAILY., Disp: 90 tablet, Rfl: 5  •  losartan (COZAAR) 50 MG tablet, TAKE 1 TABLET BY MOUTH EVERY MORNING., Disp: 30 tablet, Rfl: 5  •  magnesium oxide (MAGOX) 400 (241.3 MG) MG tablet tablet, Take 1 tablet by mouth Every Night., Disp: , Rfl:   •  metFORMIN ER (GLUCOPHAGE-XR) 500 MG 24 hr tablet, Take 1 tablet by mouth Daily With Breakfast., Disp: , Rfl: 11  •  metolazone (ZAROXOLYN) 2.5 MG tablet, Take 1 tablet by mouth Daily As Needed. 1 tablet 30 minutes prior to lasix as needed PRN, Disp: , Rfl:   •  montelukast (SINGULAIR) 10  MG tablet, Take 1 tablet by mouth Every Night., Disp: , Rfl:   •  nitroglycerin (NITROSTAT) 0.4 MG SL tablet, Place 1 tablet under the tongue Every 5 (Five) Minutes As Needed for Chest Pain., Disp: 25 tablet, Rfl: 2  •  PARoxetine (PAXIL) 20 MG tablet, 0.5 tablets Daily., Disp: , Rfl:   •  potassium chloride (K-DUR,KLOR-CON) 10 MEQ CR tablet, Take 1 tablet by mouth Every Night., Disp: , Rfl:   •  potassium chloride (K-DUR,KLOR-CON) 20 MEQ CR tablet, Take 1 tablet by mouth Every Morning., Disp: 30 tablet, Rfl: 3  •  Repatha SureClick solution auto-injector SureClick injection, INJECT 1 ML UNDER THE SKIN INTO THE APPROPRIATE AREA AS DIRECTED EVERY 14 (FOURTEEN) DAYS., Disp: 6 pen, Rfl: 3  •  sotalol (Betapace) 120 MG tablet, Take 1 tablet by mouth 2 (Two) Times a Day., Disp: 60 tablet, Rfl: 6  •  TRESIBA FLEXTOUCH 200 UNIT/ML solution pen-injector, Inject 100 Units under the skin into the appropriate area as directed Every Morning. 100 units daily, Disp: , Rfl:   •  triamterene-hydrochlorothiazide (MAXZIDE-25) 37.5-25 MG per tablet, TAKE 1 TABLET EVERY MORNING., Disp: 90 tablet, Rfl: 3  •  Tudorza Pressair 400 MCG/ACT aerosol powder  powder for inhalation, Inhale 1 puff 2 (Two) Times a Day., Disp: , Rfl:            Ace inhibitors, Diltiazem, Flecainide, Incruse ellipta [umeclidinium bromide], Lisinopril, Niacin, Ketorolac tromethamine, Buspirone, Ciprofloxacin, Citalopram, Clindamycin/lincomycin, Codeine, Levaquin [levofloxacin], Lipitor [atorvastatin], Liraglutide, Lorazepam, Sertraline, Statins, Sulfamethoxazole-trimethoprim, Trulicity [dulaglutide], and Xigduo xr [dapagliflozin-metformin hcl er]         ECG 12 Lead    Date/Time: 9/20/2023 4:40 PM  Performed by: Mark Brito PA  Authorized by: Mark, Chase Omega, DO   Comparison: compared with previous ECG from 9/14/2023             Assessment & Plan             AFiB    Pt in office for an EKG since starting Sotalol 08/18/2023  Patient confirms all medication  is current except the Sotalol 120 mg. Patient states she did not like her she felt on the 120 mg so she is now taking 80 mg twice daily. Patient denies chest pain, palpitations, edema, SOB and fatigue.    Performed EKG.     Per verbal order from Mark LANG:  Patient is going to be evaluated for possible need of Tikosyn. Patient agreed. Advised I would send in a referral for EP. Advised patient to continue current medications, monitor symptoms and follow up if needed. Advised to visit the ER for emergencies    Informed pt of the above instructions. They verbalized understanding and are aware of plan.   - Bibi Culp, CMA

## 2023-10-06 DIAGNOSIS — Z86.73 HISTORY OF TIA (TRANSIENT ISCHEMIC ATTACK): ICD-10-CM

## 2023-10-10 DIAGNOSIS — Z86.73 HISTORY OF TIA (TRANSIENT ISCHEMIC ATTACK): ICD-10-CM

## 2023-10-10 RX ORDER — ASPIRIN 81 MG/1
81 TABLET ORAL DAILY
Qty: 90 TABLET | Refills: 3 | Status: SHIPPED | OUTPATIENT
Start: 2023-10-10

## 2023-10-10 RX ORDER — ASPIRIN 81 MG/1
TABLET, COATED ORAL
Qty: 90 TABLET | Refills: 5 | OUTPATIENT
Start: 2023-10-10

## 2023-10-10 NOTE — TELEPHONE ENCOUNTER
"I spoke with her pharmacy, Ms. Jacob has not been in the office for 4 years, and aspirin is being prescribed for a-fib. From the office note on:    \" She is done very well from a neurovascular standpoint with no new stroke or TIA-like symptoms.  At this point, my main concern is her atrial fibrillation, and I think it is reasonable for her to start anticoagulation therapy.  She is going to follow-up with her cardiologist, and I think it is reasonable to discontinue her Plavix and start a NOAC (and 81 mg ASA) for her atrial fibrillation.     I was planning on following up with her in 6 months time with carotid duplex to ensure stability and exclude any recurrent/progression of disease and might necessitate further treatment/intervention; however, she states it is quite a struggle for her to get the Toponas, and is going to see if she can have her follow-up carotid duplexes obtained through her cardiologist.\"    Care was turned over to her cardiologist for all further care.   "

## 2023-10-12 ENCOUNTER — APPOINTMENT (OUTPATIENT)
Dept: CARDIOLOGY | Facility: HOSPITAL | Age: 73
End: 2023-10-12
Payer: MEDICARE

## 2023-10-12 ENCOUNTER — HOSPITAL ENCOUNTER (OUTPATIENT)
Dept: CARDIOLOGY | Facility: HOSPITAL | Age: 73
Discharge: HOME | End: 2023-10-12
Payer: MEDICARE

## 2023-10-12 DIAGNOSIS — I65.23 BILATERAL CAROTID ARTERY STENOSIS: ICD-10-CM

## 2023-10-12 DIAGNOSIS — I48.0 PAROXYSMAL ATRIAL FIBRILLATION: ICD-10-CM

## 2023-10-12 DIAGNOSIS — R42 DIZZINESS: ICD-10-CM

## 2023-10-12 DIAGNOSIS — R06.02 SHORTNESS OF BREATH: ICD-10-CM

## 2023-10-12 DIAGNOSIS — R55 SYNCOPE AND COLLAPSE: ICD-10-CM

## 2023-10-12 LAB
BH CV ECHO MEAS - ACS: 2.08 CM
BH CV ECHO MEAS - AO ROOT DIAM: 3 CM
BH CV ECHO MEAS - EDV(CUBED): 59.8 ML
BH CV ECHO MEAS - EDV(MOD-SP4): 82.4 ML
BH CV ECHO MEAS - EF(MOD-SP4): 44.4 %
BH CV ECHO MEAS - EF_3D-VOL: 55 %
BH CV ECHO MEAS - ESV(CUBED): 8.7 ML
BH CV ECHO MEAS - ESV(MOD-SP4): 45.8 ML
BH CV ECHO MEAS - FS: 47.3 %
BH CV ECHO MEAS - IVS/LVPW: 1.02 CM
BH CV ECHO MEAS - IVSD: 1.22 CM
BH CV ECHO MEAS - LA DIMENSION: 5.1 CM
BH CV ECHO MEAS - LV DIASTOLIC VOL/BSA (35-75): 36.4 CM2
BH CV ECHO MEAS - LV MASS(C)D: 161.9 GRAMS
BH CV ECHO MEAS - LV SYSTOLIC VOL/BSA (12-30): 20.2 CM2
BH CV ECHO MEAS - LVIDD: 3.9 CM
BH CV ECHO MEAS - LVIDS: 2.06 CM
BH CV ECHO MEAS - LVPWD: 1.2 CM
BH CV ECHO MEAS - SI(MOD-SP4): 16.2 ML/M2
BH CV ECHO MEAS - SV(MOD-SP4): 36.6 ML
BH CV RIGHT CCA HIDDEN LRR: 1 CM/S
BH CV XLRA MEAS LEFT DIST CCA EDV: 15.6 CM/SEC
BH CV XLRA MEAS LEFT DIST CCA PSV: 93.6 CM/SEC
BH CV XLRA MEAS LEFT DIST ICA EDV: -18.2 CM/SEC
BH CV XLRA MEAS LEFT DIST ICA PSV: -86.9 CM/SEC
BH CV XLRA MEAS LEFT ICA/CCA RATIO: 2.1
BH CV XLRA MEAS LEFT MID ICA EDV: -29.6 CM/SEC
BH CV XLRA MEAS LEFT MID ICA PSV: -171 CM/SEC
BH CV XLRA MEAS LEFT PROX CCA EDV: 19.1 CM/SEC
BH CV XLRA MEAS LEFT PROX CCA PSV: 92.7 CM/SEC
BH CV XLRA MEAS LEFT PROX ECA PSV: -169 CM/SEC
BH CV XLRA MEAS LEFT PROX ICA EDV: -43.9 CM/SEC
BH CV XLRA MEAS LEFT PROX ICA PSV: -193 CM/SEC
BH CV XLRA MEAS LEFT VERTEBRAL A EDV: 14.7 CM/SEC
BH CV XLRA MEAS LEFT VERTEBRAL A PSV: 56.7 CM/SEC
BH CV XLRA MEAS RIGHT DIST CCA EDV: 14.3 CM/SEC
BH CV XLRA MEAS RIGHT DIST CCA PSV: 48.2 CM/SEC
BH CV XLRA MEAS RIGHT DIST ICA EDV: -24.1 CM/SEC
BH CV XLRA MEAS RIGHT DIST ICA PSV: -155 CM/SEC
BH CV XLRA MEAS RIGHT ICA/CCA RATIO: 3.2
BH CV XLRA MEAS RIGHT MID ICA EDV: -25.1 CM/SEC
BH CV XLRA MEAS RIGHT MID ICA PSV: -144 CM/SEC
BH CV XLRA MEAS RIGHT PROX CCA EDV: 11.2 CM/SEC
BH CV XLRA MEAS RIGHT PROX CCA PSV: 75.2 CM/SEC
BH CV XLRA MEAS RIGHT PROX ECA PSV: -128 CM/SEC
BH CV XLRA MEAS RIGHT PROX ICA EDV: -34 CM/SEC
BH CV XLRA MEAS RIGHT PROX ICA PSV: -113 CM/SEC
BH CV XLRA MEAS RIGHT VERTEBRAL A EDV: -16.5 CM/SEC
BH CV XLRA MEAS RIGHT VERTEBRAL A PSV: -60.7 CM/SEC
BH CVPROX RIGHT ICA HIDDEN LRR: 1 CM
LEFT ATRIUM VOLUME INDEX: 46.5 ML/M2

## 2023-10-12 PROCEDURE — 93880 EXTRACRANIAL BILAT STUDY: CPT

## 2023-10-12 PROCEDURE — 93308 TTE F-UP OR LMTD: CPT

## 2023-10-13 ENCOUNTER — HOSPITAL ENCOUNTER (OUTPATIENT)
Dept: CARDIOLOGY | Facility: HOSPITAL | Age: 73
Discharge: HOME | End: 2023-10-13
Payer: MEDICARE

## 2023-10-13 DIAGNOSIS — I65.23 BILATERAL CAROTID ARTERY STENOSIS: ICD-10-CM

## 2023-10-13 DIAGNOSIS — R42 DIZZINESS: ICD-10-CM

## 2023-10-13 DIAGNOSIS — R55 SYNCOPE AND COLLAPSE: ICD-10-CM

## 2023-10-13 DIAGNOSIS — R06.02 SHORTNESS OF BREATH: ICD-10-CM

## 2023-10-13 DIAGNOSIS — I48.0 PAROXYSMAL ATRIAL FIBRILLATION: ICD-10-CM

## 2023-10-13 LAB
BH CV REST NUCLEAR ISOTOPE DOSE: 10 MCI
BH CV STRESS COMMENTS STAGE 1: NORMAL
BH CV STRESS DOSE REGADENOSON STAGE 1: 0.4
BH CV STRESS DURATION MIN STAGE 1: 0
BH CV STRESS DURATION SEC STAGE 1: 10
BH CV STRESS NUCLEAR ISOTOPE DOSE: 30 MCI
BH CV STRESS PROTOCOL 1: NORMAL
BH CV STRESS RECOVERY BP: NORMAL MMHG
BH CV STRESS RECOVERY HR: 100 BPM
BH CV STRESS STAGE 1: 1
MAXIMAL PREDICTED HEART RATE: 148 BPM
PERCENT MAX PREDICTED HR: 77.03 %
STRESS BASELINE BP: NORMAL MMHG
STRESS BASELINE HR: 94 BPM
STRESS PERCENT HR: 91 %
STRESS POST PEAK BP: NORMAL MMHG
STRESS POST PEAK HR: 114 BPM
STRESS TARGET HR: 126 BPM

## 2023-10-13 PROCEDURE — 25010000002 REGADENOSON 0.4 MG/5ML SOLUTION: Performed by: INTERNAL MEDICINE

## 2023-10-13 PROCEDURE — 93017 CV STRESS TEST TRACING ONLY: CPT

## 2023-10-13 PROCEDURE — A9500 TC99M SESTAMIBI: HCPCS | Performed by: INTERNAL MEDICINE

## 2023-10-13 PROCEDURE — 78452 HT MUSCLE IMAGE SPECT MULT: CPT

## 2023-10-13 PROCEDURE — 0 TECHNETIUM SESTAMIBI: Performed by: INTERNAL MEDICINE

## 2023-10-13 RX ORDER — REGADENOSON 0.08 MG/ML
0.4 INJECTION, SOLUTION INTRAVENOUS
Status: COMPLETED | OUTPATIENT
Start: 2023-10-13 | End: 2023-10-13

## 2023-10-13 RX ADMIN — TECHNETIUM TC 99M SESTAMIBI 1 DOSE: 1 INJECTION INTRAVENOUS at 13:21

## 2023-10-13 RX ADMIN — REGADENOSON 0.4 MG: 0.08 INJECTION, SOLUTION INTRAVENOUS at 13:21

## 2023-10-13 RX ADMIN — TECHNETIUM TC 99M SESTAMIBI 1 DOSE: 1 INJECTION INTRAVENOUS at 12:00

## 2023-10-23 ENCOUNTER — TELEPHONE (OUTPATIENT)
Dept: CARDIOLOGY | Facility: CLINIC | Age: 73
End: 2023-10-23
Payer: MEDICARE

## 2023-10-23 NOTE — TELEPHONE ENCOUNTER
Patient notified of abnormal stress test and follow up in 1 to 2 weeks. She is aware that someone from our office will be contacting her to set up appointment.

## 2023-10-23 NOTE — TELEPHONE ENCOUNTER
----- Message from RICKEY Yen sent at 10/23/2023  9:29 AM EDT -----  Follow-up 1 to 2 weeks.  ----- Message -----  From: Manpreet Mcgee MD  Sent: 10/17/2023  11:30 AM EDT  To: RICKEY Yen    Stress Test With Myocardial Perfusion One Day  Order: [id number]  Status: Final result       Visible to patient: Yes (seen)       Dx: Syncope and collapse; Shortness of br...    0 Result Notes  Details    Reading Physician Reading Date Result Priority   Manpreet Mcgee MD  [phone] 10/17/2023 Routine   Manpreet Mcgee MD  [phone] 10/17/2023      Result Text  1.  Scintigraphy demonstrates a large, moderately dense but incompletely reversible defect involving the anteroseptal anterior and anterolateral walls compatible with ischemia.     2.  Preserved post stress ejection fraction of 58% with very mild anteroseptal hypokinesis.  This is most notable in the more basilar portions of the interventricular septum.     3.  No evidence of pharmacologically induced transient ischemic dilation or of increased lung uptake of radiopharmaceutical.        [facility] CARDIOLOGY [facility]

## 2023-10-24 ENCOUNTER — LAB (OUTPATIENT)
Dept: CARDIOLOGY | Facility: CLINIC | Age: 73
End: 2023-10-24
Payer: MEDICARE

## 2023-10-24 ENCOUNTER — OFFICE VISIT (OUTPATIENT)
Dept: CARDIOLOGY | Facility: CLINIC | Age: 73
End: 2023-10-24
Payer: MEDICARE

## 2023-10-24 VITALS
BODY MASS INDEX: 43.71 KG/M2 | DIASTOLIC BLOOD PRESSURE: 56 MMHG | WEIGHT: 272 LBS | HEART RATE: 78 BPM | OXYGEN SATURATION: 91 % | HEIGHT: 66 IN | SYSTOLIC BLOOD PRESSURE: 147 MMHG

## 2023-10-24 DIAGNOSIS — I48.0 PAROXYSMAL ATRIAL FIBRILLATION: ICD-10-CM

## 2023-10-24 DIAGNOSIS — I10 ESSENTIAL HYPERTENSION: ICD-10-CM

## 2023-10-24 DIAGNOSIS — R94.39 ABNORMAL STRESS TEST: ICD-10-CM

## 2023-10-24 DIAGNOSIS — R06.02 SHORTNESS OF BREATH: ICD-10-CM

## 2023-10-24 DIAGNOSIS — R06.02 SHORTNESS OF BREATH: Primary | ICD-10-CM

## 2023-10-24 PROCEDURE — 1160F RVW MEDS BY RX/DR IN RCRD: CPT | Performed by: PHYSICIAN ASSISTANT

## 2023-10-24 PROCEDURE — 99214 OFFICE O/P EST MOD 30 MIN: CPT | Performed by: PHYSICIAN ASSISTANT

## 2023-10-24 PROCEDURE — 1159F MED LIST DOCD IN RCRD: CPT | Performed by: PHYSICIAN ASSISTANT

## 2023-10-24 PROCEDURE — 3077F SYST BP >= 140 MM HG: CPT | Performed by: PHYSICIAN ASSISTANT

## 2023-10-24 PROCEDURE — 3078F DIAST BP <80 MM HG: CPT | Performed by: PHYSICIAN ASSISTANT

## 2023-10-24 RX ORDER — SOTALOL HYDROCHLORIDE 80 MG/1
80 TABLET ORAL 2 TIMES DAILY
COMMUNITY
Start: 2023-10-16

## 2023-10-24 RX ORDER — SOTALOL HYDROCHLORIDE 80 MG/1
80 TABLET ORAL 2 TIMES DAILY
COMMUNITY

## 2023-10-24 RX ORDER — PAROXETINE 10 MG/1
10 TABLET, FILM COATED ORAL EVERY MORNING
COMMUNITY
Start: 2023-10-11

## 2023-10-24 NOTE — PROGRESS NOTES
Problem list     Subjective   Mandi Jacob is a 73 y.o. female     Chief Complaint   Patient presents with    Follow-up     Abnormal stress test   Problem List:  1. Nonobstructive CAD by cath February 2012.  2. Preserved systolic function  3. History of CHF  4. Diastolic dysfunction  5. Hypertension      6. Dyslipidemia. The patient reports she is intolerant to statins.      7. Chronic palpitations.      7.1.  Recurrent PACs noted by telemetry historically.  The patient was treated with sotalol therapy for the same.      7.2.  A. fib with rapid ventricular response noted by event monitor, 2019.      8.  Peripheral vascular disease, status post angioplasty and stenting to the right carotid artery system, 06/19 with Dr. Dre ANN  The patient presents in the clinic today primarily to review stress test findings.  She was seen at last evaluation where she had started noticing increasing chest tightness, fatigue, and worsened. She was at that time scheduled for further testing.  She did have a stress test, which has prompted today's evaluation.  This suggested anterior and anterolateral wall ischemia.  Post stress EF is at 58%.  Of note, echo and carotid duplex preliminary reports only are available today.  An event monitor was performed which indicated A-fib as the predominant rhythm throughout the study.  She did appear to have sinus rhythm at times.  She had widely fluctuating rates throughout the study.  Occasional PVCs were noted as well.  Laboratories were performed but are not available, unfortunately, today's evaluation.  Clinically, the patient still does not feel well.  She still has chest tightness which she feels has worsened even since her last appointment.  Her dyspnea has intensified, worse than previous baseline secondary to pulmonary status.  She virtually has no exercise capacity at this time because of symptoms.  She still has frequent episodes of what she recognizes as A-fib.  She has  no syncope associated with this.  There has been no PND nor orthopnea.  She has no further complaints at this time.     Current Outpatient Medications on File Prior to Visit   Medication Sig Dispense Refill    albuterol (PROVENTIL HFA;VENTOLIN HFA) 108 (90 BASE) MCG/ACT inhaler Inhale 2 puffs Every 4 (Four) Hours As Needed. Albuterol 90 MCG/ACT AERS; Patient Sig: Albuterol 90 MCG/ACT AERS INHALE 1 TO 2 PUFFS EVERY 4 TO 6 HOURS AS NEEDED.; 0; 11-Sep-2013; Active      albuterol (PROVENTIL) (2.5 MG/3ML) 0.083% nebulizer solution Albuterol Sulfate (2.5 MG/3ML) 0.083% Inhalation Nebulization Solution; Patient Sig: Albuterol Sulfate (2.5 MG/3ML) 0.083% Inhalation Nebulization Solution USE 1 UNIT DOSE IN NEBULIZER EVERY 4 TO 6 HOURS AS NEE      aspirin (Aspirin Low Dose) 81 MG EC tablet Take 1 tablet by mouth Daily. 90 tablet 3    Diclofenac Sodium (VOLTAREN) 1 % gel gel Apply 4 g topically to the appropriate area as directed Daily.      Eliquis 5 MG tablet tablet TAKE 1 TABLET EVERY 12 HOURS 180 tablet 3    furosemide (LASIX) 40 MG tablet TAKE 1 TABLET TWICE DAILY 180 tablet 3    insulin aspart (novoLOG FLEXPEN) 100 UNIT/ML solution pen-injector sc pen Inject 15 Units under the skin into the appropriate area as directed Daily With Breakfast.      isosorbide mononitrate (IMDUR) 30 MG 24 hr tablet TAKE 1 TABLET BY MOUTH DAILY. 90 tablet 5    losartan (COZAAR) 50 MG tablet TAKE 1 TABLET BY MOUTH EVERY MORNING. 30 tablet 5    magnesium oxide (MAGOX) 400 (241.3 MG) MG tablet tablet Take 1 tablet by mouth Every Night.      metFORMIN ER (GLUCOPHAGE-XR) 500 MG 24 hr tablet Take 1 tablet by mouth Daily With Breakfast.  11    metolazone (ZAROXOLYN) 2.5 MG tablet Take 1 tablet by mouth Daily As Needed. 1 tablet 30 minutes prior to lasix as needed PRN      metoprolol tartrate (LOPRESSOR) 25 MG tablet Take 0.5 tablets by mouth 2 (Two) Times a Day.      montelukast (SINGULAIR) 10 MG tablet Take 1 tablet by mouth Every Night.       nitroglycerin (NITROSTAT) 0.4 MG SL tablet Place 1 tablet under the tongue Every 5 (Five) Minutes As Needed for Chest Pain. 25 tablet 2    PARoxetine (PAXIL) 10 MG tablet Take 1 tablet by mouth Every Morning.      potassium chloride (K-DUR,KLOR-CON) 10 MEQ CR tablet Take 1 tablet by mouth Every Night.      potassium chloride (K-DUR,KLOR-CON) 20 MEQ CR tablet Take 1 tablet by mouth Every Morning. 30 tablet 3    Repatha SureClick solution auto-injector SureClick injection INJECT 1 ML UNDER THE SKIN INTO THE APPROPRIATE AREA AS DIRECTED EVERY 14 (FOURTEEN) DAYS. 6 pen 3    sotalol (BETAPACE) 80 MG tablet Take 1 tablet by mouth 2 (Two) Times a Day.      sotalol (BETAPACE) 80 MG tablet Take 1 tablet by mouth 2 (Two) Times a Day.      TRESIBA FLEXTOUCH 200 UNIT/ML solution pen-injector Inject 100 Units under the skin into the appropriate area as directed Every Morning. 100 units daily      triamterene-hydrochlorothiazide (MAXZIDE-25) 37.5-25 MG per tablet TAKE 1 TABLET EVERY MORNING. 90 tablet 3    Tudorza Pressair 400 MCG/ACT aerosol powder  powder for inhalation Inhale 1 puff 2 (Two) Times a Day.       No current facility-administered medications on file prior to visit.       Ace inhibitors, Diltiazem, Flecainide, Incruse ellipta [umeclidinium bromide], Lisinopril, Niacin, Ketorolac tromethamine, Buspirone, Ciprofloxacin, Citalopram, Clindamycin/lincomycin, Codeine, Levaquin [levofloxacin], Lipitor [atorvastatin], Liraglutide, Lorazepam, Sertraline, Statins, Sulfamethoxazole-trimethoprim, Trulicity [dulaglutide], and Xigduo xr [dapagliflozin pro-metformin er]    Past Medical History:   Diagnosis Date    Arrhythmia     Asthma     Atrial fibrillation     CAD (coronary artery disease)     non-obstructive by cath 02/2012    CHF (congestive heart failure)     COPD (chronic obstructive pulmonary disease)     Diabetes mellitus     Diastolic dysfunction     Dizziness     Edema     Esophageal spasm     Herniated lumbar  intervertebral disc     L5    Hyperlipidemia     intolerant to statins     Hypertension     Osteoarthritis     Palpitations     SOB (shortness of breath)     Stroke     Stroke-like symptoms     TIA (transient ischemic attack) 2015    left hemispheric TIA/CVA    Unstable angina        Social History     Socioeconomic History    Marital status:    Tobacco Use    Smoking status: Former     Packs/day: 1.50     Years: 41.00     Additional pack years: 0.00     Total pack years: 61.50     Types: Cigarettes     Start date: 1968     Quit date: 3/22/2009     Years since quittin.6    Smokeless tobacco: Never   Substance and Sexual Activity    Alcohol use: No    Drug use: No    Sexual activity: Never       Family History   Problem Relation Age of Onset    Heart attack Mother     Heart failure Mother         congestive    Hyperlipidemia Mother     Hypertension Mother     Peripheral vascular disease Mother     Heart attack Father     Heart failure Father         congestive    Diabetes Father     Hyperlipidemia Father     Hypertension Father     Peripheral vascular disease Father     Asthma Father     Heart failure Other         congestive    Diabetes Other     Hyperlipidemia Other     Hypertension Other     Peripheral vascular disease Other     Hypertension Brother        Review of Systems   Constitutional:  Positive for fatigue. Negative for chills, diaphoresis and fever.   HENT: Negative.     Eyes: Negative.  Negative for visual disturbance.   Respiratory:  Positive for shortness of breath. Negative for apnea, cough, chest tightness and wheezing.    Cardiovascular:  Positive for chest pain and palpitations. Negative for leg swelling.   Gastrointestinal: Negative.  Negative for abdominal pain and blood in stool.   Endocrine: Negative.    Genitourinary: Negative.  Negative for hematuria.   Musculoskeletal:  Positive for arthralgias, back pain, neck pain and neck stiffness. Negative for myalgias.   Skin: Negative.   "Negative for rash and wound.   Allergic/Immunologic: Positive for environmental allergies (mold, grass, hay, pollen). Negative for food allergies.   Neurological:  Positive for dizziness. Negative for syncope, weakness, light-headedness, numbness and headaches.   Hematological:  Bruises/bleeds easily.   Psychiatric/Behavioral:  Positive for sleep disturbance.        Objective   Vitals:    10/24/23 1315   BP: 147/56   BP Location: Right arm   Patient Position: Sitting   Pulse: 78   SpO2: 91%   Weight: 123 kg (272 lb)   Height: 167.6 cm (66\")      /56 (BP Location: Right arm, Patient Position: Sitting)   Pulse 78   Ht 167.6 cm (66\")   Wt 123 kg (272 lb)   SpO2 91% Comment: oxygen at 2 liters via nc  BMI 43.90 kg/m²    Lab Results (most recent)       None          Physical Exam  Vitals and nursing note reviewed.   Constitutional:       General: She is not in acute distress.     Appearance: She is well-developed.   HENT:      Head: Normocephalic and atraumatic.   Eyes:      Conjunctiva/sclera: Conjunctivae normal.      Pupils: Pupils are equal, round, and reactive to light.   Neck:      Vascular: No JVD.      Trachea: No tracheal deviation.   Cardiovascular:      Rate and Rhythm: Normal rate and regular rhythm.      Heart sounds: Normal heart sounds.      Comments: 1/6 systolic murmur mid lower left sternal border.  Pulmonary:      Effort: Pulmonary effort is normal.      Breath sounds: Decreased air movement present. Wheezing present.   Abdominal:      General: Bowel sounds are normal. There is no distension.      Palpations: Abdomen is soft. There is no mass.      Tenderness: There is no abdominal tenderness. There is no guarding or rebound.   Musculoskeletal:         General: No tenderness or deformity. Normal range of motion.      Cervical back: Normal range of motion and neck supple.      Right lower le+ Edema present.      Left lower le+ Edema present.   Skin:     General: Skin is warm and dry. "      Coloration: Skin is not pale.      Findings: No erythema or rash.   Neurological:      Mental Status: She is alert and oriented to person, place, and time.   Psychiatric:         Behavior: Behavior normal.         Thought Content: Thought content normal.         Judgment: Judgment normal.           Procedure   Procedures       Assessment & Plan      Diagnosis Plan   1. Shortness of breath  CBC & Differential    [facility]    Basic Metabolic Panel      2. Paroxysmal atrial fibrillation  CBC & Differential    [facility]    Basic Metabolic Panel      3. Essential hypertension  CBC & Differential    [facility]    Basic Metabolic Panel      4. Abnormal stress test  CBC & Differential    [facility]    Basic Metabolic Panel        1.  The patient presents back primarily to review stress test findings.  She was scheduled for the testing in the setting of increasing chest tightness and symptoms otherwise as above.  Stress test suggested anterior and anterolateral wall ischemia.  The patient feels very poorly at this time and would like to pursue catheterization.  She will be scheduled accordingly given clinical scenario.    2.  Event monitor indicated A-fib as the predominant rhythm throughout the study.  We have not been able to adjust sotalol, as she was intolerant to higher doses.  We attempted to adjust metoprolol, which she did not tolerate.  As we can know results of catheterization, we can recommend further in terms of suppressive therapies for A-fib and dysrhythmic symptoms otherwise.    3.  The patient will hold Eliquis 2 days before cath.  She will hold metformin 24 hours prior to the same.    4.  We will continue medications otherwise without change for now.  We can adjust further as we can review results of catheterization findings.    5.  She will return to clinic immediately for any issues prior to follow-up.           Advance Care Planning   ACP discussion was held  with the patient during this visit. Patient has an advance directive (not in EMR), copy requested.     Patient did not bring med list or medicine bottles to appointment, med list has been reviewed and updated based on patient's knowledge of their meds.      Electronically signed by:

## 2023-10-26 LAB
BASOPHILS # BLD AUTO: 0.04 10*3/MM3 (ref 0–0.2)
BASOPHILS NFR BLD AUTO: 0.4 % (ref 0–1.5)
BUN SERPL-MCNC: 22 MG/DL (ref 8–23)
BUN/CREAT SERPL: 22.7 (ref 7–25)
CALCIUM SERPL-MCNC: 11.1 MG/DL (ref 8.6–10.5)
CHLORIDE SERPL-SCNC: 101 MMOL/L (ref 98–107)
CO2 SERPL-SCNC: 31 MMOL/L (ref 22–29)
CREAT SERPL-MCNC: 0.97 MG/DL (ref 0.57–1)
EGFRCR SERPLBLD CKD-EPI 2021: 62.2 ML/MIN/1.73
EOSINOPHIL # BLD AUTO: 0.08 10*3/MM3 (ref 0–0.4)
EOSINOPHIL NFR BLD AUTO: 0.8 % (ref 0.3–6.2)
ERYTHROCYTE [DISTWIDTH] IN BLOOD BY AUTOMATED COUNT: 12.8 % (ref 12.3–15.4)
GLUCOSE SERPL-MCNC: 214 MG/DL (ref 65–99)
HCT VFR BLD AUTO: 43.9 % (ref 34–46.6)
HGB BLD-MCNC: 14.2 G/DL (ref 12–15.9)
IMM GRANULOCYTES # BLD AUTO: 0.05 10*3/MM3 (ref 0–0.05)
IMM GRANULOCYTES NFR BLD AUTO: 0.5 % (ref 0–0.5)
LYMPHOCYTES # BLD AUTO: 2.42 10*3/MM3 (ref 0.7–3.1)
LYMPHOCYTES NFR BLD AUTO: 23.3 % (ref 19.6–45.3)
MCH RBC QN AUTO: 30.1 PG (ref 26.6–33)
MCHC RBC AUTO-ENTMCNC: 32.3 G/DL (ref 31.5–35.7)
MCV RBC AUTO: 93.2 FL (ref 79–97)
MONOCYTES # BLD AUTO: 0.88 10*3/MM3 (ref 0.1–0.9)
MONOCYTES NFR BLD AUTO: 8.5 % (ref 5–12)
NEUTROPHILS # BLD AUTO: 6.9 10*3/MM3 (ref 1.7–7)
NEUTROPHILS NFR BLD AUTO: 66.5 % (ref 42.7–76)
NRBC BLD AUTO-RTO: 0 /100 WBC (ref 0–0.2)
PLATELET # BLD AUTO: 298 10*3/MM3 (ref 140–450)
POTASSIUM SERPL-SCNC: 4.1 MMOL/L (ref 3.5–5.2)
RBC # BLD AUTO: 4.71 10*6/MM3 (ref 3.77–5.28)
SODIUM SERPL-SCNC: 143 MMOL/L (ref 136–145)
WBC # BLD AUTO: 10.37 10*3/MM3 (ref 3.4–10.8)

## 2023-10-31 ENCOUNTER — TELEPHONE (OUTPATIENT)
Dept: CARDIOLOGY | Facility: CLINIC | Age: 73
End: 2023-10-31
Payer: MEDICARE

## 2023-10-31 NOTE — TELEPHONE ENCOUNTER
----- Message from RICKEY Yen sent at 10/30/2023  9:09 AM EDT -----  Copy forward to PCP please.  ----- Message -----  From: [facility] Reflab Results In  Sent: 10/26/2023   7:12 AM EDT  To: RICKEY Yen

## 2023-11-02 ENCOUNTER — TELEPHONE (OUTPATIENT)
Dept: CARDIOLOGY | Facility: CLINIC | Age: 73
End: 2023-11-02
Payer: MEDICARE

## 2023-11-02 DIAGNOSIS — I65.23 BILATERAL CAROTID ARTERY STENOSIS: Primary | ICD-10-CM

## 2023-11-02 NOTE — TELEPHONE ENCOUNTER
----- Message from RICKEY Yen sent at 10/30/2023 11:19 PM EDT -----  We can review this after catheterization.  ----- Message -----  From: Manpreet Mcgee MD  Sent: 10/29/2023   1:24 PM EDT  To: RICKEY Yen    [facility] Outpatient Telemetry  Order: [id number]  Status: Final result       Visible to patient: Yes (not seen)       Dx: Syncope and collapse; Shortness of br...    0 Result Notes  Details    Reading Physician Reading Date Result Priority   Manpreet Mcgee MD  [phone] 10/29/2023 Routine     Result Text  1.  A-fib appears to be the predominant rhythm throughout the study.  It is noted that some of the telemetry strips is reported to sinus rhythm with PACs, although this clearly is representative of atrial fibrillation.     2.  She did demonstrate 1 rhythm strip in particular, on 5:13 AM on 9/16/2023, where it does appear she is in sinus rhythm.  There appears to be second-degree AV block present, although limited telemetry is included and this is not present anywhere else.     3.  Occasional PVCs were noted during the study.     4.  Intermittent rapid ventricular response rates are noted.  Please correlate clinically and symptomatically.     5.  No other arrhythmia, ectopy, or block of significance is noted.     6.  Symptoms appear to occur during atrial fibrillation.  PVCs and in particular rapid ventricular response rates were sensed at times however.

## 2023-11-02 NOTE — TELEPHONE ENCOUNTER
Patient aware that Mark will go over result's with her at her follow up after heart cath.     Regarding carotid us patient states has not seen Dr. Erickson since 2019 and may need referral. Patient aware of result's and copy sent to PCP and Dr. Erickson.

## 2023-11-03 NOTE — TELEPHONE ENCOUNTER
Per cl sinclair to send referral to Dr. Erickson due to patient has not been seen since 2019.    Order placed for referral to Dr. Erickson.

## 2023-11-10 ENCOUNTER — TELEPHONE (OUTPATIENT)
Dept: CARDIOLOGY | Facility: CLINIC | Age: 73
End: 2023-11-10

## 2023-11-10 NOTE — TELEPHONE ENCOUNTER
[facility]    Relationship: Provider    Best call back number: [phone]     What orders are you requesting (i.e. lab or imaging): LEFT HEART CATH     In what timeframe would the patient need to come in: 11/13/23    [facility]     Additional notes: CALLER IS NEEDING ORDERS FOR THISD PT, COMING IN ON 11/13/23. PLEASE -[phone]

## 2023-11-13 ENCOUNTER — TELEPHONE (OUTPATIENT)
Dept: CARDIOLOGY | Facility: CLINIC | Age: 73
End: 2023-11-13
Payer: MEDICARE

## 2023-11-13 ENCOUNTER — OUTSIDE FACILITY SERVICE (OUTPATIENT)
Dept: CARDIOLOGY | Facility: CLINIC | Age: 73
End: 2023-11-13
Payer: MEDICARE

## 2023-11-13 RX ORDER — CLOPIDOGREL BISULFATE 75 MG/1
75 TABLET ORAL DAILY
Qty: 90 TABLET | Refills: 3 | Status: SHIPPED | OUTPATIENT
Start: 2023-11-13

## 2023-11-13 NOTE — TELEPHONE ENCOUNTER
Call received from Dr Mcgee at cath lab, Plavix 75 mg PO daily to [facility]. Patient counseled on medication at cath lab.

## 2023-11-15 ENCOUNTER — OFFICE VISIT (OUTPATIENT)
Dept: CARDIOLOGY | Facility: CLINIC | Age: 73
End: 2023-11-15
Payer: MEDICARE

## 2023-11-15 VITALS
DIASTOLIC BLOOD PRESSURE: 71 MMHG | HEART RATE: 90 BPM | OXYGEN SATURATION: 92 % | HEIGHT: 66 IN | BODY MASS INDEX: 43.68 KG/M2 | SYSTOLIC BLOOD PRESSURE: 125 MMHG | WEIGHT: 271.8 LBS

## 2023-11-15 DIAGNOSIS — I10 ESSENTIAL HYPERTENSION: ICD-10-CM

## 2023-11-15 DIAGNOSIS — I48.0 PAROXYSMAL ATRIAL FIBRILLATION: ICD-10-CM

## 2023-11-15 DIAGNOSIS — R06.02 SHORTNESS OF BREATH: ICD-10-CM

## 2023-11-15 DIAGNOSIS — I25.10 CORONARY ARTERY DISEASE INVOLVING NATIVE CORONARY ARTERY OF NATIVE HEART WITHOUT ANGINA PECTORIS: Primary | ICD-10-CM

## 2023-11-15 PROCEDURE — 1159F MED LIST DOCD IN RCRD: CPT | Performed by: PHYSICIAN ASSISTANT

## 2023-11-15 PROCEDURE — 1160F RVW MEDS BY RX/DR IN RCRD: CPT | Performed by: PHYSICIAN ASSISTANT

## 2023-11-15 PROCEDURE — 3078F DIAST BP <80 MM HG: CPT | Performed by: PHYSICIAN ASSISTANT

## 2023-11-15 PROCEDURE — 3074F SYST BP LT 130 MM HG: CPT | Performed by: PHYSICIAN ASSISTANT

## 2023-11-15 PROCEDURE — 99213 OFFICE O/P EST LOW 20 MIN: CPT | Performed by: PHYSICIAN ASSISTANT

## 2023-11-15 RX ORDER — AMOXICILLIN 500 MG/1
500 CAPSULE ORAL 3 TIMES DAILY
Qty: 30 CAPSULE | Refills: 0 | Status: SHIPPED | OUTPATIENT
Start: 2023-11-15

## 2023-11-15 RX ORDER — FLUCONAZOLE 150 MG/1
150 TABLET ORAL ONCE
Qty: 1 TABLET | Refills: 0 | Status: SHIPPED | OUTPATIENT
Start: 2023-11-15 | End: 2023-11-15

## 2023-11-15 NOTE — PROGRESS NOTES
Problem list     Subjective   Mandi Jacob is a 73 y.o. female     Chief Complaint   Patient presents with    Follow-up     Heart cath with 1 stent   Problem List:  1. Nonobstructive CAD by cath February 2012.  1.1.  Catheterization, 11/2023, in the setting of low level symptoms and abnormal stress test findings.  The patient had IVUS guided stenting of the RCA.  She had nonobstructive disease noted otherwise, with medical management recommended.  2. Preserved systolic function  3. History of CHF  4. Diastolic dysfunction  5. Hypertension      6. Dyslipidemia. The patient reports she is intolerant to statins.      7. Chronic palpitations.      7.1.  Recurrent PACs noted by telemetry historically.  The patient was treated with sotalol therapy for the same.      7.2.  A. fib with rapid ventricular response noted by event monitor, 2019.      8.  Peripheral vascular disease, status post angioplasty and stenting to the right carotid artery system, 06/19 with Dr. Dre ANN  The patient presents in for follow-up post catheterization.  She was scheduled for catheterization because of low level symptoms and abnormal noninvasive test findings/stress test results.  She had IVUS guided stenting of the RCA.  She had nonobstructive disease otherwise with medical management recommended.  Post stenting, she feels markedly improved.  She was having multiple symptoms related to A-fib previously.  Those have resolved for the most part post stenting.  She now feels that A-fib is well controlled and treated on current medical regimen.  Her chest pain/tightness has resolved.  Dyspnea still severe but felt secondary to pulmonary status.  She has no failure nor dysrhythmic issues otherwise.  Of note, cath site is stable.  She has no further concerns.    Current Outpatient Medications on File Prior to Visit   Medication Sig Dispense Refill    albuterol (PROVENTIL HFA;VENTOLIN HFA) 108 (90 BASE) MCG/ACT inhaler Inhale 2 puffs  Every 4 (Four) Hours As Needed. Albuterol 90 MCG/ACT AERS; Patient Sig: Albuterol 90 MCG/ACT AERS INHALE 1 TO 2 PUFFS EVERY 4 TO 6 HOURS AS NEEDED.; 0; 11-Sep-2013; Active      albuterol (PROVENTIL) (2.5 MG/3ML) 0.083% nebulizer solution Albuterol Sulfate (2.5 MG/3ML) 0.083% Inhalation Nebulization Solution; Patient Sig: Albuterol Sulfate (2.5 MG/3ML) 0.083% Inhalation Nebulization Solution USE 1 UNIT DOSE IN NEBULIZER EVERY 4 TO 6 HOURS AS NEE      clopidogrel (PLAVIX) 75 MG tablet Take 1 tablet by mouth Daily. 90 tablet 3    Diclofenac Sodium (VOLTAREN) 1 % gel gel Apply 4 g topically to the appropriate area as directed Daily.      Eliquis 5 MG tablet tablet TAKE 1 TABLET EVERY 12 HOURS 180 tablet 3    furosemide (LASIX) 40 MG tablet TAKE 1 TABLET TWICE DAILY 180 tablet 3    insulin aspart (novoLOG FLEXPEN) 100 UNIT/ML solution pen-injector sc pen Inject 15 Units under the skin into the appropriate area as directed Daily With Breakfast.      isosorbide mononitrate (IMDUR) 30 MG 24 hr tablet TAKE 1 TABLET BY MOUTH DAILY. 90 tablet 5    losartan (COZAAR) 50 MG tablet TAKE 1 TABLET BY MOUTH EVERY MORNING. 30 tablet 5    magnesium oxide (MAGOX) 400 (241.3 MG) MG tablet tablet Take 1 tablet by mouth Every Night.      metFORMIN ER (GLUCOPHAGE-XR) 500 MG 24 hr tablet Take 1 tablet by mouth Daily With Breakfast.  11    metolazone (ZAROXOLYN) 2.5 MG tablet Take 1 tablet by mouth Daily As Needed. 1 tablet 30 minutes prior to lasix as needed PRN      metoprolol tartrate (LOPRESSOR) 25 MG tablet Take 0.5 tablets by mouth 2 (Two) Times a Day.      montelukast (SINGULAIR) 10 MG tablet Take 1 tablet by mouth Every Night.      nitroglycerin (NITROSTAT) 0.4 MG SL tablet Place 1 tablet under the tongue Every 5 (Five) Minutes As Needed for Chest Pain. 25 tablet 2    PARoxetine (PAXIL) 10 MG tablet Take 1 tablet by mouth Every Morning.      potassium chloride (K-DUR,KLOR-CON) 10 MEQ CR tablet Take 1 tablet by mouth Every Night.       potassium chloride (K-DUR,KLOR-CON) 20 MEQ CR tablet Take 1 tablet by mouth Every Morning. 30 tablet 3    Repatha SureClick solution auto-injector SureClick injection INJECT 1 ML UNDER THE SKIN INTO THE APPROPRIATE AREA AS DIRECTED EVERY 14 (FOURTEEN) DAYS. 6 pen 3    sotalol (BETAPACE) 80 MG tablet Take 1 tablet by mouth 2 (Two) Times a Day.      TRESIBA FLEXTOUCH 200 UNIT/ML solution pen-injector Inject 100 Units under the skin into the appropriate area as directed Every Morning. 100 units daily      triamterene-hydrochlorothiazide (MAXZIDE-25) 37.5-25 MG per tablet TAKE 1 TABLET EVERY MORNING. 90 tablet 3    Tudorza Pressair 400 MCG/ACT aerosol powder  powder for inhalation Inhale 1 puff 2 (Two) Times a Day.       No current facility-administered medications on file prior to visit.       Ace inhibitors, Diltiazem, Flecainide, Incruse ellipta [umeclidinium bromide], Lisinopril, Niacin, Ketorolac tromethamine, Buspirone, Ciprofloxacin, Citalopram, Clindamycin/lincomycin, Codeine, Levaquin [levofloxacin], Lipitor [atorvastatin], Liraglutide, Lorazepam, Sertraline, Statins, Sulfamethoxazole-trimethoprim, Trulicity [dulaglutide], and Xigduo xr [dapagliflozin pro-metformin er]    Past Medical History:   Diagnosis Date    Arrhythmia     Asthma     Atrial fibrillation     CAD (coronary artery disease)     non-obstructive by cath 02/2012    CHF (congestive heart failure)     COPD (chronic obstructive pulmonary disease)     Diabetes mellitus     Diastolic dysfunction     Dizziness     Edema     Esophageal spasm     Herniated lumbar intervertebral disc     L5    Hyperlipidemia     intolerant to statins     Hypertension     Osteoarthritis     Palpitations     SOB (shortness of breath)     Stroke     Stroke-like symptoms     TIA (transient ischemic attack) 2015    left hemispheric TIA/CVA    Unstable angina        Social History     Socioeconomic History    Marital status:    Tobacco Use    Smoking status: Former      Packs/day: 1.50     Years: 41.00     Additional pack years: 0.00     Total pack years: 61.50     Types: Cigarettes     Start date: 1968     Quit date: 3/22/2009     Years since quittin.6    Smokeless tobacco: Never   Substance and Sexual Activity    Alcohol use: No    Drug use: No    Sexual activity: Never       Family History   Problem Relation Age of Onset    Heart attack Mother     Heart failure Mother         congestive    Hyperlipidemia Mother     Hypertension Mother     Peripheral vascular disease Mother     Heart attack Father     Heart failure Father         congestive    Diabetes Father     Hyperlipidemia Father     Hypertension Father     Peripheral vascular disease Father     Asthma Father     Heart failure Other         congestive    Diabetes Other     Hyperlipidemia Other     Hypertension Other     Peripheral vascular disease Other     Hypertension Brother        Review of Systems   Constitutional:  Positive for fatigue. Negative for chills, diaphoresis and fever.   HENT: Negative.     Eyes: Negative.  Negative for visual disturbance.   Respiratory:  Positive for cough and shortness of breath. Negative for apnea, chest tightness and wheezing.    Cardiovascular: Negative.  Negative for chest pain, palpitations and leg swelling.   Gastrointestinal:  Positive for abdominal pain and constipation. Negative for blood in stool.   Endocrine: Negative.    Genitourinary: Negative.  Negative for hematuria.   Musculoskeletal:  Positive for arthralgias and back pain. Negative for myalgias, neck pain and neck stiffness.   Skin: Negative.  Negative for rash and wound.   Allergic/Immunologic: Positive for environmental allergies (seasonal). Negative for food allergies.   Neurological: Negative.  Negative for dizziness, syncope, weakness, light-headedness, numbness and headaches.   Hematological:  Bruises/bleeds easily.   Psychiatric/Behavioral:  Positive for sleep disturbance.        Objective   Vitals:     "11/15/23 1531   BP: 125/71   BP Location: Left arm   Patient Position: Sitting   Pulse: 90   SpO2: 92%   Weight: 123 kg (271 lb 12.8 oz)   Height: 167.6 cm (66\")      /71 (BP Location: Left arm, Patient Position: Sitting)   Pulse 90   Ht 167.6 cm (66\")   Wt 123 kg (271 lb 12.8 oz)   SpO2 92%   BMI 43.87 kg/m²    Lab Results (most recent)       None          Physical Exam  Vitals and nursing note reviewed.   Constitutional:       General: She is not in acute distress.     Appearance: She is well-developed.   HENT:      Head: Normocephalic and atraumatic.   Eyes:      Conjunctiva/sclera: Conjunctivae normal.      Pupils: Pupils are equal, round, and reactive to light.   Neck:      Vascular: No JVD.      Trachea: No tracheal deviation.   Cardiovascular:      Rate and Rhythm: Normal rate and regular rhythm.      Heart sounds: Normal heart sounds.      Comments: 1/6 systolic murmur mid lower left sternal border.  Pulmonary:      Effort: Pulmonary effort is normal.      Breath sounds: Normal breath sounds. Decreased air movement present.   Abdominal:      General: Bowel sounds are normal. There is no distension.      Palpations: Abdomen is soft. There is no mass.      Tenderness: There is no abdominal tenderness. There is no guarding or rebound.   Musculoskeletal:         General: No tenderness or deformity. Normal range of motion.      Cervical back: Normal range of motion and neck supple.      Right lower le+      Left lower le+   Skin:     General: Skin is warm and dry.      Coloration: Skin is not pale.      Findings: No erythema or rash.   Neurological:      Mental Status: She is alert and oriented to person, place, and time.   Psychiatric:         Behavior: Behavior normal.         Thought Content: Thought content normal.         Judgment: Judgment normal.           Procedure   Procedures       Assessment & Plan      Diagnosis Plan   1. Coronary artery disease involving native coronary artery of " native heart without angina pectoris        2. Shortness of breath        3. Essential hypertension        4. Paroxysmal atrial fibrillation          1.  At this time, the patient is in for follow-up status post recent catheterization.  She had IVUS guided stenting of the RCA.  She had nonobstructive disease otherwise which we will manage medically.    2.  The patient feels much improved post recent stenting.  Chest discomfort has resolved.  She tells me that her dysrhythmic symptoms, as above, have now resolved as well.  She overall feels much improved post stenting.    3.  In this setting, we will continue antidysrhythmic and anticoagulation therapy alone to address A-fib, without change.    4.  Her dyspnea is persistent and rather severe, but secondary to pulmonary status.  She is pending follow-up with her pulmonology team to address this.    5.  As the patient is doing well and is much improved post stenting, nothing further.  We will continue to see the patient on routine 6-month intervals, sooner for complications.  No further adjustments will be indicated.           Advance Care Planning   ACP discussion was held with the patient during this visit. Patient has an advance directive (not in EMR), copy requested.         Electronically signed by:

## 2024-01-31 ENCOUNTER — OFFICE VISIT (OUTPATIENT)
Dept: NEUROSURGERY | Facility: CLINIC | Age: 74
End: 2024-01-31
Payer: MEDICARE

## 2024-01-31 VITALS
DIASTOLIC BLOOD PRESSURE: 90 MMHG | OXYGEN SATURATION: 99 % | HEART RATE: 83 BPM | WEIGHT: 270.4 LBS | SYSTOLIC BLOOD PRESSURE: 120 MMHG | TEMPERATURE: 94.6 F | HEIGHT: 66 IN | BODY MASS INDEX: 43.46 KG/M2

## 2024-01-31 DIAGNOSIS — I65.23 CAROTID STENOSIS, ASYMPTOMATIC, BILATERAL: Primary | ICD-10-CM

## 2024-01-31 PROCEDURE — 1160F RVW MEDS BY RX/DR IN RCRD: CPT | Performed by: RADIOLOGY

## 2024-01-31 PROCEDURE — 99203 OFFICE O/P NEW LOW 30 MIN: CPT | Performed by: RADIOLOGY

## 2024-01-31 PROCEDURE — 3074F SYST BP LT 130 MM HG: CPT | Performed by: RADIOLOGY

## 2024-01-31 PROCEDURE — 3080F DIAST BP >= 90 MM HG: CPT | Performed by: RADIOLOGY

## 2024-01-31 PROCEDURE — 1159F MED LIST DOCD IN RCRD: CPT | Performed by: RADIOLOGY

## 2024-01-31 NOTE — PROGRESS NOTES
NAME: COLE EUBANKS   DOS: 2024  : 1950  PCP: John Montelongo MD    Chief Complaint:    Chief Complaint   Patient presents with    Carotid stenosis     Bilateral carotid stenosis       History of Present Illness:  73 y.o. female who underwent right carotid angioplasty/stent placement in 2019 for asymptomatic carotid disease.  She has been followed by her cardiology office since that time, but has undergone a recent carotid duplex suggesting possible recurrent stenosis.  She remains asymptomatic, denying any focal stroke or TIA-like symptoms.  She also has a history of atrial fibrillation, and is on a Plavix/Eliquis regimen.  She presents today for follow-up of her carotid occlusive disease.    Past Medical History:  Past Medical History:   Diagnosis Date    Arrhythmia     Asthma     Atrial fibrillation     CAD (coronary artery disease)     non-obstructive by cath 2012    CHF (congestive heart failure)     COPD (chronic obstructive pulmonary disease)     Diabetes mellitus     Diastolic dysfunction     Dizziness     Edema     Esophageal spasm     Herniated lumbar intervertebral disc     L5    Hyperlipidemia     intolerant to statins     Hypertension     Osteoarthritis     Palpitations     SOB (shortness of breath)     Stroke     Stroke-like symptoms     TIA (transient ischemic attack)     left hemispheric TIA/CVA    Unstable angina        Past Surgical History:  Past Surgical History:   Procedure Laterality Date    CARDIAC CATHETERIZATION      CARDIAC CATHETERIZATION  2023    dr. mcclure with 1 stent    CATARACT EXTRACTION      CATARACT EXTRACTION      CEREBRAL ANGIOGRAM      EYE SURGERY Bilateral     INTERVENTIONAL RADIOLOGY PROCEDURE Bilateral 2019    Procedure: Carotid Cerebral Angiogram;  Surgeon: Alex Erickson MD;  Location: [facility] INVASIVE LOCATION;  Service: Interventional Radiology    ND TCAT IV STENT CRV CRTD ART EMBOLIC PROTECJ N/A 2019     Right Carotid Stent     TUBAL ABDOMINAL LIGATION      WRIST SURGERY         Review of Systems:        Review of Systems   Constitutional:  Positive for fatigue. Negative for activity change, appetite change, chills, diaphoresis, fever and unexpected weight change.   HENT:  Negative for congestion, dental problem, drooling, ear discharge, ear pain, facial swelling, hearing loss, mouth sores, nosebleeds, postnasal drip, rhinorrhea, sinus pressure, sinus pain, sneezing, sore throat, tinnitus, trouble swallowing and voice change.    Eyes:  Negative for photophobia, pain, discharge, redness, itching and visual disturbance.   Respiratory:  Positive for shortness of breath. Negative for apnea, cough, choking, chest tightness, wheezing and stridor.    Cardiovascular:  Negative for chest pain, palpitations and leg swelling.   Gastrointestinal:  Negative for abdominal distention, abdominal pain, anal bleeding, blood in stool, constipation, diarrhea, nausea, rectal pain and vomiting.   Endocrine: Positive for polydipsia. Negative for cold intolerance, heat intolerance, polyphagia and polyuria.   Genitourinary:  Positive for flank pain. Negative for decreased urine volume, difficulty urinating, dysuria, enuresis, frequency, genital sores, hematuria and urgency.   Musculoskeletal:  Positive for arthralgias, back pain and gait problem. Negative for joint swelling, myalgias, neck pain and neck stiffness.   Skin:  Negative for color change, pallor, rash and wound.   Allergic/Immunologic: Negative for environmental allergies, food allergies and immunocompromised state.   Neurological:  Negative for dizziness, tremors, seizures, syncope, facial asymmetry, speech difficulty, weakness, light-headedness, numbness and headaches.   Hematological:  Negative for adenopathy. Does not bruise/bleed easily.   Psychiatric/Behavioral:  Negative for agitation, behavioral problems, confusion, decreased concentration, dysphoric mood, hallucinations,  self-injury, sleep disturbance and suicidal ideas. The patient is not nervous/anxious and is not hyperactive.    All other systems reviewed and are negative.     Medications    Current Outpatient Medications:     albuterol (PROVENTIL HFA;VENTOLIN HFA) 108 (90 BASE) MCG/ACT inhaler, Inhale 2 puffs Every 4 (Four) Hours As Needed. Albuterol 90 MCG/ACT AERS; Patient Sig: Albuterol 90 MCG/ACT AERS INHALE 1 TO 2 PUFFS EVERY 4 TO 6 HOURS AS NEEDED.; 0; 11-Sep-2013; Active, Disp: , Rfl:     albuterol (PROVENTIL) (2.5 MG/3ML) 0.083% nebulizer solution, Albuterol Sulfate (2.5 MG/3ML) 0.083% Inhalation Nebulization Solution; Patient Sig: Albuterol Sulfate (2.5 MG/3ML) 0.083% Inhalation Nebulization Solution USE 1 UNIT DOSE IN NEBULIZER EVERY 4 TO 6 HOURS AS NEE, Disp: , Rfl:     clopidogrel (PLAVIX) 75 MG tablet, Take 1 tablet by mouth Daily., Disp: 90 tablet, Rfl: 3    Diclofenac Sodium (VOLTAREN) 1 % gel gel, Apply 4 g topically to the appropriate area as directed Daily., Disp: , Rfl:     Eliquis 5 MG tablet tablet, TAKE 1 TABLET EVERY 12 HOURS, Disp: 180 tablet, Rfl: 3    furosemide (LASIX) 40 MG tablet, TAKE 1 TABLET TWICE DAILY, Disp: 180 tablet, Rfl: 3    insulin aspart (novoLOG FLEXPEN) 100 UNIT/ML solution pen-injector sc pen, Inject 15 Units under the skin into the appropriate area as directed Daily With Breakfast., Disp: , Rfl:     isosorbide mononitrate (IMDUR) 30 MG 24 hr tablet, TAKE 1 TABLET BY MOUTH DAILY., Disp: 90 tablet, Rfl: 5    losartan (COZAAR) 50 MG tablet, TAKE 1 TABLET BY MOUTH EVERY MORNING., Disp: 30 tablet, Rfl: 5    magnesium oxide (MAGOX) 400 (241.3 MG) MG tablet tablet, Take 1 tablet by mouth Every Night., Disp: , Rfl:     metFORMIN ER (GLUCOPHAGE-XR) 500 MG 24 hr tablet, Take 1 tablet by mouth Daily With Breakfast., Disp: , Rfl: 11    metolazone (ZAROXOLYN) 2.5 MG tablet, Take 1 tablet by mouth Daily As Needed. 1 tablet 30 minutes prior to lasix as needed PRN, Disp: , Rfl:     metoprolol  tartrate (LOPRESSOR) 25 MG tablet, Take 0.5 tablets by mouth 2 (Two) Times a Day., Disp: , Rfl:     montelukast (SINGULAIR) 10 MG tablet, Take 1 tablet by mouth Every Night., Disp: , Rfl:     nitroglycerin (NITROSTAT) 0.4 MG SL tablet, Place 1 tablet under the tongue Every 5 (Five) Minutes As Needed for Chest Pain., Disp: 25 tablet, Rfl: 2    PARoxetine (PAXIL) 10 MG tablet, Take 1 tablet by mouth Every Morning., Disp: , Rfl:     potassium chloride (K-DUR,KLOR-CON) 10 MEQ CR tablet, Take 1 tablet by mouth Every Night., Disp: , Rfl:     potassium chloride (K-DUR,KLOR-CON) 20 MEQ CR tablet, Take 1 tablet by mouth Every Morning., Disp: 30 tablet, Rfl: 3    Repatha SureClick solution auto-injector SureClick injection, INJECT 1 ML UNDER THE SKIN INTO THE APPROPRIATE AREA AS DIRECTED EVERY 14 (FOURTEEN) DAYS., Disp: 6 pen, Rfl: 3    sotalol (BETAPACE) 80 MG tablet, Take 1 tablet by mouth 2 (Two) Times a Day., Disp: , Rfl:     TRESIBA FLEXTOUCH 200 UNIT/ML solution pen-injector, Inject 100 Units under the skin into the appropriate area as directed Every Morning. 100 units daily, Disp: , Rfl:     triamterene-hydrochlorothiazide (MAXZIDE-25) 37.5-25 MG per tablet, TAKE 1 TABLET EVERY MORNING., Disp: 90 tablet, Rfl: 3    Tudorza Pressair 400 MCG/ACT aerosol powder  powder for inhalation, Inhale 1 puff 2 (Two) Times a Day., Disp: , Rfl:     amoxicillin (AMOXIL) 500 MG capsule, Take 1 capsule by mouth 3 (Three) Times a Day. (Patient not taking: Reported on 1/31/2024), Disp: 30 capsule, Rfl: 0    Allergies:  Allergies   Allergen Reactions    Ace Inhibitors Anaphylaxis    Diltiazem Angioedema    Flecainide Other (See Comments)     Facial numbness and edema, itching all over and shortness of breath    Incruse Ellipta [Umeclidinium Bromide] Shortness Of Breath    Lisinopril Anaphylaxis and Hives    Niacin Swelling     LIPS AND TONGUE    Wellbutrin [Bupropion] Shortness Of Breath    Ketorolac Tromethamine Arrhythmia    Buspirone  "Other (See Comments)     THROAT AND LIPS SWELLED     Ciprofloxacin Rash    Citalopram Unknown (See Comments)     PT UNSURE     Clindamycin/Lincomycin Rash    Codeine Other (See Comments)     SEVERE HEART BURN    Levaquin [Levofloxacin] Rash    Lipitor [Atorvastatin] Diarrhea and Other (See Comments)     HEADACHE    Liraglutide Unknown - Low Severity    Lorazepam Mental Status Change    Sertraline Unknown - Low Severity     PT UNSURE OF REACTION     Statins Diarrhea and Other (See Comments)     HEADACHE    Sulfamethoxazole-Trimethoprim Unknown (See Comments)     PT UNSURE OF REACTION    Trulicity [Dulaglutide] Diarrhea     SEVERE DIARRHEA    Xigduo Xr [Dapagliflozin Pro-Metformin Er] Diarrhea       Social Hx:  Social History     Tobacco Use    Smoking status: Former     Packs/day: 1.50     Years: 41.00     Additional pack years: 0.00     Total pack years: 61.50     Types: Cigarettes     Start date: 1968     Quit date: 3/22/2009     Years since quittin.8    Smokeless tobacco: Never   Substance Use Topics    Alcohol use: No    Drug use: No       Family Hx:  Family History   Problem Relation Age of Onset    Heart attack Mother     Heart failure Mother         congestive    Hyperlipidemia Mother     Hypertension Mother     Peripheral vascular disease Mother     Heart attack Father     Heart failure Father         congestive    Diabetes Father     Hyperlipidemia Father     Hypertension Father     Peripheral vascular disease Father     Asthma Father     Heart failure Other         congestive    Diabetes Other     Hyperlipidemia Other     Hypertension Other     Peripheral vascular disease Other     Hypertension Brother        Review of Imaging:  Carotid duplex examinations dated 10/28/2023 and 3/30/2023 from the [facility] cardiology office were reviewed along with her corresponding radiologic reports.  Comparison is also made to the immediate \"postoperative\" carotid duplex dated 6/15/2019 from [facility]" [facility].  The right carotid stent remains widely patent, without recurrent hemodynamically significant stenosis (less than 50%).  Peak velocities within the right carotid vasculature are 144/25 cm/s, with an ICA/CCA ratio of 3.2 (was 152/39 cm/s with ratio 3.4 on 10/28/2023, was 172/36 cm/s with a ratio 2.0 on 6/5/2019).  Heterogeneous plaque remains present at the left carotid bifurcation, but similarly without hemodynamically significant stenosis (less than 50%).  Peak velocities within the left carotid vasculature are 171/30 cm/s, with an ICA/CCA ratio of 2.1 (was 216/51 cm/s, ratio 2.4).    Physical Examination:  Vitals:    01/31/24 1330   BP: 120/90   Pulse: 83   Temp: 94.6 °F (34.8 °C)   SpO2: 99%        General Appearance:   Well developed, well nourished, well groomed, alert, and cooperative.  Cardiovascular: Regular rate and rhythm. No carotid bruits      Neurological examination:  Alert and oriented x 3.  Nonfocal neurologic exam.  Speech is clear.  No facial droop or pronator drift.  Symmetric strength in the upper and lower extremities.  She ambulates with assistance of a walker secondary to arthritis, as well as oxygen dependent COPD.    Diagnoses/Plan:    Ms. Jacob is a 73 y.o. female status post right carotid angioplasty/stent placement in 2019.  She has had recent carotid duplex suggesting borderline hemodynamically significant stenosis involving the left carotid vasculature, but upon my review of the carotid duplex examinations, this is nonhemodynamically significant (less than 50%).  Her right carotid stent remains widely patent without recurrent hemodynamically significant (less than 50%) stenosis.  We will continue medical management for her carotid artery disease, and she is currently on an Eliquis/Plavix regimen.  While her risk of stroke is not 0 (known atrial fibrillation), I do feel it is quite low on her current medical regimen.  At this point, she will continue to follow-up with  yearly carotid duplex examinations (in [place] office), but if there is a concern regarding any progression of disease, I would be happy to see her back at any point (ideally via MyChart video visit for patient convenience).

## 2024-02-12 ENCOUNTER — TELEPHONE (OUTPATIENT)
Dept: CARDIOLOGY | Facility: CLINIC | Age: 74
End: 2024-02-12
Payer: MEDICARE

## 2024-04-03 DIAGNOSIS — R06.02 SHORTNESS OF BREATH: ICD-10-CM

## 2024-04-03 DIAGNOSIS — R60.9 EDEMA, UNSPECIFIED TYPE: ICD-10-CM

## 2024-04-03 DIAGNOSIS — I48.0 PAROXYSMAL ATRIAL FIBRILLATION: ICD-10-CM

## 2024-04-03 RX ORDER — SOTALOL HYDROCHLORIDE 80 MG/1
80 TABLET ORAL 2 TIMES DAILY
Qty: 180 TABLET | Refills: 3 | Status: SHIPPED | OUTPATIENT
Start: 2024-04-03

## 2024-04-03 RX ORDER — APIXABAN 5 MG/1
TABLET, FILM COATED ORAL
Qty: 180 TABLET | Refills: 3 | Status: SHIPPED | OUTPATIENT
Start: 2024-04-03

## 2024-04-03 RX ORDER — FUROSEMIDE 40 MG/1
TABLET ORAL
Qty: 180 TABLET | Refills: 3 | Status: SHIPPED | OUTPATIENT
Start: 2024-04-03

## 2024-04-03 RX ORDER — ISOSORBIDE MONONITRATE 30 MG/1
30 TABLET, EXTENDED RELEASE ORAL DAILY
Qty: 90 TABLET | Refills: 3 | Status: SHIPPED | OUTPATIENT
Start: 2024-04-03

## 2024-05-07 ENCOUNTER — OFFICE VISIT (OUTPATIENT)
Dept: CARDIOLOGY | Facility: CLINIC | Age: 74
End: 2024-05-07
Payer: MEDICARE

## 2024-05-07 VITALS
DIASTOLIC BLOOD PRESSURE: 72 MMHG | HEART RATE: 115 BPM | HEIGHT: 66 IN | BODY MASS INDEX: 44.13 KG/M2 | WEIGHT: 274.6 LBS | SYSTOLIC BLOOD PRESSURE: 120 MMHG | OXYGEN SATURATION: 93 %

## 2024-05-07 DIAGNOSIS — I48.0 PAROXYSMAL ATRIAL FIBRILLATION: Primary | ICD-10-CM

## 2024-05-07 DIAGNOSIS — I25.10 CORONARY ARTERY DISEASE INVOLVING NATIVE CORONARY ARTERY OF NATIVE HEART WITHOUT ANGINA PECTORIS: ICD-10-CM

## 2024-05-07 DIAGNOSIS — R06.02 SHORTNESS OF BREATH: ICD-10-CM

## 2024-05-07 PROCEDURE — 3074F SYST BP LT 130 MM HG: CPT | Performed by: PHYSICIAN ASSISTANT

## 2024-05-07 PROCEDURE — 99214 OFFICE O/P EST MOD 30 MIN: CPT | Performed by: PHYSICIAN ASSISTANT

## 2024-05-07 PROCEDURE — 1159F MED LIST DOCD IN RCRD: CPT | Performed by: PHYSICIAN ASSISTANT

## 2024-05-07 PROCEDURE — 3078F DIAST BP <80 MM HG: CPT | Performed by: PHYSICIAN ASSISTANT

## 2024-05-07 PROCEDURE — 1160F RVW MEDS BY RX/DR IN RCRD: CPT | Performed by: PHYSICIAN ASSISTANT

## 2024-05-07 PROCEDURE — 93000 ELECTROCARDIOGRAM COMPLETE: CPT | Performed by: PHYSICIAN ASSISTANT

## 2024-05-07 RX ORDER — LOSARTAN POTASSIUM 100 MG/1
100 TABLET ORAL DAILY
COMMUNITY

## 2024-05-07 RX ORDER — ASPIRIN 81 MG/1
81 TABLET ORAL DAILY
COMMUNITY

## 2024-05-21 ENCOUNTER — CLINICAL SUPPORT (OUTPATIENT)
Dept: CARDIOLOGY | Facility: CLINIC | Age: 74
End: 2024-05-21
Payer: MEDICARE

## 2024-05-21 VITALS — OXYGEN SATURATION: 96 % | HEART RATE: 108 BPM | SYSTOLIC BLOOD PRESSURE: 143 MMHG | DIASTOLIC BLOOD PRESSURE: 86 MMHG

## 2024-05-21 DIAGNOSIS — I48.0 PAROXYSMAL ATRIAL FIBRILLATION: Primary | ICD-10-CM

## 2024-05-21 RX ORDER — SOTALOL HYDROCHLORIDE 120 MG/1
120 TABLET ORAL 2 TIMES DAILY
Qty: 60 TABLET | Refills: 11 | Status: SHIPPED | OUTPATIENT
Start: 2024-05-21

## 2024-05-21 NOTE — PROGRESS NOTES
Mandi Jacob  1950 5/21/2024   ?   Chief Complaint   Patient presents with    Atrial Fibrillation     EKG, discuss CV or med change      ?   HPI:   ?   ?   ?  Patient presents as nurse visit for EKG and to discuss treatment if still in afib. Patient reports being in afib. She has been having chest pain daily along with dizziness, fatigue and smothering. She has 02 at 2L via NC. She states last night she almost went to the ER for smothering, chest, jaw and left arm pain. She does not feel she is a candidate for cardioversion as previously discussed due to history with sedation, lung disease and being on oxygen. She would rather not go that route if possible. She has previously tried and failed Flecainide, Sotalol at 120 mg BID and unable to take Amio. EKG done as ordered and to be reviewed by Mark Brito PA-C.     Current Outpatient Medications:     sotalol (BETAPACE) 120 MG tablet, Take 1 tablet by mouth 2 (Two) Times a Day., Disp: 60 tablet, Rfl: 11    albuterol (PROVENTIL HFA;VENTOLIN HFA) 108 (90 BASE) MCG/ACT inhaler, Inhale 2 puffs Every 4 (Four) Hours As Needed. Albuterol 90 MCG/ACT AERS; Patient Sig: Albuterol 90 MCG/ACT AERS INHALE 1 TO 2 PUFFS EVERY 4 TO 6 HOURS AS NEEDED.; 0; 11-Sep-2013; Active, Disp: , Rfl:     albuterol (PROVENTIL) (2.5 MG/3ML) 0.083% nebulizer solution, Albuterol Sulfate (2.5 MG/3ML) 0.083% Inhalation Nebulization Solution; Patient Sig: Albuterol Sulfate (2.5 MG/3ML) 0.083% Inhalation Nebulization Solution USE 1 UNIT DOSE IN NEBULIZER EVERY 4 TO 6 HOURS AS NEE, Disp: , Rfl:     aspirin 81 MG EC tablet, Take 1 tablet by mouth Daily., Disp: , Rfl:     clopidogrel (PLAVIX) 75 MG tablet, Take 1 tablet by mouth Daily., Disp: 90 tablet, Rfl: 3    Diclofenac Sodium (VOLTAREN) 1 % gel gel, Apply 4 g topically to the appropriate area as directed Daily., Disp: , Rfl:     Eliquis 5 MG tablet tablet, TAKE 1 TABLET EVERY 12 HOURS, Disp: 180 tablet, Rfl: 3    furosemide (LASIX) 40  MG tablet, TAKE 1 TABLET TWICE DAILY, Disp: 180 tablet, Rfl: 3    insulin aspart (novoLOG FLEXPEN) 100 UNIT/ML solution pen-injector sc pen, Inject 15 Units under the skin into the appropriate area as directed Daily With Breakfast., Disp: , Rfl:     isosorbide mononitrate (IMDUR) 30 MG 24 hr tablet, TAKE 1 TABLET BY MOUTH DAILY., Disp: 90 tablet, Rfl: 3    losartan (COZAAR) 100 MG tablet, Take 1 tablet by mouth Daily., Disp: , Rfl:     magnesium oxide (MAGOX) 400 (241.3 MG) MG tablet tablet, Take 1 tablet by mouth Every Night., Disp: , Rfl:     metFORMIN ER (GLUCOPHAGE-XR) 500 MG 24 hr tablet, Take 1 tablet by mouth Daily With Breakfast., Disp: , Rfl: 11    metolazone (ZAROXOLYN) 2.5 MG tablet, Take 1 tablet by mouth Daily As Needed. 1 tablet 30 minutes prior to lasix as needed PRN, Disp: , Rfl:     metoprolol tartrate (LOPRESSOR) 25 MG tablet, ~101Q.5 TAKE 1/2 TABLET DAILY ~101Q.5 TAKE 1/2 TABLET DAILY (Patient taking differently: Take 0.5 tablets by mouth 2 (Two) Times a Day.), Disp: 45 tablet, Rfl: 3    montelukast (SINGULAIR) 10 MG tablet, Take 1 tablet by mouth Every Night., Disp: , Rfl:     nitroglycerin (NITROSTAT) 0.4 MG SL tablet, Place 1 tablet under the tongue Every 5 (Five) Minutes As Needed for Chest Pain., Disp: 25 tablet, Rfl: 2    PARoxetine (PAXIL) 10 MG tablet, Take 1 tablet by mouth Every Morning., Disp: , Rfl:     potassium chloride (K-DUR,KLOR-CON) 10 MEQ CR tablet, Take 1 tablet by mouth Every Night., Disp: , Rfl:     potassium chloride (K-DUR,KLOR-CON) 20 MEQ CR tablet, Take 1 tablet by mouth Every Morning., Disp: 30 tablet, Rfl: 3    Repatha SureClick solution auto-injector SureClick injection, INJECT 1 ML UNDER THE SKIN INTO THE APPROPRIATE AREA AS DIRECTED EVERY 14 (FOURTEEN) DAYS., Disp: 6 pen, Rfl: 3    TRESIBA FLEXTOUCH 200 UNIT/ML solution pen-injector, Inject 64 Units under the skin into the appropriate area as directed Every Morning. 100 units daily, Disp: , Rfl:      triamterene-hydrochlorothiazide (MAXZIDE-25) 37.5-25 MG per tablet, TAKE 1 TABLET EVERY MORNING., Disp: 90 tablet, Rfl: 3    Tudorza Pressair 400 MCG/ACT aerosol powder  powder for inhalation, Inhale 1 puff 2 (Two) Times a Day., Disp: , Rfl:    ?   ?   Ace inhibitors, Diltiazem, Flecainide, Incruse ellipta [umeclidinium bromide], Lisinopril, Niacin, Wellbutrin [bupropion], Ketorolac tromethamine, Buspirone, Ciprofloxacin, Citalopram, Clindamycin/lincomycin, Codeine, Levaquin [levofloxacin], Lipitor [atorvastatin], Liraglutide, Lorazepam, Sertraline, Statins, Sulfamethoxazole-trimethoprim, Trulicity [dulaglutide], and Xigduo xr [dapagliflozin pro-metformin er]         ECG 12 Lead    Date/Time: 5/21/2024 3:51 PM  Performed by: Mark Brito PA    Authorized by: Mark Brito PA  Comparison: compared with previous ECG from 5/7/2024         ?   Assessment & Plan    ?   ?   ?  1. Afib    EKG, vitals, symptoms and chart reviewed by Mark Brito PA-C with recommendation for EP and to proceed to ER with any active symptoms. Multaq is not recommended for patient. She is agreeable to increasing Sotalol to 120 mg BID to see if it helps with symptoms in the meantime. She will monitor HR and discontinue Metoprolol if needed.

## 2024-07-02 DIAGNOSIS — R60.9 EDEMA, UNSPECIFIED TYPE: ICD-10-CM

## 2024-07-03 RX ORDER — TRIAMTERENE AND HYDROCHLOROTHIAZIDE 37.5; 25 MG/1; MG/1
TABLET ORAL
Qty: 90 TABLET | Refills: 3 | Status: SHIPPED | OUTPATIENT
Start: 2024-07-03

## 2024-07-03 RX ORDER — CLOPIDOGREL BISULFATE 75 MG/1
75 TABLET ORAL DAILY
Qty: 90 TABLET | Refills: 3 | Status: SHIPPED | OUTPATIENT
Start: 2024-07-03

## 2024-07-03 RX ORDER — ASPIRIN 81 MG/1
81 TABLET, COATED ORAL DAILY
Qty: 90 TABLET | Refills: 3 | Status: SHIPPED | OUTPATIENT
Start: 2024-07-03

## 2024-08-15 NOTE — PROGRESS NOTES
Cardiac Electrophysiology Outpatient Note  [facility]    Office Visit     Mandi Jacob  [id number]  08/16/2024    Primary Care Physician: John Montelongo MD    Referred By: Mark Brito PA    Subjective     Chief Complaint   Patient presents with    Paroxysmal atrial fibrillation     Problem List:  Atrial Fibrillation  Diagnosis event monitor 2019 ?  On sotalol thereafter  Noted recurrence indicental on EKG 5/2024 cards office visit  CAD  [facility] 2/2012: nonobstructive  TTE 10/2023: LVEF 51-55%, mild conc LVH, limited study  [facility] 11/2023: IVUS guided RCA stent, otherwise nonobstructive disease  HFpEF  Peripheral Vascular Disease  Angioplasty and stenting to R carotid 6/2019  Given  HTN  HLD  Diabetes  Hx TIA  COPD      History of Present Illness:   Mandi Jacob is a 74 y.o. female who presents to my electrophysiology clinic as a referral from Mark Brito PA-C for atrial fibrillation.  She says she has not had atrial fibrillation since at least 2019.  With that she felt fluttering, shortness of breath, and fatigue.  Has been treated with sotalol with moderate effectiveness.  Recently however she has had a lot more atrial fibrillation.  She is noticing when she has stress and potentially leads to more A-fib.  She was found to be back in atrial fibrillation in April 2024.    Past Medical History:   Diagnosis Date    Arrhythmia     Asthma     Atrial fibrillation     Breast cancer 05/2023    Right breast    CAD (coronary artery disease)     non-obstructive by cath 02/2012    CHF (congestive heart failure)     COPD (chronic obstructive pulmonary disease)     Diabetes mellitus     Diastolic dysfunction     Dizziness     Edema     Esophageal spasm     Herniated lumbar intervertebral disc     L5    Hyperlipidemia     intolerant to statins     Hypertension     Osteoarthritis     Palpitations     SOB (shortness of breath)     Stroke     Stroke-like  symptoms     TIA (transient ischemic attack) 2015    left hemispheric TIA/CVA    Unstable angina        Past Surgical History:   Procedure Laterality Date    CARDIAC CATHETERIZATION      CARDIAC CATHETERIZATION  11/13/2023    dr. mcclure with 1 stent    CAROTID STENT      CATARACT EXTRACTION      CATARACT EXTRACTION      CEREBRAL ANGIOGRAM      CORONARY STENT PLACEMENT  11/13/2023    EYE SURGERY Bilateral 2021    INTERVENTIONAL RADIOLOGY PROCEDURE Bilateral 05/16/2019    Procedure: Carotid Cerebral Angiogram;  Surgeon: Alex Erickson MD;  Location: [facility] INVASIVE LOCATION;  Service: Interventional Radiology    [place] TCAT IV STENT CRV CRTD ART EMBOLIC PROTECJ N/A 06/05/2019    Right Carotid Stent     TUBAL ABDOMINAL LIGATION      WRIST SURGERY         Family History   Problem Relation Age of Onset    Heart attack Mother     Heart failure Mother         congestive    Hyperlipidemia Mother     Hypertension Mother     Peripheral vascular disease Mother     Heart attack Father     Heart failure Father         congestive    Diabetes Father     Hyperlipidemia Father     Hypertension Father     Peripheral vascular disease Father     Asthma Father     Hypertension Brother     Heart failure Other         congestive    Diabetes Other     Hyperlipidemia Other     Hypertension Other     Peripheral vascular disease Other        Social History     Socioeconomic History    Marital status:    Tobacco Use    Smoking status: Former     Current packs/day: 0.00     Average packs/day: 1.5 packs/day for 41.1 years (61.6 ttl pk-yrs)     Types: Cigarettes     Start date: 4/8/1968     Quit date: 3/22/2009     Years since quitting: 15.4    Smokeless tobacco: Never   Vaping Use    Vaping status: Never Used   Substance and Sexual Activity    Alcohol use: No    Drug use: No    Sexual activity: Not Currently     Partners: Male     Birth control/protection: Post-menopausal         Current Outpatient Medications:     albuterol  (PROVENTIL HFA;VENTOLIN HFA) 108 (90 BASE) MCG/ACT inhaler, Inhale 2 puffs Every 4 (Four) Hours As Needed. Albuterol 90 MCG/ACT AERS; Patient Sig: Albuterol 90 MCG/ACT AERS INHALE 1 TO 2 PUFFS EVERY 4 TO 6 HOURS AS NEEDED.; 0; 11-Sep-2013; Active, Disp: , Rfl:     albuterol (PROVENTIL) (2.5 MG/3ML) 0.083% nebulizer solution, Albuterol Sulfate (2.5 MG/3ML) 0.083% Inhalation Nebulization Solution; Patient Sig: Albuterol Sulfate (2.5 MG/3ML) 0.083% Inhalation Nebulization Solution USE 1 UNIT DOSE IN NEBULIZER EVERY 4 TO 6 HOURS AS NEE, Disp: , Rfl:     clopidogrel (PLAVIX) 75 MG tablet, TAKE 1 TABLET BY MOUTH DAILY., Disp: 90 tablet, Rfl: 3    Diclofenac Sodium (VOLTAREN) 1 % gel gel, Apply 4 g topically to the appropriate area as directed As Needed., Disp: , Rfl:     Eliquis 5 MG tablet tablet, TAKE 1 TABLET EVERY 12 HOURS, Disp: 180 tablet, Rfl: 3    furosemide (LASIX) 40 MG tablet, TAKE 1 TABLET TWICE DAILY, Disp: 180 tablet, Rfl: 3    insulin aspart (novoLOG FLEXPEN) 100 UNIT/ML solution pen-injector sc pen, Inject 15 Units under the skin into the appropriate area as directed Daily With Breakfast., Disp: , Rfl:     isosorbide mononitrate (IMDUR) 30 MG 24 hr tablet, TAKE 1 TABLET BY MOUTH DAILY., Disp: 90 tablet, Rfl: 3    losartan (COZAAR) 50 MG tablet, Take 1 tablet by mouth Daily., Disp: , Rfl:     magnesium oxide (MAGOX) 400 (241.3 MG) MG tablet tablet, Take 1 tablet by mouth Every Night., Disp: , Rfl:     metFORMIN ER (GLUCOPHAGE-XR) 500 MG 24 hr tablet, Take 1 tablet by mouth Daily With Breakfast., Disp: , Rfl: 11    metolazone (ZAROXOLYN) 2.5 MG tablet, Take 1 tablet by mouth Daily As Needed. 1 tablet 30 minutes prior to lasix as needed PRN, Disp: , Rfl:     metoprolol tartrate (LOPRESSOR) 25 MG tablet, ~101Q.5 TAKE 1/2 TABLET DAILY ~101Q.5 TAKE 1/2 TABLET DAILY (Patient taking differently: Take 0.5 tablets by mouth 2 (Two) Times a Day.), Disp: 45 tablet, Rfl: 3    montelukast (SINGULAIR) 10 MG tablet, Take 1  tablet by mouth Every Night., Disp: , Rfl:     nitroglycerin (NITROSTAT) 0.4 MG SL tablet, Place 1 tablet under the tongue Every 5 (Five) Minutes As Needed for Chest Pain., Disp: 25 tablet, Rfl: 2    O2 (OXYGEN), Inhale Continuous., Disp: , Rfl:     PARoxetine (PAXIL) 10 MG tablet, Take 1 tablet by mouth Every Morning., Disp: , Rfl:     potassium chloride (K-DUR,KLOR-CON) 10 MEQ CR tablet, Take 1 tablet by mouth Every Night., Disp: , Rfl:     potassium chloride (K-DUR,KLOR-CON) 20 MEQ CR tablet, Take 1 tablet by mouth Every Morning., Disp: 30 tablet, Rfl: 3    Repatha SureClick solution auto-injector SureClick injection, INJECT 1 ML UNDER THE SKIN INTO THE APPROPRIATE AREA AS DIRECTED EVERY 14 (FOURTEEN) DAYS., Disp: 6 pen, Rfl: 3    sotalol (BETAPACE) 120 MG tablet, Take 1 tablet by mouth 2 (Two) Times a Day., Disp: 60 tablet, Rfl: 11    TRESIBA FLEXTOUCH 200 UNIT/ML solution pen-injector, Inject 68 Units under the skin into the appropriate area as directed Every Morning. 100 units daily, Disp: , Rfl:     triamterene-hydrochlorothiazide (MAXZIDE-25) 37.5-25 MG per tablet, TAKE 1 TABLET EVERY MORNING., Disp: 90 tablet, Rfl: 3    Tudorza Pressair 400 MCG/ACT aerosol powder  powder for inhalation, Inhale 1 puff 2 (Two) Times a Day., Disp: , Rfl:     Allergies:   Allergies   Allergen Reactions    Ace Inhibitors Anaphylaxis    Diltiazem Angioedema    Flecainide Other (See Comments)     Facial numbness and edema, itching all over and shortness of breath    Incruse Ellipta [Umeclidinium Bromide] Shortness Of Breath    Lisinopril Anaphylaxis and Hives    Niacin Swelling     LIPS AND TONGUE    Wellbutrin [Bupropion] Shortness Of Breath    Ketorolac Tromethamine Arrhythmia    Buspirone Other (See Comments)     THROAT AND LIPS SWELLED     Ciprofloxacin Rash    Citalopram Unknown (See Comments)     PT UNSURE     Clindamycin/Lincomycin Rash    Codeine Other (See Comments)     SEVERE HEART BURN    Levaquin [Levofloxacin] Rash     "Lipitor [Atorvastatin] Diarrhea and Other (See Comments)     HEADACHE    Liraglutide Unknown - Low Severity    Lorazepam Mental Status Change    Sertraline Unknown - Low Severity     PT UNSURE OF REACTION     Statins Diarrhea and Other (See Comments)     HEADACHE    Sulfamethoxazole-Trimethoprim Unknown (See Comments)     PT UNSURE OF REACTION    Trulicity [Dulaglutide] Diarrhea     SEVERE DIARRHEA    Xigduo Xr [Dapagliflozin Pro-Metformin Er] Diarrhea       Objective   Vital Signs: Blood pressure 140/68, pulse 60, height 167.6 cm (66\"), weight 122 kg (268 lb), SpO2 93%.    PHYSICAL EXAM  General appearance: Awake, alert, cooperative  Head: Normocephalic, without obvious abnormality, atraumatic  Neck: No JVD  Lungs: Clear to ascultation bilaterally  Heart: Regular rate and rhythm, no murmurs, 2+ LE pulses, no lower extremity swelling  Skin: Skin color, turgor normal, no rashes or lesions  Neurologic: Grossly normal     Lab Results   Component Value Date    GLUCOSE 214 (H) 10/24/2023    CALCIUM 11.1 (H) 10/24/2023     10/24/2023    K 4.1 10/24/2023    CO2 31.0 (H) 10/24/2023     10/24/2023    BUN 22 10/24/2023    CREATININE 0.97 10/24/2023    EGFRIFAFRI 94 09/21/2015    EGFRIFNONA 96 06/06/2019    BCR 22.7 10/24/2023    ANIONGAP 8.0 06/06/2019     Lab Results   Component Value Date    WBC 10.37 10/24/2023    HGB 14.2 10/24/2023    HCT 43.9 10/24/2023    MCV 93.2 10/24/2023     10/24/2023     Lab Results   Component Value Date    INR 0.97 12/16/2015    PROTIME 10.2 12/16/2015     Lab Results   Component Value Date    TSH 1.660 09/21/2015          Results for orders placed during the hospital encounter of 10/12/23    Adult Transthoracic Echo Limited W/ Cont if Necessary Per Protocol    Interpretation Summary    Left ventricular ejection fraction appears to be 51 - 55%.    Left ventricular wall thickness is consistent with mild concentric hypertrophy.    The left atrial cavity is moderately " dilated.    Moderately to severely technically limited study.  Gross generalizations can be rendered.    1.  LV size is normal and global LV systolic function is not significantly depressed.  Visually estimated ejection fraction is 50±5%.  Mild concentric left ventricular hypertrophy.  Moderate left atrial enlargement.  The right ventricle appears mildly dilated but RV systolic function is grossly preserved.  The right atrium is at the upper limits of normal.    2.  Valves are morphologically normal.    3.  No pericardial or great vessel pathology.     Results for orders placed during the hospital encounter of 06/05/19    Carotid Stent    Narrative  Preoperative Diagnosis: Right internal carotid artery stenosis  Postoperative Diagnosis: Same    Indication for Procedure: This is a 68-year-old woman with asymptomatic right internal carotid artery stenosis.  She had a diagnostic angiogram which demonstrated severe stenosis of approximately 90%.  She had multiple risk factors for general anesthesia including heart disease, and oxygen dependent COPD.  Cerebral angiography with carotid stenting is indicated.    Informed consent was obtained for a cerebral angiogram and possible intervention. She acknowledges a risk of stroke, coma, death, pain, paralysis, blindness, permanent neurological disability, access complications, hematoma, limb ischemia, renal failure, allergic reaction, radiation exposure, hair loss, tumor growth, failure of benefit of the procedure, or need for additional procedures. All questions were answered. No guarantees were given or implied. She elects to proceed.    Procedure in Detail:  The patient was identified in the CVOU and brought to the angiography suite where she was transferred to the angiography table in supine position. The patients bilateral inguinal regions were shaved, prepped and draped in the usual sterile fashion. A time out was performed. Access to the right femoral artery established  "using a 21ga micropuncture needle. After sequentially dilating, a 5 Mosotho introducer sheath was placed over an 0.038\" wire using the modified Seldinger technique. The sheath was attached to a pressurized flush bag in the standard fashion.    A 5 Mosotho angled [employer]en [employer] catheter was advanced over an 0.038\" Glidewire over the aortic arch under continuous fluoroscopic visualization.    A roadmap was created allowing selective catheter placement into the right common carotid artery. The catheter was double-flushed and distal flow was checked. An angiogram of the right common carotid in MONTOYA and lateral projection centered over the neck was obtained.  There is a high-grade, focal stenosis at the origin of the right internal carotid artery.  I estimate that this is approximately 90% according to NASCET criteria..  An angiogram of the right common carotid artery and PA and lateral projection centered over the head was then obtained.  This demonstrated opacification of the vessels of the right internal carotid artery.    An exchange was then carried out over a Kamara wire bringing up a 7 Mosotho ArrowFlex sheath which was positioned in the distal right common carotid artery.  The patient was then given intravenous heparin to maintain an activated clotting time of greater than 250 seconds, which was maintained throughout the remainder of the procedure.    A Dagoberto 6 distal protection device was then navigated into the right internal carotid artery and deployed in the distal cervical segment.  A 10 mm carotid Wallstent was then deployed across the stenosis.  Control angiography through the sheath was obtained prior to deploying the stent which confirmed satisfactory position.  Postdilatation was then achieved with a 5 mm balloon.  I estimate that the degree of residual stenosis is less than 20%.  The distal protection device was then recaptured and withdrawn from the patient's body.  Control angiography of the right carotid " bifurcation and the intracranial circulation demonstrated satisfactory resolution of the stenosis, good position of the stent, and no intracranial branch vessel occlusions in comparison to the preprocedure angiogram.    An 8 Vietnamese Angio-Seal was placed uneventfully with excellent hemostasis in the right common femoral artery.    Summary of Procedure:  Status post successful angioplasty and stenting of high-grade, asymptomatic right internal carotid artery stenosis without apparent complication.  Distal protection time was 6 minutes.  High risk criteria for carotid endarterectomy included congestive heart failure, and COPD.      I personally viewed and interpreted the patient's EKG/Telemetry/lab data    Procedures    Mandi Jacob  reports that she quit smoking about 15 years ago. Her smoking use included cigarettes. She started smoking about 56 years ago. She has a 61.6 pack-year smoking history. She has never used smokeless tobacco.    Advance Care Planning   Advance Care Planning: ACP discussion was held with the patient during this visit. Patient has an advance directive in EMR which is still valid.      EKG today shows sinus rhythm without significant abnormality    Assessment & Plan    1. Paroxysmal atrial fibrillation  Patient was referred for treatment of atrial fibrillation.  His atrial fibrillation is likely nearing persistent.  Has been maintained on sotalol.  EKG today does show sinus rhythm.  We discussed options going forward including adjusting her antiarrhythmic medicine.  I do not think she is a good candidate for ablation due to her severe obesity and significant baseline COPD.  We discussed options including increasing her sotalol versus coming in for Tikosyn.  After this discussion she would like to be started on Tikosyn.    We will work on scheduling this when she finishes her current radiation course for breast cancer.  We will need to stop her triamterene-hydrochlorothiazide 5 days  prior to admission.  If she needs additional blood pressure medicine we can adjust that during her hospital stay.    2. Primary hypertension  Blood pressure is borderline today.  As above we are going to need to adjust her blood pressure medication due to her Tikosyn.  Easiest change would likely be to increase losartan.    3. Congestive heart failure  Relatively well compensated today.  Will continue current course.           Follow Up:  Return in about 6 months (around 2/16/2025) for Recheck.      Thank you for allowing me to participate in the care of your patient. Please do not hesitate to contact me with additional questions or concerns.      Donaldo Hollingsworth M.D.  Cardiac Electrophysiologist  [facility] / [facility]

## 2024-08-16 ENCOUNTER — OFFICE VISIT (OUTPATIENT)
Dept: CARDIOLOGY | Facility: CLINIC | Age: 74
End: 2024-08-16
Payer: MEDICARE

## 2024-08-16 VITALS
DIASTOLIC BLOOD PRESSURE: 68 MMHG | SYSTOLIC BLOOD PRESSURE: 140 MMHG | WEIGHT: 268 LBS | BODY MASS INDEX: 43.07 KG/M2 | HEART RATE: 60 BPM | OXYGEN SATURATION: 93 % | HEIGHT: 66 IN

## 2024-08-16 DIAGNOSIS — I50.9 CONGESTIVE HEART FAILURE, UNSPECIFIED HF CHRONICITY, UNSPECIFIED HEART FAILURE TYPE: ICD-10-CM

## 2024-08-16 DIAGNOSIS — I10 PRIMARY HYPERTENSION: ICD-10-CM

## 2024-08-16 DIAGNOSIS — I48.0 PAROXYSMAL ATRIAL FIBRILLATION: ICD-10-CM

## 2024-08-16 DIAGNOSIS — R26.81 UNSTEADINESS ON FEET: Primary | ICD-10-CM

## 2024-08-16 PROCEDURE — 3077F SYST BP >= 140 MM HG: CPT | Performed by: STUDENT IN AN ORGANIZED HEALTH CARE EDUCATION/TRAINING PROGRAM

## 2024-08-16 PROCEDURE — G2211 COMPLEX E/M VISIT ADD ON: HCPCS | Performed by: STUDENT IN AN ORGANIZED HEALTH CARE EDUCATION/TRAINING PROGRAM

## 2024-08-16 PROCEDURE — 3078F DIAST BP <80 MM HG: CPT | Performed by: STUDENT IN AN ORGANIZED HEALTH CARE EDUCATION/TRAINING PROGRAM

## 2024-08-16 PROCEDURE — 99204 OFFICE O/P NEW MOD 45 MIN: CPT | Performed by: STUDENT IN AN ORGANIZED HEALTH CARE EDUCATION/TRAINING PROGRAM

## 2024-08-16 RX ORDER — LOSARTAN POTASSIUM 50 MG/1
50 TABLET ORAL DAILY
COMMUNITY

## 2024-08-19 ENCOUNTER — TELEPHONE (OUTPATIENT)
Dept: CARDIOLOGY | Facility: CLINIC | Age: 74
End: 2024-08-19
Payer: MEDICARE

## 2024-08-19 DIAGNOSIS — I48.0 PAROXYSMAL ATRIAL FIBRILLATION: Primary | ICD-10-CM

## 2024-08-19 NOTE — TELEPHONE ENCOUNTER
----- Message from Donaldo Hollingsworth sent at 8/19/2024  1:56 PM EDT -----  We are going to get her scheduled for Tikosyn when she finishes her current daily breast cancer radiation which I think is in another 5 or 6 weeks.  Will need to stop her triamterene hydrochlorothiazide 5 days before.

## 2024-08-22 NOTE — TELEPHONE ENCOUNTER
Donaldo Hollingsworth MD sent to [place] RN  5 days          Previous Messages       ----- Message -----  From: [place] RN  Sent: 8/19/2024   3:51 PM EDT  To: Donaldo Hollingsworth MD    How long does she need to hold her sotolol prior to her admission as well?

## 2024-10-01 DIAGNOSIS — R60.9 EDEMA, UNSPECIFIED TYPE: ICD-10-CM

## 2024-10-01 RX ORDER — TRIAMTERENE/HYDROCHLOROTHIAZID 37.5-25 MG
TABLET ORAL
Qty: 90 TABLET | Refills: 5 | Status: SHIPPED | OUTPATIENT
Start: 2024-10-01 | End: 2024-10-07 | Stop reason: HOSPADM

## 2024-10-02 RX ORDER — METOPROLOL TARTRATE 25 MG/1
TABLET, FILM COATED ORAL
Qty: 90 TABLET | Refills: 3 | Status: SHIPPED | OUTPATIENT
Start: 2024-10-02

## 2024-10-07 ENCOUNTER — HOSPITAL ENCOUNTER (OUTPATIENT)
Facility: HOSPITAL | Age: 74
Discharge: HOME | End: 2024-10-07
Attending: STUDENT IN AN ORGANIZED HEALTH CARE EDUCATION/TRAINING PROGRAM
Payer: MEDICARE

## 2024-10-07 VITALS
TEMPERATURE: 98 F | SYSTOLIC BLOOD PRESSURE: 114 MMHG | RESPIRATION RATE: 16 BRPM | BODY MASS INDEX: 42.79 KG/M2 | DIASTOLIC BLOOD PRESSURE: 67 MMHG | WEIGHT: 265.1 LBS

## 2024-10-07 PROBLEM — I48.19 PERSISTENT ATRIAL FIBRILLATION: Status: ACTIVE | Noted: 2024-10-07

## 2024-10-07 LAB
ANION GAP SERPL CALCULATED.3IONS-SCNC: 8 MMOL/L (ref 5–15)
BUN SERPL-MCNC: 28 MG/DL (ref 8–23)
BUN/CREAT SERPL: 30.4 (ref 7–25)
CALCIUM SPEC-SCNC: 11.1 MG/DL (ref 8.6–10.5)
CHLORIDE SERPL-SCNC: 98 MMOL/L (ref 98–107)
CO2 SERPL-SCNC: 34 MMOL/L (ref 22–29)
CREAT SERPL-MCNC: 0.92 MG/DL (ref 0.57–1)
EGFRCR SERPLBLD CKD-EPI 2021: 65.9 ML/MIN/1.73
GLUCOSE SERPL-MCNC: 105 MG/DL (ref 65–99)
MAGNESIUM SERPL-MCNC: 2.1 MG/DL (ref 1.6–2.4)
POTASSIUM SERPL-SCNC: 3.5 MMOL/L (ref 3.5–5.2)
SODIUM SERPL-SCNC: 140 MMOL/L (ref 136–145)

## 2024-10-07 PROCEDURE — 93010 ELECTROCARDIOGRAM REPORT: CPT | Performed by: INTERNAL MEDICINE

## 2024-10-07 PROCEDURE — 93005 ELECTROCARDIOGRAM TRACING: CPT

## 2024-10-07 PROCEDURE — 80048 BASIC METABOLIC PNL TOTAL CA: CPT

## 2024-10-07 PROCEDURE — 83735 ASSAY OF MAGNESIUM: CPT

## 2024-10-07 RX ORDER — NITROGLYCERIN 0.4 MG/1
0.4 TABLET SUBLINGUAL
Status: DISCONTINUED | OUTPATIENT
Start: 2024-10-07 | End: 2024-10-07 | Stop reason: HOSPADM

## 2024-10-07 NOTE — H&P
Patient presented for Tikosyn admission.  Unfortunately she reports not receiving a packet that she needed to stop her sotalol and hydrochlorothiazide.  Therefore we are unable to start Tikosyn today and she will be discharged.  Will reschedule.

## 2024-10-08 ENCOUNTER — READMISSION MANAGEMENT (OUTPATIENT)
Dept: CALL CENTER | Facility: HOSPITAL | Age: 74
End: 2024-10-08
Payer: MEDICARE

## 2024-10-08 LAB
QT INTERVAL: 408 MS
QTC INTERVAL: 455 MS

## 2024-10-08 NOTE — OUTREACH NOTE
Prep Survey      Flowsheet Row Responses   [facility] facility patient discharged from? [place]   Is LACE score < 7 ? No   Eligibility Readm Mgmt   Discharge diagnosis Persistent atrial fibrillation   Does the patient have one of the following disease processes/diagnoses(primary or secondary)? Other   Does the patient have Home health ordered? No   Is there a DME ordered? No   Medication alerts for this patient see avs--eliquis   Prep survey completed? Yes            Lisseth DURBIN - Registered Nurse

## 2024-10-16 ENCOUNTER — READMISSION MANAGEMENT (OUTPATIENT)
Dept: CALL CENTER | Facility: HOSPITAL | Age: 74
End: 2024-10-16
Payer: MEDICARE

## 2024-10-16 NOTE — OUTREACH NOTE
Medical Week 2 Survey      Flowsheet Row Responses   [facility] patient discharged from? [place]   Does the patient have one of the following disease processes/diagnoses(primary or secondary)? Other   Week 2 attempt successful? Yes   Call start time 1901   Discharge diagnosis Persistent atrial fibrillation   Call end time 1902   Meds reviewed with patient/caregiver? Yes   Is the patient having any side effects they believe may be caused by any medication additions or changes? No   Does the patient have all medications ordered at discharge? Yes   Is the patient taking all medications as directed (includes completed medication regime)? Yes   Does the patient have a primary care provider?  Yes   Does the patient have an appointment with their PCP within 7 days of discharge? Yes   Has the patient kept scheduled appointments due by today? Yes   Psychosocial issues? No   Did the patient receive a copy of their discharge instructions? Yes   Nursing interventions Reviewed instructions with patient   What is the patient's perception of their health status since discharge? Same   Is the patient/caregiver able to teach back signs and symptoms related to disease process for when to call PCP? Yes   Is the patient/caregiver able to teach back signs and symptoms related to disease process for when to call 911? Yes   Is the patient/caregiver able to teach back the hierarchy of who to call/visit for symptoms/problems? PCP, Specialist, Home health nurse, Urgent Care, ED, 911 Yes   If the patient is a current smoker, are they able to teach back resources for cessation? Not a smoker   Week 2 Call Completed? Yes   Is the patient interested in additional calls from an ambulatory ? No   Would this patient benefit from a Referral to Amb Social Work? No   Call end time 1902            GOLDEN FERRERA - Registered Nurse

## 2024-10-25 ENCOUNTER — READMISSION MANAGEMENT (OUTPATIENT)
Dept: CALL CENTER | Facility: HOSPITAL | Age: 74
End: 2024-10-25
Payer: MEDICARE

## 2024-10-25 NOTE — OUTREACH NOTE
Medical Week 3 Survey      Flowsheet Row Responses   [facility] [place] patient discharged from? [place]   Does the patient have one of the following disease processes/diagnoses(primary or secondary)? Other   Week 3 attempt successful? Yes   Call start time 1211   Call end time 1215   Discharge diagnosis Persistent atrial fibrillation   Person spoke with today (if not patient) and relationship patient   Meds reviewed with patient/caregiver? Yes   Is the patient having any side effects they believe may be caused by any medication additions or changes? No   Does the patient have all medications ordered at discharge? Yes   Is the patient taking all medications as directed (includes completed medication regime)? Yes   Does the patient have a primary care provider?  Yes   Does the patient have an appointment with their PCP within 7 days of discharge? Yes   Has the patient kept scheduled appointments due by today? Yes   Psychosocial issues? No   Did the patient receive a copy of their discharge instructions? Yes   Nursing interventions Reviewed instructions with patient   What is the patient's perception of their health status since discharge? Improving   Is the patient/caregiver able to teach back signs and symptoms related to disease process for when to call PCP? Yes   Is the patient/caregiver able to teach back signs and symptoms related to disease process for when to call 911? Yes   Is the patient/caregiver able to teach back the hierarchy of who to call/visit for symptoms/problems? PCP, Specialist, Home health nurse, Urgent Care, ED, 911 Yes   If the patient is a current smoker, are they able to teach back resources for cessation? Not a smoker   Week 3 Call Completed? Yes   Graduated Yes   Is the patient interested in additional calls from an ambulatory ? No   Would this patient benefit from a Referral to Amb Social Work? No   Wrap up additional comments Patient reports she will be going in hospital to  get Tikosyn started Monday.   Call end time 1215            Gino W - Registered Nurse

## 2024-10-28 ENCOUNTER — HOSPITAL ENCOUNTER (INPATIENT)
Facility: HOSPITAL | Age: 74
LOS: 5 days | Discharge: NURSING, REHAB OR LONG-TERM CARE | End: 2024-11-02
Attending: STUDENT IN AN ORGANIZED HEALTH CARE EDUCATION/TRAINING PROGRAM | Admitting: STUDENT IN AN ORGANIZED HEALTH CARE EDUCATION/TRAINING PROGRAM
Payer: MEDICARE

## 2024-10-28 DIAGNOSIS — I48.19 PERSISTENT ATRIAL FIBRILLATION: Primary | ICD-10-CM

## 2024-10-28 PROBLEM — I48.91 A-FIB: Status: ACTIVE | Noted: 2024-10-28

## 2024-10-28 LAB
ANION GAP SERPL CALCULATED.3IONS-SCNC: 13 MMOL/L (ref 5–15)
BUN SERPL-MCNC: 22 MG/DL (ref 8–23)
BUN/CREAT SERPL: 25.6 (ref 7–25)
CALCIUM SPEC-SCNC: 10.7 MG/DL (ref 8.6–10.5)
CHLORIDE SERPL-SCNC: 98 MMOL/L (ref 98–107)
CO2 SERPL-SCNC: 31 MMOL/L (ref 22–29)
CREAT SERPL-MCNC: 0.86 MG/DL (ref 0.57–1)
EGFRCR SERPLBLD CKD-EPI 2021: 71.4 ML/MIN/1.73
GLUCOSE SERPL-MCNC: 129 MG/DL (ref 65–99)
MAGNESIUM SERPL-MCNC: 1.8 MG/DL (ref 1.6–2.4)
POTASSIUM SERPL-SCNC: 3.4 MMOL/L (ref 3.5–5.2)
SODIUM SERPL-SCNC: 142 MMOL/L (ref 136–145)

## 2024-10-28 PROCEDURE — 80048 BASIC METABOLIC PNL TOTAL CA: CPT | Performed by: STUDENT IN AN ORGANIZED HEALTH CARE EDUCATION/TRAINING PROGRAM

## 2024-10-28 PROCEDURE — 25010000002 MAGNESIUM SULFATE 4 GM/100ML SOLUTION: Performed by: STUDENT IN AN ORGANIZED HEALTH CARE EDUCATION/TRAINING PROGRAM

## 2024-10-28 PROCEDURE — 83735 ASSAY OF MAGNESIUM: CPT | Performed by: STUDENT IN AN ORGANIZED HEALTH CARE EDUCATION/TRAINING PROGRAM

## 2024-10-28 PROCEDURE — 93005 ELECTROCARDIOGRAM TRACING: CPT | Performed by: STUDENT IN AN ORGANIZED HEALTH CARE EDUCATION/TRAINING PROGRAM

## 2024-10-28 PROCEDURE — 93010 ELECTROCARDIOGRAM REPORT: CPT | Performed by: STUDENT IN AN ORGANIZED HEALTH CARE EDUCATION/TRAINING PROGRAM

## 2024-10-28 PROCEDURE — 99222 1ST HOSP IP/OBS MODERATE 55: CPT | Performed by: NURSE PRACTITIONER

## 2024-10-28 RX ORDER — DOFETILIDE 0.5 MG/1
500 CAPSULE ORAL EVERY 12 HOURS
Status: DISCONTINUED | OUTPATIENT
Start: 2024-10-29 | End: 2024-11-02 | Stop reason: HOSPADM

## 2024-10-28 RX ORDER — NITROGLYCERIN 0.4 MG/1
0.4 TABLET SUBLINGUAL
Status: DISCONTINUED | OUTPATIENT
Start: 2024-10-28 | End: 2024-11-02 | Stop reason: HOSPADM

## 2024-10-28 RX ORDER — DOFETILIDE 0.5 MG/1
500 CAPSULE ORAL ONCE
Status: COMPLETED | OUTPATIENT
Start: 2024-10-29 | End: 2024-10-29

## 2024-10-28 RX ORDER — MAGNESIUM SULFATE HEPTAHYDRATE 40 MG/ML
4 INJECTION, SOLUTION INTRAVENOUS ONCE
Status: COMPLETED | OUTPATIENT
Start: 2024-10-28 | End: 2024-10-29

## 2024-10-28 RX ORDER — POTASSIUM CHLORIDE 1500 MG/1
40 TABLET, EXTENDED RELEASE ORAL EVERY 4 HOURS
Status: COMPLETED | OUTPATIENT
Start: 2024-10-28 | End: 2024-10-29

## 2024-10-28 RX ADMIN — POTASSIUM CHLORIDE 40 MEQ: 1500 TABLET, EXTENDED RELEASE ORAL at 22:09

## 2024-10-28 RX ADMIN — MAGNESIUM SULFATE IN WATER FOR 4 G: 40 INJECTION INTRAVENOUS at 22:09

## 2024-10-28 NOTE — H&P
Mandi Jacob  [id number]  1950   LOS: 1 day   Patient Care Team:  John Montelongo MD as PCP - General (Internal Medicine)  John Montelongo MD as Referring Physician (Internal Medicine)    Ms. Jacob is a 74-year-old  white female from [place], [place].    Chief Complaint: Tikosyn admission    Problem List:  Atrial Fibrillation  Diagnosis event monitor 2019 ?  Allergy to flecainide  On sotalol thereafter  Noted recurrence indicental on EKG 5/2024 cards office visit  [facility] 6, anticoagulated with Eliquis  Sotalol discontinued 10/21/2024  Tikosyn initiated 10/29/2024  CAD  [facility] 2/2012: nonobstructive  TTE 10/2023: LVEF 51-55%, mild conc LVH, limited study  [facility] 11/2023: IVUS guided RCA stent, otherwise nonobstructive disease  HFpEF  Peripheral Vascular Disease  Angioplasty and stenting to R carotid 6/2019 Dr. Erickson  HTN  HLD  Diabetes  Hx TIA  COPD, on continuous 2 L O2 NC  Morbid obesity: BMI 42.61  Former tobacco use with 61.6-pack-year history, quit in 2009      Allergies   Allergen Reactions    Ace Inhibitors Anaphylaxis    Diltiazem Angioedema    Flecainide Other (See Comments)     Facial numbness and edema, itching all over and shortness of breath    Incruse Ellipta [Umeclidinium Bromide] Shortness Of Breath    Lisinopril Anaphylaxis and Hives    Niacin Swelling     LIPS AND TONGUE    Wellbutrin [Bupropion] Shortness Of Breath    Ketorolac Tromethamine Arrhythmia    Buspirone Other (See Comments)     THROAT AND LIPS SWELLED     Ciprofloxacin Rash    Citalopram Unknown (See Comments)     PT UNSURE     Clindamycin/Lincomycin Rash    Codeine Other (See Comments)     SEVERE HEART BURN    Levaquin [Levofloxacin] Rash    Lipitor [Atorvastatin] Diarrhea and Other (See Comments)     HEADACHE    Liraglutide Unknown - Low Severity    Lorazepam Mental Status Change    Sertraline Unknown - Low Severity     PT UNSURE OF REACTION     Statins Diarrhea and Other (See Comments)      HEADACHE    Sulfamethoxazole-Trimethoprim Unknown (See Comments)     PT UNSURE OF REACTION    Trulicity [Dulaglutide] Diarrhea     SEVERE DIARRHEA    Xigduo Xr [Dapagliflozin Pro-Metformin Er] Diarrhea     Medications Prior to Admission   Medication Sig Dispense Refill Last Dose/Taking    albuterol (PROVENTIL HFA;VENTOLIN HFA) 108 (90 BASE) MCG/ACT inhaler Inhale 2 puffs Every 4 (Four) Hours As Needed. Albuterol 90 MCG/ACT AERS; Patient Sig: Albuterol 90 MCG/ACT AERS INHALE 1 TO 2 PUFFS EVERY 4 TO 6 HOURS AS NEEDED.; 0; 11-Sep-2013; Active   Past Week    anastrozole (ARIMIDEX) 1 MG tablet Take 1 tablet by mouth Daily.   10/28/2024 Morning    Cholecalciferol 25 MCG (1000 UT) tablet Take 1 tablet by mouth Daily.   10/28/2024    clopidogrel (PLAVIX) 75 MG tablet TAKE 1 TABLET BY MOUTH DAILY. 90 tablet 3 10/28/2024 at  9:00 AM    Diclofenac Sodium (VOLTAREN) 1 % gel gel Apply 4 g topically to the appropriate area as directed As Needed.   Past Week    Eliquis 5 MG tablet tablet TAKE 1 TABLET EVERY 12 HOURS 180 tablet 3 10/28/2024 at  6:00 PM    furosemide (LASIX) 40 MG tablet TAKE 1 TABLET TWICE DAILY 180 tablet 3 10/28/2024 at  4:00 PM    insulin aspart (novoLOG FLEXPEN) 100 UNIT/ML solution pen-injector sc pen Inject 15 Units under the skin into the appropriate area as directed Daily With Breakfast.   10/28/2024 at  9:00 AM    losartan (COZAAR) 50 MG tablet Take 1 tablet by mouth Daily.   10/28/2024 at  9:00 AM    magnesium oxide (MAGOX) 400 (241.3 MG) MG tablet tablet Take 1 tablet by mouth Every Night.   10/28/2024 at  4:00 PM    metFORMIN ER (GLUCOPHAGE-XR) 500 MG 24 hr tablet Take 1 tablet by mouth Daily With Breakfast.  11 10/28/2024 at  9:00 AM    metolazone (ZAROXOLYN) 2.5 MG tablet Take 1 tablet by mouth Daily As Needed. 1 tablet 30 minutes prior to lasix as needed PRN   10/27/2024 at 10:00 AM    metoprolol tartrate (LOPRESSOR) 25 MG tablet ~101Q.5 TAKE 1/2 TABLET DAILY ~101Q.5 TAKE 1/2 TABLET DAILY 90 tablet 3  10/28/2024 at  4:00 PM    montelukast (SINGULAIR) 10 MG tablet Take 1 tablet by mouth Every Night.   10/28/2024 at  4:00 PM    O2 (OXYGEN) Inhale Continuous.   10/28/2024    potassium chloride (K-DUR,KLOR-CON) 10 MEQ CR tablet Take 1 tablet by mouth Every Night.   10/28/2024 at  9:00 AM    potassium chloride (K-DUR,KLOR-CON) 20 MEQ CR tablet Take 1 tablet by mouth Every Morning. 30 tablet 3 10/28/2024 at  4:00 PM    Repatha SureClick solution auto-injector SureClick injection INJECT 1 ML UNDER THE SKIN INTO THE APPROPRIATE AREA AS DIRECTED EVERY 14 (FOURTEEN) DAYS. 6 pen 3 10/21/2024 at  4:00 PM    TRESIBA FLEXTOUCH 200 UNIT/ML solution pen-injector Inject 68 Units under the skin into the appropriate area as directed Every Morning. 100 units daily   10/28/2024 at  9:00 AM    Tudorza Pressair 400 MCG/ACT aerosol powder  powder for inhalation Inhale 1 puff 2 (Two) Times a Day.   10/28/2024 at  4:00 PM    albuterol (PROVENTIL) (2.5 MG/3ML) 0.083% nebulizer solution Albuterol Sulfate (2.5 MG/3ML) 0.083% Inhalation Nebulization Solution; Patient Sig: Albuterol Sulfate (2.5 MG/3ML) 0.083% Inhalation Nebulization Solution USE 1 UNIT DOSE IN NEBULIZER EVERY 4 TO 6 HOURS AS NEE   More than a month    isosorbide mononitrate (IMDUR) 30 MG 24 hr tablet TAKE 1 TABLET BY MOUTH DAILY. 90 tablet 3  9:00 AM    nitroglycerin (NITROSTAT) 0.4 MG SL tablet Place 1 tablet under the tongue Every 5 (Five) Minutes As Needed for Chest Pain. 25 tablet 2 Unknown    PARoxetine (PAXIL) 10 MG tablet Take 1 tablet by mouth Every Morning.   More than a month    sotalol (BETAPACE) 120 MG tablet Take 1 tablet by mouth 2 (Two) Times a Day. 60 tablet 11 10/22/2024 Morning     Scheduled Meds:anastrozole, 1 mg, Oral, Daily  apixaban, 5 mg, Oral, Q12H  clopidogrel, 75 mg, Oral, Daily  dofetilide, 500 mcg, Oral, Q12H  furosemide, 40 mg, Oral, BID  insulin glargine, 1-200 Units, Subcutaneous, Nightly - Glucommander  insulin lispro, 1-200 Units,  Subcutaneous, 4x Daily With Meals & Nightly  isosorbide mononitrate, 30 mg, Oral, Daily  losartan, 50 mg, Oral, Daily  magnesium oxide, 400 mg, Oral, Nightly  metFORMIN ER, 500 mg, Oral, Daily With Breakfast  metoprolol tartrate, 12.5 mg, Oral, Daily  montelukast, 10 mg, Oral, Nightly  PARoxetine, 10 mg, Oral, QAM  [START ON 10/30/2024] potassium chloride, 20 mEq, Oral, QAM  potassium chloride, 10 mEq, Oral, Nightly      Continuous Infusions:O2, 2 L/min      PRN Meds:.  albuterol    Calcium Replacement - Follow Nurse / BPA Driven Protocol    dextrose    dextrose    glucagon (human recombinant)    insulin lispro    Magnesium Cardiology Dose Replacement - Follow Nurse / BPA Driven Protocol    nitroglycerin    Pharmacy Consult    Phosphorus Replacement - Follow Nurse / BPA Driven Protocol    Potassium Replacement - Follow Nurse / BPA Driven Protocol       History of Present Illness:   This is a 74-year-old white female who presents to [facility] for Tikosyn admission.  She has an allergy to flecainide, diltiazem, ACE inhibitors, statins.  Last dose of sotalol was 10/21/2024.  The patient has no missed doses of Eliquis in the past 30 days.  She denies any chest pain, increased shortness of breath, or palpitations.  She felt like her ankles were little puffy but she received her Lasix this morning.    Cardiac risk factors: advanced age (older than 55 for men, 65 for women), diabetes mellitus, dyslipidemia, hypertension, male gender, obesity (BMI >= 30 kg/m2), and sedentary lifestyle.    Social History     Socioeconomic History    Marital status:    Tobacco Use    Smoking status: Former     Current packs/day: 0.00     Average packs/day: 1.5 packs/day for 41.1 years (61.6 ttl pk-yrs)     Types: Cigarettes     Start date: 4/8/1968     Quit date: 3/22/2009     Years since quitting: 15.6    Smokeless tobacco: Never   Vaping Use    Vaping status: Never Used   Substance and Sexual Activity    Alcohol use: No    Drug use: No     "Sexual activity: Not Currently     Partners: Male     Birth control/protection: Post-menopausal     Family History   Problem Relation Age of Onset    Heart attack Mother     Heart failure Mother         congestive    Hyperlipidemia Mother     Hypertension Mother     Peripheral vascular disease Mother     Heart attack Father     Heart failure Father         congestive    Diabetes Father     Hyperlipidemia Father     Hypertension Father     Peripheral vascular disease Father     Asthma Father     Hypertension Brother     Heart failure Other         congestive    Diabetes Other     Hyperlipidemia Other     Hypertension Other     Peripheral vascular disease Other        Review of Systems  10 point review of systems was completed, positives outlined in the HPI, and otherwise all other systems are negative.      Objective:       Physical Exam  /61 (BP Location: Right arm, Patient Position: Lying)   Pulse 88   Temp 98.2 °F (36.8 °C) (Oral)   Resp 18   Ht 167.6 cm (66\")   Wt 120 kg (264 lb)   SpO2 97%   BMI 42.61 kg/m²       10/28/24  1900   Weight: 120 kg (264 lb)     Body mass index is 42.61 kg/m².    Intake/Output Summary (Last 24 hours) at 10/29/2024 1224  Last data filed at 10/29/2024 1130  Gross per 24 hour   Intake --   Output 650 ml   Net -650 ml       General Appearance:  Alert, cooperative, no distress, appears stated age   Head:  Normocephalic, without obvious abnormality, atraumatic   Neck: Supple, symmetrical, trachea midline, no adenopathy, thyroid: not enlarged, symmetric, no tenderness/mass/nodules, no carotid bruit or JVD   Lungs:   Clear to auscultation bilaterally, respirations unlabored   Heart:  Regular rate and rhythm with PACs, S1, S2 normal, no murmur, rub or gallop   Abdomen:   Soft, nontender, no masses, no organomegaly, bowel sounds audible x4   Extremities: No edema, normal range of motion   Pulses: 2+ and symmetric   Skin: Skin color, texture, turgor normal, no rashes or lesions "   Neurologic: Normal       Cardiographics  EKG 10/29/2024:Sinus rhythm with premature atrial complexes  Nonspecific ST abnormality  Abnormal ECG  When compared with ECG of 28-OCT-2024 20:35, (Unconfirmed)  No significant change was found, QTc 480 ms    Imaging  Chest x-ray: No new chest x-ray to review    Lab Review   Results from last 7 days   Lab Units 10/29/24  0623 10/28/24  2039   SODIUM mmol/L 140 142   POTASSIUM mmol/L 3.9 3.4*   CHLORIDE mmol/L 100 98   CO2 mmol/L 30.0* 31.0*   BUN mg/dL 17 22   CREATININE mg/dL 0.68 0.86   GLUCOSE mg/dL 89 129*   CALCIUM mg/dL 10.4 10.7*                         Assessment:   Patient presents to [facility] for Tikosyn admission per protocol.  Last dose of sotalol was 10/21/2024.  The patient has no missed doses of Eliquis in the past 30 days.   Patient has received 2/5 doses of Tikosyn thus far.  QTc acceptable on ECG this morning.      Plan:   Tikosyn admission per protocol  Hospitalist consult for T2DM management    Electronically signed by FLOWER Ya, 10/29/24, 12:37 PM EDT.

## 2024-10-29 PROBLEM — C50.919 BREAST CANCER: Status: ACTIVE | Noted: 2024-10-29

## 2024-10-29 PROBLEM — M79.673 FOOT PAIN: Status: ACTIVE | Noted: 2024-10-29

## 2024-10-29 LAB
ANION GAP SERPL CALCULATED.3IONS-SCNC: 10 MMOL/L (ref 5–15)
BUN SERPL-MCNC: 17 MG/DL (ref 8–23)
BUN/CREAT SERPL: 25 (ref 7–25)
CALCIUM SPEC-SCNC: 10.4 MG/DL (ref 8.6–10.5)
CHLORIDE SERPL-SCNC: 100 MMOL/L (ref 98–107)
CO2 SERPL-SCNC: 30 MMOL/L (ref 22–29)
CREAT SERPL-MCNC: 0.68 MG/DL (ref 0.57–1)
EGFRCR SERPLBLD CKD-EPI 2021: 92.1 ML/MIN/1.73
GLUCOSE BLDC GLUCOMTR-MCNC: 177 MG/DL (ref 70–130)
GLUCOSE BLDC GLUCOMTR-MCNC: 245 MG/DL (ref 70–130)
GLUCOSE BLDC GLUCOMTR-MCNC: 250 MG/DL (ref 70–130)
GLUCOSE SERPL-MCNC: 89 MG/DL (ref 65–99)
MAGNESIUM SERPL-MCNC: 2.7 MG/DL (ref 1.6–2.4)
POTASSIUM SERPL-SCNC: 3.7 MMOL/L (ref 3.5–5.2)
POTASSIUM SERPL-SCNC: 3.9 MMOL/L (ref 3.5–5.2)
POTASSIUM SERPL-SCNC: 3.9 MMOL/L (ref 3.5–5.2)
SODIUM SERPL-SCNC: 140 MMOL/L (ref 136–145)

## 2024-10-29 PROCEDURE — 80048 BASIC METABOLIC PNL TOTAL CA: CPT | Performed by: STUDENT IN AN ORGANIZED HEALTH CARE EDUCATION/TRAINING PROGRAM

## 2024-10-29 PROCEDURE — 63710000001 INSULIN LISPRO (HUMAN) PER 5 UNITS: Performed by: PHYSICIAN ASSISTANT

## 2024-10-29 PROCEDURE — 93005 ELECTROCARDIOGRAM TRACING: CPT | Performed by: STUDENT IN AN ORGANIZED HEALTH CARE EDUCATION/TRAINING PROGRAM

## 2024-10-29 PROCEDURE — 83735 ASSAY OF MAGNESIUM: CPT | Performed by: STUDENT IN AN ORGANIZED HEALTH CARE EDUCATION/TRAINING PROGRAM

## 2024-10-29 PROCEDURE — 63710000001 INSULIN GLARGINE PER 5 UNITS: Performed by: NURSE PRACTITIONER

## 2024-10-29 PROCEDURE — 99231 SBSQ HOSP IP/OBS SF/LOW 25: CPT | Performed by: NURSE PRACTITIONER

## 2024-10-29 PROCEDURE — 82948 REAGENT STRIP/BLOOD GLUCOSE: CPT

## 2024-10-29 PROCEDURE — 93010 ELECTROCARDIOGRAM REPORT: CPT | Performed by: STUDENT IN AN ORGANIZED HEALTH CARE EDUCATION/TRAINING PROGRAM

## 2024-10-29 PROCEDURE — 84132 ASSAY OF SERUM POTASSIUM: CPT | Performed by: STUDENT IN AN ORGANIZED HEALTH CARE EDUCATION/TRAINING PROGRAM

## 2024-10-29 RX ORDER — IBUPROFEN 600 MG/1
1 TABLET ORAL
Status: DISCONTINUED | OUTPATIENT
Start: 2024-10-29 | End: 2024-10-29

## 2024-10-29 RX ORDER — POTASSIUM CHLORIDE 1500 MG/1
20 TABLET, EXTENDED RELEASE ORAL EVERY MORNING
Status: DISCONTINUED | OUTPATIENT
Start: 2024-10-30 | End: 2024-11-02 | Stop reason: HOSPADM

## 2024-10-29 RX ORDER — FUROSEMIDE 40 MG/1
40 TABLET ORAL 2 TIMES DAILY
Status: DISCONTINUED | OUTPATIENT
Start: 2024-10-29 | End: 2024-11-02 | Stop reason: HOSPADM

## 2024-10-29 RX ORDER — INSULIN LISPRO 100 [IU]/ML
1-200 INJECTION, SOLUTION INTRAVENOUS; SUBCUTANEOUS AS NEEDED
Status: DISCONTINUED | OUTPATIENT
Start: 2024-10-29 | End: 2024-10-29

## 2024-10-29 RX ORDER — POTASSIUM CHLORIDE 1500 MG/1
20 TABLET, EXTENDED RELEASE ORAL ONCE
Status: COMPLETED | OUTPATIENT
Start: 2024-10-29 | End: 2024-10-29

## 2024-10-29 RX ORDER — ACETAMINOPHEN 325 MG/1
650 TABLET ORAL EVERY 6 HOURS PRN
Status: DISCONTINUED | OUTPATIENT
Start: 2024-10-29 | End: 2024-11-02 | Stop reason: HOSPADM

## 2024-10-29 RX ORDER — CHOLECALCIFEROL (VITAMIN D3) 25 MCG
1000 TABLET ORAL DAILY
COMMUNITY

## 2024-10-29 RX ORDER — CLOPIDOGREL BISULFATE 75 MG/1
75 TABLET ORAL DAILY
Status: DISCONTINUED | OUTPATIENT
Start: 2024-10-29 | End: 2024-11-02 | Stop reason: HOSPADM

## 2024-10-29 RX ORDER — NICOTINE POLACRILEX 4 MG
15 LOZENGE BUCCAL
Status: DISCONTINUED | OUTPATIENT
Start: 2024-10-29 | End: 2024-10-29

## 2024-10-29 RX ORDER — ALBUTEROL SULFATE 0.83 MG/ML
2.5 SOLUTION RESPIRATORY (INHALATION) EVERY 6 HOURS PRN
Status: DISCONTINUED | OUTPATIENT
Start: 2024-10-29 | End: 2024-11-02 | Stop reason: HOSPADM

## 2024-10-29 RX ORDER — DEXTROSE MONOHYDRATE 25 G/50ML
25 INJECTION, SOLUTION INTRAVENOUS
Status: DISCONTINUED | OUTPATIENT
Start: 2024-10-29 | End: 2024-11-02 | Stop reason: HOSPADM

## 2024-10-29 RX ORDER — DEXTROSE MONOHYDRATE 25 G/50ML
10-50 INJECTION, SOLUTION INTRAVENOUS
Status: DISCONTINUED | OUTPATIENT
Start: 2024-10-29 | End: 2024-10-29

## 2024-10-29 RX ORDER — NICOTINE POLACRILEX 4 MG
15 LOZENGE BUCCAL
Status: DISCONTINUED | OUTPATIENT
Start: 2024-10-29 | End: 2024-11-02 | Stop reason: HOSPADM

## 2024-10-29 RX ORDER — ANASTROZOLE 1 MG/1
1 TABLET ORAL DAILY
COMMUNITY

## 2024-10-29 RX ORDER — IBUPROFEN 600 MG/1
1 TABLET ORAL
Status: DISCONTINUED | OUTPATIENT
Start: 2024-10-29 | End: 2024-11-02 | Stop reason: HOSPADM

## 2024-10-29 RX ORDER — MONTELUKAST SODIUM 10 MG/1
10 TABLET ORAL NIGHTLY
Status: DISCONTINUED | OUTPATIENT
Start: 2024-10-29 | End: 2024-11-02 | Stop reason: HOSPADM

## 2024-10-29 RX ORDER — ALBUTEROL SULFATE 0.83 MG/ML
2.5 SOLUTION RESPIRATORY (INHALATION) EVERY 6 HOURS PRN
Status: DISCONTINUED | OUTPATIENT
Start: 2024-10-29 | End: 2024-10-29 | Stop reason: SDUPTHER

## 2024-10-29 RX ORDER — INSULIN LISPRO 100 [IU]/ML
1-200 INJECTION, SOLUTION INTRAVENOUS; SUBCUTANEOUS
Status: DISCONTINUED | OUTPATIENT
Start: 2024-10-29 | End: 2024-10-29

## 2024-10-29 RX ORDER — LOSARTAN POTASSIUM 50 MG/1
50 TABLET ORAL DAILY
Status: DISCONTINUED | OUTPATIENT
Start: 2024-10-29 | End: 2024-11-02 | Stop reason: HOSPADM

## 2024-10-29 RX ORDER — METFORMIN HYDROCHLORIDE 500 MG/1
500 TABLET, EXTENDED RELEASE ORAL
Status: DISCONTINUED | OUTPATIENT
Start: 2024-10-29 | End: 2024-11-02 | Stop reason: HOSPADM

## 2024-10-29 RX ORDER — POTASSIUM CHLORIDE 750 MG/1
10 CAPSULE, EXTENDED RELEASE ORAL NIGHTLY
Status: DISCONTINUED | OUTPATIENT
Start: 2024-10-29 | End: 2024-11-02 | Stop reason: HOSPADM

## 2024-10-29 RX ORDER — ISOSORBIDE MONONITRATE 30 MG/1
30 TABLET, EXTENDED RELEASE ORAL DAILY
Status: DISCONTINUED | OUTPATIENT
Start: 2024-10-29 | End: 2024-11-02 | Stop reason: HOSPADM

## 2024-10-29 RX ORDER — ANASTROZOLE 1 MG/1
1 TABLET ORAL DAILY
Status: DISCONTINUED | OUTPATIENT
Start: 2024-10-29 | End: 2024-11-02 | Stop reason: HOSPADM

## 2024-10-29 RX ORDER — PAROXETINE 10 MG/1
10 TABLET, FILM COATED ORAL EVERY MORNING
Status: DISCONTINUED | OUTPATIENT
Start: 2024-10-29 | End: 2024-11-02 | Stop reason: HOSPADM

## 2024-10-29 RX ORDER — INSULIN LISPRO 100 [IU]/ML
2-9 INJECTION, SOLUTION INTRAVENOUS; SUBCUTANEOUS
Status: DISCONTINUED | OUTPATIENT
Start: 2024-10-29 | End: 2024-11-02 | Stop reason: HOSPADM

## 2024-10-29 RX ADMIN — DOFETILIDE 500 MCG: 0.5 CAPSULE ORAL at 00:07

## 2024-10-29 RX ADMIN — METFORMIN HYDROCHLORIDE 500 MG: 500 TABLET, EXTENDED RELEASE ORAL at 12:09

## 2024-10-29 RX ADMIN — FUROSEMIDE 40 MG: 40 TABLET ORAL at 11:05

## 2024-10-29 RX ADMIN — POTASSIUM CHLORIDE 10 MEQ: 750 CAPSULE, EXTENDED RELEASE ORAL at 20:37

## 2024-10-29 RX ADMIN — DOFETILIDE 500 MCG: 0.5 CAPSULE ORAL at 21:52

## 2024-10-29 RX ADMIN — LOSARTAN POTASSIUM 50 MG: 50 TABLET, FILM COATED ORAL at 11:04

## 2024-10-29 RX ADMIN — ACETAMINOPHEN 650 MG: 325 TABLET ORAL at 20:38

## 2024-10-29 RX ADMIN — POTASSIUM CHLORIDE 20 MEQ: 1500 TABLET, EXTENDED RELEASE ORAL at 09:57

## 2024-10-29 RX ADMIN — INSULIN LISPRO 2 UNITS: 100 INJECTION, SOLUTION INTRAVENOUS; SUBCUTANEOUS at 18:26

## 2024-10-29 RX ADMIN — ACETAMINOPHEN 650 MG: 325 TABLET ORAL at 14:55

## 2024-10-29 RX ADMIN — FUROSEMIDE 40 MG: 40 TABLET ORAL at 20:38

## 2024-10-29 RX ADMIN — PAROXETINE HYDROCHLORIDE 10 MG: 10 TABLET, FILM COATED ORAL at 11:04

## 2024-10-29 RX ADMIN — ISOSORBIDE MONONITRATE 30 MG: 30 TABLET, EXTENDED RELEASE ORAL at 11:04

## 2024-10-29 RX ADMIN — INSULIN LISPRO 6 UNITS: 100 INJECTION, SOLUTION INTRAVENOUS; SUBCUTANEOUS at 21:53

## 2024-10-29 RX ADMIN — POTASSIUM CHLORIDE 20 MEQ: 1500 TABLET, EXTENDED RELEASE ORAL at 18:26

## 2024-10-29 RX ADMIN — DICLOFENAC SODIUM 2 G: 10 GEL TOPICAL at 20:37

## 2024-10-29 RX ADMIN — ANASTROZOLE 1 MG: 1 TABLET ORAL at 15:08

## 2024-10-29 RX ADMIN — APIXABAN 5 MG: 5 TABLET, FILM COATED ORAL at 11:04

## 2024-10-29 RX ADMIN — INSULIN GLARGINE 15 UNITS: 100 INJECTION, SOLUTION SUBCUTANEOUS at 18:26

## 2024-10-29 RX ADMIN — MAGNESIUM OXIDE TAB 400 MG (241.3 MG ELEMENTAL MG) 400 MG: 400 (241.3 MG) TAB at 20:38

## 2024-10-29 RX ADMIN — POTASSIUM CHLORIDE 40 MEQ: 1500 TABLET, EXTENDED RELEASE ORAL at 01:50

## 2024-10-29 RX ADMIN — INSULIN LISPRO 4 UNITS: 100 INJECTION, SOLUTION INTRAVENOUS; SUBCUTANEOUS at 13:06

## 2024-10-29 RX ADMIN — CLOPIDOGREL BISULFATE 75 MG: 75 TABLET ORAL at 11:05

## 2024-10-29 RX ADMIN — Medication 12.5 MG: at 11:04

## 2024-10-29 RX ADMIN — DOFETILIDE 500 MCG: 0.5 CAPSULE ORAL at 09:57

## 2024-10-29 RX ADMIN — APIXABAN 5 MG: 5 TABLET, FILM COATED ORAL at 20:37

## 2024-10-29 RX ADMIN — MONTELUKAST 10 MG: 10 TABLET, FILM COATED ORAL at 20:37

## 2024-10-29 NOTE — PROGRESS NOTES
"Pharmacy Consult - Tikosyn Initiation    Mandi Jacob is a 74 y.o. female admitted for Tikosyn initiation and monitoring.    Height: 167.6 cm (66\")  Weight: 120 kg (264 lb)    Evaluation of Drug-Drug Interactions     Previous antiarrhythmic medications must be discontinued prior to admission       - Amiodarone must be discontinued >3 weeks prior to Tikosyn start       - Sotalol and class I antiarrhythmics (Dysopyramide, Flecainide, Mexiletine, Propafenone) must be discontinued >48 hours prior to Tikosyn start         - Previous antiarrhythmic therapy: Sotalol, last dose 10/21 per pt     Current drug-drug interactions noted with admission medications and Tikosyn    The following medications are contraindicated with Dofetilide and will be/have been discontinued:  - Azole antifungals (Isavuconazonium sulfate, Itraconazole, Ketoconazole, Posaconazole)  - Bictegravir  - Cimetidine   - Citalopram  - Dolutegravir  - Fingolimod  - Fluoroquinolone antibiotics (Ciprofloxacin, Levofloxacin, Moxifloxacin, Norfloxacin)  - Ibutilide   - Hydrochlorothiazide and any combination products containing Hydrochlorothiazide   - Lamotrigine   - Macrolide antibiotics (Erythromycin, Clarithromycin)  - Megestrol  - Pimozide  - Procainamide  - Prochlorperazine  - Pyrimethamine   - Quetiapine  - Quinidine  - Ranolazine   - Sauinavir  - Thioridazine   - Trimethoprim and Trimethoprim-Sulfamethoxazole   - Verapamil  - Ziprasidone     The following medications are recognized to prolong QT interval, however the medication may continue during initial Dofetilide dosing:  - Antidepressants (Amitriptyline, Amoxapine, Clomipramine, Desipramine, Doxepin, Imipramine, Maprotiline, Mirtazapine, Nortriptyline, Protriptyline, Triipramine)  - Antipsychotics (Haloperidol,  Risperidone, Asenapine)   - Diltiazem  - Eribulin  - Ivabradine   - Loop diuretics (Bumetanide, Furosemide, Torsemide)  - Ondansetron  - Paliperidone  - Phenothiazines " (Chlorpromazine, Fluphenazine, Mesoridazine, Perphenazine, Promazine, Trifluoperazine)    The following medications may increase Dofetilide serum concentrations and can be co-administered:  - Amiloride   - Azole antifungals (Fluconazole, Voriconazole)  - Cannabinoids  - Diltiazem   - Metformin   - Nefazodone   - Glyburide  - Protease Inhibitors (Amprenavir, Indinavir, Nelfinavir, Ritonavir)  - Quinidine  - SSRI's (Escitalopram, Fluvoxamine, Fluoxetine, Paroxetine, Sertraline)  - Triamterene    Laboratory    Results from last 7 days   Lab Units 10/28/24  2039   SODIUM mmol/L 142   POTASSIUM mmol/L 3.4*   CHLORIDE mmol/L 98   CO2 mmol/L 31.0*   BUN mg/dL 22   CREATININE mg/dL 0.86   GLUCOSE mg/dL 129*   CALCIUM mg/dL 10.7*     Results from last 7 days   Lab Units 10/28/24  2039   MAGNESIUM mg/dL 1.8     Pharmacy will order electrolyte replacement per protocols if indicated (Mag < 2.0, K < 4.0) based on laboratory values on admission      - Magnesium replacement protocol ordered: Yes     - Potassium replacement protocol ordered: Yes    Estimated CrCl =  76.9 mL/min  (Calculated with Cockroft-Gault equation using actual body weight and serum creatinine for calculation--can use laboratory values from previous 24 hours)    Initial QTc and EKG Monitoring    QTc =  444, cards calculated as 405ms    If QTc is:     < 440 msec: okay to proceed with initiation      > 440 msec: must contact MD or physician extender (FLOWER/RYAN)             - Provider contacted: Millie Rodriguez             - Okay to proceed: Yes                     Patient has a functioning atrial pacemaker - No     Cardiology aware, will proceed    Initial Tikosyn dose based on Creatinine Clearance:    CrCl > 60 mL/min - Dofetilide 500 mcg PO Q12H  CrCl 40-60 mL/min - Dofetilide 250 mcg PO Q12H  CrCl 20-39 mL/min - Dofetilide 125 mcg PO Q12H  CrCl < 20 mL/min - Do not start medication, contact MD    (Will dose medication 10 hour intervals until administration times  are between 7 and 9).    Assessment/Plan:    Patient admitted for Tikosyn initiation per cardiology. Will initiate Tikosyn 500 mcg PO every 12 hours.  Current drug-drug interactions include metolazone, a thiazide like diuretic. Hydrochlorothiazide contraindicated. Has been ~ 4 half lifes since last dose.   Monitor electrolytes and drug-drug interactions.  Pharmacy will continue to follow.    Valerie Villa RPH  10/28/2024  21:22 EDT

## 2024-10-29 NOTE — CASE MANAGEMENT/SOCIAL WORK
Continued Stay Note  [facility]     Patient Name: Mandi Jacob  MRN: [id number]  Today's Date: 10/29/2024    Admit Date: 10/28/2024    Plan: Dofetilide   Discharge Plan       Row Name 10/29/24 0945       Plan    Plan Dofetilide    Plan Comments Dofetilide PA approved through CoverMyMeds ([id number]).  Patient will get first fill at [facility].  CM will continue to follow.    Final Discharge Disposition Code 01 - home or self-care      Row Name 10/29/24 0919                                              Discharge Codes    No documentation.                 Expected Discharge Date and Time       Expected Discharge Date Expected Discharge Time    Oct 30, 2024               Maria A Ferrera RN

## 2024-10-29 NOTE — CONSULTS
[facility] Medicine Services  CONSULT NOTE      Patient Name: Mandi Jacob  : 1950  MRN: [id number]    Primary Care Physician: John Montelongo MD  Provider requesting consultation: Donaldo Hollingsworth MD    Subjective   Subjective     Reason for Consultation:  DM management    HPI:  Mandi Jacob is a 74 y.o. female with pmh significant for HTN, HLD,CAD, HFpEF, COPD on home O2, T2DM, breast cancer, depression, TIA  and persistent Afib that presents as scheduled admission for Tikosyn induction. Hospitalist consulted to assist with medical/ DM management. Patient reports DM well controlled. Last A1c 6.3% on Treseba, novolog every am and metformin. Complaints of left foot pain and swelling today.       Review of Systems   Constitutional: Negative.    HENT: Negative.     Respiratory: Negative.     Cardiovascular:  Positive for palpitations and leg swelling.   Gastrointestinal: Negative.    Genitourinary: Negative.    Musculoskeletal: Negative.    Skin: Negative.    Neurological: Negative.    Psychiatric/Behavioral: Negative.         All other systems reviewed and are negative.     Personal History     Past Medical History:   Diagnosis Date    Arrhythmia     Asthma     Atrial fibrillation     Breast cancer 2023    Right breast    CAD (coronary artery disease)     non-obstructive by cath 2012    CHF (congestive heart failure)     COPD (chronic obstructive pulmonary disease)     Diabetes mellitus     Diastolic dysfunction     Dizziness     Edema     Esophageal spasm     Herniated lumbar intervertebral disc     L5    Hyperlipidemia     intolerant to statins     Hypertension     Osteoarthritis     Palpitations     SOB (shortness of breath)     Stroke     Stroke-like symptoms     TIA (transient ischemic attack)     left hemispheric TIA/CVA    Unstable angina        Past Surgical History:   Procedure Laterality Date    CARDIAC CATHETERIZATION      CARDIAC  CATHETERIZATION  11/13/2023    dr. mcclure with 1 stent    CAROTID STENT      CATARACT EXTRACTION      CATARACT EXTRACTION      CEREBRAL ANGIOGRAM      CORONARY STENT PLACEMENT  11/13/2023    EYE SURGERY Bilateral 2021    INTERVENTIONAL RADIOLOGY PROCEDURE Bilateral 05/16/2019    Procedure: Carotid Cerebral Angiogram;  Surgeon: Alex Erickson MD;  Location: [facility] INVASIVE LOCATION;  Service: Interventional Radiology    TX TCAT IV STENT CRV CRTD ART EMBOLIC PROTECJ N/A 06/05/2019    Right Carotid Stent     TUBAL ABDOMINAL LIGATION      WRIST SURGERY         Family History:  family history includes Asthma in her father; Diabetes in her father and another family member; Heart attack in her father and mother; Heart failure in her father, mother, and another family member; Hyperlipidemia in her father, mother, and another family member; Hypertension in her brother, father, mother, and another family member; Peripheral vascular disease in her father, mother, and another family member. Otherwise pertinent FHx was reviewed and unremarkable.     Social History:  reports that she quit smoking about 15 years ago. Her smoking use included cigarettes. She started smoking about 56 years ago. She has a 61.6 pack-year smoking history. She has never used smokeless tobacco. She reports that she does not drink alcohol and does not use drugs.    Medications:  Diclofenac Sodium, Evolocumab, Insulin Degludec, O2, PARoxetine, aclidinium bromide, albuterol, albuterol sulfate HFA, anastrozole, apixaban, cholecalciferol, clopidogrel, furosemide, insulin aspart, isosorbide mononitrate, losartan, magnesium oxide, metFORMIN ER, metOLazone, metoprolol tartrate, montelukast, nitroglycerin, potassium chloride, and sotalol    Scheduled Meds:anastrozole, 1 mg, Oral, Daily  apixaban, 5 mg, Oral, Q12H  clopidogrel, 75 mg, Oral, Daily  Diclofenac Sodium, 2 g, Topical, 4x Daily  dofetilide, 500 mcg, Oral, Q12H  furosemide, 40 mg, Oral,  BID  insulin glargine, 15 Units, Subcutaneous, Daily  insulin lispro, 2-9 Units, Subcutaneous, 4x Daily AC & at Bedtime  isosorbide mononitrate, 30 mg, Oral, Daily  losartan, 50 mg, Oral, Daily  magnesium oxide, 400 mg, Oral, Nightly  metFORMIN ER, 500 mg, Oral, Daily With Breakfast  metoprolol tartrate, 12.5 mg, Oral, Daily  montelukast, 10 mg, Oral, Nightly  PARoxetine, 10 mg, Oral, QAM  [START ON 10/30/2024] potassium chloride, 20 mEq, Oral, QAM  potassium chloride ER, 20 mEq, Oral, Once  potassium chloride, 10 mEq, Oral, Nightly      Continuous Infusions:O2, 2 L/min      PRN Meds:.  acetaminophen    albuterol    Calcium Replacement - Follow Nurse / BPA Driven Protocol    dextrose    dextrose    glucagon (human recombinant)    Magnesium Cardiology Dose Replacement - Follow Nurse / BPA Driven Protocol    nitroglycerin    Pharmacy Consult    Phosphorus Replacement - Follow Nurse / BPA Driven Protocol    Potassium Replacement - Follow Nurse / BPA Driven Protocol    Allergies   Allergen Reactions    Ace Inhibitors Anaphylaxis    Diltiazem Angioedema    Flecainide Other (See Comments)     Facial numbness and edema, itching all over and shortness of breath    Incruse Ellipta [Umeclidinium Bromide] Shortness Of Breath    Lisinopril Anaphylaxis and Hives    Niacin Swelling     LIPS AND TONGUE    Wellbutrin [Bupropion] Shortness Of Breath    Ketorolac Tromethamine Arrhythmia    Buspirone Other (See Comments)     THROAT AND LIPS SWELLED     Ciprofloxacin Rash    Citalopram Unknown (See Comments)     PT UNSURE     Clindamycin/Lincomycin Rash    Codeine Other (See Comments)     SEVERE HEART BURN    Levaquin [Levofloxacin] Rash    Lipitor [Atorvastatin] Diarrhea and Other (See Comments)     HEADACHE    Liraglutide Unknown - Low Severity    Lorazepam Mental Status Change    Sertraline Unknown - Low Severity     PT UNSURE OF REACTION     Statins Diarrhea and Other (See Comments)     HEADACHE    Sulfamethoxazole-Trimethoprim  Unknown (See Comments)     PT UNSURE OF REACTION    Trulicity [Dulaglutide] Diarrhea     SEVERE DIARRHEA    Xigduo Xr [Dapagliflozin Pro-Metformin Er] Diarrhea       Objective   Objective     Vital Signs:   Temp:  [98.1 °F (36.7 °C)-99.8 °F (37.7 °C)] 98.6 °F (37 °C)  Heart Rate:  [] 81  Resp:  [18] 18  BP: (108-173)/(50-98) 132/62  Flow (L/min) (Oxygen Therapy):  [2] 2    Physical Exam  Constitutional: No acute distress, awake, alert  HENT: NCAT, mucous membranes moist  Respiratory: Clear to auscultation bilaterally, respiratory effort normal   Cardiovascular: RRR, no murmurs, rubs, or gallops  Gastrointestinal: Positive bowel sounds, soft, nontender, nondistended  Musculoskeletal: trace pedal edema  Psychiatric: Appropriate affect, cooperative  Neurologic: Oriented x 3, strength symmetric in all extremities, Cranial Nerves grossly intact to confrontation, speech clear  Skin: No rashes         Result Review:  I have personally reviewed the results from the time of admission and agree with these findings:  [x]  Laboratory  []  Radiology  [x]  EKG/Telemetry   []  Pathology  []  Old records  []  Other:  Most notable findings include:     LAB RESULTS:          Lab 10/29/24  1335 10/29/24  0623 10/28/24  2039   SODIUM  --  140 142   POTASSIUM 3.7 3.9 3.4*   CHLORIDE  --  100 98   CO2  --  30.0* 31.0*   ANION GAP  --  10.0 13.0   BUN  --  17 22   CREATININE  --  0.68 0.86   EGFR  --  92.1 71.4   GLUCOSE  --  89 129*   CALCIUM  --  10.4 10.7*   MAGNESIUM  --  2.7* 1.8                         Brief Urine Lab Results       None          Microbiology Results (last 10 days)       ** No results found for the last 240 hours. **            No radiology results from the last 24 hrs    Results for orders placed during the hospital encounter of 10/12/23    Adult Transthoracic Echo Limited W/ Cont if Necessary Per Protocol    Interpretation Summary    Left ventricular ejection fraction appears to be 51 - 55%.    Left  ventricular wall thickness is consistent with mild concentric hypertrophy.    The left atrial cavity is moderately dilated.    Moderately to severely technically limited study.  Gross generalizations can be rendered.    1.  LV size is normal and global LV systolic function is not significantly depressed.  Visually estimated ejection fraction is 50±5%.  Mild concentric left ventricular hypertrophy.  Moderate left atrial enlargement.  The right ventricle appears mildly dilated but RV systolic function is grossly preserved.  The right atrium is at the upper limits of normal.    2.  Valves are morphologically normal.    3.  No pericardial or great vessel pathology.      Assessment & Plan   Assessment & Plan     Active Hospital Problems    Diagnosis  POA    **Persistent atrial fibrillation [I48.19]  Yes    Foot pain [M79.673]  Unknown    Congestive heart failure [I50.9]  Yes    Diabetes mellitus [E11.9]  Yes    Hyperlipidemia [E78.5]  Yes    Hypertension [I10]  Yes      Resolved Hospital Problems   No resolved problems to display.       I recommend the following:    Persistent A Fib  -- tikosyn induction per EP  -- eliquis, metoprolol    DM2  -- check A1c, 6.3% per patient  -- continue metformin, ssi as started per cardiology  -- will start basal lower than home rx and monitor    HFpEF  LE edema  -- ef 51-55%  -- continue home lasix, K+    Left foot pain  -- diclofenac gel started  -- prn tylenol    HTN  HLD  CAD  -- imdur, losartan, plavix    H/O of breast cancer  -- arimidex    COPD   -- not in exacerbation, continue home O2 @ 2LNC    Thank you for allowing [facility] Medicine Service to provide consultative care for your patient, we will continue to follow while clinically appropriate.    FLOWER Tracy  10/29/24      Electronically signed by FLOWER Tracy, 10/29/24, 4:51 PM EDT.

## 2024-10-29 NOTE — CASE MANAGEMENT/SOCIAL WORK
Discharge Planning Assessment  [facility]     Patient Name: Mandi Jacob  MRN: [id number]  Today's Date: 10/29/2024    Admit Date: 10/28/2024    Plan: Home   Discharge Needs Assessment       Row Name 10/29/24 0918       Living Environment    People in Home alone    Current Living Arrangements home    Potentially Unsafe Housing Conditions none    Primary Care Provided by self    Provides Primary Care For no one    Family Caregiver if Needed child(marija), adult    Quality of Family Relationships unable to assess    Able to Return to Prior Arrangements yes       Resource/Environmental Concerns    Resource/Environmental Concerns none       Transition Planning    Patient/Family Anticipates Transition to home    Patient/Family Anticipated Services at Transition     Transportation Anticipated family or friend will provide       Discharge Needs Assessment    Readmission Within the Last 30 Days no previous admission in last 30 days    Equipment Currently Used at Home cane, straight;rollator;oxygen    Concerns to be Addressed discharge planning    Anticipated Changes Related to Illness none    Equipment Needed After Discharge none                   Discharge Plan       Row Name 10/29/24 0919       Plan    Plan Home    Patient/Family in Agreement with Plan yes    Plan Comments Spoke with patient in room to initiate discharge planning.  She lives alone in [place].  She is independent with ADL's, using a rollator to assist with ambulation.  She also has a straight cane and continuous home O2@2L through [employer].  She is not current with home health.  Her PCP is John Montelongo.  She has an advanced directive in EPIC.  Verified that she has [employer] Medicare Replacement.  Ms. Jacob has RX coverage and has her scripts filled at [facility].  Her plan is to return home at discharge.  No needs noted at this time.  CM will continue to follow.    Final Discharge Disposition Code 01 - home or self-care                   Continued Care and Services - Admitted Since 10/28/2024    No active coordination exists for this encounter.       Expected Discharge Date and Time       Expected Discharge Date Expected Discharge Time    Oct 30, 2024            Demographic Summary       Row Name 10/29/24 0918       General Information    Admission Type inpatient    Arrived From home    Referral Source admission list    Reason for Consult discharge planning    Preferred Language English                   Functional Status       Row Name 10/29/24 0918       Functional Status    Usual Activity Tolerance moderate    Current Activity Tolerance moderate       Functional Status, IADL    Medications independent    Meal Preparation independent    Housekeeping independent    Laundry independent    Shopping independent                   Psychosocial    No documentation.                  Abuse/Neglect    No documentation.                  Legal    No documentation.                  Substance Abuse    No documentation.                  Patient Forms    No documentation.                     Maria A Ferrera RN

## 2024-10-30 ENCOUNTER — APPOINTMENT (OUTPATIENT)
Dept: CARDIOLOGY | Facility: HOSPITAL | Age: 74
End: 2024-10-30
Payer: MEDICARE

## 2024-10-30 ENCOUNTER — APPOINTMENT (OUTPATIENT)
Dept: GENERAL RADIOLOGY | Facility: HOSPITAL | Age: 74
End: 2024-10-30
Payer: MEDICARE

## 2024-10-30 LAB
ANION GAP SERPL CALCULATED.3IONS-SCNC: 12 MMOL/L (ref 5–15)
BH CV LOWER VASCULAR LEFT COMMON FEMORAL AUGMENT: NORMAL
BH CV LOWER VASCULAR LEFT COMMON FEMORAL COMPRESS: NORMAL
BH CV LOWER VASCULAR LEFT COMMON FEMORAL PHASIC: NORMAL
BH CV LOWER VASCULAR LEFT COMMON FEMORAL SPONT: NORMAL
BH CV LOWER VASCULAR LEFT DISTAL FEMORAL COMPRESS: NORMAL
BH CV LOWER VASCULAR LEFT GASTRONEMIUS COMPRESS: NORMAL
BH CV LOWER VASCULAR LEFT GREATER SAPH AK COMPRESS: NORMAL
BH CV LOWER VASCULAR LEFT GREATER SAPH BK COMPRESS: NORMAL
BH CV LOWER VASCULAR LEFT LESSER SAPH COMPRESS: NORMAL
BH CV LOWER VASCULAR LEFT MID FEMORAL AUGMENT: NORMAL
BH CV LOWER VASCULAR LEFT MID FEMORAL COMPRESS: NORMAL
BH CV LOWER VASCULAR LEFT MID FEMORAL PHASIC: NORMAL
BH CV LOWER VASCULAR LEFT MID FEMORAL SPONT: NORMAL
BH CV LOWER VASCULAR LEFT PERONEAL AUGMENT: NORMAL
BH CV LOWER VASCULAR LEFT PERONEAL COMPRESS: NORMAL
BH CV LOWER VASCULAR LEFT POPLITEAL AUGMENT: NORMAL
BH CV LOWER VASCULAR LEFT POPLITEAL COMPRESS: NORMAL
BH CV LOWER VASCULAR LEFT POPLITEAL PHASIC: NORMAL
BH CV LOWER VASCULAR LEFT POPLITEAL SPONT: NORMAL
BH CV LOWER VASCULAR LEFT POSTERIOR TIBIAL AUGMENT: NORMAL
BH CV LOWER VASCULAR LEFT POSTERIOR TIBIAL COMPRESS: NORMAL
BH CV LOWER VASCULAR LEFT PROFUNDA FEMORAL COMPRESS: NORMAL
BH CV LOWER VASCULAR LEFT PROXIMAL FEMORAL COMPRESS: NORMAL
BH CV LOWER VASCULAR LEFT SAPHENOFEMORAL JUNCTION COMPRESS: NORMAL
BH CV LOWER VASCULAR RIGHT COMMON FEMORAL AUGMENT: NORMAL
BH CV LOWER VASCULAR RIGHT COMMON FEMORAL COMPRESS: NORMAL
BH CV LOWER VASCULAR RIGHT COMMON FEMORAL PHASIC: NORMAL
BH CV LOWER VASCULAR RIGHT COMMON FEMORAL SPONT: NORMAL
BUN SERPL-MCNC: 13 MG/DL (ref 8–23)
BUN/CREAT SERPL: 18.6 (ref 7–25)
CALCIUM SPEC-SCNC: 10.4 MG/DL (ref 8.6–10.5)
CHLORIDE SERPL-SCNC: 101 MMOL/L (ref 98–107)
CO2 SERPL-SCNC: 28 MMOL/L (ref 22–29)
CREAT SERPL-MCNC: 0.7 MG/DL (ref 0.57–1)
EGFRCR SERPLBLD CKD-EPI 2021: 91.5 ML/MIN/1.73
GLUCOSE BLDC GLUCOMTR-MCNC: 224 MG/DL (ref 70–130)
GLUCOSE BLDC GLUCOMTR-MCNC: 232 MG/DL (ref 70–130)
GLUCOSE BLDC GLUCOMTR-MCNC: 238 MG/DL (ref 70–130)
GLUCOSE BLDC GLUCOMTR-MCNC: 242 MG/DL (ref 70–130)
GLUCOSE SERPL-MCNC: 193 MG/DL (ref 65–99)
HBA1C MFR BLD: 6.5 % (ref 4.8–5.6)
MAGNESIUM SERPL-MCNC: 2 MG/DL (ref 1.6–2.4)
POTASSIUM SERPL-SCNC: 4.2 MMOL/L (ref 3.5–5.2)
QT INTERVAL: 352 MS
QT INTERVAL: 364 MS
QT INTERVAL: 370 MS
QT INTERVAL: 378 MS
QT INTERVAL: 410 MS
QT INTERVAL: 422 MS
QTC INTERVAL: 444 MS
QTC INTERVAL: 467 MS
QTC INTERVAL: 479 MS
QTC INTERVAL: 480 MS
QTC INTERVAL: 487 MS
QTC INTERVAL: 493 MS
SODIUM SERPL-SCNC: 141 MMOL/L (ref 136–145)

## 2024-10-30 PROCEDURE — 93005 ELECTROCARDIOGRAM TRACING: CPT | Performed by: STUDENT IN AN ORGANIZED HEALTH CARE EDUCATION/TRAINING PROGRAM

## 2024-10-30 PROCEDURE — 83036 HEMOGLOBIN GLYCOSYLATED A1C: CPT | Performed by: NURSE PRACTITIONER

## 2024-10-30 PROCEDURE — 63710000001 INSULIN GLARGINE PER 5 UNITS: Performed by: NURSE PRACTITIONER

## 2024-10-30 PROCEDURE — 83735 ASSAY OF MAGNESIUM: CPT

## 2024-10-30 PROCEDURE — 99232 SBSQ HOSP IP/OBS MODERATE 35: CPT | Performed by: STUDENT IN AN ORGANIZED HEALTH CARE EDUCATION/TRAINING PROGRAM

## 2024-10-30 PROCEDURE — 93010 ELECTROCARDIOGRAM REPORT: CPT | Performed by: STUDENT IN AN ORGANIZED HEALTH CARE EDUCATION/TRAINING PROGRAM

## 2024-10-30 PROCEDURE — 97530 THERAPEUTIC ACTIVITIES: CPT

## 2024-10-30 PROCEDURE — 93971 EXTREMITY STUDY: CPT | Performed by: INTERNAL MEDICINE

## 2024-10-30 PROCEDURE — 82948 REAGENT STRIP/BLOOD GLUCOSE: CPT

## 2024-10-30 PROCEDURE — 63710000001 INSULIN LISPRO (HUMAN) PER 5 UNITS: Performed by: STUDENT IN AN ORGANIZED HEALTH CARE EDUCATION/TRAINING PROGRAM

## 2024-10-30 PROCEDURE — 97162 PT EVAL MOD COMPLEX 30 MIN: CPT

## 2024-10-30 PROCEDURE — 63710000001 INSULIN GLARGINE PER 5 UNITS: Performed by: STUDENT IN AN ORGANIZED HEALTH CARE EDUCATION/TRAINING PROGRAM

## 2024-10-30 PROCEDURE — 80048 BASIC METABOLIC PNL TOTAL CA: CPT | Performed by: STUDENT IN AN ORGANIZED HEALTH CARE EDUCATION/TRAINING PROGRAM

## 2024-10-30 PROCEDURE — 93971 EXTREMITY STUDY: CPT

## 2024-10-30 PROCEDURE — 63710000001 INSULIN LISPRO (HUMAN) PER 5 UNITS: Performed by: PHYSICIAN ASSISTANT

## 2024-10-30 PROCEDURE — 73630 X-RAY EXAM OF FOOT: CPT

## 2024-10-30 RX ORDER — INSULIN LISPRO 100 [IU]/ML
3 INJECTION, SOLUTION INTRAVENOUS; SUBCUTANEOUS
Status: DISCONTINUED | OUTPATIENT
Start: 2024-10-30 | End: 2024-10-31

## 2024-10-30 RX ORDER — COLCHICINE 0.6 MG/1
0.6 TABLET ORAL EVERY 12 HOURS SCHEDULED
Status: DISCONTINUED | OUTPATIENT
Start: 2024-10-30 | End: 2024-11-02 | Stop reason: HOSPADM

## 2024-10-30 RX ORDER — LIDOCAINE 4 G/G
1 PATCH TOPICAL
Status: DISCONTINUED | OUTPATIENT
Start: 2024-10-30 | End: 2024-10-30

## 2024-10-30 RX ORDER — METOPROLOL TARTRATE 25 MG/1
25 TABLET, FILM COATED ORAL DAILY
Status: DISCONTINUED | OUTPATIENT
Start: 2024-10-30 | End: 2024-11-01

## 2024-10-30 RX ORDER — LIDOCAINE 4 G/G
1 PATCH TOPICAL
Status: DISCONTINUED | OUTPATIENT
Start: 2024-10-30 | End: 2024-11-02 | Stop reason: HOSPADM

## 2024-10-30 RX ADMIN — INSULIN LISPRO 4 UNITS: 100 INJECTION, SOLUTION INTRAVENOUS; SUBCUTANEOUS at 17:26

## 2024-10-30 RX ADMIN — ISOSORBIDE MONONITRATE 30 MG: 30 TABLET, EXTENDED RELEASE ORAL at 08:42

## 2024-10-30 RX ADMIN — INSULIN LISPRO 3 UNITS: 100 INJECTION, SOLUTION INTRAVENOUS; SUBCUTANEOUS at 10:45

## 2024-10-30 RX ADMIN — INSULIN LISPRO 4 UNITS: 100 INJECTION, SOLUTION INTRAVENOUS; SUBCUTANEOUS at 08:49

## 2024-10-30 RX ADMIN — FUROSEMIDE 40 MG: 40 TABLET ORAL at 08:42

## 2024-10-30 RX ADMIN — METFORMIN HYDROCHLORIDE 500 MG: 500 TABLET, EXTENDED RELEASE ORAL at 08:42

## 2024-10-30 RX ADMIN — PAROXETINE HYDROCHLORIDE 10 MG: 10 TABLET, FILM COATED ORAL at 06:13

## 2024-10-30 RX ADMIN — DICLOFENAC SODIUM 2 G: 10 GEL TOPICAL at 12:57

## 2024-10-30 RX ADMIN — DOFETILIDE 500 MCG: 0.5 CAPSULE ORAL at 10:44

## 2024-10-30 RX ADMIN — COLCHICINE 0.6 MG: 0.6 TABLET ORAL at 13:40

## 2024-10-30 RX ADMIN — APIXABAN 5 MG: 5 TABLET, FILM COATED ORAL at 08:42

## 2024-10-30 RX ADMIN — INSULIN GLARGINE 15 UNITS: 100 INJECTION, SOLUTION SUBCUTANEOUS at 08:41

## 2024-10-30 RX ADMIN — COLCHICINE 0.6 MG: 0.6 TABLET ORAL at 20:58

## 2024-10-30 RX ADMIN — POTASSIUM CHLORIDE 20 MEQ: 1500 TABLET, EXTENDED RELEASE ORAL at 06:13

## 2024-10-30 RX ADMIN — DICLOFENAC SODIUM 2 G: 10 GEL TOPICAL at 08:21

## 2024-10-30 RX ADMIN — MAGNESIUM OXIDE TAB 400 MG (241.3 MG ELEMENTAL MG) 400 MG: 400 (241.3 MG) TAB at 20:58

## 2024-10-30 RX ADMIN — MONTELUKAST 10 MG: 10 TABLET, FILM COATED ORAL at 20:58

## 2024-10-30 RX ADMIN — METOPROLOL TARTRATE 25 MG: 25 TABLET, FILM COATED ORAL at 08:50

## 2024-10-30 RX ADMIN — ANASTROZOLE 1 MG: 1 TABLET ORAL at 08:42

## 2024-10-30 RX ADMIN — INSULIN GLARGINE 5 UNITS: 100 INJECTION, SOLUTION SUBCUTANEOUS at 10:45

## 2024-10-30 RX ADMIN — DOFETILIDE 500 MCG: 0.5 CAPSULE ORAL at 22:02

## 2024-10-30 RX ADMIN — LOSARTAN POTASSIUM 50 MG: 50 TABLET, FILM COATED ORAL at 08:42

## 2024-10-30 RX ADMIN — POTASSIUM CHLORIDE 10 MEQ: 750 CAPSULE, EXTENDED RELEASE ORAL at 20:58

## 2024-10-30 RX ADMIN — DICLOFENAC SODIUM 2 G: 10 GEL TOPICAL at 20:58

## 2024-10-30 RX ADMIN — INSULIN LISPRO 3 UNITS: 100 INJECTION, SOLUTION INTRAVENOUS; SUBCUTANEOUS at 17:26

## 2024-10-30 RX ADMIN — APIXABAN 5 MG: 5 TABLET, FILM COATED ORAL at 20:58

## 2024-10-30 RX ADMIN — FUROSEMIDE 40 MG: 40 TABLET ORAL at 20:58

## 2024-10-30 RX ADMIN — DICLOFENAC SODIUM 2 G: 10 GEL TOPICAL at 17:26

## 2024-10-30 RX ADMIN — CLOPIDOGREL BISULFATE 75 MG: 75 TABLET ORAL at 08:42

## 2024-10-30 RX ADMIN — LIDOCAINE 1 PATCH: 4 PATCH TOPICAL at 01:04

## 2024-10-30 RX ADMIN — INSULIN LISPRO 2 UNITS: 100 INJECTION, SOLUTION INTRAVENOUS; SUBCUTANEOUS at 12:57

## 2024-10-30 RX ADMIN — ACETAMINOPHEN 650 MG: 325 TABLET ORAL at 10:51

## 2024-10-30 RX ADMIN — INSULIN LISPRO 4 UNITS: 100 INJECTION, SOLUTION INTRAVENOUS; SUBCUTANEOUS at 21:01

## 2024-10-30 NOTE — PROGRESS NOTES
[facility] Electrophysiologty  In Patient progress note   LOS: 2 days   Patient Care Team:  John Montelongo MD as PCP - General (Internal Medicine)  John Montelongo MD as Referring Physician (Internal Medicine)    Follow up for Cheif Complaint EP: Atrial Fibrillation    Subjective Denies Chest Pain Denies Dyspnea Eating OK.  Primary complaint today is pain in both feet           Active Hospital Problems    Diagnosis  POA    **Persistent atrial fibrillation [I48.19]  Yes     Priority: High    Foot pain [M79.673]  Unknown    Diabetes mellitus [E11.9]  Yes    Hypertension [I10]  Yes         Tele: Sinus Rythym    Vitals:  Temp:  [98 °F (36.7 °C)-98.6 °F (37 °C)] 98 °F (36.7 °C)  Heart Rate:  [] 103  Resp:  [18] 18  BP: (118-139)/(60-73) 139/64  Flow (L/min) (Oxygen Therapy):  [2] 2    Body mass index is 42.61 kg/m².    Intake/Output Summary (Last 24 hours) at 10/30/2024 0825  Last data filed at 10/30/2024 0340  Gross per 24 hour   Intake --   Output 1300 ml   Net -1300 ml       Physical Exam:      General: alert, no acute distress, acyanotic, well developed, well nourished   Chest: Clear Auscultation   CV: Heart regular rate and rhythm   Extremities: negative    Results Review:         Labs:      Results from last 7 days   Lab Units 10/29/24  2206 10/29/24  1335 10/29/24  0623   SODIUM mmol/L  --   --  140   POTASSIUM mmol/L 3.9   < > 3.9   CHLORIDE mmol/L  --   --  100   CO2 mmol/L  --   --  30.0*   BUN mg/dL  --   --  17   CREATININE mg/dL  --   --  0.68   GLUCOSE mg/dL  --   --  89   CALCIUM mg/dL  --   --  10.4    < > = values in this interval not displayed.     Estimated Creatinine Clearance: 97.2 mL/min (by C-G formula based on SCr of 0.68 mg/dL).            Scheduled Meds:  anastrozole, 1 mg, Oral, Daily  apixaban, 5 mg, Oral, Q12H  clopidogrel, 75 mg, Oral, Daily  Diclofenac Sodium, 2 g, Topical, 4x Daily  dofetilide, 500 mcg, Oral, Q12H  furosemide, 40 mg, Oral, BID  insulin  glargine, 15 Units, Subcutaneous, Daily  insulin lispro, 2-9 Units, Subcutaneous, 4x Daily AC & at Bedtime  isosorbide mononitrate, 30 mg, Oral, Daily  Lidocaine, 1 patch, Transdermal, Q24H  losartan, 50 mg, Oral, Daily  magnesium oxide, 400 mg, Oral, Nightly  metFORMIN ER, 500 mg, Oral, Daily With Breakfast  metoprolol tartrate, 12.5 mg, Oral, Daily  montelukast, 10 mg, Oral, Nightly  PARoxetine, 10 mg, Oral, QAM  potassium chloride, 20 mEq, Oral, QAM  potassium chloride, 10 mEq, Oral, Nightly        Continuous Infusions:O2, 2 L/min          Assessment: Plan:    Atrial fibrillation  Now in sinus rhythm with occasional PACs  Stable QTc  Continue Tikosyn 500 mcg twice daily    Hospitalist has been consulted to evaluate painful feet        Electronically signed by RICKEY Vences, 10/30/24, 1:41 PM EDT.

## 2024-10-30 NOTE — PROGRESS NOTES
[facility] Medicine Services  PROGRESS NOTE    Patient Name: Mandi Jacob  : 1950  MRN: [id number]    Date of Admission: 10/28/2024  Primary Care Physician: John Montelongo MD    Subjective   Subjective     CC:  Follow-up A-fib    HPI:  Reports left foot pain the past 2 days.  Also with some left great toe pain.  Has been on hydrochlorothiazide outpatient.  No chest pain or shortness of breath.  No nausea or vomiting.  Blood sugars elevated up into the 200s over the past 24 hours.      Objective   Objective     Vital Signs:   Temp:  [98 °F (36.7 °C)-98.6 °F (37 °C)] 98 °F (36.7 °C)  Heart Rate:  [] 116  Resp:  [18] 18  BP: (118-139)/(60-73) 139/64  Flow (L/min) (Oxygen Therapy):  [2] 2     Physical Exam:  Constitutional: No acute distress, awake, alert  HENT: NCAT, mucous membranes moist  Respiratory: Clear to auscultation bilaterally, respiratory effort normal   Cardiovascular: IRIR, HR 90s  Gastrointestinal: Positive bowel sounds, soft, nontender, nondistended  Musculoskeletal: Left lower extremity asymmetric edema; left foot mildly warmer to palpation than the right, left great toe with tophi  Psychiatric: Appropriate affect, cooperative  Neurologic: Alert, oriented, moves all extremity, speech clear  Skin: No rashes      Results Reviewed:  LAB RESULTS:          Lab 10/29/24  2206 10/29/24  1335 10/29/24  0623 10/28/24  2039   SODIUM  --   --  140 142   POTASSIUM 3.9 3.7 3.9 3.4*   CHLORIDE  --   --  100 98   CO2  --   --  30.0* 31.0*   ANION GAP  --   --  10.0 13.0   BUN  --   --  17 22   CREATININE  --   --  0.68 0.86   EGFR  --   --  92.1 71.4   GLUCOSE  --   --  89 129*   CALCIUM  --   --  10.4 10.7*   MAGNESIUM  --   --  2.7* 1.8                         Brief Urine Lab Results       None            Microbiology Results Abnormal       None            No radiology results from the last 24 hrs    Results for orders placed during the hospital encounter of  10/12/23    Adult Transthoracic Echo Limited W/ Cont if Necessary Per Protocol    Interpretation Summary    Left ventricular ejection fraction appears to be 51 - 55%.    Left ventricular wall thickness is consistent with mild concentric hypertrophy.    The left atrial cavity is moderately dilated.    Moderately to severely technically limited study.  Gross generalizations can be rendered.    1.  LV size is normal and global LV systolic function is not significantly depressed.  Visually estimated ejection fraction is 50±5%.  Mild concentric left ventricular hypertrophy.  Moderate left atrial enlargement.  The right ventricle appears mildly dilated but RV systolic function is grossly preserved.  The right atrium is at the upper limits of normal.    2.  Valves are morphologically normal.    3.  No pericardial or great vessel pathology.      Current medications:  Scheduled Meds:anastrozole, 1 mg, Oral, Daily  apixaban, 5 mg, Oral, Q12H  clopidogrel, 75 mg, Oral, Daily  Diclofenac Sodium, 2 g, Topical, 4x Daily  dofetilide, 500 mcg, Oral, Q12H  furosemide, 40 mg, Oral, BID  insulin glargine, 15 Units, Subcutaneous, Daily  insulin lispro, 2-9 Units, Subcutaneous, 4x Daily AC & at Bedtime  isosorbide mononitrate, 30 mg, Oral, Daily  Lidocaine, 1 patch, Transdermal, Q24H  losartan, 50 mg, Oral, Daily  magnesium oxide, 400 mg, Oral, Nightly  metFORMIN ER, 500 mg, Oral, Daily With Breakfast  metoprolol tartrate, 25 mg, Oral, Daily  montelukast, 10 mg, Oral, Nightly  PARoxetine, 10 mg, Oral, QAM  potassium chloride, 20 mEq, Oral, QAM  potassium chloride, 10 mEq, Oral, Nightly      Continuous Infusions:O2, 2 L/min      PRN Meds:.  acetaminophen    albuterol    Calcium Replacement - Follow Nurse / BPA Driven Protocol    dextrose    dextrose    glucagon (human recombinant)    Magnesium Cardiology Dose Replacement - Follow Nurse / BPA Driven Protocol    nitroglycerin    Pharmacy Consult    Phosphorus Replacement - Follow Nurse /  BPA Driven Protocol    Potassium Replacement - Follow Nurse / BPA Driven Protocol    Assessment & Plan   Assessment & Plan     Active Hospital Problems    Diagnosis  POA    **Persistent atrial fibrillation [I48.19]  Yes    Foot pain [M79.673]  Unknown    Diabetes mellitus [E11.9]  Yes    Hypertension [I10]  Yes      Resolved Hospital Problems   No resolved problems to display.        Brief Hospital Course to date:  Mandi Jacob is a 74 y.o. female     This patient's problems and plans were partially entered by my partner and updated as appropriate by me 10/30/24.    Persistent A Fib  -- tikosyn induction per EP  -- eliquis, metoprolol     DM2  -- A1C pending  -- continue metformin, ssi as started per cardiology  -- Blood glucoses reviewed, insulin regimen adjusted on 10/30 Lantus 20 units daily, lispro 3 times daily with meals, continue sliding scale     HFpEF  LE edema  -- ef 51-55%  -- continue home lasix     Left foot pain  Left great toe tophaceous gout  --Left foot x-ray pending  --Colchicine started, will eventually need allopurinol  --Recommended against hydrochlorothiazide in the future    Asymmetric left lower extremity edema  --Duplex pending     HTN  HLD  CAD  -- imdur, losartan, plavix     H/O of breast cancer  -- arimidex     COPD   -- not in exacerbation, continue home O2 @ 2LNC    Expected Discharge Location and Transportation: Home  Expected Discharge   Expected Discharge Date: 10/30/2024; Expected Discharge Time:      VTE Prophylaxis:  Pharmacologic VTE prophylaxis orders are present.         AM-PAC 6 Clicks Score (PT): 20 (10/30/24 0600)    CODE STATUS:   Code Status and Medical Interventions: CPR (Attempt to Resuscitate); Full Support   Ordered at: 10/29/24 1239     Level Of Support Discussed With:    Patient     Code Status (Patient has no pulse and is not breathing):    CPR (Attempt to Resuscitate)     Medical Interventions (Patient has pulse or is breathing):    Full Support        Kae Grider MD  10/30/24

## 2024-10-30 NOTE — THERAPY EVALUATION
Patient Name: Mandi Jacob  : 1950    MRN: [id number]                              Today's Date: 10/30/2024       Admit Date: 10/28/2024    Visit Dx: No diagnosis found.  Patient Active Problem List   Diagnosis    Coronary arteriosclerosis in native artery    Chronic obstructive pulmonary disease    Congestive heart failure    Diabetes mellitus    Hyperlipidemia    Hypertension    TIA (transient ischemic attack)    PAC (premature atrial contraction)    Carotid stenosis, asymptomatic, right    Persistent atrial fibrillation    Foot pain    Breast cancer     Past Medical History:   Diagnosis Date    Arrhythmia     Asthma     Atrial fibrillation     Breast cancer 2023    Right breast    CAD (coronary artery disease)     non-obstructive by cath 2012    CHF (congestive heart failure)     COPD (chronic obstructive pulmonary disease)     Diabetes mellitus     Diastolic dysfunction     Dizziness     Edema     Esophageal spasm     Herniated lumbar intervertebral disc     L5    Hyperlipidemia     intolerant to statins     Hypertension     Osteoarthritis     Palpitations     SOB (shortness of breath)     Stroke     Stroke-like symptoms     TIA (transient ischemic attack)     left hemispheric TIA/CVA    Unstable angina      Past Surgical History:   Procedure Laterality Date    CARDIAC CATHETERIZATION      CARDIAC CATHETERIZATION  2023    dr. mcclure with 1 stent    CAROTID STENT      CATARACT EXTRACTION      CATARACT EXTRACTION      CEREBRAL ANGIOGRAM      CORONARY STENT PLACEMENT  2023    EYE SURGERY Bilateral     INTERVENTIONAL RADIOLOGY PROCEDURE Bilateral 2019    Procedure: Carotid Cerebral Angiogram;  Surgeon: Alex Erickson MD;  Location: [facility] CATH INVASIVE LOCATION;  Service: Interventional Radiology    IL TCAT IV STENT CRV CRTD ART EMBOLIC PROTECJ N/A 2019    Right Carotid Stent     TUBAL ABDOMINAL LIGATION      WRIST SURGERY        General Information        Row Name 10/30/24 1047          Physical Therapy Time and Intention    Document Type evaluation  -CD     Mode of Treatment physical therapy  -CD       Row Name 10/30/24 1047          General Information    Patient Profile Reviewed yes  -CD     Prior Level of Function independent:;all household mobility;community mobility;gait;bed mobility;ADL's;driving  PT USES ROLLATOR IN COMMUNITY, CANE INSIDE. USES 2L O2 24/7. ENDORSES 2 FALLS IN THE LAST 2 MONTHS. 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 LIMITED COMMUNITY DISTANCES DUE TO FATIGUE AND DECREASED ENDURANCE.  -CD     Existing Precautions/Restrictions fall  PAINFUL FEET, R>L.  -CD     Barriers to Rehab medically complex;previous functional deficit  -CD       Row Name 10/30/24 1047          Living Environment    People in Home alone  -CD       Row Name 10/30/24 1047          Home Main Entrance    Number of Stairs, Main Entrance one  -CD       Row Name 10/30/24 1047          Stairs Within Home, Primary    Number of Stairs, Within Home, Primary none  -CD       Row Name 10/30/24 1047          Cognition    Orientation Status (Cognition) oriented x 4  -CD       Row Name 10/30/24 1047          Safety Issues/Impairments Affecting Functional Mobility    Safety Issues Affecting Function (Mobility) safety precaution awareness;safety precautions follow-through/compliance;insight into deficits/self-awareness;positioning of assistive device;ability to follow commands;awareness of need for assistance;sequencing abilities  -CD     Impairments Affecting Function (Mobility) balance;endurance/activity tolerance;pain;range of motion (ROM);strength  -CD     Comment, Safety Issues/Impairments (Mobility) PT NEEDS CUES FOR SEQUENCING MOBILITY SAFELY. CURRENTLY PRIMARY LIMITING FACTOR IS B FOOT PAIN, L>R.  -CD               User Key  (r) = Recorded By, (t) = Taken By, (c) = Cosigned By      Initials Name Provider Type    CD Carissa Jules PT Physical Therapist                   Mobility       Row Name  10/30/24 1054          Bed Mobility    Bed Mobility supine-sit  -CD     Supine-Sit [place] (Bed Mobility) contact guard  -CD     Assistive Device (Bed Mobility) bed rails;head of bed elevated  -CD     Comment, (Bed Mobility) INCREASED TIME AND EFFORT. DENIED DIZZINESS.  -CD       Row Name 10/30/24 1054          Transfers    Comment, (Transfers) CUES FOR HAND PLACEMENT. STS FROM EOB WITH INCREASED TIME AND EFFORT DUE TO B FOOT PAIN. PT INSTRUCTED IN OFF LOADING L LE WITH USE OF R WALKER. REQUIRED CUES FOR SEQUENCING STAND STEP PIVOT BED TO CHAIR WITH R WALKER.  -CD       Row Name 10/30/24 1054          Bed-Chair Transfer    Bed-Chair [place] (Transfers) moderate assist (50% patient effort)  -CD     Assistive Device (Bed-Chair Transfers) walker, front-wheeled  -CD       Row Name 10/30/24 1054          Sit-Stand Transfer    Sit-Stand [place] (Transfers) moderate assist (50% patient effort)  -CD     Assistive Device (Sit-Stand Transfers) walker, front-wheeled  -CD       Row Name 10/30/24 1054          Gait/Stairs (Locomotion)    [place] Level (Gait) unable to assess  -CD     Comment, (Gait/Stairs) DEFERRED. LIMITED TO SEVERAL SHORT ANTALGIC STEPS BED TO CHAIR WITH PIVOT TRANSFER.  -CD               User Key  (r) = Recorded By, (t) = Taken By, (c) = Cosigned By      Initials Name Provider Type    Carissa Dexter, PT Physical Therapist                   Obj/Interventions       Row Name 10/30/24 1100          Range of Motion Comprehensive    General Range of Motion bilateral lower extremity ROM WFL  -CD       Row Name 10/30/24 1100          Strength Comprehensive (MMT)    General Manual Muscle Testing (MMT) Assessment lower extremity strength deficits identified  -CD     Comment, General Manual Muscle Testing (MMT) Assessment B LLE GROSSLY 4/5 AND SYMMETRICAL EXCEPT B ANKLES NT DUE TO PAINFUL FEET, GROSSLY 3/5 DF.  -CD       Row Name 10/30/24 1100          Motor Skills    Motor Skills  coordination;functional endurance  -CD       Row Name 10/30/24 1100          Balance    Balance Assessment sitting static balance;sitting dynamic balance;standing static balance;standing dynamic balance  -CD     Static Sitting Balance independent  -CD     Dynamic Sitting Balance independent  -CD     Position, Sitting Balance unsupported;sitting edge of bed;sitting in chair  -CD     Static Standing Balance minimal assist  -CD     Dynamic Standing Balance moderate assist  -CD     Position/Device Used, Standing Balance walker, rolling  -CD     Balance Interventions sitting;standing;sit to stand;supported;static  -CD     Comment, Balance MOD ASSIST REQUIRED FOR BALANCE WITH PIVOT TRANSFER DUE TO PAINFUL FEET.  -CD       Row Name 10/30/24 1100          Sensory Assessment (Somatosensory)    Sensory Assessment (Somatosensory) LE sensation intact  PT REPORTS NEUROPATHY AT BASELINE. STATE CURRENT PAIN IS NEW AND STARTED YESTERDAY,.  -CD               User Key  (r) = Recorded By, (t) = Taken By, (c) = Cosigned By      Initials Name Provider Type    CD Carissa Jules, PT Physical Therapist                   Goals/Plan       Row Name 10/30/24 1117          Bed Mobility Goal 1 (PT)    Activity/Assistive Device (Bed Mobility Goal 1, PT) sit to supine/supine to sit  -CD     [place] Level/Cues Needed (Bed Mobility Goal 1, PT) independent  -CD     Time Frame (Bed Mobility Goal 1, PT) short term goal (STG);1 week  -CD       Row Name 10/30/24 1117          Transfer Goal 1 (PT)    Activity/Assistive Device (Transfer Goal 1, PT) sit-to-stand/stand-to-sit;bed-to-chair/chair-to-bed  -CD     [place] Level/Cues Needed (Transfer Goal 1, PT) independent  -CD     Time Frame (Transfer Goal 1, PT) long term goal (LTG);2 weeks  -CD       Row Name 10/30/24 1117          Gait Training Goal 1 (PT)    Activity/Assistive Device (Gait Training Goal 1, PT) gait (walking locomotion);assistive device use  -CD     [place] Level (Gait  Training Goal 1, PT) independent  -CD     Distance (Gait Training Goal 1, PT) 200 FEET  -CD     Time Frame (Gait Training Goal 1, PT) long term goal (LTG);2 weeks  -CD       Row Name 10/30/24 1117          Therapy Assessment/Plan (PT)    Planned Therapy Interventions (PT) balance training;bed mobility training;gait training;home exercise program;transfer training;strengthening;patient/family education  -CD               User Key  (r) = Recorded By, (t) = Taken By, (c) = Cosigned By      Initials Name Provider Type    CD Carissa Jules, PT Physical Therapist                   Clinical Impression       Row Name 10/30/24 1110          Pain    Pretreatment Pain Rating 10/10  -CD     Posttreatment Pain Rating 10/10  -CD     Pain Location foot  -CD     Pain Side/Orientation bilateral  LEFT GREATER THAN R.  -CD     Pain Management Interventions positioning techniques utilized;other (see comments)  NOTIFIED NSG AND MD.  -CD     Response to Pain Interventions activity participation with increased pain  -CD     Pre/Posttreatment Pain Comment PT AGREEABLE TO UP TO CHAIR DESPITE PAIN. NSG IS AWARE AND MANAGING. MD -CARDIO ARRIVED AND NOTIFIED.  -CD       Row Name 10/30/24 1110          Plan of Care Review    Plan of Care Reviewed With patient  -CD     Outcome Evaluation PT PRESENTS WITH EVOLVING SYMPTOMS TO INCLUDE GENERALIZED WEAKNESS, IMPAIRED BALANCE AND SEVERE B FOOT PAIN , L>R. PT REPORTS FOOT PAIN STARTED YESTERDAY.  REQUIRED MOD ASSIST FOR FOR PIVOT TRANSFER WITH R WALKER BED TO CHAIR. RECOMMEND IRF IF DECLINE IN FUNCTIONAL MOBILITY PERSISTS AS PT LIVES ALONE.  -CD       Row Name 10/30/24 1110          Therapy Assessment/Plan (PT)    Patient/Family Therapy Goals Statement (PT) TO RESOLVE PAIN.  -CD     Rehab Potential (PT) good  -CD     Criteria for Skilled Interventions Met (PT) yes;meets criteria;skilled treatment is necessary  -CD     Therapy Frequency (PT) daily  -CD     Predicted Duration of Therapy Intervention  (PT) 2 WEEKS  -CD       Row Name 10/30/24 1110          Vital Signs    Posttreatment Heart Rate (beats/min) 143  -CD     Pretreatment Resp Rate (breaths/min) 59  -CD     O2 Delivery Pre Treatment nasal cannula  -CD     O2 Delivery Intra Treatment nasal cannula  -CD     Pre Patient Position Supine  -CD     Intra Patient Position Standing  -CD     Post Patient Position Sitting  -CD       Row Name 10/30/24 1110          Positioning and Restraints    Pre-Treatment Position in bed  -CD     Post Treatment Position chair  -CD     In Chair reclined;call light within reach;encouraged to call for assist;exit alarm on;with nsg;with other staff  DR ELISE PRESENT.  -CD               User Key  (r) = Recorded By, (t) = Taken By, (c) = Cosigned By      Initials Name Provider Type    CD Carissa Jules, PT Physical Therapist                   Outcome Measures       Row Name 10/30/24 1118 10/30/24 0600       How much help from another person do you currently need...    Turning from your back to your side while in flat bed without using bedrails? 4  -CD 4  -AS    Moving from lying on back to sitting on the side of a flat bed without bedrails? 4  -CD 4  -AS    Moving to and from a bed to a chair (including a wheelchair)? 2  -CD 3  -AS    Standing up from a chair using your arms (e.g., wheelchair, bedside chair)? 2  -CD 3  -AS    Climbing 3-5 steps with a railing? 1  -CD 3  -AS    To walk in hospital room? 1  -CD 3  -AS    AM-PAC 6 Clicks Score (PT) 14  -CD 20  -AS    Highest Level of Mobility Goal 4 --> Transfer to chair/commode  -CD 6 --> Walk 10 steps or more  -AS      Row Name 10/30/24 1118          Functional Assessment    Outcome Measure Options AM-PAC 6 Clicks Basic Mobility (PT)  -CD               User Key  (r) = Recorded By, (t) = Taken By, (c) = Cosigned By      Initials Name Provider Type    Carissa Dexter, PT Physical Therapist    AS Rob Todd, RN Registered Nurse                                 Physical Therapy  Education       Title: PT OT SLP Therapies (Done)       Topic: Physical Therapy (Done)       Point: Mobility training (Done)       Learning Progress Summary            Patient Acceptance, E, VU,NR by CD at 10/30/2024 1119    Comment: BENEFITS OF OOB ACTIVITY, SAFETY WITH MOBILITY, PROGRESSION OF POC, D/C PLANNING, TRANSFER TRAINING WITH OFF LOADING L LE.                      Point: Home exercise program (Done)       Learning Progress Summary            Patient Acceptance, E, VU,NR by CD at 10/30/2024 1119    Comment: BENEFITS OF OOB ACTIVITY, SAFETY WITH MOBILITY, PROGRESSION OF POC, D/C PLANNING, TRANSFER TRAINING WITH OFF LOADING L LE.                      Point: Body mechanics (Done)       Learning Progress Summary            Patient Acceptance, E, VU,NR by CD at 10/30/2024 1119    Comment: BENEFITS OF OOB ACTIVITY, SAFETY WITH MOBILITY, PROGRESSION OF POC, D/C PLANNING, TRANSFER TRAINING WITH OFF LOADING L LE.                      Point: Precautions (Done)       Learning Progress Summary            Patient Acceptance, E, VU,NR by CD at 10/30/2024 1119    Comment: BENEFITS OF OOB ACTIVITY, SAFETY WITH MOBILITY, PROGRESSION OF POC, D/C PLANNING, TRANSFER TRAINING WITH OFF LOADING L LE.                                      User Key       Initials Effective Dates Name Provider Type Discipline    CD 02/03/23 -  Carissa Jules, PT Physical Therapist PT                  PT Recommendation and Plan  Planned Therapy Interventions (PT): balance training, bed mobility training, gait training, home exercise program, transfer training, strengthening, patient/family education  Outcome Evaluation: PT PRESENTS WITH EVOLVING SYMPTOMS TO INCLUDE GENERALIZED WEAKNESS, IMPAIRED BALANCE AND SEVERE B FOOT PAIN , L>R. PT REPORTS FOOT PAIN STARTED YESTERDAY.  REQUIRED MOD ASSIST FOR FOR PIVOT TRANSFER WITH R WALKER BED TO CHAIR. RECOMMEND IRF IF DECLINE IN FUNCTIONAL MOBILITY PERSISTS AS PT LIVES ALONE.     Time Calculation:   PT  Evaluation Complexity  History, PT Evaluation Complexity: 3 or more personal factors and/or comorbidities  Examination of Body Systems (PT Eval Complexity): total of 4 or more elements  Clinical Presentation (PT Evaluation Complexity): evolving  Clinical Decision Making (PT Evaluation Complexity): moderate complexity  Overall Complexity (PT Evaluation Complexity): moderate complexity     PT Charges       Row Name 10/30/24 1120             Time Calculation    Start Time 1015  -CD      PT Received On 10/30/24  -CD      PT Goal Re-Cert Due Date 11/09/24  -CD         Time Calculation- PT    Total Timed Code Minutes- PT 21 minute(s)  -CD         Timed Charges    49694 - PT Therapeutic Activity Minutes 21  -CD         Untimed Charges    PT Eval/Re-eval Minutes 60  -CD         Total Minutes    Timed Charges Total Minutes 21  -CD      Untimed Charges Total Minutes 60  -CD       Total Minutes 81  -CD                User Key  (r) = Recorded By, (t) = Taken By, (c) = Cosigned By      Initials Name Provider Type    CD Carissa Jules, PT Physical Therapist                  Therapy Charges for Today       Code Description Service Date Service Provider Modifiers Qty    72513547100 HC PT THERAPEUTIC ACT EA 15 MIN 10/30/2024 Carissa Jules, PT GP 1    [id number] HC PT EVAL MOD COMPLEXITY 4 10/30/2024 Carissa Jules, PT GP 1            PT G-Codes  Outcome Measure Options: AM-PAC 6 Clicks Basic Mobility (PT)  AM-PAC 6 Clicks Score (PT): 14  PT Discharge Summary  Anticipated Discharge Disposition (PT): inpatient rehabilitation facility    Carissa Jules, IZZY  10/30/2024

## 2024-10-30 NOTE — PLAN OF CARE
Goal Outcome Evaluation:  Plan of Care Reviewed With: patient           Outcome Evaluation: PT PRESENTS WITH EVOLVING SYMPTOMS TO INCLUDE GENERALIZED WEAKNESS, IMPAIRED BALANCE AND SEVERE B FOOT PAIN , L>R. PT REPORTS FOOT PAIN STARTED YESTERDAY.  REQUIRED MOD ASSIST FOR FOR PIVOT TRANSFER WITH R WALKER BED TO CHAIR. RECOMMEND IRF IF DECLINE IN FUNCTIONAL MOBILITY PERSISTS AS PT LIVES ALONE.    Anticipated Discharge Disposition (PT): inpatient rehabilitation facility

## 2024-10-30 NOTE — CASE MANAGEMENT/SOCIAL WORK
Continued Stay Note  [facility]     Patient Name: Mandi Jacob  MRN: [id number]  Today's Date: 10/30/2024    Admit Date: 10/28/2024    Plan: Home   Discharge Plan       Row Name 10/30/24 1407       Plan    Plan Home    Plan Comments Spoke with patient in room.  Therapy recommends inoatient rehab at discharge.  Patient prefers to go home with outpatient PT.  Patient given order and she will schedule after discharge.  Mo other needs noted at this time.  CM will continue to follow.    Final Discharge Disposition Code 01 - home or self-care                   Discharge Codes    No documentation.                 Expected Discharge Date and Time       Expected Discharge Date Expected Discharge Time    Oct 30, 2024               Maria A Ferrera RN     [FreeTextEntry1] : Ms. CHAMPION is a 63 year old female with a history of a fib, grade 3 distolic dysfunction, positive messi, asthma, and mild PAH presenting to the office today for follow up pulmonary evaluation. Her chief complaint is sob, mild persistent asthma, + allergies, no JULIANNA- +PAH\par \par Her shortness of breath is multifactorial due to:\par -poor mechanics of breathing \par -out of shape / overweight\par -pulmonary disease\par    -Mild restrictive dysfunction- WNL Diffusion \par -cardiac disease \par \par Problem 1: Mild persistent Asthma\par - add Albuterol 0.83% via nebulizer, Q6H \par -add Trelegy 100 1 puff QD \par Asthma is  believed to be caused by inherited (genetic) and environmental factor, but its exact cause is unknown. Asthma may be triggered by allergens, lung infections, or irritants in the air. Asthma triggers are different for each person \par Inhaler technique reviewed as well as oral hygiene techniques reviewed with patient. Avoidance of cold air, extremes of temperature, rescue inhaler should be used before exercise. Order of medication reviewed with patient. Recommended use of a cool mist humidifier in the bedroom. \par \par Problem 1A: Eosinophilic Asthma \par - The safety and efficacy of Nucala was established in three double-blind, randomized, placebo-controlled trials in patients with severe asthma. Compared to a placebo, patients with severe asthma receiving Nucala had fewer exacerbation requiring hospitalization and/or emergency department visits, and a longer time to first exacerbation. In addition, patients with severe asthma receiving Nucala or Fasenra experienced greater reductions in their daily maintenance oral corticosteroid dose, while maintaining asthma control compared with patients receiving placebo. Treatment with Nucala did not result in a significant improvement in lung function, as measured by the volume of air exhaled by patients in one second. The most common side effects include: headache, injection site reactions, back pain, weakness, and fatigue; hypersensitivity reactions can occur within hours or days including swelling of the face, mouth, and tongue, fainting, dizziness, hives, breathing problems, and rash; herpes zoster infections have occurred. The drug is a monoclonal antibody that inhibits interleukin-5 which helps regular eosinophils, a type of white blood cell that contributes to asthma. The over-production of eosinophils can cause inflammation in the lungs, increasing the frequency of asthma attacks. Patients must also take other medications, including high dose inhaled corticosteroids and at least one additional asthma drug. \par \par [place] 1B: Elevated IgE 174\par - Xolair candidate if needed \par -Xolair is a recombinant DNA- derived humanized IgG1K monoclonal antibody that selectively binds ot human immunoglobulin E (IgE). Xolair is produced by a Chinese hamster ovary cell suspension culture in nutrient medium containing the antibiotic gentamicin. Gentamicin is not detectable in the final product. Xolair is a sterile, white, preservative free, lyophilized powder contained in a single use vial that is reconstituted with sterile water for suspension. Side effects include: wheezing, tightness of the chest, trouble breathing, hives, skin rash, feeling anxious or light-headed, fainting, warmth or tingling under skin, or swelling of face, lips, or tongue \par \par Problem 2: + Allergies \par - a/p given for blood work: + asthma profile, + food IgE panel, eosinophil level, IgE level 174, Vitamin D level Low\par Environmental measures for allergies were encouraged including mattress and pillow covers, air purifier, and environmental controls. \par \par Problem 2A: Low Vitamin D\par - Suplement \par - Has been associated with asthma exacerbations and increased allergic symptoms. The goal based on recent information is maintaining levels between 50-70 and low normal is 30. Recommended 50,000 units every two weeks to once a month depending on the level.\par \par Problem 3: ?JULIANNA (mallampati class, A fib,)\par -Complete home sleep study \par Sleep apnea is associated with adverse clinical consequences which can affect most organ systems.  Cardiovascular disease risk includes arrhythmias, atrial fibrillation, hypertension, coronary artery disease, and stroke. Metabolic disorders include diabetes type 2, non-alcoholic fatty liver disease. Mood disorder especially depression; and cognitive decline especially in the elderly. Associations with  chronic reflux/Wilson’s esophagus some but not all inclusive. \par -Reasons  include arousal consistent with hypopnea; respiratory events most prominent in REM sleep or supine position; therefore sleep staging and body position are important for accurate diagnosis and estimation of AHI. \par \par Problem 4: GERD/LPR (Dr. Ceballos)\par -endoscopy and manometry pending \par -Rule of 2s: avoid eating too much, eating too late, eating too spicy, eating two hours before bed.\par - Things to avoid including overeating, spicy foods, tight clothing, eating within two hours of bed, this list is not all inclusive.\par - For treatments of reflux, possible options discussed including diet control, H2 blockers, PPIs, as well as coating motility agents discussed as treatment options. Timing of meals and proximity of last meal to sleep were discussed. If symptoms persist, a formal gastrointestinal evaluation is needed.\par \par \par problem 5: cardiac disease\par -recommended to continue to follow up with Cardiologist (Dr. Mejia)- ECHO 11/2023\par \par problem 5A: + PAH\par - s/p ECHO - next ECHO = 11/2023\par - RHC after if needed Vq scan \par \par problem 6: poor breathing mechanics\par -Proper breathing techniques were reviewed with an emphasis of exhalation. Patient instructed to breath in for 1 second and out for four seconds. Patient was encouraged to not talk while walking. \par \par problem 7: Rule out vascular abnormality \par - Get Doppler \par \par problem 8: out of shape / overweight\par -Weight loss, exercise, and diet control were discussed and are highly encouraged. Treatment options were given such as, aqua therapy, and contacting a nutritionist. Recommended to use the elliptical, stationary bike, less use of treadmill. Mindful eating was explained to the patient Obesity is associated with worsening asthma, shortness of breath, and potential for cardiac disease, diabetes, and other underlying medical conditions\par \par problem 9: health maintenance \par -recommended yearly flu shot after October 15 2022 \par -recommended strep pneumonia vaccines: Prevnar-13 vaccine, followed by Pneumo vaccine 23 one year following after 65\par -recommended early intervention for Upper Respiratory Infections (URIs)\par -recommended regular osteoporosis evaluations\par -recommended early dermatological evaluations\par -recommended after the age of 50 to the age of 70, colonoscopy every 5 years\par \par F/U in 6-8 weeks.\par She is encouraged to call with any changes, concerns, or questions

## 2024-10-31 LAB
ANION GAP SERPL CALCULATED.3IONS-SCNC: 10 MMOL/L (ref 5–15)
BUN SERPL-MCNC: 12 MG/DL (ref 8–23)
BUN/CREAT SERPL: 15.2 (ref 7–25)
CALCIUM SPEC-SCNC: 10.2 MG/DL (ref 8.6–10.5)
CHLORIDE SERPL-SCNC: 95 MMOL/L (ref 98–107)
CO2 SERPL-SCNC: 29 MMOL/L (ref 22–29)
CREAT SERPL-MCNC: 0.79 MG/DL (ref 0.57–1)
EGFRCR SERPLBLD CKD-EPI 2021: 79.1 ML/MIN/1.73
GLUCOSE BLDC GLUCOMTR-MCNC: 168 MG/DL (ref 70–130)
GLUCOSE BLDC GLUCOMTR-MCNC: 191 MG/DL (ref 70–130)
GLUCOSE BLDC GLUCOMTR-MCNC: 212 MG/DL (ref 70–130)
GLUCOSE BLDC GLUCOMTR-MCNC: 287 MG/DL (ref 70–130)
GLUCOSE SERPL-MCNC: 300 MG/DL (ref 65–99)
MAGNESIUM SERPL-MCNC: 2 MG/DL (ref 1.6–2.4)
POTASSIUM SERPL-SCNC: 3.6 MMOL/L (ref 3.5–5.2)
QT INTERVAL: 330 MS
QT INTERVAL: 366 MS
QT INTERVAL: 368 MS
QTC INTERVAL: 448 MS
QTC INTERVAL: 464 MS
QTC INTERVAL: 469 MS
SODIUM SERPL-SCNC: 134 MMOL/L (ref 136–145)
URATE SERPL-MCNC: 8.3 MG/DL (ref 2.4–5.7)

## 2024-10-31 PROCEDURE — 82948 REAGENT STRIP/BLOOD GLUCOSE: CPT

## 2024-10-31 PROCEDURE — 93005 ELECTROCARDIOGRAM TRACING: CPT | Performed by: STUDENT IN AN ORGANIZED HEALTH CARE EDUCATION/TRAINING PROGRAM

## 2024-10-31 PROCEDURE — 93010 ELECTROCARDIOGRAM REPORT: CPT | Performed by: INTERNAL MEDICINE

## 2024-10-31 PROCEDURE — 63710000001 INSULIN LISPRO (HUMAN) PER 5 UNITS: Performed by: PHYSICIAN ASSISTANT

## 2024-10-31 PROCEDURE — 93010 ELECTROCARDIOGRAM REPORT: CPT | Performed by: STUDENT IN AN ORGANIZED HEALTH CARE EDUCATION/TRAINING PROGRAM

## 2024-10-31 PROCEDURE — 84550 ASSAY OF BLOOD/URIC ACID: CPT | Performed by: PHYSICIAN ASSISTANT

## 2024-10-31 PROCEDURE — 99232 SBSQ HOSP IP/OBS MODERATE 35: CPT | Performed by: STUDENT IN AN ORGANIZED HEALTH CARE EDUCATION/TRAINING PROGRAM

## 2024-10-31 PROCEDURE — 63710000001 INSULIN GLARGINE PER 5 UNITS: Performed by: STUDENT IN AN ORGANIZED HEALTH CARE EDUCATION/TRAINING PROGRAM

## 2024-10-31 PROCEDURE — 99232 SBSQ HOSP IP/OBS MODERATE 35: CPT | Performed by: PHYSICIAN ASSISTANT

## 2024-10-31 PROCEDURE — 83735 ASSAY OF MAGNESIUM: CPT | Performed by: STUDENT IN AN ORGANIZED HEALTH CARE EDUCATION/TRAINING PROGRAM

## 2024-10-31 PROCEDURE — 80048 BASIC METABOLIC PNL TOTAL CA: CPT | Performed by: STUDENT IN AN ORGANIZED HEALTH CARE EDUCATION/TRAINING PROGRAM

## 2024-10-31 PROCEDURE — 63710000001 INSULIN LISPRO (HUMAN) PER 5 UNITS: Performed by: STUDENT IN AN ORGANIZED HEALTH CARE EDUCATION/TRAINING PROGRAM

## 2024-10-31 RX ORDER — ALLOPURINOL 100 MG/1
100 TABLET ORAL DAILY
Status: DISCONTINUED | OUTPATIENT
Start: 2024-10-31 | End: 2024-11-02 | Stop reason: HOSPADM

## 2024-10-31 RX ORDER — INSULIN LISPRO 100 [IU]/ML
6 INJECTION, SOLUTION INTRAVENOUS; SUBCUTANEOUS
Status: DISCONTINUED | OUTPATIENT
Start: 2024-10-31 | End: 2024-11-02 | Stop reason: HOSPADM

## 2024-10-31 RX ADMIN — INSULIN GLARGINE 25 UNITS: 100 INJECTION, SOLUTION SUBCUTANEOUS at 09:09

## 2024-10-31 RX ADMIN — APIXABAN 5 MG: 5 TABLET, FILM COATED ORAL at 09:06

## 2024-10-31 RX ADMIN — INSULIN LISPRO 6 UNITS: 100 INJECTION, SOLUTION INTRAVENOUS; SUBCUTANEOUS at 17:29

## 2024-10-31 RX ADMIN — INSULIN LISPRO 4 UNITS: 100 INJECTION, SOLUTION INTRAVENOUS; SUBCUTANEOUS at 20:44

## 2024-10-31 RX ADMIN — POTASSIUM CHLORIDE 10 MEQ: 750 CAPSULE, EXTENDED RELEASE ORAL at 20:44

## 2024-10-31 RX ADMIN — METFORMIN HYDROCHLORIDE 500 MG: 500 TABLET, EXTENDED RELEASE ORAL at 09:08

## 2024-10-31 RX ADMIN — APIXABAN 5 MG: 5 TABLET, FILM COATED ORAL at 20:43

## 2024-10-31 RX ADMIN — INSULIN LISPRO 6 UNITS: 100 INJECTION, SOLUTION INTRAVENOUS; SUBCUTANEOUS at 11:44

## 2024-10-31 RX ADMIN — MONTELUKAST 10 MG: 10 TABLET, FILM COATED ORAL at 20:43

## 2024-10-31 RX ADMIN — DOFETILIDE 500 MCG: 0.5 CAPSULE ORAL at 22:34

## 2024-10-31 RX ADMIN — POTASSIUM CHLORIDE 20 MEQ: 1500 TABLET, EXTENDED RELEASE ORAL at 09:06

## 2024-10-31 RX ADMIN — ISOSORBIDE MONONITRATE 30 MG: 30 TABLET, EXTENDED RELEASE ORAL at 09:06

## 2024-10-31 RX ADMIN — CLOPIDOGREL BISULFATE 75 MG: 75 TABLET ORAL at 09:06

## 2024-10-31 RX ADMIN — ALLOPURINOL 100 MG: 100 TABLET ORAL at 11:40

## 2024-10-31 RX ADMIN — INSULIN LISPRO 2 UNITS: 100 INJECTION, SOLUTION INTRAVENOUS; SUBCUTANEOUS at 09:12

## 2024-10-31 RX ADMIN — DOFETILIDE 500 MCG: 0.5 CAPSULE ORAL at 10:51

## 2024-10-31 RX ADMIN — MAGNESIUM OXIDE TAB 400 MG (241.3 MG ELEMENTAL MG) 400 MG: 400 (241.3 MG) TAB at 20:43

## 2024-10-31 RX ADMIN — COLCHICINE 0.6 MG: 0.6 TABLET ORAL at 20:43

## 2024-10-31 RX ADMIN — ACETAMINOPHEN 650 MG: 325 TABLET ORAL at 20:43

## 2024-10-31 RX ADMIN — INSULIN LISPRO 6 UNITS: 100 INJECTION, SOLUTION INTRAVENOUS; SUBCUTANEOUS at 11:40

## 2024-10-31 RX ADMIN — DICLOFENAC SODIUM 2 G: 10 GEL TOPICAL at 09:10

## 2024-10-31 RX ADMIN — INSULIN LISPRO 6 UNITS: 100 INJECTION, SOLUTION INTRAVENOUS; SUBCUTANEOUS at 09:09

## 2024-10-31 RX ADMIN — FUROSEMIDE 40 MG: 40 TABLET ORAL at 20:43

## 2024-10-31 RX ADMIN — ANASTROZOLE 1 MG: 1 TABLET ORAL at 09:07

## 2024-10-31 RX ADMIN — PAROXETINE HYDROCHLORIDE 10 MG: 10 TABLET, FILM COATED ORAL at 09:07

## 2024-10-31 RX ADMIN — ACETAMINOPHEN 650 MG: 325 TABLET ORAL at 14:24

## 2024-10-31 RX ADMIN — COLCHICINE 0.6 MG: 0.6 TABLET ORAL at 09:07

## 2024-10-31 RX ADMIN — INSULIN LISPRO 2 UNITS: 100 INJECTION, SOLUTION INTRAVENOUS; SUBCUTANEOUS at 17:28

## 2024-10-31 RX ADMIN — DICLOFENAC SODIUM 2 G: 10 GEL TOPICAL at 20:46

## 2024-10-31 NOTE — PROGRESS NOTES
[facility]  In Patient progress note   LOS: 3 days   Patient Care Team:  John Montelongo MD as PCP - General (Internal Medicine)  John Montelongo MD as Referring Physician (Internal Medicine)    Follow up for Cheif Complaint EP: Atrial Fibrillation    Subjective Denies Chest Pain Denies Dyspnea Eating OK. Feet hurt L>R as soon as colchicine wears off           Active Hospital Problems    Diagnosis  POA    **Persistent atrial fibrillation [I48.19]  Yes     Priority: High    Foot pain [M79.673]  Unknown    Diabetes mellitus [E11.9]  Yes    Hypertension [I10]  Yes               Tele: Atrial Fib with Controlled Rate    Vitals:  Temp:  [98.5 °F (36.9 °C)-99.4 °F (37.4 °C)] 99 °F (37.2 °C)  Heart Rate:  [] 101  Resp:  [18] 18  BP: (104-145)/(51-53) 130/51  Flow (L/min) (Oxygen Therapy):  [2] 2    Body mass index is 42.72 kg/m².    Intake/Output Summary (Last 24 hours) at 10/31/2024 0821  Last data filed at 10/31/2024 0200  Gross per 24 hour   Intake 220 ml   Output 1900 ml   Net -1680 ml       Physical Exam:      General: alert, no acute distress, acyanotic, well developed, well nourished   Chest: Clear Auscultation   CV: Rhythm: regularly irregular   Extremities: negative    Results Review:         Labs:      Results from last 7 days   Lab Units 10/30/24  1138   SODIUM mmol/L 141   POTASSIUM mmol/L 4.2   CHLORIDE mmol/L 101   CO2 mmol/L 28.0   BUN mg/dL 13   CREATININE mg/dL 0.70   GLUCOSE mg/dL 193*   CALCIUM mg/dL 10.4     Estimated Creatinine Clearance: 94.5 mL/min (by C-G formula based on SCr of 0.7 mg/dL).            Scheduled Meds:anastrozole, 1 mg, Oral, Daily  apixaban, 5 mg, Oral, Q12H  clopidogrel, 75 mg, Oral, Daily  colchicine, 0.6 mg, Oral, Q12H  Diclofenac Sodium, 2 g, Topical, 4x Daily  dofetilide, 500 mcg, Oral, Q12H  furosemide, 40 mg, Oral, BID  insulin glargine, 25 Units, Subcutaneous, Daily  insulin lispro, 2-9 Units, Subcutaneous, 4x Daily AC & at  Bedtime  Insulin Lispro, 6 Units, Subcutaneous, TID With Meals  isosorbide mononitrate, 30 mg, Oral, Daily  Lidocaine, 1 patch, Transdermal, Q24H  losartan, 50 mg, Oral, Daily  magnesium oxide, 400 mg, Oral, Nightly  metFORMIN ER, 500 mg, Oral, Daily With Breakfast  metoprolol tartrate, 25 mg, Oral, Daily  montelukast, 10 mg, Oral, Nightly  PARoxetine, 10 mg, Oral, QAM  potassium chloride, 20 mEq, Oral, QAM  potassium chloride, 10 mEq, Oral, Nightly          Assessment: Plan:    Atrial fibrillation  Back in Afib today  QTc stable  Ongoing evaluation by hospitalist for bilateral foot pain          Electronically signed by RICKEY Vences, 10/31/24, 9:39 AM EDT.

## 2024-10-31 NOTE — PROGRESS NOTES
[facility] Medicine Services  PROGRESS NOTE    Patient Name: Mandi Jacob  : 1950  MRN: [id number]    Date of Admission: 10/28/2024  Primary Care Physician: John Montelongo MD    Subjective   Subjective     CC:  Follow-up A-fib    HPI:  Still with left foot pain, improved some compared to yesterday.  Blood sugars have remained high.  Denied palpitations.  No shortness of breath.  Had 2 bowel movements.      Objective   Objective     Vital Signs:   Temp:  [98.5 °F (36.9 °C)-99.4 °F (37.4 °C)] 99 °F (37.2 °C)  Heart Rate:  [] 101  Resp:  [18] 18  BP: (104-145)/(51-59) 130/51  Flow (L/min) (Oxygen Therapy):  [2] 2     Physical Exam:  Constitutional: No acute distress, awake, alert  HENT: NCAT, mucous membranes moist  Respiratory: Clear to auscultation bilaterally, respiratory effort normal   Cardiovascular: IRIR,   Gastrointestinal: Positive bowel sounds, soft, nontender, nondistended  Musculoskeletal: Left lower extremity asymmetric edema; left foot mildly warmer to palpation than the right, left great toe with tophi  Psychiatric: Appropriate affect, cooperative  Neurologic: Alert, oriented, moves all extremity, speech clear  Skin: No rashes on exposed skin    Results Reviewed:  LAB RESULTS:          Lab 10/30/24  1139 10/30/24  1138 10/29/24  2206 10/29/24  1335 10/29/24  0623 10/28/24  2039   SODIUM  --  141  --   --  140 142   POTASSIUM  --  4.2 3.9 3.7 3.9 3.4*   CHLORIDE  --  101  --   --  100 98   CO2  --  28.0  --   --  30.0* 31.0*   ANION GAP  --  12.0  --   --  10.0 13.0   BUN  --  13  --   --  17 22   CREATININE  --  0.70  --   --  0.68 0.86   EGFR  --  91.5  --   --  92.1 71.4   GLUCOSE  --  193*  --   --  89 129*   CALCIUM  --  10.4  --   --  10.4 10.7*   MAGNESIUM  --  2.0  --   --  2.7* 1.8   HEMOGLOBIN A1C 6.50*  --   --   --   --   --                          Brief Urine Lab Results       None            Microbiology Results Abnormal        None            Duplex Venous Lower Extremity - Left CAR    Result Date: 10/30/2024    Normal left lower extremity venous duplex scan.     XR Foot 3+ View Left    Result Date: 10/30/2024  XR FOOT 3+ VW LEFT Date of Exam: 10/30/2024 1:09 PM EDT Indication: left foot pain Comparison: None available. Findings: No traumatic malalignment on nonweightbearing exam. Plantar calcaneal spurring. Moderate degenerative changes of the midfoot. Mild to moderate degenerative changes of the forefoot. Erosive changes seen at the third middle and distal phalanges. Chronic appearing erosive changes of the first proximal phalanx. Diffuse bone demineralization. Erosive change seen along the base of the fifth metatarsal as well.     Impression: Impression: Erosive changes seen along the first and third toes as well as the base of the fifth metatarsal. Given the lack of soft tissue wounds, this is likely from erosive or crystalline arthropathy. Infection is considered less likely but also in the differential due to history of diabetes. Degenerative changes predominate in the midfoot. Electronically Signed: Jens Pandya MD  10/30/2024 1:57 PM EDT  Workstation ID: [id number]     Results for orders placed during the hospital encounter of 10/12/23    Adult Transthoracic Echo Limited W/ Cont if Necessary Per Protocol    Interpretation Summary    Left ventricular ejection fraction appears to be 51 - 55%.    Left ventricular wall thickness is consistent with mild concentric hypertrophy.    The left atrial cavity is moderately dilated.    Moderately to severely technically limited study.  Gross generalizations can be rendered.    1.  LV size is normal and global LV systolic function is not significantly depressed.  Visually estimated ejection fraction is 50±5%.  Mild concentric left ventricular hypertrophy.  Moderate left atrial enlargement.  The right ventricle appears mildly dilated but RV systolic function is grossly preserved.  The right  atrium is at the upper limits of normal.    2.  Valves are morphologically normal.    3.  No pericardial or great vessel pathology.      Current medications:  Scheduled Meds:anastrozole, 1 mg, Oral, Daily  apixaban, 5 mg, Oral, Q12H  clopidogrel, 75 mg, Oral, Daily  colchicine, 0.6 mg, Oral, Q12H  Diclofenac Sodium, 2 g, Topical, 4x Daily  dofetilide, 500 mcg, Oral, Q12H  furosemide, 40 mg, Oral, BID  insulin glargine, 20 Units, Subcutaneous, Daily  insulin lispro, 2-9 Units, Subcutaneous, 4x Daily AC & at Bedtime  Insulin Lispro, 3 Units, Subcutaneous, TID With Meals  isosorbide mononitrate, 30 mg, Oral, Daily  Lidocaine, 1 patch, Transdermal, Q24H  losartan, 50 mg, Oral, Daily  magnesium oxide, 400 mg, Oral, Nightly  metFORMIN ER, 500 mg, Oral, Daily With Breakfast  metoprolol tartrate, 25 mg, Oral, Daily  montelukast, 10 mg, Oral, Nightly  PARoxetine, 10 mg, Oral, QAM  potassium chloride, 20 mEq, Oral, QAM  potassium chloride, 10 mEq, Oral, Nightly      Continuous Infusions:O2, 2 L/min      PRN Meds:.  acetaminophen    albuterol    Calcium Replacement - Follow Nurse / BPA Driven Protocol    dextrose    dextrose    glucagon (human recombinant)    Magnesium Cardiology Dose Replacement - Follow Nurse / BPA Driven Protocol    nitroglycerin    Pharmacy Consult    Phosphorus Replacement - Follow Nurse / BPA Driven Protocol    Potassium Replacement - Follow Nurse / BPA Driven Protocol    Assessment & Plan   Assessment & Plan     Active Hospital Problems    Diagnosis  POA    **Persistent atrial fibrillation [I48.19]  Yes    Foot pain [M79.673]  Unknown    Diabetes mellitus [E11.9]  Yes    Hypertension [I10]  Yes      Resolved Hospital Problems   No resolved problems to display.        Brief Hospital Course to date:  Mandi Jacob is a 74 y.o. female     This patient's problems and plans were partially entered by my partner and updated as appropriate by me 10/31/24.    Persistent A Fib  -- tikosyn induction per  EP  -- eliquis, metoprolol     DM2  -- A1C 6.5%  -- Blood glucoses reviewed, insulin regimen adjusted on 10/31 Lantus 25 units daily, lispro 6 untis 3 times daily with meals, continue sliding scale     HFpEF  LE edema  -- ef 51-55%  -- continue home lasix     Left foot pain  Left great toe tophaceous gout  --Left foot x-ray with erosive changes likely from erosive or crystalline arthropathy, suspect gout  --Colchicine started on 10/30  --Okay to start allopurinol during acute flare given patient on colchicine, allopurinol 100 mg once daily started  --Recommended against hydrochlorothiazide in the future, discussed w primary team    Asymmetric left lower extremity edema  --Duplex negative for DVT     HTN  HLD  CAD  -- imdur, losartan, plavix     H/O of breast cancer  -- arimidex     COPD   -- not in exacerbation, continue home O2 @ 2LNC    Expected Discharge Location and Transportation: Home  Expected Discharge   Expected Discharge Date: 10/30/2024; Expected Discharge Time:      VTE Prophylaxis:  Pharmacologic VTE prophylaxis orders are present.         AM-PAC 6 Clicks Score (PT): 14 (10/30/24 2000)    CODE STATUS:   Code Status and Medical Interventions: CPR (Attempt to Resuscitate); Full Support   Ordered at: 10/29/24 1239     Level Of Support Discussed With:    Patient     Code Status (Patient has no pulse and is not breathing):    CPR (Attempt to Resuscitate)     Medical Interventions (Patient has pulse or is breathing):    Full Support       Kae Grider MD  10/31/24

## 2024-11-01 LAB
ANION GAP SERPL CALCULATED.3IONS-SCNC: 12 MMOL/L (ref 5–15)
BUN SERPL-MCNC: 12 MG/DL (ref 8–23)
BUN/CREAT SERPL: 18.8 (ref 7–25)
CALCIUM SPEC-SCNC: 10.2 MG/DL (ref 8.6–10.5)
CHLORIDE SERPL-SCNC: 97 MMOL/L (ref 98–107)
CO2 SERPL-SCNC: 29 MMOL/L (ref 22–29)
CREAT SERPL-MCNC: 0.64 MG/DL (ref 0.57–1)
EGFRCR SERPLBLD CKD-EPI 2021: 93.4 ML/MIN/1.73
GLUCOSE BLDC GLUCOMTR-MCNC: 159 MG/DL (ref 70–130)
GLUCOSE BLDC GLUCOMTR-MCNC: 195 MG/DL (ref 70–130)
GLUCOSE BLDC GLUCOMTR-MCNC: 205 MG/DL (ref 70–130)
GLUCOSE BLDC GLUCOMTR-MCNC: 270 MG/DL (ref 70–130)
GLUCOSE SERPL-MCNC: 163 MG/DL (ref 65–99)
MAGNESIUM SERPL-MCNC: 2.1 MG/DL (ref 1.6–2.4)
POTASSIUM SERPL-SCNC: 4.1 MMOL/L (ref 3.5–5.2)
QT INTERVAL: 360 MS
QT INTERVAL: 380 MS
QT INTERVAL: 422 MS
QTC INTERVAL: 459 MS
QTC INTERVAL: 464 MS
QTC INTERVAL: 471 MS
SODIUM SERPL-SCNC: 138 MMOL/L (ref 136–145)

## 2024-11-01 PROCEDURE — 63710000001 INSULIN LISPRO (HUMAN) PER 5 UNITS: Performed by: STUDENT IN AN ORGANIZED HEALTH CARE EDUCATION/TRAINING PROGRAM

## 2024-11-01 PROCEDURE — 93010 ELECTROCARDIOGRAM REPORT: CPT | Performed by: STUDENT IN AN ORGANIZED HEALTH CARE EDUCATION/TRAINING PROGRAM

## 2024-11-01 PROCEDURE — 99232 SBSQ HOSP IP/OBS MODERATE 35: CPT

## 2024-11-01 PROCEDURE — 82948 REAGENT STRIP/BLOOD GLUCOSE: CPT

## 2024-11-01 PROCEDURE — 80048 BASIC METABOLIC PNL TOTAL CA: CPT | Performed by: STUDENT IN AN ORGANIZED HEALTH CARE EDUCATION/TRAINING PROGRAM

## 2024-11-01 PROCEDURE — 93005 ELECTROCARDIOGRAM TRACING: CPT | Performed by: INTERNAL MEDICINE

## 2024-11-01 PROCEDURE — 83735 ASSAY OF MAGNESIUM: CPT | Performed by: STUDENT IN AN ORGANIZED HEALTH CARE EDUCATION/TRAINING PROGRAM

## 2024-11-01 PROCEDURE — 63710000001 INSULIN LISPRO (HUMAN) PER 5 UNITS: Performed by: PHYSICIAN ASSISTANT

## 2024-11-01 PROCEDURE — 63710000001 INSULIN GLARGINE PER 5 UNITS: Performed by: STUDENT IN AN ORGANIZED HEALTH CARE EDUCATION/TRAINING PROGRAM

## 2024-11-01 PROCEDURE — 93005 ELECTROCARDIOGRAM TRACING: CPT

## 2024-11-01 PROCEDURE — 93005 ELECTROCARDIOGRAM TRACING: CPT | Performed by: STUDENT IN AN ORGANIZED HEALTH CARE EDUCATION/TRAINING PROGRAM

## 2024-11-01 PROCEDURE — 97165 OT EVAL LOW COMPLEX 30 MIN: CPT

## 2024-11-01 RX ORDER — ALLOPURINOL 100 MG/1
100 TABLET ORAL DAILY
Qty: 60 TABLET | Refills: 2 | Status: SHIPPED | OUTPATIENT
Start: 2024-11-02

## 2024-11-01 RX ORDER — METOPROLOL TARTRATE 25 MG/1
25 TABLET, FILM COATED ORAL ONCE
Status: COMPLETED | OUTPATIENT
Start: 2024-11-01 | End: 2024-11-01

## 2024-11-01 RX ORDER — METOPROLOL TARTRATE 25 MG/1
25 TABLET, FILM COATED ORAL EVERY 12 HOURS SCHEDULED
Status: DISCONTINUED | OUTPATIENT
Start: 2024-11-01 | End: 2024-11-02 | Stop reason: HOSPADM

## 2024-11-01 RX ORDER — COLCHICINE 0.6 MG/1
0.6 TABLET ORAL DAILY
Qty: 30 TABLET | Refills: 0 | Status: SHIPPED | OUTPATIENT
Start: 2024-11-01

## 2024-11-01 RX ORDER — METOPROLOL TARTRATE 50 MG
50 TABLET ORAL DAILY
Status: DISCONTINUED | OUTPATIENT
Start: 2024-11-02 | End: 2024-11-01

## 2024-11-01 RX ADMIN — LOSARTAN POTASSIUM 50 MG: 50 TABLET, FILM COATED ORAL at 09:03

## 2024-11-01 RX ADMIN — INSULIN GLARGINE 25 UNITS: 100 INJECTION, SOLUTION SUBCUTANEOUS at 09:01

## 2024-11-01 RX ADMIN — CLOPIDOGREL BISULFATE 75 MG: 75 TABLET ORAL at 09:03

## 2024-11-01 RX ADMIN — APIXABAN 5 MG: 5 TABLET, FILM COATED ORAL at 09:04

## 2024-11-01 RX ADMIN — MONTELUKAST 10 MG: 10 TABLET, FILM COATED ORAL at 21:50

## 2024-11-01 RX ADMIN — DOFETILIDE 500 MCG: 0.5 CAPSULE ORAL at 09:54

## 2024-11-01 RX ADMIN — DICLOFENAC SODIUM 2 G: 10 GEL TOPICAL at 21:50

## 2024-11-01 RX ADMIN — ISOSORBIDE MONONITRATE 30 MG: 30 TABLET, EXTENDED RELEASE ORAL at 09:03

## 2024-11-01 RX ADMIN — DICLOFENAC SODIUM 2 G: 10 GEL TOPICAL at 17:31

## 2024-11-01 RX ADMIN — METOPROLOL TARTRATE 25 MG: 25 TABLET, FILM COATED ORAL at 21:50

## 2024-11-01 RX ADMIN — METFORMIN HYDROCHLORIDE 500 MG: 500 TABLET, EXTENDED RELEASE ORAL at 09:04

## 2024-11-01 RX ADMIN — INSULIN LISPRO 2 UNITS: 100 INJECTION, SOLUTION INTRAVENOUS; SUBCUTANEOUS at 21:59

## 2024-11-01 RX ADMIN — METOPROLOL TARTRATE 25 MG: 25 TABLET, FILM COATED ORAL at 09:03

## 2024-11-01 RX ADMIN — POTASSIUM CHLORIDE 20 MEQ: 1500 TABLET, EXTENDED RELEASE ORAL at 06:31

## 2024-11-01 RX ADMIN — COLCHICINE 0.6 MG: 0.6 TABLET ORAL at 09:03

## 2024-11-01 RX ADMIN — FUROSEMIDE 40 MG: 40 TABLET ORAL at 09:03

## 2024-11-01 RX ADMIN — MAGNESIUM OXIDE TAB 400 MG (241.3 MG ELEMENTAL MG) 400 MG: 400 (241.3 MG) TAB at 21:50

## 2024-11-01 RX ADMIN — DICLOFENAC SODIUM 2 G: 10 GEL TOPICAL at 12:18

## 2024-11-01 RX ADMIN — ALLOPURINOL 100 MG: 100 TABLET ORAL at 09:04

## 2024-11-01 RX ADMIN — INSULIN LISPRO 6 UNITS: 100 INJECTION, SOLUTION INTRAVENOUS; SUBCUTANEOUS at 17:31

## 2024-11-01 RX ADMIN — INSULIN LISPRO 6 UNITS: 100 INJECTION, SOLUTION INTRAVENOUS; SUBCUTANEOUS at 12:19

## 2024-11-01 RX ADMIN — FUROSEMIDE 40 MG: 40 TABLET ORAL at 21:49

## 2024-11-01 RX ADMIN — PAROXETINE HYDROCHLORIDE 10 MG: 10 TABLET, FILM COATED ORAL at 06:31

## 2024-11-01 RX ADMIN — ANASTROZOLE 1 MG: 1 TABLET ORAL at 09:04

## 2024-11-01 RX ADMIN — INSULIN LISPRO 2 UNITS: 100 INJECTION, SOLUTION INTRAVENOUS; SUBCUTANEOUS at 17:30

## 2024-11-01 RX ADMIN — INSULIN LISPRO 6 UNITS: 100 INJECTION, SOLUTION INTRAVENOUS; SUBCUTANEOUS at 12:18

## 2024-11-01 RX ADMIN — ACETAMINOPHEN 650 MG: 325 TABLET ORAL at 06:34

## 2024-11-01 RX ADMIN — ACETAMINOPHEN 650 MG: 325 TABLET ORAL at 18:30

## 2024-11-01 RX ADMIN — APIXABAN 5 MG: 5 TABLET, FILM COATED ORAL at 21:49

## 2024-11-01 RX ADMIN — DOFETILIDE 500 MCG: 0.5 CAPSULE ORAL at 21:49

## 2024-11-01 RX ADMIN — POTASSIUM CHLORIDE 10 MEQ: 750 CAPSULE, EXTENDED RELEASE ORAL at 21:49

## 2024-11-01 RX ADMIN — METOPROLOL TARTRATE 25 MG: 25 TABLET, FILM COATED ORAL at 12:18

## 2024-11-01 RX ADMIN — ACETAMINOPHEN 650 MG: 325 TABLET ORAL at 12:18

## 2024-11-01 RX ADMIN — DICLOFENAC SODIUM 2 G: 10 GEL TOPICAL at 09:05

## 2024-11-01 RX ADMIN — COLCHICINE 0.6 MG: 0.6 TABLET ORAL at 21:49

## 2024-11-01 NOTE — CASE MANAGEMENT/SOCIAL WORK
Continued Stay Note  [facility]     Patient Name: Mandi Jacob  MRN: [id number]  Today's Date: 11/1/2024    Admit Date: 10/28/2024    Plan: SNF   Discharge Plan       Row Name 11/01/24 1209       Plan    Plan SNF    Patient/Family in Agreement with Plan yes    Plan Comments Spoke with patient in room.  Patient now agreeable to inpatient rehab.  Referral called to Dodie at [facility].  CM will continue to follow.    Final Discharge Disposition Code 03 - skilled nursing facility (SNF)                   Discharge Codes    No documentation.                 Expected Discharge Date and Time       Expected Discharge Date Expected Discharge Time    Nov 1, 2024               Maria A Ferrera RN

## 2024-11-01 NOTE — PROGRESS NOTES
"    [facility] Medicine Services  CLINICAL REVIEW NOTE    Patient Name: Mandi Jacob  : 1950  MRN: [id number]    Date of Admission: 10/28/2024  Length of Stay: 4  Attending Service:  Cards    Patient doing better. Gout symptoms are improving. Cardiology considering d/c home pending PT/OT eval. I have addressed home COPD, diabetes, gout medications in med rec. Suitable for discharge from hospital medicine standpoint.         Patient's labs, charting, and events reviewed.  No active medical management issues to address today, the Hospitalist team will continue to follow daily, be available for any needs, and see or bill for services as needed.      if \"pending\" use the bhesig process     "

## 2024-11-01 NOTE — PLAN OF CARE
Problem: Adult Inpatient Plan of Care  Goal: Plan of Care Review  Outcome: Progressing  Goal: Patient-Specific Goal (Individualized)  Outcome: Progressing  Goal: Absence of Hospital-Acquired Illness or Injury  Outcome: Progressing  Intervention: Identify and Manage Fall Risk  Recent Flowsheet Documentation  Taken 11/1/2024 0507 by Ayanna Perez RN  Safety Promotion/Fall Prevention:   activity supervised   assistive device/personal items within reach   clutter free environment maintained   fall prevention program maintained   lighting adjusted   nonskid shoes/slippers when out of bed   room organization consistent   safety round/check completed  Taken 11/1/2024 0200 by Ayanna Perez RN  Safety Promotion/Fall Prevention:   activity supervised   assistive device/personal items within reach   clutter free environment maintained   fall prevention program maintained   lighting adjusted   nonskid shoes/slippers when out of bed   room organization consistent   safety round/check completed  Taken 11/1/2024 0000 by Ayanna Perez RN  Safety Promotion/Fall Prevention:   activity supervised   assistive device/personal items within reach   clutter free environment maintained   fall prevention program maintained   lighting adjusted   nonskid shoes/slippers when out of bed   room organization consistent   safety round/check completed  Taken 10/31/2024 2200 by Ayanna Perez RN  Safety Promotion/Fall Prevention:   activity supervised   assistive device/personal items within reach   clutter free environment maintained   fall prevention program maintained   lighting adjusted   nonskid shoes/slippers when out of bed   room organization consistent   safety round/check completed  Taken 10/31/2024 2000 by Ayanna Perez RN  Safety Promotion/Fall Prevention:   activity supervised   assistive device/personal items within reach   clutter free environment maintained   fall prevention program  maintained   lighting adjusted   nonskid shoes/slippers when out of bed   room organization consistent   safety round/check completed  Intervention: Prevent Skin Injury  Recent Flowsheet Documentation  Taken 11/1/2024 0507 by Ayanna Perez RN  Body Position: position changed independently  Skin Protection:   incontinence pads utilized   skin sealant/moisture barrier applied   transparent dressing maintained  Taken 11/1/2024 0200 by Ayanna Perez RN  Body Position: position changed independently  Skin Protection:   incontinence pads utilized   skin sealant/moisture barrier applied   transparent dressing maintained  Taken 11/1/2024 0000 by Ayanna Perez RN  Body Position: position changed independently  Skin Protection:   incontinence pads utilized   skin sealant/moisture barrier applied   transparent dressing maintained  Taken 10/31/2024 2200 by Ayanna Perez RN  Body Position: position changed independently  Skin Protection:   incontinence pads utilized   transparent dressing maintained  Taken 10/31/2024 2000 by Ayanna Perez RN  Body Position: position changed independently  Skin Protection:   incontinence pads utilized   skin sealant/moisture barrier applied   transparent dressing maintained  Intervention: Prevent and Manage VTE (Venous Thromboembolism) Risk  Recent Flowsheet Documentation  Taken 10/31/2024 2000 by Ayanna Perez RN  VTE Prevention/Management:   bilateral   SCDs (sequential compression devices) off  Intervention: Prevent Infection  Recent Flowsheet Documentation  Taken 11/1/2024 0507 by Ayanna Perez RN  Infection Prevention:   environmental surveillance performed   equipment surfaces disinfected   hand hygiene promoted   single patient room provided  Taken 11/1/2024 0200 by Ayanna Perez RN  Infection Prevention:   environmental surveillance performed   equipment surfaces disinfected   hand hygiene promoted   single patient room  provided  Taken 11/1/2024 0000 by Ayanna Perez RN  Infection Prevention:   environmental surveillance performed   hand hygiene promoted   single patient room provided  Taken 10/31/2024 2200 by Ayanna Perez RN  Infection Prevention:   environmental surveillance performed   equipment surfaces disinfected   hand hygiene promoted   single patient room provided  Taken 10/31/2024 2000 by Ayanna Perez RN  Infection Prevention:   environmental surveillance performed   equipment surfaces disinfected   hand hygiene promoted   single patient room provided  Goal: Optimal Comfort and Wellbeing  Outcome: Progressing  Intervention: Provide Person-Centered Care  Recent Flowsheet Documentation  Taken 10/31/2024 2000 by Ayanna Perez RN  Trust Relationship/Rapport:   care explained   questions encouraged   questions answered  Goal: Readiness for Transition of Care  Outcome: Progressing     Problem: Comorbidity Management  Goal: Maintenance of COPD Symptom Control  Outcome: Progressing  Intervention: Maintain COPD (Chronic Obstructive Pulmonary Disease) Symptom Control  Recent Flowsheet Documentation  Taken 11/1/2024 0507 by Ayanna Perez RN  Medication Review/Management: medications reviewed  Taken 11/1/2024 0200 by Ayanna Perez RN  Medication Review/Management: medications reviewed  Taken 11/1/2024 0000 by Ayanna Perez RN  Medication Review/Management: medications reviewed  Taken 10/31/2024 2200 by Ayanna Perez RN  Medication Review/Management: medications reviewed  Taken 10/31/2024 2000 by Ayanna Perez RN  Medication Review/Management: medications reviewed  Goal: Blood Glucose Level Within Target Range  Outcome: Progressing  Intervention: Monitor and Manage Glycemia  Recent Flowsheet Documentation  Taken 11/1/2024 0507 by Ayanna Perez RN  Medication Review/Management: medications reviewed  Taken 11/1/2024 0200 by Ayanna Perez  RN  Medication Review/Management: medications reviewed  Taken 11/1/2024 0000 by Ayanna Perez RN  Medication Review/Management: medications reviewed  Taken 10/31/2024 2200 by Ayanna Perez RN  Medication Review/Management: medications reviewed  Taken 10/31/2024 2000 by Ayanna Perez RN  Medication Review/Management: medications reviewed  Goal: Maintenance of Heart Failure Symptom Control  Outcome: Progressing  Intervention: Maintain Heart Failure Management  Recent Flowsheet Documentation  Taken 11/1/2024 0507 by Ayanna Perez RN  Medication Review/Management: medications reviewed  Taken 11/1/2024 0200 by Ayanna Perez RN  Medication Review/Management: medications reviewed  Taken 11/1/2024 0000 by Ayanna Perez RN  Medication Review/Management: medications reviewed  Taken 10/31/2024 2200 by Ayanna Perez RN  Medication Review/Management: medications reviewed  Taken 10/31/2024 2000 by Ayanna Perez RN  Medication Review/Management: medications reviewed  Goal: Blood Pressure in Desired Range  Outcome: Progressing  Intervention: Maintain Blood Pressure Management  Recent Flowsheet Documentation  Taken 11/1/2024 0507 by Ayanna Perez RN  Medication Review/Management: medications reviewed  Taken 11/1/2024 0200 by Ayanna Perez RN  Medication Review/Management: medications reviewed  Taken 11/1/2024 0000 by Ayanna Perez RN  Medication Review/Management: medications reviewed  Taken 10/31/2024 2200 by Ayanna Perez RN  Medication Review/Management: medications reviewed  Taken 10/31/2024 2000 by Ayanna Perez RN  Medication Review/Management: medications reviewed     Problem: Dysrhythmia  Goal: Normalized Cardiac Rhythm  Outcome: Progressing  Intervention: Monitor and Manage Cardiac Rhythm Effect  Recent Flowsheet Documentation  Taken 10/31/2024 2000 by Ayanna Perez RN  VTE Prevention/Management:   bilateral    SCDs (sequential compression devices) off     Problem: Fall Injury Risk  Goal: Absence of Fall and Fall-Related Injury  Outcome: Progressing  Intervention: Identify and Manage Contributors  Recent Flowsheet Documentation  Taken 11/1/2024 0507 by Ayanna Preez RN  Medication Review/Management: medications reviewed  Self-Care Promotion: independence encouraged  Taken 11/1/2024 0200 by Ayanna Perez RN  Medication Review/Management: medications reviewed  Self-Care Promotion:   independence encouraged   BADL personal objects within reach  Taken 11/1/2024 0000 by Ayanna Perez RN  Medication Review/Management: medications reviewed  Self-Care Promotion:   independence encouraged   BADL personal objects within reach  Taken 10/31/2024 2200 by Ayanna Perez RN  Medication Review/Management: medications reviewed  Self-Care Promotion: independence encouraged  Taken 10/31/2024 2000 by Ayanna Perez RN  Medication Review/Management: medications reviewed  Self-Care Promotion:   independence encouraged   BADL personal objects within reach  Intervention: Promote Injury-Free Environment  Recent Flowsheet Documentation  Taken 11/1/2024 0507 by Ayanna Perez RN  Safety Promotion/Fall Prevention:   activity supervised   assistive device/personal items within reach   clutter free environment maintained   fall prevention program maintained   lighting adjusted   nonskid shoes/slippers when out of bed   room organization consistent   safety round/check completed  Taken 11/1/2024 0200 by Ayanna Perez RN  Safety Promotion/Fall Prevention:   activity supervised   assistive device/personal items within reach   clutter free environment maintained   fall prevention program maintained   lighting adjusted   nonskid shoes/slippers when out of bed   room organization consistent   safety round/check completed  Taken 11/1/2024 0000 by Ayanna Perez RN  Safety Promotion/Fall Prevention:    activity supervised   assistive device/personal items within reach   clutter free environment maintained   fall prevention program maintained   lighting adjusted   nonskid shoes/slippers when out of bed   room organization consistent   safety round/check completed  Taken 10/31/2024 2200 by Ayanna Perez, RN  Safety Promotion/Fall Prevention:   activity supervised   assistive device/personal items within reach   clutter free environment maintained   fall prevention program maintained   lighting adjusted   nonskid shoes/slippers when out of bed   room organization consistent   safety round/check completed  Taken 10/31/2024 2000 by Ayanna Perez, RN  Safety Promotion/Fall Prevention:   activity supervised   assistive device/personal items within reach   clutter free environment maintained   fall prevention program maintained   lighting adjusted   nonskid shoes/slippers when out of bed   room organization consistent   safety round/check completed   Goal Outcome Evaluation:

## 2024-11-01 NOTE — THERAPY EVALUATION
Patient Name: Mandi Jacob  : 1950    MRN: [id number]                              Today's Date: 2024       Admit Date: 10/28/2024    Visit Dx:     ICD-10-CM ICD-9-CM   1. Persistent atrial fibrillation  I48.19 427.31     Patient Active Problem List   Diagnosis    Coronary arteriosclerosis in native artery    Chronic obstructive pulmonary disease    Congestive heart failure    Diabetes mellitus    Hyperlipidemia    Hypertension    TIA (transient ischemic attack)    PAC (premature atrial contraction)    Carotid stenosis, asymptomatic, right    Persistent atrial fibrillation    Foot pain    Breast cancer     Past Medical History:   Diagnosis Date    Arrhythmia     Asthma     Atrial fibrillation     Breast cancer 2023    Right breast    CAD (coronary artery disease)     non-obstructive by cath 2012    CHF (congestive heart failure)     COPD (chronic obstructive pulmonary disease)     Diabetes mellitus     Diastolic dysfunction     Dizziness     Edema     Esophageal spasm     Herniated lumbar intervertebral disc     L5    Hyperlipidemia     intolerant to statins     Hypertension     Osteoarthritis     Palpitations     SOB (shortness of breath)     Stroke     Stroke-like symptoms     TIA (transient ischemic attack)     left hemispheric TIA/CVA    Unstable angina      Past Surgical History:   Procedure Laterality Date    CARDIAC CATHETERIZATION      CARDIAC CATHETERIZATION  2023    dr. mcclure with 1 stent    CAROTID STENT      CATARACT EXTRACTION      CATARACT EXTRACTION      CEREBRAL ANGIOGRAM      CORONARY STENT PLACEMENT  2023    EYE SURGERY Bilateral     INTERVENTIONAL RADIOLOGY PROCEDURE Bilateral 2019    Procedure: Carotid Cerebral Angiogram;  Surgeon: Alex Erickson MD;  Location: [facility] INVASIVE LOCATION;  Service: Interventional Radiology    UT TCAT IV STENT CRV CRTD ART EMBOLIC PROTECJ N/A 2019    Right Carotid Stent     TUBAL ABDOMINAL  LIGATION      WRIST SURGERY        General Information       Row Name 11/01/24 1504          OT Time and Intention    Document Type evaluation  -MR     Mode of Treatment occupational therapy  -MR       Row Name 11/01/24 1504          General Information    Patient Profile Reviewed yes  -MR     Prior Level of Function independent:;all household mobility;community mobility;gait;bed mobility;ADL's;driving  Pt uses rollator in community and cane inside. Pt on 2L of O2 via NC at baseline. Endorses 2 recent falls in the last 2 months. Dtr does grocery shopping. Limited community distances due to fatigue and decreased endurance.  -MR     Existing Precautions/Restrictions fall;other (see comments)  Painful feet R>L  -MR     Barriers to Rehab medically complex;previous functional deficit  -MR       Row Name 11/01/24 1504          Living Environment    People in Home alone  -MR       Row Name 11/01/24 1504          Home Main Entrance    Number of Stairs, Main Entrance one  -MR       Row Name 11/01/24 1504          Stairs Within Home, Primary    Number of Stairs, Within Home, Primary none  -MR       Row Name 11/01/24 1504          Cognition    Orientation Status (Cognition) oriented x 4  -MR       Row Name 11/01/24 1504          Safety Issues/Impairments Affecting Functional Mobility    Safety Issues Affecting Function (Mobility) safety precaution awareness;safety precautions follow-through/compliance;insight into deficits/self-awareness;positioning of assistive device;ability to follow commands;awareness of need for assistance;sequencing abilities  -MR     Impairments Affecting Function (Mobility) balance;endurance/activity tolerance;pain;range of motion (ROM);strength  -MR               User Key  (r) = Recorded By, (t) = Taken By, (c) = Cosigned By      Initials Name Provider Type    MR Rani Boone, YASMEEN Occupational Therapist                     Mobility/ADL's       Row Name 11/01/24 1507          Bed Mobility    Comment,  (Bed Mobility) UIC upon OT arrival and left UIC at end of session.  -MR       Row Name 11/01/24 1507          Transfers    Transfers sit-stand transfer;toilet transfer  -MR     Comment, (Transfers) v/c for hand placement and sequencing.  -MR       Row Name 11/01/24 1507          Sit-Stand Transfer    Sit-Stand [place] (Transfers) moderate assist (50% patient effort)  -MR     Assistive Device (Sit-Stand Transfers) walker, front-wheeled  -MR       Row Name 11/01/24 1507          Toilet Transfer    Type (Toilet Transfer) stand pivot/stand step  -MR     [place] Level (Toilet Transfer) moderate assist (50% patient effort);2 person assist;verbal cues;nonverbal cues (demo/gesture)  -MR     Assistive Device (Toilet Transfer) commode, bedside without drop arms  -MR       Row Name 11/01/24 1507          Activities of Daily Living    BADL Assessment/Intervention toileting;lower body dressing;grooming  -MR       Row Name 11/01/24 1507          Toileting Assessment/Training    [place] Level (Toileting) adjust/manage clothing;contact guard assist;perform perineal hygiene;dependent (less than 25% patient effort)  -MR     Assistive Devices (Toileting) commode, bedside without drop arms  -MR     Position (Toileting) supported standing;supported sitting  -MR       Row Name 11/01/24 1507          Lower Body Dressing Assessment/Training    [place] Level (Lower Body Dressing) don;doff;socks;dependent (less than 25% patient effort)  -MR     Position (Lower Body Dressing) supported sitting  -MR       Row Name 11/01/24 1507          Grooming Assessment/Training    [place] Level (Grooming) hair care, combing/brushing;set up  -MR     Position (Grooming) supported sitting  -MR               User Key  (r) = Recorded By, (t) = Taken By, (c) = Cosigned By      Initials Name Provider Type    Rani Webster, OT Occupational Therapist                   Obj/Interventions       Row Name 11/01/24 1510          Sensory  Assessment (Somatosensory)    Sensory Assessment (Somatosensory) UE sensation intact  -MR       Row Name 11/01/24 1510          Vision Assessment/Intervention    Visual Impairment/Limitations WFL  -MR       Row Name 11/01/24 1510          Range of Motion Comprehensive    General Range of Motion no range of motion deficits identified  -MR       Row Name 11/01/24 1510          Strength Comprehensive (MMT)    Comment, General Manual Muscle Testing (MMT) Assessment BUE grossly 4/5 in all functional planes  -MR       Row Name 11/01/24 1510          Balance    Balance Assessment sitting static balance;sitting dynamic balance;standing static balance;standing dynamic balance  -MR     Static Sitting Balance independent  -MR     Dynamic Sitting Balance independent  -MR     Position, Sitting Balance unsupported;sitting in chair  -MR     Static Standing Balance minimal assist  -MR     Dynamic Standing Balance moderate assist  -MR     Position/Device Used, Standing Balance walker, front-wheeled  -MR     Balance Interventions sitting;standing;sit to stand;supported;static;dynamic;occupation based/functional task  -MR     Comment, Balance Pt requiring Cristal to modA for balance during transfers.  -MR               User Key  (r) = Recorded By, (t) = Taken By, (c) = Cosigned By      Initials Name Provider Type    MR Rani Boone, OT Occupational Therapist                   Goals/Plan       Row Name 11/01/24 1524          Bed Mobility Goal 1 (OT)    Activity/Assistive Device (Bed Mobility Goal 1, OT) sit to supine;supine to sit  -MR     [place] Level/Cues Needed (Bed Mobility Goal 1, OT) minimum assist (75% or more patient effort)  -MR     Time Frame (Bed Mobility Goal 1, OT) short term goal (STG);3 days  -MR     Progress/Outcomes (Bed Mobility Goal 1, OT) new goal  -MR       Row Name 11/01/24 1524          Transfer Goal 1 (OT)    Activity/Assistive Device (Transfer Goal 1, OT) sit-to-stand/stand-to-sit;commode, bedside without  drop arms  -MR     [place] Level/Cues Needed (Transfer Goal 1, OT) contact guard required  -MR     Time Frame (Transfer Goal 1, OT) long term goal (LTG);10 days  -MR     Progress/Outcome (Transfer Goal 1, OT) new goal  -MR       Row Name 11/01/24 1524          Dressing Goal 1 (OT)    Activity/Device (Dressing Goal 1, OT) lower body dressing  -MR     [place]/Cues Needed (Dressing Goal 1, OT) minimum assist (75% or more patient effort)  -MR     Time Frame (Dressing Goal 1, OT) long term goal (LTG);10 days  -MR     Progress/Outcome (Dressing Goal 1, OT) new goal  -MR       Row Name 11/01/24 1524          Therapy Assessment/Plan (OT)    Planned Therapy Interventions (OT) activity tolerance training;adaptive equipment training;IADL retraining;functional balance retraining;transfer/mobility retraining;strengthening exercise;ROM/therapeutic exercise;patient/caregiver education/training;BADL retraining;occupation/activity based interventions  -MR               User Key  (r) = Recorded By, (t) = Taken By, (c) = Cosigned By      Initials Name Provider Type    Rani Webster, OT Occupational Therapist                   Clinical Impression       Row Name 11/01/24 1512          Pain Assessment    Pretreatment Pain Rating 10/10  -MR     Posttreatment Pain Rating 10/10  -MR     Pain Location foot  -MR     Pain Side/Orientation bilateral  -MR       Row Name 11/01/24 1512          Plan of Care Review    Plan of Care Reviewed With patient  -MR     Progress no change  Initial Eval  -MR     Outcome Evaluation Pt presenting below her functional baseline w/ transfers, mobility and balance limiting independence w/ ADL based tasks. Pt limited by B foot pain. Pt requiring assist for transfers, toileting and LB dressing. Pt's deficits warrants skilled OT services. Recommend inpatient rehab at d/c for best functional outcome.  -MR       Row Name 11/01/24 1512          Therapy Assessment/Plan (OT)    Patient/Family Therapy Goal  Statement (OT) Return to PLOF  -MR     Rehab Potential (OT) good  -MR     Criteria for Skilled Therapeutic Interventions Met (OT) yes;meets criteria;skilled treatment is necessary  -MR     Therapy Frequency (OT) daily  -MR     Predicted Duration of Therapy Intervention (OT) 5 days  -MR       Row Name 11/01/24 1512          Therapy Plan Review/Discharge Plan (OT)    Anticipated Discharge Disposition (OT) inpatient rehabilitation facility  -MR       Row Name 11/01/24 1512          Vital Signs    O2 Delivery Pre Treatment nasal cannula  -MR     O2 Delivery Intra Treatment nasal cannula  -MR     O2 Delivery Post Treatment nasal cannula  -MR     Pre Patient Position Sitting  -MR     Intra Patient Position Standing  -MR     Post Patient Position Sitting  -MR       Row Name 11/01/24 1512          Positioning and Restraints    Pre-Treatment Position sitting in chair/recliner  -MR     Post Treatment Position chair  -MR     In Chair notified nsg;reclined;sitting;call light within reach;encouraged to call for assist;exit alarm on;waffle cushion;legs elevated  -MR               User Key  (r) = Recorded By, (t) = Taken By, (c) = Cosigned By      Initials Name Provider Type    MR Rani Boone, OT Occupational Therapist                   Outcome Measures       Row Name 11/01/24 1525          How much help from another is currently needed...    Putting on and taking off regular lower body clothing? 1  -MR     Bathing (including washing, rinsing, and drying) 2  -MR     Toileting (which includes using toilet bed pan or urinal) 2  -MR     Putting on and taking off regular upper body clothing 3  -MR     Taking care of personal grooming (such as brushing teeth) 4  -MR     Eating meals 4  -MR     AM-PAC 6 Clicks Score (OT) 16  -MR       Row Name 11/01/24 1525          Functional Assessment    Outcome Measure Options AM-PAC 6 Clicks Daily Activity (OT)  -MR               User Key  (r) = Recorded By, (t) = Taken By, (c) = Cosigned By       Initials Name Provider Type     Paolo YASMEEN Saravia Occupational Therapist                    Occupational Therapy Education       Title: PT OT SLP Therapies (Done)       Topic: Occupational Therapy (Done)       Point: ADL training (Done)       Description:   Instruct learner(s) on proper safety adaptation and remediation techniques during self care or transfers.   Instruct in proper use of assistive devices.                  Learning Progress Summary            Patient Acceptance, E, VU by MR at 11/1/2024 1526                      Point: Precautions (Done)       Description:   Instruct learner(s) on prescribed precautions during self-care and functional transfers.                  Learning Progress Summary            Patient Acceptance, E, VU by MR at 11/1/2024 1526                      Point: Body mechanics (Done)       Description:   Instruct learner(s) on proper positioning and spine alignment during self-care, functional mobility activities and/or exercises.                  Learning Progress Summary            Patient Acceptance, E, VU by MR at 11/1/2024 1526                                      User Key       Initials Effective Dates Name Provider Type Discipline     09/22/22 -  Rani Boone OT Occupational Therapist OT                  OT Recommendation and Plan  Planned Therapy Interventions (OT): activity tolerance training, adaptive equipment training, IADL retraining, functional balance retraining, transfer/mobility retraining, strengthening exercise, ROM/therapeutic exercise, patient/caregiver education/training, BADL retraining, occupation/activity based interventions  Therapy Frequency (OT): daily  Plan of Care Review  Plan of Care Reviewed With: patient  Progress: no change (Initial Eval)  Outcome Evaluation: Pt presenting below her functional baseline w/ transfers, mobility and balance limiting independence w/ ADL based tasks. Pt limited by B foot pain. Pt requiring assist for transfers,  toileting and LB dressing. Pt's deficits warrants skilled OT services. Recommend inpatient rehab at d/c for best functional outcome.     Time Calculation:   Evaluation Complexity (OT)  Review Occupational Profile/Medical/Therapy History Complexity: brief/low complexity  Assessment, Occupational Performance/Identification of Deficit Complexity: 1-3 performance deficits  Clinical Decision Making Complexity (OT): problem focused assessment/low complexity  Overall Complexity of Evaluation (OT): low complexity     Time Calculation- OT       Row Name 11/01/24 1526             Time Calculation- OT    OT Start Time 1408  -MR      OT Received On 11/01/24  -MR      OT Goal Re-Cert Due Date 11/11/24  -MR         Untimed Charges    OT Eval/Re-eval Minutes 46  -MR         Total Minutes    Untimed Charges Total Minutes 46  -MR       Total Minutes 46  -MR                User Key  (r) = Recorded By, (t) = Taken By, (c) = Cosigned By      Initials Name Provider Type    Rani Webster OT Occupational Therapist                  Therapy Charges for Today       Code Description Service Date Service Provider Modifiers Qty    [id number]  OT EVAL LOW COMPLEXITY 4 11/1/2024 Rani Boone OT GO 1                 Rani Boone OT  11/1/2024

## 2024-11-01 NOTE — CASE MANAGEMENT/SOCIAL WORK
Case Management Discharge Note      Final Note: Patient's plan is a skilled bed at [facility] tomorrow, 11/2.  [facility] will transport by wheelchair.  They will pick patient up in her room at 1100.  Nurse to call report to [phone].  CM will fax discharge summary when available to [phone].         Selected Continued Care - Admitted Since 10/28/2024       Destination Coordination complete.      Service Provider Services Address Phone Fax Patient Preferred    [facility] [address] [facility] [address] [phone] --              Durable Medical Equipment    No services have been selected for the patient.                Dialysis/Infusion    No services have been selected for the patient.                Home Medical Care    No services have been selected for the patient.                Therapy    No services have been selected for the patient.                Community Resources    No services have been selected for the patient.                Community & DME    No services have been selected for the patient.                    Transportation Services  W/C Van: Other ([facility])    Final Discharge Disposition Code: 03 - skilled nursing facility (SNF)

## 2024-11-01 NOTE — PLAN OF CARE
Goal Outcome Evaluation:      Patient free of falls and injury. Skin free of injury. Vital signs stable. BP managed with medications. On Tikosyn. Last Qtc was 485. On PO eliquis. Multiple BM. Up to bedside commode. Pain managed with medication. BG managed with scheduled and sliding scale insulin. NSR with a-fib on monitor, keeps going in and out of a-fib. D/c tomorrow to [facility] 11/2 @ 1100 via [facility] wheelchair transfer.       Problem: Adult Inpatient Plan of Care  Goal: Plan of Care Review  Outcome: Progressing  Goal: Patient-Specific Goal (Individualized)  Outcome: Progressing  Goal: Absence of Hospital-Acquired Illness or Injury  Outcome: Progressing  Intervention: Identify and Manage Fall Risk  Recent Flowsheet Documentation  Taken 11/1/2024 1828 by Sarita Young RN  Safety Promotion/Fall Prevention:   activity supervised   assistive device/personal items within reach   clutter free environment maintained   fall prevention program maintained   lighting adjusted   nonskid shoes/slippers when out of bed   room organization consistent   safety round/check completed   toileting scheduled  Taken 11/1/2024 1600 by Sarita Young RN  Safety Promotion/Fall Prevention:   activity supervised   assistive device/personal items within reach   clutter free environment maintained   fall prevention program maintained   lighting adjusted   nonskid shoes/slippers when out of bed   room organization consistent   safety round/check completed   toileting scheduled  Taken 11/1/2024 1400 by Sarita Young, RN  Safety Promotion/Fall Prevention:   activity supervised   assistive device/personal items within reach   clutter free environment maintained   fall prevention program maintained   lighting adjusted   nonskid shoes/slippers when out of bed   room organization consistent   safety round/check completed   toileting scheduled  Taken 11/1/2024 1230 by Sarita Young, RN  Safety Promotion/Fall Prevention:   activity  supervised   clutter free environment maintained   assistive device/personal items within reach   nonskid shoes/slippers when out of bed   room organization consistent   safety round/check completed   toileting scheduled  Taken 11/1/2024 1030 by Sarita Young RN  Safety Promotion/Fall Prevention:   activity supervised   assistive device/personal items within reach   clutter free environment maintained   fall prevention program maintained   nonskid shoes/slippers when out of bed   room organization consistent   safety round/check completed   toileting scheduled   lighting adjusted  Taken 11/1/2024 0850 by Sarita Young RN  Safety Promotion/Fall Prevention:   activity supervised   assistive device/personal items within reach   clutter free environment maintained   fall prevention program maintained   nonskid shoes/slippers when out of bed   room organization consistent   safety round/check completed   toileting scheduled   lighting adjusted  Intervention: Prevent Skin Injury  Recent Flowsheet Documentation  Taken 11/1/2024 1230 by Sarita Yonug RN  Skin Protection:   incontinence pads utilized   transparent dressing maintained  Taken 11/1/2024 1030 by Sarita Young RN  Body Position: position changed independently  Taken 11/1/2024 0850 by Sarita Young RN  Body Position: position changed independently  Intervention: Prevent and Manage VTE (Venous Thromboembolism) Risk  Recent Flowsheet Documentation  Taken 11/1/2024 0850 by Sarita Young RN  VTE Prevention/Management:   bilateral   SCDs (sequential compression devices) off   patient refused intervention  Intervention: Prevent Infection  Recent Flowsheet Documentation  Taken 11/1/2024 1828 by Sarita Young RN  Infection Prevention:   environmental surveillance performed   hand hygiene promoted  Taken 11/1/2024 1600 by Sarita Young RN  Infection Prevention:   environmental surveillance performed   hand hygiene promoted  Taken 11/1/2024  1400 by Sarita Young RN  Infection Prevention:   environmental surveillance performed   hand hygiene promoted  Taken 11/1/2024 1230 by Sarita Young RN  Infection Prevention:   environmental surveillance performed   hand hygiene promoted  Taken 11/1/2024 1030 by Sarita Young RN  Infection Prevention:   environmental surveillance performed   hand hygiene promoted  Taken 11/1/2024 0850 by Sarita Young RN  Infection Prevention:   environmental surveillance performed   hand hygiene promoted  Goal: Optimal Comfort and Wellbeing  Outcome: Progressing  Intervention: Monitor Pain and Promote Comfort  Recent Flowsheet Documentation  Taken 11/1/2024 1828 by Sarita Young RN  Pain Management Interventions: pain medication given  Taken 11/1/2024 1600 by Sarita Young RN  Pain Management Interventions: no interventions per patient request  Taken 11/1/2024 1230 by Sarita Young RN  Pain Management Interventions: pain medication given  Taken 11/1/2024 0850 by Sarita Young RN  Pain Management Interventions: (arthritis cream aplied, anti gout medication given)   other (see comments)   pain medication given  Intervention: Provide Person-Centered Care  Recent Flowsheet Documentation  Taken 11/1/2024 1600 by Sarita Young RN  Trust Relationship/Rapport:   care explained   thoughts/feelings acknowledged  Taken 11/1/2024 0850 by Sarita Young RN  Trust Relationship/Rapport:   care explained   thoughts/feelings acknowledged  Goal: Readiness for Transition of Care  Outcome: Progressing     Problem: Comorbidity Management  Goal: Maintenance of COPD Symptom Control  Outcome: Progressing  Intervention: Maintain COPD (Chronic Obstructive Pulmonary Disease) Symptom Control  Recent Flowsheet Documentation  Taken 11/1/2024 1828 by Sarita Young RN  Medication Review/Management: medications reviewed  Taken 11/1/2024 1600 by Sarita Young RN  Medication Review/Management: medications  reviewed  Taken 11/1/2024 1400 by Sarita Young RN  Medication Review/Management: medications reviewed  Taken 11/1/2024 1230 by Sarita Young RN  Medication Review/Management: medications reviewed  Taken 11/1/2024 1030 by Sarita Young RN  Medication Review/Management: medications reviewed  Taken 11/1/2024 0850 by Sarita Young RN  Medication Review/Management: medications reviewed  Goal: Blood Glucose Level Within Target Range  Outcome: Progressing  Intervention: Monitor and Manage Glycemia  Recent Flowsheet Documentation  Taken 11/1/2024 1828 by Sarita Young RN  Medication Review/Management: medications reviewed  Taken 11/1/2024 1600 by Sarita Young RN  Medication Review/Management: medications reviewed  Taken 11/1/2024 1400 by Sarita Young RN  Medication Review/Management: medications reviewed  Taken 11/1/2024 1230 by Sarita Young RN  Medication Review/Management: medications reviewed  Taken 11/1/2024 1030 by Sarita Young RN  Medication Review/Management: medications reviewed  Taken 11/1/2024 0850 by Sarita Young RN  Medication Review/Management: medications reviewed  Goal: Maintenance of Heart Failure Symptom Control  Outcome: Progressing  Intervention: Maintain Heart Failure Management  Recent Flowsheet Documentation  Taken 11/1/2024 1828 by Sarita Young RN  Medication Review/Management: medications reviewed  Taken 11/1/2024 1600 by Sarita Young RN  Medication Review/Management: medications reviewed  Taken 11/1/2024 1400 by Sarita Young RN  Medication Review/Management: medications reviewed  Taken 11/1/2024 1230 by Sarita Young RN  Medication Review/Management: medications reviewed  Taken 11/1/2024 1030 by Sarita Young RN  Medication Review/Management: medications reviewed  Taken 11/1/2024 0850 by Sarita Young RN  Medication Review/Management: medications reviewed  Goal: Blood Pressure in Desired Range  Outcome:  Progressing  Intervention: Maintain Blood Pressure Management  Recent Flowsheet Documentation  Taken 11/1/2024 1828 by Sarita Young RN  Medication Review/Management: medications reviewed  Taken 11/1/2024 1600 by Sarita Young RN  Medication Review/Management: medications reviewed  Taken 11/1/2024 1400 by Sarita Young RN  Medication Review/Management: medications reviewed  Taken 11/1/2024 1230 by Sarita Young RN  Medication Review/Management: medications reviewed  Taken 11/1/2024 1030 by Sarita Young RN  Medication Review/Management: medications reviewed  Taken 11/1/2024 0850 by Sarita Young RN  Medication Review/Management: medications reviewed     Problem: Dysrhythmia  Goal: Normalized Cardiac Rhythm  Outcome: Progressing  Intervention: Monitor and Manage Cardiac Rhythm Effect  Recent Flowsheet Documentation  Taken 11/1/2024 0850 by Sarita Young RN  VTE Prevention/Management:   bilateral   SCDs (sequential compression devices) off   patient refused intervention     Problem: Fall Injury Risk  Goal: Absence of Fall and Fall-Related Injury  Outcome: Progressing  Intervention: Identify and Manage Contributors  Recent Flowsheet Documentation  Taken 11/1/2024 1828 by Sarita Young RN  Medication Review/Management: medications reviewed  Self-Care Promotion:   independence encouraged   BADL personal objects within reach  Taken 11/1/2024 1600 by Sarita Young RN  Medication Review/Management: medications reviewed  Self-Care Promotion:   independence encouraged   BADL personal objects within reach  Taken 11/1/2024 1400 by Sarita Young RN  Medication Review/Management: medications reviewed  Self-Care Promotion:   independence encouraged   BADL personal objects within reach  Taken 11/1/2024 1230 by Sarita Young RN  Medication Review/Management: medications reviewed  Self-Care Promotion:   independence encouraged   BADL personal objects within reach  Taken 11/1/2024 1030 by  Sarita Young RN  Medication Review/Management: medications reviewed  Self-Care Promotion:   independence encouraged   BADL personal objects within reach  Taken 11/1/2024 0850 by Sarita Young RN  Medication Review/Management: medications reviewed  Self-Care Promotion:   independence encouraged   BADL personal objects within reach  Intervention: Promote Injury-Free Environment  Recent Flowsheet Documentation  Taken 11/1/2024 1828 by Sarita Young RN  Safety Promotion/Fall Prevention:   activity supervised   assistive device/personal items within reach   clutter free environment maintained   fall prevention program maintained   lighting adjusted   nonskid shoes/slippers when out of bed   room organization consistent   safety round/check completed   toileting scheduled  Taken 11/1/2024 1600 by Sarita Young RN  Safety Promotion/Fall Prevention:   activity supervised   assistive device/personal items within reach   clutter free environment maintained   fall prevention program maintained   lighting adjusted   nonskid shoes/slippers when out of bed   room organization consistent   safety round/check completed   toileting scheduled  Taken 11/1/2024 1400 by Sarita Young RN  Safety Promotion/Fall Prevention:   activity supervised   assistive device/personal items within reach   clutter free environment maintained   fall prevention program maintained   lighting adjusted   nonskid shoes/slippers when out of bed   room organization consistent   safety round/check completed   toileting scheduled  Taken 11/1/2024 1230 by Sarita Young RN  Safety Promotion/Fall Prevention:   activity supervised   clutter free environment maintained   assistive device/personal items within reach   nonskid shoes/slippers when out of bed   room organization consistent   safety round/check completed   toileting scheduled  Taken 11/1/2024 1030 by Sarita Young RN  Safety Promotion/Fall Prevention:   activity supervised    assistive device/personal items within reach   clutter free environment maintained   fall prevention program maintained   nonskid shoes/slippers when out of bed   room organization consistent   safety round/check completed   toileting scheduled   lighting adjusted  Taken 11/1/2024 0850 by Sarita Young RN  Safety Promotion/Fall Prevention:   activity supervised   assistive device/personal items within reach   clutter free environment maintained   fall prevention program maintained   nonskid shoes/slippers when out of bed   room organization consistent   safety round/check completed   toileting scheduled   lighting adjusted

## 2024-11-01 NOTE — PLAN OF CARE
Goal Outcome Evaluation:  Plan of Care Reviewed With: patient        Progress: no change (Initial Eval)  Outcome Evaluation: Pt presenting below her functional baseline w/ transfers, mobility and balance limiting independence w/ ADL based tasks. Pt limited by B foot pain. Pt requiring assist for transfers, toileting and LB dressing. Pt's deficits warrants skilled OT services. Recommend inpatient rehab at d/c for best functional outcome.    Anticipated Discharge Disposition (OT): inpatient rehabilitation facility

## 2024-11-01 NOTE — DISCHARGE PLACEMENT REQUEST
"Mandi Jacob \"Herlinda\" (74 y.o. Female)       Date of Birth   1950    Social Security Number       Address   [address] [place] [id number]    Home Phone   [phone]    MRN   [id number]       [facility]    Marital Status                               Admission Date   10/28/24    Admission Type   Urgent    Admitting Provider   Donaldo Hollingsworth MD    Attending Provider   Donaldo Hollingsworth MD    Department, Room/Bed   [facility] 3E, S343/1       Discharge Date       Discharge Disposition       Discharge Destination                                 Attending Provider: Donaldo Hollingsworth MD    Allergies: Ace Inhibitors, Diltiazem, Flecainide, Incruse Ellipta [Umeclidinium Bromide], Lisinopril, Niacin, Wellbutrin [Bupropion], Ketorolac Tromethamine, Buspirone, Ciprofloxacin, Citalopram, Clindamycin/lincomycin, Codeine, Levaquin [Levofloxacin], Lipitor [Atorvastatin], Liraglutide, Lorazepam, Sertraline, Statins, Sulfamethoxazole-trimethoprim, Trulicity [Dulaglutide], Xigduo Xr [Dapagliflozin Pro-metformin Er]    Isolation: None   Infection: None   Code Status: CPR    Ht: 167.6 cm (65.98\")   Wt: 120 kg (264 lb 8.8 oz)    Admission Cmt: None   Principal Problem: Persistent atrial fibrillation [I48.19]                   Active Insurance as of 10/28/2024       Primary Coverage       Payor Plan Insurance Group Employer/Plan Group    HUMANA MEDICARE REPLACEMENT HUMANA MED ADV GROUP [id number]       Payor Plan Address Payor Plan Phone Number Payor Plan Fax Number Effective Dates    [address] [address] [phone]  1/1/2019 - None Entered    [facility] [id number]         Subscriber Name Subscriber Birth Date Member ID       MANDI JACOB 1950 [id number]                     Emergency Contacts        (Rel.) Home Phone Work Phone Mobile Phone    EVAN DOWELL (Daughter) -- -- [phone]                 History & Physical        Darlene Galaviz APRN at 10/28/24 " 3405       Attestation signed by Donaldo Hollingsworth MD at 10/29/24 1636    I have reviewed this documentation and agree.                    Mandi Jacob  [id number]  1950   LOS: 1 day   Patient Care Team:  John Montelongo MD as PCP - General (Internal Medicine)  John Montelongo MD as Referring Physician (Internal Medicine)    Ms. Jacob is a 74-year-old  white female from [place], [place].    Chief Complaint: Tikosyn admission    Problem List:  Atrial Fibrillation  Diagnosis event monitor 2019 ?  Allergy to flecainide  On sotalol thereafter  Noted recurrence indicental on EKG 5/2024 cards office visit  CHAVas 6, anticoagulated with Eliquis  Sotalol discontinued 10/21/2024  Tikosyn initiated 10/29/2024  CAD  [facility] 2/2012: nonobstructive  TTE 10/2023: LVEF 51-55%, mild conc LVH, limited study  [facility] 11/2023: IVUS guided RCA stent, otherwise nonobstructive disease  HFpEF  Peripheral Vascular Disease  Angioplasty and stenting to R carotid 6/2019 Dr. Erickson  HTN  HLD  Diabetes  Hx TIA  COPD, on continuous 2 L O2 NC  Morbid obesity: BMI 42.61  Former tobacco use with 61.6-pack-year history, quit in 2009      Allergies   Allergen Reactions    Ace Inhibitors Anaphylaxis    Diltiazem Angioedema    Flecainide Other (See Comments)     Facial numbness and edema, itching all over and shortness of breath    Incruse Ellipta [Umeclidinium Bromide] Shortness Of Breath    Lisinopril Anaphylaxis and Hives    Niacin Swelling     LIPS AND TONGUE    Wellbutrin [Bupropion] Shortness Of Breath    Ketorolac Tromethamine Arrhythmia    Buspirone Other (See Comments)     THROAT AND LIPS SWELLED     Ciprofloxacin Rash    Citalopram Unknown (See Comments)     PT UNSURE     Clindamycin/Lincomycin Rash    Codeine Other (See Comments)     SEVERE HEART BURN    Levaquin [Levofloxacin] Rash    Lipitor [Atorvastatin] Diarrhea and Other (See Comments)     HEADACHE    Liraglutide Unknown - Low Severity    Lorazepam Mental  Status Change    Sertraline Unknown - Low Severity     PT UNSURE OF REACTION     Statins Diarrhea and Other (See Comments)     HEADACHE    Sulfamethoxazole-Trimethoprim Unknown (See Comments)     PT UNSURE OF REACTION    Trulicity [Dulaglutide] Diarrhea     SEVERE DIARRHEA    Xigduo Xr [Dapagliflozin Pro-Metformin Er] Diarrhea     Medications Prior to Admission   Medication Sig Dispense Refill Last Dose/Taking    albuterol (PROVENTIL HFA;VENTOLIN HFA) 108 (90 BASE) MCG/ACT inhaler Inhale 2 puffs Every 4 (Four) Hours As Needed. Albuterol 90 MCG/ACT AERS; Patient Sig: Albuterol 90 MCG/ACT AERS INHALE 1 TO 2 PUFFS EVERY 4 TO 6 HOURS AS NEEDED.; 0; 11-Sep-2013; Active   Past Week    anastrozole (ARIMIDEX) 1 MG tablet Take 1 tablet by mouth Daily.   10/28/2024 Morning    Cholecalciferol 25 MCG (1000 UT) tablet Take 1 tablet by mouth Daily.   10/28/2024    clopidogrel (PLAVIX) 75 MG tablet TAKE 1 TABLET BY MOUTH DAILY. 90 tablet 3 10/28/2024 at  9:00 AM    Diclofenac Sodium (VOLTAREN) 1 % gel gel Apply 4 g topically to the appropriate area as directed As Needed.   Past Week    Eliquis 5 MG tablet tablet TAKE 1 TABLET EVERY 12 HOURS 180 tablet 3 10/28/2024 at  6:00 PM    furosemide (LASIX) 40 MG tablet TAKE 1 TABLET TWICE DAILY 180 tablet 3 10/28/2024 at  4:00 PM    insulin aspart (novoLOG FLEXPEN) 100 UNIT/ML solution pen-injector sc pen Inject 15 Units under the skin into the appropriate area as directed Daily With Breakfast.   10/28/2024 at  9:00 AM    losartan (COZAAR) 50 MG tablet Take 1 tablet by mouth Daily.   10/28/2024 at  9:00 AM    magnesium oxide (MAGOX) 400 (241.3 MG) MG tablet tablet Take 1 tablet by mouth Every Night.   10/28/2024 at  4:00 PM    metFORMIN ER (GLUCOPHAGE-XR) 500 MG 24 hr tablet Take 1 tablet by mouth Daily With Breakfast.  11 10/28/2024 at  9:00 AM    metolazone (ZAROXOLYN) 2.5 MG tablet Take 1 tablet by mouth Daily As Needed. 1 tablet 30 minutes prior to lasix as needed PRN   10/27/2024 at  10:00 AM    metoprolol tartrate (LOPRESSOR) 25 MG tablet ~101Q.5 TAKE 1/2 TABLET DAILY ~101Q.5 TAKE 1/2 TABLET DAILY 90 tablet 3 10/28/2024 at  4:00 PM    montelukast (SINGULAIR) 10 MG tablet Take 1 tablet by mouth Every Night.   10/28/2024 at  4:00 PM    O2 (OXYGEN) Inhale Continuous.   10/28/2024    potassium chloride (K-DUR,KLOR-CON) 10 MEQ CR tablet Take 1 tablet by mouth Every Night.   10/28/2024 at  9:00 AM    potassium chloride (K-DUR,KLOR-CON) 20 MEQ CR tablet Take 1 tablet by mouth Every Morning. 30 tablet 3 10/28/2024 at  4:00 PM    Repatha SureClick solution auto-injector SureClick injection INJECT 1 ML UNDER THE SKIN INTO THE APPROPRIATE AREA AS DIRECTED EVERY 14 (FOURTEEN) DAYS. 6 pen 3 10/21/2024 at  4:00 PM    TRESIBA FLEXTOUCH 200 UNIT/ML solution pen-injector Inject 68 Units under the skin into the appropriate area as directed Every Morning. 100 units daily   10/28/2024 at  9:00 AM    Tudorza Pressair 400 MCG/ACT aerosol powder  powder for inhalation Inhale 1 puff 2 (Two) Times a Day.   10/28/2024 at  4:00 PM    albuterol (PROVENTIL) (2.5 MG/3ML) 0.083% nebulizer solution Albuterol Sulfate (2.5 MG/3ML) 0.083% Inhalation Nebulization Solution; Patient Sig: Albuterol Sulfate (2.5 MG/3ML) 0.083% Inhalation Nebulization Solution USE 1 UNIT DOSE IN NEBULIZER EVERY 4 TO 6 HOURS AS NEE   More than a month    isosorbide mononitrate (IMDUR) 30 MG 24 hr tablet TAKE 1 TABLET BY MOUTH DAILY. 90 tablet 3  9:00 AM    nitroglycerin (NITROSTAT) 0.4 MG SL tablet Place 1 tablet under the tongue Every 5 (Five) Minutes As Needed for Chest Pain. 25 tablet 2 Unknown    PARoxetine (PAXIL) 10 MG tablet Take 1 tablet by mouth Every Morning.   More than a month    sotalol (BETAPACE) 120 MG tablet Take 1 tablet by mouth 2 (Two) Times a Day. 60 tablet 11 10/22/2024 Morning     Scheduled Meds:anastrozole, 1 mg, Oral, Daily  apixaban, 5 mg, Oral, Q12H  clopidogrel, 75 mg, Oral, Daily  dofetilide, 500 mcg, Oral, Q12H  furosemide,  40 mg, Oral, BID  insulin glargine, 1-200 Units, Subcutaneous, Nightly - Glucommander  insulin lispro, 1-200 Units, Subcutaneous, 4x Daily With Meals & Nightly  isosorbide mononitrate, 30 mg, Oral, Daily  losartan, 50 mg, Oral, Daily  magnesium oxide, 400 mg, Oral, Nightly  metFORMIN ER, 500 mg, Oral, Daily With Breakfast  metoprolol tartrate, 12.5 mg, Oral, Daily  montelukast, 10 mg, Oral, Nightly  PARoxetine, 10 mg, Oral, QAM  [START ON 10/30/2024] potassium chloride, 20 mEq, Oral, QAM  potassium chloride, 10 mEq, Oral, Nightly      Continuous Infusions:O2, 2 L/min      PRN Meds:.  albuterol    Calcium Replacement - Follow Nurse / BPA Driven Protocol    dextrose    dextrose    glucagon (human recombinant)    insulin lispro    Magnesium Cardiology Dose Replacement - Follow Nurse / BPA Driven Protocol    nitroglycerin    Pharmacy Consult    Phosphorus Replacement - Follow Nurse / BPA Driven Protocol    Potassium Replacement - Follow Nurse / BPA Driven Protocol       History of Present Illness:   This is a 74-year-old white female who presents to [facility] for Tikosyn admission.  She has an allergy to flecainide, diltiazem, ACE inhibitors, statins.  Last dose of sotalol was 10/21/2024.  The patient has no missed doses of Eliquis in the past 30 days.  She denies any chest pain, increased shortness of breath, or palpitations.  She felt like her ankles were little puffy but she received her Lasix this morning.    Cardiac risk factors: advanced age (older than 55 for men, 65 for women), diabetes mellitus, dyslipidemia, hypertension, male gender, obesity (BMI >= 30 kg/m2), and sedentary lifestyle.    Social History     Socioeconomic History    Marital status:    Tobacco Use    Smoking status: Former     Current packs/day: 0.00     Average packs/day: 1.5 packs/day for 41.1 years (61.6 ttl pk-yrs)     Types: Cigarettes     Start date: 4/8/1968     Quit date: 3/22/2009     Years since quitting: 15.6    Smokeless tobacco:  "Never   Vaping Use    Vaping status: Never Used   Substance and Sexual Activity    Alcohol use: No    Drug use: No    Sexual activity: Not Currently     Partners: Male     Birth control/protection: Post-menopausal     Family History   Problem Relation Age of Onset    Heart attack Mother     Heart failure Mother         congestive    Hyperlipidemia Mother     Hypertension Mother     Peripheral vascular disease Mother     Heart attack Father     Heart failure Father         congestive    Diabetes Father     Hyperlipidemia Father     Hypertension Father     Peripheral vascular disease Father     Asthma Father     Hypertension Brother     Heart failure Other         congestive    Diabetes Other     Hyperlipidemia Other     Hypertension Other     Peripheral vascular disease Other        Review of Systems  10 point review of systems was completed, positives outlined in the HPI, and otherwise all other systems are negative.      Objective:       Physical Exam  /61 (BP Location: Right arm, Patient Position: Lying)   Pulse 88   Temp 98.2 °F (36.8 °C) (Oral)   Resp 18   Ht 167.6 cm (66\")   Wt 120 kg (264 lb)   SpO2 97%   BMI 42.61 kg/m²       10/28/24  1900   Weight: 120 kg (264 lb)     Body mass index is 42.61 kg/m².    Intake/Output Summary (Last 24 hours) at 10/29/2024 1224  Last data filed at 10/29/2024 1130  Gross per 24 hour   Intake --   Output 650 ml   Net -650 ml       General Appearance:  Alert, cooperative, no distress, appears stated age   Head:  Normocephalic, without obvious abnormality, atraumatic   Neck: Supple, symmetrical, trachea midline, no adenopathy, thyroid: not enlarged, symmetric, no tenderness/mass/nodules, no carotid bruit or JVD   Lungs:   Clear to auscultation bilaterally, respirations unlabored   Heart:  Regular rate and rhythm with PACs, S1, S2 normal, no murmur, rub or gallop   Abdomen:   Soft, nontender, no masses, no organomegaly, bowel sounds audible x4   Extremities: No edema, " normal range of motion   Pulses: 2+ and symmetric   Skin: Skin color, texture, turgor normal, no rashes or lesions   Neurologic: Normal       Cardiographics  EKG 10/29/2024:Sinus rhythm with premature atrial complexes  Nonspecific ST abnormality  Abnormal ECG  When compared with ECG of 28-OCT-2024 20:35, (Unconfirmed)  No significant change was found, QTc 480 ms    Imaging  Chest x-ray: No new chest x-ray to review    Lab Review   Results from last 7 days   Lab Units 10/29/24  0623 10/28/24  2039   SODIUM mmol/L 140 142   POTASSIUM mmol/L 3.9 3.4*   CHLORIDE mmol/L 100 98   CO2 mmol/L 30.0* 31.0*   BUN mg/dL 17 22   CREATININE mg/dL 0.68 0.86   GLUCOSE mg/dL 89 129*   CALCIUM mg/dL 10.4 10.7*                         Assessment:   Patient presents to [facility] for Tikosyn admission per protocol.  Last dose of sotalol was 10/21/2024.  The patient has no missed doses of Eliquis in the past 30 days.   Patient has received 2/5 doses of Tikosyn thus far.  QTc acceptable on ECG this morning.      Plan:   Tikosyn admission per protocol  Hospitalist consult for T2DM management    Electronically signed by FLOWER Ya, 10/29/24, 12:37 PM EDT.     Electronically signed by Donaldo Hollingsworth MD at 10/29/24 1636          Physician Progress Notes (most recent note)        Jen Sarkar II DO at 24 0910              [facility] Medicine Services  CLINICAL REVIEW NOTE    Patient Name: Mandi Jacob  : 1950  MRN: [id number]    Date of Admission: 10/28/2024  Length of Stay: 4  Attending Service:  Cards    Patient doing better. Gout symptoms are improving. Cardiology considering d/c home pending PT/OT eval. I have addressed home COPD, diabetes, gout medications in med rec. Suitable for discharge from hospital medicine standpoint.         Patient's labs, charting, and events reviewed.  No active medical management issues to address today, the Hospitalist team will continue to follow  "daily, be available for any needs, and see or bill for services as needed.      if \"pending\" use the esig process       Electronically signed by Jen Sarkar II, DO at 24 1111          Physical Therapy Notes (most recent note)        Carissa Jules, PT at 10/30/24 1015  Version 1 of 1         Patient Name: Mandi Jacob  : 1950    MRN: [id number]                              Today's Date: 10/30/2024       Admit Date: 10/28/2024    Visit Dx: No diagnosis found.  Patient Active Problem List   Diagnosis    Coronary arteriosclerosis in native artery    Chronic obstructive pulmonary disease    Congestive heart failure    Diabetes mellitus    Hyperlipidemia    Hypertension    TIA (transient ischemic attack)    PAC (premature atrial contraction)    Carotid stenosis, asymptomatic, right    Persistent atrial fibrillation    Foot pain    Breast cancer     Past Medical History:   Diagnosis Date    Arrhythmia     Asthma     Atrial fibrillation     Breast cancer 2023    Right breast    CAD (coronary artery disease)     non-obstructive by cath 2012    CHF (congestive heart failure)     COPD (chronic obstructive pulmonary disease)     Diabetes mellitus     Diastolic dysfunction     Dizziness     Edema     Esophageal spasm     Herniated lumbar intervertebral disc     L5    Hyperlipidemia     intolerant to statins     Hypertension     Osteoarthritis     Palpitations     SOB (shortness of breath)     Stroke     Stroke-like symptoms     TIA (transient ischemic attack)     left hemispheric TIA/CVA    Unstable angina      Past Surgical History:   Procedure Laterality Date    CARDIAC CATHETERIZATION      CARDIAC CATHETERIZATION  2023    dr. mcclure with 1 stent    CAROTID STENT      CATARACT EXTRACTION      CATARACT EXTRACTION      CEREBRAL ANGIOGRAM      CORONARY STENT PLACEMENT  2023    EYE SURGERY Bilateral     INTERVENTIONAL RADIOLOGY PROCEDURE Bilateral 2019    Procedure: " Carotid Cerebral Angiogram;  Surgeon: Alex Erickson MD;  Location:  [facility];  Service: Interventional Radiology    ID TCAT IV STENT CRV CRTD ART EMBOLIC PROTECJ N/A 06/05/2019    Right Carotid Stent     TUBAL ABDOMINAL LIGATION      WRIST SURGERY        General Information       Row Name 10/30/24 1047          Physical Therapy Time and Intention    Document Type evaluation  -CD     Mode of Treatment physical therapy  -CD       Row Name 10/30/24 1047          General Information    Patient Profile Reviewed yes  -CD     Prior Level of Function independent:;all household mobility;community mobility;gait;bed mobility;ADL's;driving  PT USES ROLLATOR IN COMMUNITY, CANE INSIDE. USES 2L O2 24/7. ENDORSES 2 FALLS IN THE LAST 2 MONTHS. 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 LIMITED COMMUNITY DISTANCES DUE TO FATIGUE AND DECREASED ENDURANCE.  -CD     Existing Precautions/Restrictions fall  PAINFUL FEET, R>L.  -CD     Barriers to Rehab medically complex;previous functional deficit  -CD       Row Name 10/30/24 1047          Living Environment    People in Home alone  -CD       Row Name 10/30/24 1047          Home Main Entrance    Number of Stairs, Main Entrance one  -CD       Row Name 10/30/24 1047          Stairs Within Home, Primary    Number of Stairs, Within Home, Primary none  -CD       Row Name 10/30/24 1047          Cognition    Orientation Status (Cognition) oriented x 4  -CD       Row Name 10/30/24 1047          Safety Issues/Impairments Affecting Functional Mobility    Safety Issues Affecting Function (Mobility) safety precaution awareness;safety precautions follow-through/compliance;insight into deficits/self-awareness;positioning of assistive device;ability to follow commands;awareness of need for assistance;sequencing abilities  -CD     Impairments Affecting Function (Mobility) balance;endurance/activity tolerance;pain;range of motion (ROM);strength  -CD     Comment, Safety Issues/Impairments (Mobility)  PT NEEDS CUES FOR SEQUENCING MOBILITY SAFELY. CURRENTLY PRIMARY LIMITING FACTOR IS B FOOT PAIN, L>R.  -CD               User Key  (r) = Recorded By, (t) = Taken By, (c) = Cosigned By      Initials Name Provider Type    Carissa Dexter PT Physical Therapist                   Mobility       Row Name 10/30/24 1054          Bed Mobility    Bed Mobility supine-sit  -CD     Supine-Sit [place] (Bed Mobility) contact guard  -CD     Assistive Device (Bed Mobility) bed rails;head of bed elevated  -CD     Comment, (Bed Mobility) INCREASED TIME AND EFFORT. DENIED DIZZINESS.  -CD       Row Name 10/30/24 1054          Transfers    Comment, (Transfers) CUES FOR HAND PLACEMENT. STS FROM EOB WITH INCREASED TIME AND EFFORT DUE TO B FOOT PAIN. PT INSTRUCTED IN OFF LOADING L LE WITH USE OF R WALKER. REQUIRED CUES FOR SEQUENCING STAND STEP PIVOT BED TO CHAIR WITH R WALKER.  -CD       Row Name 10/30/24 1054          Bed-Chair Transfer    Bed-Chair [place] (Transfers) moderate assist (50% patient effort)  -CD     Assistive Device (Bed-Chair Transfers) walker, front-wheeled  -CD       Row Name 10/30/24 1054          Sit-Stand Transfer    Sit-Stand [place] (Transfers) moderate assist (50% patient effort)  -CD     Assistive Device (Sit-Stand Transfers) walker, front-wheeled  -CD       Row Name 10/30/24 1054          Gait/Stairs (Locomotion)    [place] Level (Gait) unable to assess  -CD     Comment, (Gait/Stairs) DEFERRED. LIMITED TO SEVERAL SHORT ANTALGIC STEPS BED TO CHAIR WITH PIVOT TRANSFER.  -CD               User Key  (r) = Recorded By, (t) = Taken By, (c) = Cosigned By      Initials Name Provider Type    Carissa Dexter PT Physical Therapist                   Obj/Interventions       Row Name 10/30/24 1100          Range of Motion Comprehensive    General Range of Motion bilateral lower extremity ROM WFL  -CD       Row Name 10/30/24 1100          Strength Comprehensive (MMT)    General Manual Muscle Testing  (MMT) Assessment lower extremity strength deficits identified  -CD     Comment, General Manual Muscle Testing (MMT) Assessment B LLE GROSSLY 4/5 AND SYMMETRICAL EXCEPT B ANKLES NT DUE TO PAINFUL FEET, GROSSLY 3/5 DF.  -CD       Row Name 10/30/24 1100          Motor Skills    Motor Skills coordination;functional endurance  -CD       Row Name 10/30/24 1100          Balance    Balance Assessment sitting static balance;sitting dynamic balance;standing static balance;standing dynamic balance  -CD     Static Sitting Balance independent  -CD     Dynamic Sitting Balance independent  -CD     Position, Sitting Balance unsupported;sitting edge of bed;sitting in chair  -CD     Static Standing Balance minimal assist  -CD     Dynamic Standing Balance moderate assist  -CD     Position/Device Used, Standing Balance walker, rolling  -CD     Balance Interventions sitting;standing;sit to stand;supported;static  -CD     Comment, Balance MOD ASSIST REQUIRED FOR BALANCE WITH PIVOT TRANSFER DUE TO PAINFUL FEET.  -CD       Row Name 10/30/24 1100          Sensory Assessment (Somatosensory)    Sensory Assessment (Somatosensory) LE sensation intact  PT REPORTS NEUROPATHY AT BASELINE. STATE CURRENT PAIN IS NEW AND STARTED YESTERDAY,.  -CD               User Key  (r) = Recorded By, (t) = Taken By, (c) = Cosigned By      Initials Name Provider Type    CD Carissa Jules, PT Physical Therapist                   Goals/Plan       Row Name 10/30/24 1117          Bed Mobility Goal 1 (PT)    Activity/Assistive Device (Bed Mobility Goal 1, PT) sit to supine/supine to sit  -CD     [place] Level/Cues Needed (Bed Mobility Goal 1, PT) independent  -CD     Time Frame (Bed Mobility Goal 1, PT) short term goal (STG);1 week  -CD       Row Name 10/30/24 1117          Transfer Goal 1 (PT)    Activity/Assistive Device (Transfer Goal 1, PT) sit-to-stand/stand-to-sit;bed-to-chair/chair-to-bed  -CD     [place] Level/Cues Needed (Transfer Goal 1, PT)  independent  -CD     Time Frame (Transfer Goal 1, PT) long term goal (LTG);2 weeks  -CD       Row Name 10/30/24 1117          Gait Training Goal 1 (PT)    Activity/Assistive Device (Gait Training Goal 1, PT) gait (walking locomotion);assistive device use  -CD     [place] Level (Gait Training Goal 1, PT) independent  -CD     Distance (Gait Training Goal 1, PT) 200 FEET  -CD     Time Frame (Gait Training Goal 1, PT) long term goal (LTG);2 weeks  -CD       Row Name 10/30/24 1117          Therapy Assessment/Plan (PT)    Planned Therapy Interventions (PT) balance training;bed mobility training;gait training;home exercise program;transfer training;strengthening;patient/family education  -CD               User Key  (r) = Recorded By, (t) = Taken By, (c) = Cosigned By      Initials Name Provider Type    CD Carissa Jules, PT Physical Therapist                   Clinical Impression       Row Name 10/30/24 1110          Pain    Pretreatment Pain Rating 10/10  -CD     Posttreatment Pain Rating 10/10  -CD     Pain Location foot  -CD     Pain Side/Orientation bilateral  LEFT GREATER THAN R.  -CD     Pain Management Interventions positioning techniques utilized;other (see comments)  NOTIFIED NSG AND MD.  -CD     Response to Pain Interventions activity participation with increased pain  -CD     Pre/Posttreatment Pain Comment PT AGREEABLE TO UP TO CHAIR DESPITE PAIN. NSG IS AWARE AND MANAGING. MD -CARDIO ARRIVED AND NOTIFIED.  -CD       Row Name 10/30/24 1110          Plan of Care Review    Plan of Care Reviewed With patient  -CD     Outcome Evaluation PT PRESENTS WITH EVOLVING SYMPTOMS TO INCLUDE GENERALIZED WEAKNESS, IMPAIRED BALANCE AND SEVERE B FOOT PAIN , L>R. PT REPORTS FOOT PAIN STARTED YESTERDAY.  REQUIRED MOD ASSIST FOR FOR PIVOT TRANSFER WITH R WALKER BED TO CHAIR. RECOMMEND IRF IF DECLINE IN FUNCTIONAL MOBILITY PERSISTS AS PT LIVES ALONE.  -CD       Row Name 10/30/24 1110          Therapy Assessment/Plan (PT)     Patient/Family Therapy Goals Statement (PT) TO RESOLVE PAIN.  -CD     Rehab Potential (PT) good  -CD     Criteria for Skilled Interventions Met (PT) yes;meets criteria;skilled treatment is necessary  -CD     Therapy Frequency (PT) daily  -CD     Predicted Duration of Therapy Intervention (PT) 2 WEEKS  -CD       Row Name 10/30/24 1110          Vital Signs    Posttreatment Heart Rate (beats/min) 143  -CD     Pretreatment Resp Rate (breaths/min) 59  -CD     O2 Delivery Pre Treatment nasal cannula  -CD     O2 Delivery Intra Treatment nasal cannula  -CD     Pre Patient Position Supine  -CD     Intra Patient Position Standing  -CD     Post Patient Position Sitting  -CD       Row Name 10/30/24 1110          Positioning and Restraints    Pre-Treatment Position in bed  -CD     Post Treatment Position chair  -CD     In Chair reclined;call light within reach;encouraged to call for assist;exit alarm on;with nsg;with other staff  DR ELISE PRESENT.  -CD               User Key  (r) = Recorded By, (t) = Taken By, (c) = Cosigned By      Initials Name Provider Type    CD Carissa Jules, PT Physical Therapist                   Outcome Measures       Row Name 10/30/24 1118 10/30/24 0600       How much help from another person do you currently need...    Turning from your back to your side while in flat bed without using bedrails? 4  -CD 4  -AS    Moving from lying on back to sitting on the side of a flat bed without bedrails? 4  -CD 4  -AS    Moving to and from a bed to a chair (including a wheelchair)? 2  -CD 3  -AS    Standing up from a chair using your arms (e.g., wheelchair, bedside chair)? 2  -CD 3  -AS    Climbing 3-5 steps with a railing? 1  -CD 3  -AS    To walk in hospital room? 1  -CD 3  -AS    AM-PAC 6 Clicks Score (PT) 14  -CD 20  -AS    Highest Level of Mobility Goal 4 --> Transfer to chair/commode  -CD 6 --> Walk 10 steps or more  -AS      Row Name 10/30/24 1118          Functional Assessment    Outcome Measure Options  AM-PAC 6 Clicks Basic Mobility (PT)  -CD               User Key  (r) = Recorded By, (t) = Taken By, (c) = Cosigned By      Initials Name Provider Type    CD Carissa Jules, PT Physical Therapist    AS Rob Todd, RN Registered Nurse                                 Physical Therapy Education       Title: PT OT SLP Therapies (Done)       Topic: Physical Therapy (Done)       Point: Mobility training (Done)       Learning Progress Summary            Patient Acceptance, E, VU,NR by CD at 10/30/2024 1119    Comment: BENEFITS OF OOB ACTIVITY, SAFETY WITH MOBILITY, PROGRESSION OF POC, D/C PLANNING, TRANSFER TRAINING WITH OFF LOADING L LE.                      Point: Home exercise program (Done)       Learning Progress Summary            Patient Acceptance, E, VU,NR by CD at 10/30/2024 1119    Comment: BENEFITS OF OOB ACTIVITY, SAFETY WITH MOBILITY, PROGRESSION OF POC, D/C PLANNING, TRANSFER TRAINING WITH OFF LOADING L LE.                      Point: Body mechanics (Done)       Learning Progress Summary            Patient Acceptance, E, VU,NR by CD at 10/30/2024 1119    Comment: BENEFITS OF OOB ACTIVITY, SAFETY WITH MOBILITY, PROGRESSION OF POC, D/C PLANNING, TRANSFER TRAINING WITH OFF LOADING L LE.                      Point: Precautions (Done)       Learning Progress Summary            Patient Acceptance, E, VU,NR by CD at 10/30/2024 1119    Comment: BENEFITS OF OOB ACTIVITY, SAFETY WITH MOBILITY, PROGRESSION OF POC, D/C PLANNING, TRANSFER TRAINING WITH OFF LOADING L LE.                                      User Key       Initials Effective Dates Name Provider Type [facility] 02/03/23 -  Carissa Jules, PT Physical Therapist PT                  PT Recommendation and Plan  Planned Therapy Interventions (PT): balance training, bed mobility training, gait training, home exercise program, transfer training, strengthening, patient/family education  Outcome Evaluation: PT PRESENTS WITH EVOLVING SYMPTOMS TO INCLUDE  GENERALIZED WEAKNESS, IMPAIRED BALANCE AND SEVERE B FOOT PAIN , L>R. PT REPORTS FOOT PAIN STARTED YESTERDAY.  REQUIRED MOD ASSIST FOR FOR PIVOT TRANSFER WITH R WALKER BED TO CHAIR. RECOMMEND IRF IF DECLINE IN FUNCTIONAL MOBILITY PERSISTS AS PT LIVES ALONE.     Time Calculation:   PT Evaluation Complexity  History, PT Evaluation Complexity: 3 or more personal factors and/or comorbidities  Examination of Body Systems (PT Eval Complexity): total of 4 or more elements  Clinical Presentation (PT Evaluation Complexity): evolving  Clinical Decision Making (PT Evaluation Complexity): moderate complexity  Overall Complexity (PT Evaluation Complexity): moderate complexity     PT Charges       Row Name 10/30/24 1120             Time Calculation    Start Time 1015  -CD      PT Received On 10/30/24  -CD      PT Goal Re-Cert Due Date 11/09/24  -CD         Time Calculation- PT    Total Timed Code Minutes- PT 21 minute(s)  -CD         Timed Charges    93863 - PT Therapeutic Activity Minutes 21  -CD         Untimed Charges    PT Eval/Re-eval Minutes 60  -CD         Total Minutes    Timed Charges Total Minutes 21  -CD      Untimed Charges Total Minutes 60  -CD       Total Minutes 81  -CD                User Key  (r) = Recorded By, (t) = Taken By, (c) = Cosigned By      Initials Name Provider Type    CD Carissa Jules, PT Physical Therapist                  Therapy Charges for Today       Code Description Service Date Service Provider Modifiers Qty    32622793826 HC PT THERAPEUTIC ACT EA 15 MIN 10/30/2024 Carissa Jules, PT GP 1    21253969480 HC PT EVAL MOD COMPLEXITY 4 10/30/2024 Carissa Jules, PT GP 1            PT G-Codes  Outcome Measure Options: AM-PAC 6 Clicks Basic Mobility (PT)  AM-PAC 6 Clicks Score (PT): 14  PT Discharge Summary  Anticipated Discharge Disposition (PT): inpatient rehabilitation facility    Carissa Jules, IZZY  10/30/2024      Electronically signed by Carissa Jules PT at 10/30/24 1124       Occupational  Therapy Notes (most recent note)    No notes exist for this encounter.

## 2024-11-01 NOTE — PROGRESS NOTES
[facility] at [facility]  PROGRESS NOTE    Mandi Jacob   [id number]   1950    LOS: 4 days .  Date of Admission: 10/28/2024  Date of Service: 11/01/24    Primary Care Physician: John Montelongo MD    Chief Complaint: f/u paroxsymal atrial fibrillation    Problem List:   Atrial Fibrillation  Diagnosis event monitor 2019 ?  Allergy to flecainide  On sotalol thereafter  Noted recurrence indicental on EKG 5/2024 cards office visit  CHADsVasc 6, anticoagulated with Eliquis  Sotalol discontinued 10/21/2024  Tikosyn initiated 10/29/2024  CAD  [facility] 2/2012: nonobstructive  TTE 10/2023: LVEF 51-55%, mild conc LVH, limited study  [facility] 11/2023: IVUS guided RCA stent, otherwise nonobstructive disease  HFpEF  Peripheral Vascular Disease  Angioplasty and stenting to R carotid 6/2019 Dr. Erickson  HTN  HLD  Diabetes  Hx TIA  COPD, on continuous 2 L O2 NC  Morbid obesity: BMI 42.61  Gout  Former tobacco use with 61.6-pack-year history, quit in 2009    Subjective      Patient doing well this morning. Gout symptoms are improving. Asked patient to work with PT today so we can better decide discharge plans. No other complaints.     ROS  All systems have been reviewed and are negative with the exception of those mentioned in the HPI and problem list above.     Objective   Vital Sign Min/Max for last 24 hours  Temp  Min: 98.3 °F (36.8 °C)  Max: 99.9 °F (37.7 °C)   BP  Min: 119/67  Max: 151/56   Pulse  Min: 91  Max: 126   Resp  Min: 18  Max: 18   SpO2  Min: 95 %  Max: 99 %   No data recorded   No data recorded     Physical Exam:  GENERAL: Alert, cooperative, in no acute distress.   HEENT: Normocephalic, no jugular venous distention  HEART: Regular rhythm, normal rate, and no murmurs, gallops, or rubs.   LUNGS: Clear to auscultation bilaterally. No wheezing, rales or rhonchi. On 2 L NC chronic home oxygen  NEUROLOGIC: No focal abnormalities involving strength or sensation are noted.   EXTREMITIES:  No clubbing, cyanosis, or edema noted.     Results:      Results from last 7 days   Lab Units 10/31/24  1031 10/30/24  1138 10/29/24  2206 10/29/24  1335 10/29/24  0623   SODIUM mmol/L 134* 141  --   --  140   POTASSIUM mmol/L 3.6 4.2 3.9   < > 3.9   CHLORIDE mmol/L 95* 101  --   --  100   CO2 mmol/L 29.0 28.0  --   --  30.0*   BUN mg/dL 12 13  --   --  17   CREATININE mg/dL 0.79 0.70  --   --  0.68   GLUCOSE mg/dL 300* 193*  --   --  89    < > = values in this interval not displayed.      Lab Results   Component Value Date    TRIG 255 (H) 09/21/2015    HDL 43 09/21/2015     (H) 09/21/2015    AST 30 09/21/2015    ALT 36 (H) 09/21/2015     Results from last 7 days   Lab Units 10/30/24  1139   HEMOGLOBIN A1C % 6.50*                               Intake/Output Summary (Last 24 hours) at 11/1/2024 0758  Last data filed at 10/31/2024 2000  Gross per 24 hour   Intake 240 ml   Output 600 ml   Net -360 ml       EKG/TELE: SR    Radiology Data:   XR Foot 3+ View Left    Result Date: 10/30/2024  Impression: Erosive changes seen along the first and third toes as well as the base of the fifth metatarsal. Given the lack of soft tissue wounds, this is likely from erosive or crystalline arthropathy. Infection is considered less likely but also in the differential due to history of diabetes. Degenerative changes predominate in the midfoot. Electronically Signed: Jens Pandya MD  10/30/2024 1:57 PM EDT  Workstation ID: [id number]    Results for orders placed during the hospital encounter of 10/12/23    Adult Transthoracic Echo Limited W/ Cont if Necessary Per Protocol    Interpretation Summary    Left ventricular ejection fraction appears to be 51 - 55%.    Left ventricular wall thickness is consistent with mild concentric hypertrophy.    The left atrial cavity is moderately dilated.    Moderately to severely technically limited study.  Gross generalizations can be rendered.    1.  LV size is normal and global LV systolic  function is not significantly depressed.  Visually estimated ejection fraction is 50±5%.  Mild concentric left ventricular hypertrophy.  Moderate left atrial enlargement.  The right ventricle appears mildly dilated but RV systolic function is grossly preserved.  The right atrium is at the upper limits of normal.    2.  Valves are morphologically normal.    3.  No pericardial or great vessel pathology.     Current Medications:  allopurinol, 100 mg, Oral, Daily  anastrozole, 1 mg, Oral, Daily  apixaban, 5 mg, Oral, Q12H  clopidogrel, 75 mg, Oral, Daily  colchicine, 0.6 mg, Oral, Q12H  Diclofenac Sodium, 2 g, Topical, 4x Daily  dofetilide, 500 mcg, Oral, Q12H  furosemide, 40 mg, Oral, BID  insulin glargine, 25 Units, Subcutaneous, Daily  insulin lispro, 2-9 Units, Subcutaneous, 4x Daily AC & at Bedtime  Insulin Lispro, 6 Units, Subcutaneous, TID With Meals  isosorbide mononitrate, 30 mg, Oral, Daily  Lidocaine, 1 patch, Transdermal, Q24H  losartan, 50 mg, Oral, Daily  magnesium oxide, 400 mg, Oral, Nightly  metFORMIN ER, 500 mg, Oral, Daily With Breakfast  metoprolol tartrate, 25 mg, Oral, Daily  montelukast, 10 mg, Oral, Nightly  PARoxetine, 10 mg, Oral, QAM  potassium chloride, 20 mEq, Oral, QAM  potassium chloride, 10 mEq, Oral, Nightly      O2, 2 L/min      Assessment and Plan:   Paroxysmal atrial fibrillation  [id number] (age, sex, HTN, CHF, CAD, DM, TIA) , anticoagulated with Eliquis twice daily  AAD: failed sotalol. Tikosyn initiated 10/29.  On metoprolol  Gout  Hospitalist following  Left foot x-ray with erosive changes likely from erosive or crystalline arthropathy, suspect gout   Colchicine and allopurinol started.   Avoiding thiazide diuretics in the future due to gout and interactions with Tikosyn.  HFpEF  Echo, 10/12/24: EF 51-55%, no significant valvular abnormalities  Continue lasix  Hypertension - controlled  COPD - Patient on chronic home oxygen 2L NC    -Reviewed EKG with Dr. Joyce. Patient in SR.  Qtc acceptable on today's EKG. Continue Tikosyn BID.   -Will increase metoprolol to 25 mg BID for better rate control.   -PT recommend inpatient rehab. Patient originally declined and would like to be discharged with home health. Asked patient to work with PT today since her gout symptoms have improved. Family concerned about discharging home. Case management following.   -At time of discharge:   -Follow up with PCP in 1 week about gout .  -Follow up with Dr. Hollingsworth in 3 months in [facility].     Electronically signed by FLOWER Aragon, 11/01/24, 7:58 AM EDT.     Please note that portions of this note were dictated utilizing Dragon dictation.

## 2024-11-01 NOTE — DISCHARGE SUMMARY
Date of Discharge:  11/2/2024    Patient Care Team:  John Montelongo MD as PCP - General (Internal Medicine)  John Montelongo MD as Referring Physician (Internal Medicine)    Discharge Diagnosis: Atrial fibrillation: Resolved    Presenting Problem  Visit for monitoring Tikosyn therapy [Z51.81, Z79.899]  A-fib [I48.91]    Allergies   Allergen Reactions    Ace Inhibitors Anaphylaxis    Diltiazem Angioedema    Flecainide Other (See Comments)     Facial numbness and edema, itching all over and shortness of breath    Incruse Ellipta [Umeclidinium Bromide] Shortness Of Breath    Lisinopril Anaphylaxis and Hives    Niacin Swelling     LIPS AND TONGUE    Wellbutrin [Bupropion] Shortness Of Breath    Ketorolac Tromethamine Arrhythmia    Buspirone Other (See Comments)     THROAT AND LIPS SWELLED     Ciprofloxacin Rash    Citalopram Unknown (See Comments)     PT UNSURE     Clindamycin/Lincomycin Rash    Codeine Other (See Comments)     SEVERE HEART BURN    Levaquin [Levofloxacin] Rash    Lipitor [Atorvastatin] Diarrhea and Other (See Comments)     HEADACHE    Liraglutide Unknown - Low Severity    Lorazepam Mental Status Change    Sertraline Unknown - Low Severity     PT UNSURE OF REACTION     Statins Diarrhea and Other (See Comments)     HEADACHE    Sulfamethoxazole-Trimethoprim Unknown (See Comments)     PT UNSURE OF REACTION    Trulicity [Dulaglutide] Diarrhea     SEVERE DIARRHEA    Xigduo Xr [Dapagliflozin Pro-Metformin Er] Diarrhea       Discharge Medications     Discharge Medications        New Medications        Instructions Start Date   allopurinol 100 MG tablet  Commonly known as: ZYLOPRIM   100 mg, Oral, Daily      colchicine 0.6 MG tablet   0.6 mg, Oral, Daily      dofetilide 500 MCG capsule  Commonly known as: Tikosyn   500 mcg, Oral, Every 12 Hours             Changes to Medications        Instructions Start Date   albuterol (2.5 MG/3ML) 0.083% nebulizer solution  Commonly known as: PROVENTIL  What  changed: Another medication with the same name was removed. Continue taking this medication, and follow the directions you see here.   Albuterol Sulfate (2.5 MG/3ML) 0.083% Inhalation Nebulization Solution; Patient Sig: Albuterol Sulfate (2.5 MG/3ML) 0.083% Inhalation Nebulization Solution USE 1 UNIT DOSE IN NEBULIZER EVERY 4 TO 6 HOURS AS NEE      metoprolol tartrate 25 MG tablet  Commonly known as: LOPRESSOR  What changed: See the new instructions.   25 mg, Oral, Every 12 Hours Scheduled             Continue These Medications        Instructions Start Date   anastrozole 1 MG tablet  Commonly known as: ARIMIDEX   1 mg, Daily      cholecalciferol 25 MCG (1000 UT) tablet  Commonly known as: VITAMIN D3   1,000 Units, Daily      clopidogrel 75 MG tablet  Commonly known as: PLAVIX   75 mg, Oral, Daily      Diclofenac Sodium 1 % gel gel  Commonly known as: VOLTAREN   4 g, As Needed      Eliquis 5 MG tablet tablet  Generic drug: apixaban   TAKE 1 TABLET EVERY 12 HOURS      furosemide 40 MG tablet  Commonly known as: LASIX   TAKE 1 TABLET TWICE DAILY      insulin aspart 100 UNIT/ML solution pen-injector sc pen  Commonly known as: novoLOG FLEXPEN   15 Units, Daily With Breakfast      isosorbide mononitrate 30 MG 24 hr tablet  Commonly known as: IMDUR   30 mg, Oral, Daily      losartan 50 MG tablet  Commonly known as: COZAAR   50 mg, Daily      magnesium oxide 400 (241.3 Mg) MG tablet tablet  Commonly known as: MAGOX   400 mg, Nightly      metFORMIN  MG 24 hr tablet  Commonly known as: GLUCOPHAGE-XR   500 mg, Daily With Breakfast      montelukast 10 MG tablet  Commonly known as: SINGULAIR   10 mg, Nightly      nitroglycerin 0.4 MG SL tablet  Commonly known as: NITROSTAT   0.4 mg, Sublingual, Every 5 Minutes PRN      O2  Commonly known as: OXYGEN   Continuous      PARoxetine 10 MG tablet  Commonly known as: PAXIL   10 mg, Oral, Every Morning      potassium chloride 10 MEQ CR tablet  Commonly known as: KLOR-CON M10   10  mEq, Nightly      potassium chloride 20 MEQ CR tablet  Commonly known as: KLOR-CON M20   20 mEq, Oral, Every Morning      Repatha SureClick solution auto-injector SureClick injection  Generic drug: Evolocumab   140 mg, Subcutaneous, Every 14 Days      Tresiba FlexTouch 200 UNIT/ML solution pen-injector pen injection  Generic drug: Insulin Degludec   68 Units, Every Morning      Tudorza Pressair 400 MCG/ACT aerosol powder  powder for inhalation  Generic drug: aclidinium bromide   1 puff, 2 Times Daily             Stop These Medications      metOLazone 2.5 MG tablet  Commonly known as: ZAROXOLYN     sotalol 120 MG tablet  Commonly known as: BETAPACE              Procedures Performed  Continual telemetry monitoring   ECG x 14  Left foot x-ray 10/30/2024:  Erosive changes seen along the first and third toes as well as the base of the fifth metatarsal. Given the lack of soft tissue wounds, this is likely from erosive or crystalline arthropathy. Infection is considered less likely but also in the   differential due to history of diabetes.Degenerative changes predominate in the midfoot.  Lower extremity venous duplex 10/30/2024: Normal LLE venous duplex scan     History of Present Illness  This is a 74-year-old white female who presents to [facility] 10/28/2024 for Tikosyn admission.  She has an allergy to flecainide, diltiazem, ACE inhibitors, statins.  Last dose of sotalol was 10/21/2024.  The patient has no missed doses of Eliquis in the past 30 days.  She denies any chest pain, increased shortness of breath, or palpitations.  She felt like her ankles were little puffy but she received her Lasix this morning.     Cardiovascular Disease Risk Factors  hyptertension, hyperlipidemia, diabetes mellitus, obesity, increased age    Hospital Course  Patient is a 74 y.o. female presented to [facility] 10/28/2024 for Tikosyn admission per protocol.  Hospitalist consulted for management of diabetes mellitus and foot pain.  Patient felt to have  gouty arthritis and had previously been on hydrochlorothiazide but recently held.  Recommendations to stay off of thiazide diuretics due to interactions with Tikosyn.  Patient did not require cardioversion during hospitalization.  She was provided with colchicine during admission.  Patient will be discharged to rehab at [facility] 11/2/2024 and will follow-up with PCP in 1 week regarding gout and Dr. Hollingsworth in 3 months in the [place] clinic.  QTc acceptable on Tikosyn therapy.  She was discharged to [facility] 11/2/2024 in stable condition with follow-up appointments listed below.    Labs  Results from last 7 days   Lab Units 11/01/24  0953   SODIUM mmol/L 138   POTASSIUM mmol/L 4.1   CHLORIDE mmol/L 97*   CO2 mmol/L 29.0   BUN mg/dL 12   CREATININE mg/dL 0.64   GLUCOSE mg/dL 163*   CALCIUM mg/dL 10.2         Results from last 7 days   Lab Units 10/30/24  1139   HEMOGLOBIN A1C % 6.50*           Vital Signs  Temp:  [98.2 °F (36.8 °C)-98.9 °F (37.2 °C)] 98.9 °F (37.2 °C)  Heart Rate:  [69-99] 72  Resp:  [18-20] 18  BP: (105-140)/() 114/58  Temp  Min: 98.2 °F (36.8 °C)  Max: 98.9 °F (37.2 °C)   BP  Min: 105/54  Max: 140/100   Pulse  Min: 69  Max: 99   Resp  Min: 18  Max: 20   SpO2  Min: 87 %  Max: 99 %   Flow (L/min) (Oxygen Therapy)  Min: 2  Max: 2   No data recorded     Discharge Disposition: Stable  Rehab Facility or Unit (DC - External)    Discharge Diet: Cardiac    Activity at Discharge: As tolerated    Follow-up Appointments  Future Appointments   Date Time Provider Department Center   1/15/2025  2:00 PM Mark Brito PA [facility]   11/21/2025  2:45 PM Donaldo Hollingsworth MD [facility]     Additional Instructions for the Follow-ups that You Need to Schedule       Ambulatory Referral to Physical Therapy for Evaluation & Treatment   As directed      Specialty needed: Evaluate and treat   Follow-up needed: Yes        Discharge Follow-up with Specified Provider: Dr Hollingsworth in [place] office; 3  Months   As directed      To: Dr Hollingsworth in [place] office   Follow Up: 3 Months        Discharge Follow-up with Specified Provider: PCP; 1 Week   As directed      To: PCP   Follow Up: 1 Week                Test Results Pending at Discharge: None       Electronically signed by FLOWER Ya, 11/02/24, 8:27 AM EDT.

## 2024-11-02 ENCOUNTER — APPOINTMENT (OUTPATIENT)
Dept: CT IMAGING | Facility: HOSPITAL | Age: 74
End: 2024-11-02
Payer: MEDICARE

## 2024-11-02 ENCOUNTER — HOSPITAL ENCOUNTER (EMERGENCY)
Facility: HOSPITAL | Age: 74
Discharge: HOME | End: 2024-11-03
Attending: EMERGENCY MEDICINE
Payer: MEDICARE

## 2024-11-02 VITALS
HEIGHT: 66 IN | HEART RATE: 84 BPM | SYSTOLIC BLOOD PRESSURE: 148 MMHG | TEMPERATURE: 98.8 F | DIASTOLIC BLOOD PRESSURE: 70 MMHG | WEIGHT: 262 LBS | RESPIRATION RATE: 18 BRPM | BODY MASS INDEX: 42.11 KG/M2 | OXYGEN SATURATION: 96 %

## 2024-11-02 VITALS
SYSTOLIC BLOOD PRESSURE: 114 MMHG | WEIGHT: 264.55 LBS | RESPIRATION RATE: 18 BRPM | OXYGEN SATURATION: 95 % | TEMPERATURE: 98.9 F | DIASTOLIC BLOOD PRESSURE: 58 MMHG | HEART RATE: 72 BPM | HEIGHT: 66 IN | BODY MASS INDEX: 42.52 KG/M2

## 2024-11-02 DIAGNOSIS — T50.4X5A: ICD-10-CM

## 2024-11-02 DIAGNOSIS — R82.81 PYURIA: ICD-10-CM

## 2024-11-02 DIAGNOSIS — I49.1 PREMATURE ATRIAL CONTRACTION: ICD-10-CM

## 2024-11-02 DIAGNOSIS — R19.7 DIARRHEA, UNSPECIFIED TYPE: Primary | ICD-10-CM

## 2024-11-02 LAB
ALBUMIN SERPL-MCNC: 3.7 G/DL (ref 3.5–5.2)
ALBUMIN/GLOB SERPL: 0.9 G/DL
ALP SERPL-CCNC: 85 U/L (ref 39–117)
ALT SERPL W P-5'-P-CCNC: 16 U/L (ref 1–33)
ANION GAP SERPL CALCULATED.3IONS-SCNC: 11 MMOL/L (ref 5–15)
AST SERPL-CCNC: 16 U/L (ref 1–32)
BACTERIA UR QL AUTO: ABNORMAL /HPF
BASOPHILS # BLD AUTO: 0.04 10*3/MM3 (ref 0–0.2)
BASOPHILS NFR BLD AUTO: 0.4 % (ref 0–1.5)
BILIRUB SERPL-MCNC: 0.3 MG/DL (ref 0–1.2)
BILIRUB UR QL STRIP: NEGATIVE
BUN SERPL-MCNC: 18 MG/DL (ref 8–23)
BUN/CREAT SERPL: 23.7 (ref 7–25)
CALCIUM SPEC-SCNC: 11.1 MG/DL (ref 8.6–10.5)
CHLORIDE SERPL-SCNC: 91 MMOL/L (ref 98–107)
CLARITY UR: ABNORMAL
CO2 SERPL-SCNC: 28 MMOL/L (ref 22–29)
COLOR UR: YELLOW
CREAT SERPL-MCNC: 0.76 MG/DL (ref 0.57–1)
D-LACTATE SERPL-SCNC: 1.1 MMOL/L (ref 0.5–2)
DEPRECATED RDW RBC AUTO: 42.5 FL (ref 37–54)
EGFRCR SERPLBLD CKD-EPI 2021: 82.9 ML/MIN/1.73
EOSINOPHIL # BLD AUTO: 0.05 10*3/MM3 (ref 0–0.4)
EOSINOPHIL NFR BLD AUTO: 0.5 % (ref 0.3–6.2)
ERYTHROCYTE [DISTWIDTH] IN BLOOD BY AUTOMATED COUNT: 12.5 % (ref 12.3–15.4)
GLOBULIN UR ELPH-MCNC: 4.1 GM/DL
GLUCOSE BLDC GLUCOMTR-MCNC: 162 MG/DL (ref 70–130)
GLUCOSE BLDC GLUCOMTR-MCNC: 239 MG/DL (ref 70–130)
GLUCOSE SERPL-MCNC: 224 MG/DL (ref 65–99)
GLUCOSE UR STRIP-MCNC: NEGATIVE MG/DL
HCT VFR BLD AUTO: 36.1 % (ref 34–46.6)
HGB BLD-MCNC: 12 G/DL (ref 12–15.9)
HGB UR QL STRIP.AUTO: ABNORMAL
HOLD SPECIMEN: NORMAL
HYALINE CASTS UR QL AUTO: ABNORMAL /LPF
IMM GRANULOCYTES # BLD AUTO: 0.07 10*3/MM3 (ref 0–0.05)
IMM GRANULOCYTES NFR BLD AUTO: 0.7 % (ref 0–0.5)
KETONES UR QL STRIP: NEGATIVE
LEUKOCYTE ESTERASE UR QL STRIP.AUTO: ABNORMAL
LIPASE SERPL-CCNC: 28 U/L (ref 13–60)
LYMPHOCYTES # BLD AUTO: 1.3 10*3/MM3 (ref 0.7–3.1)
LYMPHOCYTES NFR BLD AUTO: 13.4 % (ref 19.6–45.3)
MCH RBC QN AUTO: 30.7 PG (ref 26.6–33)
MCHC RBC AUTO-ENTMCNC: 33.2 G/DL (ref 31.5–35.7)
MCV RBC AUTO: 92.3 FL (ref 79–97)
MONOCYTES # BLD AUTO: 0.96 10*3/MM3 (ref 0.1–0.9)
MONOCYTES NFR BLD AUTO: 9.9 % (ref 5–12)
NEUTROPHILS NFR BLD AUTO: 7.29 10*3/MM3 (ref 1.7–7)
NEUTROPHILS NFR BLD AUTO: 75.1 % (ref 42.7–76)
NITRITE UR QL STRIP: NEGATIVE
NRBC BLD AUTO-RTO: 0 /100 WBC (ref 0–0.2)
PH UR STRIP.AUTO: 5.5 [PH] (ref 5–8)
PLATELET # BLD AUTO: 283 10*3/MM3 (ref 140–450)
PMV BLD AUTO: 11.2 FL (ref 6–12)
POTASSIUM SERPL-SCNC: 3.5 MMOL/L (ref 3.5–5.2)
PROT SERPL-MCNC: 7.8 G/DL (ref 6–8.5)
PROT UR QL STRIP: ABNORMAL
QT INTERVAL: 398 MS
QT INTERVAL: 414 MS
QTC INTERVAL: 476 MS
QTC INTERVAL: 483 MS
RBC # BLD AUTO: 3.91 10*6/MM3 (ref 3.77–5.28)
RBC # UR STRIP: ABNORMAL /HPF
REF LAB TEST METHOD: ABNORMAL
SODIUM SERPL-SCNC: 130 MMOL/L (ref 136–145)
SP GR UR STRIP: 1.02 (ref 1–1.03)
SQUAMOUS #/AREA URNS HPF: ABNORMAL /HPF
UROBILINOGEN UR QL STRIP: ABNORMAL
WBC # UR STRIP: ABNORMAL /HPF
WBC NRBC COR # BLD AUTO: 9.71 10*3/MM3 (ref 3.4–10.8)
WHOLE BLOOD HOLD COAG: NORMAL
WHOLE BLOOD HOLD SPECIMEN: NORMAL

## 2024-11-02 PROCEDURE — 85025 COMPLETE CBC W/AUTO DIFF WBC: CPT | Performed by: EMERGENCY MEDICINE

## 2024-11-02 PROCEDURE — 81001 URINALYSIS AUTO W/SCOPE: CPT | Performed by: EMERGENCY MEDICINE

## 2024-11-02 PROCEDURE — 83605 ASSAY OF LACTIC ACID: CPT | Performed by: EMERGENCY MEDICINE

## 2024-11-02 PROCEDURE — 93005 ELECTROCARDIOGRAM TRACING: CPT | Performed by: STUDENT IN AN ORGANIZED HEALTH CARE EDUCATION/TRAINING PROGRAM

## 2024-11-02 PROCEDURE — 93005 ELECTROCARDIOGRAM TRACING: CPT | Performed by: EMERGENCY MEDICINE

## 2024-11-02 PROCEDURE — 63710000001 INSULIN LISPRO (HUMAN) PER 5 UNITS: Performed by: PHYSICIAN ASSISTANT

## 2024-11-02 PROCEDURE — 63710000001 INSULIN LISPRO (HUMAN) PER 5 UNITS: Performed by: STUDENT IN AN ORGANIZED HEALTH CARE EDUCATION/TRAINING PROGRAM

## 2024-11-02 PROCEDURE — 80053 COMPREHEN METABOLIC PANEL: CPT | Performed by: EMERGENCY MEDICINE

## 2024-11-02 PROCEDURE — 99238 HOSP IP/OBS DSCHRG MGMT 30/<: CPT | Performed by: NURSE PRACTITIONER

## 2024-11-02 PROCEDURE — 82948 REAGENT STRIP/BLOOD GLUCOSE: CPT

## 2024-11-02 PROCEDURE — 99284 EMERGENCY DEPT VISIT MOD MDM: CPT

## 2024-11-02 PROCEDURE — 83690 ASSAY OF LIPASE: CPT | Performed by: EMERGENCY MEDICINE

## 2024-11-02 PROCEDURE — 93010 ELECTROCARDIOGRAM REPORT: CPT | Performed by: STUDENT IN AN ORGANIZED HEALTH CARE EDUCATION/TRAINING PROGRAM

## 2024-11-02 PROCEDURE — 74176 CT ABD & PELVIS W/O CONTRAST: CPT

## 2024-11-02 PROCEDURE — 63710000001 INSULIN GLARGINE PER 5 UNITS: Performed by: STUDENT IN AN ORGANIZED HEALTH CARE EDUCATION/TRAINING PROGRAM

## 2024-11-02 RX ORDER — DIPHENOXYLATE HYDROCHLORIDE AND ATROPINE SULFATE 2.5; .025 MG/1; MG/1
1 TABLET ORAL 4 TIMES DAILY PRN
Qty: 15 TABLET | Refills: 0 | Status: SHIPPED | OUTPATIENT
Start: 2024-11-02

## 2024-11-02 RX ORDER — DIPHENOXYLATE HYDROCHLORIDE AND ATROPINE SULFATE 2.5; .025 MG/1; MG/1
1 TABLET ORAL ONCE
Status: COMPLETED | OUTPATIENT
Start: 2024-11-02 | End: 2024-11-02

## 2024-11-02 RX ORDER — SODIUM CHLORIDE 9 MG/ML
10 INJECTION, SOLUTION INTRAMUSCULAR; INTRAVENOUS; SUBCUTANEOUS AS NEEDED
Status: DISCONTINUED | OUTPATIENT
Start: 2024-11-02 | End: 2024-11-03 | Stop reason: HOSPADM

## 2024-11-02 RX ORDER — METOPROLOL TARTRATE 25 MG/1
25 TABLET, FILM COATED ORAL EVERY 12 HOURS SCHEDULED
Qty: 60 TABLET | Refills: 2 | Status: SHIPPED | OUTPATIENT
Start: 2024-11-02

## 2024-11-02 RX ORDER — DOFETILIDE 0.5 MG/1
500 CAPSULE ORAL EVERY 12 HOURS
Qty: 60 CAPSULE | Refills: 2 | Status: SHIPPED | OUTPATIENT
Start: 2024-11-02

## 2024-11-02 RX ORDER — GRANULES FOR ORAL 3 G/1
3 POWDER ORAL ONCE
Status: COMPLETED | OUTPATIENT
Start: 2024-11-02 | End: 2024-11-02

## 2024-11-02 RX ADMIN — ALLOPURINOL 100 MG: 100 TABLET ORAL at 09:57

## 2024-11-02 RX ADMIN — APIXABAN 5 MG: 5 TABLET, FILM COATED ORAL at 09:57

## 2024-11-02 RX ADMIN — METOPROLOL TARTRATE 25 MG: 25 TABLET, FILM COATED ORAL at 09:57

## 2024-11-02 RX ADMIN — ANASTROZOLE 1 MG: 1 TABLET ORAL at 09:57

## 2024-11-02 RX ADMIN — INSULIN LISPRO 4 UNITS: 100 INJECTION, SOLUTION INTRAVENOUS; SUBCUTANEOUS at 10:18

## 2024-11-02 RX ADMIN — GRANULES FOR ORAL SOLUTION 3 G: 3 POWDER ORAL at 23:32

## 2024-11-02 RX ADMIN — DOFETILIDE 500 MCG: 0.5 CAPSULE ORAL at 09:57

## 2024-11-02 RX ADMIN — PAROXETINE HYDROCHLORIDE 10 MG: 10 TABLET, FILM COATED ORAL at 06:41

## 2024-11-02 RX ADMIN — INSULIN GLARGINE 25 UNITS: 100 INJECTION, SOLUTION SUBCUTANEOUS at 10:18

## 2024-11-02 RX ADMIN — POTASSIUM CHLORIDE 20 MEQ: 1500 TABLET, EXTENDED RELEASE ORAL at 06:41

## 2024-11-02 RX ADMIN — COLCHICINE 0.6 MG: 0.6 TABLET ORAL at 09:57

## 2024-11-02 RX ADMIN — DIPHENOXYLATE HYDROCHLORIDE AND ATROPINE SULFATE 1 TABLET: 2.5; .025 TABLET ORAL at 23:32

## 2024-11-02 RX ADMIN — FUROSEMIDE 40 MG: 40 TABLET ORAL at 10:20

## 2024-11-02 RX ADMIN — LOSARTAN POTASSIUM 50 MG: 50 TABLET, FILM COATED ORAL at 09:57

## 2024-11-02 RX ADMIN — ISOSORBIDE MONONITRATE 30 MG: 30 TABLET, EXTENDED RELEASE ORAL at 09:57

## 2024-11-02 RX ADMIN — INSULIN LISPRO 6 UNITS: 100 INJECTION, SOLUTION INTRAVENOUS; SUBCUTANEOUS at 10:18

## 2024-11-02 RX ADMIN — DICLOFENAC SODIUM 2 G: 10 GEL TOPICAL at 09:58

## 2024-11-02 RX ADMIN — CLOPIDOGREL BISULFATE 75 MG: 75 TABLET ORAL at 09:57

## 2024-11-02 RX ADMIN — ACETAMINOPHEN 650 MG: 325 TABLET ORAL at 01:51

## 2024-11-02 RX ADMIN — LIDOCAINE 1 PATCH: 4 PATCH TOPICAL at 09:57

## 2024-11-02 NOTE — PROGRESS NOTES
Mandi Jacob  [id number]  1950   LOS: 5 days   Patient Care Team:  John Montelongo MD as PCP - General (Internal Medicine)  John Montelongo MD as Referring Physician (Internal Medicine)    Chief Complaint: Persistent atrial fibrillation    Subjective Patient says she converted to sinus rhythm yesterday.  She denies any chest pain, shortness of breath, dizziness, but still has some foot pain.    Objective     Vital Sign Min/Max for last 24 hours  Temp  Min: 98.2 °F (36.8 °C)  Max: 98.8 °F (37.1 °C)   BP  Min: 105/54  Max: 140/100   Pulse  Min: 69  Max: 99   Resp  Min: 18  Max: 20   SpO2  Min: 87 %  Max: 99 %   No data recorded   No data recorded         10/28/24  1900 10/30/24  1413   Weight: 120 kg (264 lb) 120 kg (264 lb 8.8 oz)         Intake/Output Summary (Last 24 hours) at 11/2/2024 0755  Last data filed at 11/2/2024 0425  Gross per 24 hour   Intake 200 ml   Output 2650 ml   Net -2450 ml       Physical Exam:     General Appearance:    Alert, cooperative, in no acute distress   Lungs:   Decreased breath sounds to auscultation,respirations regular, even and                unlabored, 2 L O2 NC    Heart:  Sinus rhythm with PACs, normal S1 and S2, no            murmur, no gallop, no rub, no click   Abdomen:  Extremities:   Soft, nontender, bowel sounds audible x4    No edema, normal range of motion   Pulses:   Pulses palpable and equal bilaterally      Results Review:   Results from last 7 days   Lab Units 11/01/24  0953 10/31/24  1031 10/30/24  1138   SODIUM mmol/L 138 134* 141   POTASSIUM mmol/L 4.1 3.6 4.2   CHLORIDE mmol/L 97* 95* 101   CO2 mmol/L 29.0 29.0 28.0   BUN mg/dL 12 12 13   CREATININE mg/dL 0.64 0.79 0.70   GLUCOSE mg/dL 163* 300* 193*   CALCIUM mg/dL 10.2 10.2 10.4             Results from last 7 days   Lab Units 10/30/24  1139   HEMOGLOBIN A1C % 6.50*       No new ECG to review this morning    ECG 11/1/2024 at 1344:  Sinus rhythm with premature supraventricular  complexes  Nonspecific ST abnormality  Abnormal ECG  When compared with ECG of 01-NOV-2024 09:14,  Previous ECG has undetermined rhythm, needs review  Confirmed by ATILIO ELISE MD ([id number]) on 11/1/2024 3:12:31 PM    ECG 11/1/2024 at 2146:  Undetermined rhythm  Nonspecific ST abnormality  Abnormal ECG  When compared with ECG of 01-NOV-2024 13:49, (Unconfirmed)  Current undetermined rhythm precludes rhythm comparison, needs review     ECG 11/2/2024:  Sinus rhythm with PACs, rightward axis, nonspecific ST abnormality, abnormal ECG, 79 bpm,  ms, QRS 88 ms, QTc 451 ms    Medication Review: Reviewed    Assessment & Plan   Patient with persistent atrial fibrillation, here for Tikosyn admit, now in sinus rhythm with PACs.   Acceptable QTc.  She will be discharged to inpatient rehab today to [place] [facility].  Patient will follow-up with Dr. Elise in 3 months in the [place] clinic.  Rates are controlled. Continue Eliquis 5 mg twice daily, Plavix 75 mg daily, Lasix 40 mg twice daily, Tikosyn 500 mcg twice daily, Imdur 30 mg daily, losartan 50 mg daily, Mag-Ox 400 mg nightly, metoprolol tartrate 25 mg twice daily, Micro-K 10 mEq nightly, Klor-Con 20 mEq daily.  Patient has received 8 doses of Tikosyn.      Persistent atrial fibrillation    Diabetes mellitus    Hypertension    Foot pain    Electronically signed by FLOWER Ya, 11/02/24, 8:20 AM EDT.     11/02/24  07:55 EDT

## 2024-11-03 LAB
QT INTERVAL: 394 MS
QTC INTERVAL: 451 MS

## 2024-11-03 NOTE — ED PROVIDER NOTES
Subjective   History of Present Illness    Pt presents with diarrhea that started yesterday.  She also complains of some crampy LLQ abd pain for the same time.  No fever or vomiting.    She was just hospitalized here for AF and Tikosyn initiation, discharged this morning.  She is worried she is in AF still.  She had an EKG this morning prior to discharge and she says they told her it was sinus but she is skeptical.    She was also treated for gout during her hospital stay.    She has a history of diverticulosis.    History provided by:  Patient      Review of Systems    Past Medical History:   Diagnosis Date    Arrhythmia     Asthma     Atrial fibrillation     Breast cancer 05/2023    Right breast    CAD (coronary artery disease)     non-obstructive by cath 02/2012    CHF (congestive heart failure)     COPD (chronic obstructive pulmonary disease)     Diabetes mellitus     Diastolic dysfunction     Dizziness     Edema     Esophageal spasm     Herniated lumbar intervertebral disc     L5    Hyperlipidemia     intolerant to statins     Hypertension     Osteoarthritis     Palpitations     SOB (shortness of breath)     Stroke     Stroke-like symptoms     TIA (transient ischemic attack) 2015    left hemispheric TIA/CVA    Unstable angina        Allergies   Allergen Reactions    Ace Inhibitors Anaphylaxis    Diltiazem Angioedema    Flecainide Other (See Comments)     Facial numbness and edema, itching all over and shortness of breath    Incruse Ellipta [Umeclidinium Bromide] Shortness Of Breath    Lisinopril Anaphylaxis and Hives    Niacin Swelling     LIPS AND TONGUE    Wellbutrin [Bupropion] Shortness Of Breath    Ketorolac Tromethamine Arrhythmia    Buspirone Other (See Comments)     THROAT AND LIPS SWELLED     Ciprofloxacin Rash    Citalopram Unknown (See Comments)     PT UNSURE     Clindamycin/Lincomycin Rash    Codeine Other (See Comments)     SEVERE HEART BURN    Levaquin [Levofloxacin] Rash    Lipitor [Atorvastatin]  Diarrhea and Other (See Comments)     HEADACHE    Liraglutide Unknown - Low Severity    Lorazepam Mental Status Change    Sertraline Unknown - Low Severity     PT UNSURE OF REACTION     Statins Diarrhea and Other (See Comments)     HEADACHE    Sulfamethoxazole-Trimethoprim Unknown (See Comments)     PT UNSURE OF REACTION    Trulicity [Dulaglutide] Diarrhea     SEVERE DIARRHEA    Xigduo Xr [Dapagliflozin Pro-Metformin Er] Diarrhea       Past Surgical History:   Procedure Laterality Date    CARDIAC CATHETERIZATION      CARDIAC CATHETERIZATION  11/13/2023    dr. mcclure with 1 stent    CAROTID STENT      CATARACT EXTRACTION      CATARACT EXTRACTION      CEREBRAL ANGIOGRAM      CORONARY STENT PLACEMENT  11/13/2023    EYE SURGERY Bilateral 2021    INTERVENTIONAL RADIOLOGY PROCEDURE Bilateral 05/16/2019    Procedure: Carotid Cerebral Angiogram;  Surgeon: Alex Erickson MD;  Location: [facility] INVASIVE LOCATION;  Service: Interventional Radiology    TX TCAT IV STENT CRV CRTD ART EMBOLIC PROTECJ N/A 06/05/2019    Right Carotid Stent     TUBAL ABDOMINAL LIGATION      WRIST SURGERY         Family History   Problem Relation Age of Onset    Heart attack Mother     Heart failure Mother         congestive    Hyperlipidemia Mother     Hypertension Mother     Peripheral vascular disease Mother     Heart attack Father     Heart failure Father         congestive    Diabetes Father     Hyperlipidemia Father     Hypertension Father     Peripheral vascular disease Father     Asthma Father     Hypertension Brother     Heart failure Other         congestive    Diabetes Other     Hyperlipidemia Other     Hypertension Other     Peripheral vascular disease Other        Social History     Socioeconomic History    Marital status:    Tobacco Use    Smoking status: Former     Current packs/day: 0.00     Average packs/day: 1.5 packs/day for 41.1 years (61.6 ttl pk-yrs)     Types: Cigarettes     Start date: 4/8/1968     Quit date:  3/22/2009     Years since quitting: 15.6    Smokeless tobacco: Never   Vaping Use    Vaping status: Never Used   Substance and Sexual Activity    Alcohol use: No    Drug use: No    Sexual activity: Not Currently     Partners: Male     Birth control/protection: Post-menopausal           Objective   Physical Exam  Vitals and nursing note reviewed.   Constitutional:       General: She is not in acute distress.     Appearance: Normal appearance. She is not ill-appearing.   HENT:      Head: Normocephalic and atraumatic.      Mouth/Throat:      Mouth: Mucous membranes are moist.   Eyes:      General: No scleral icterus.        Right eye: No discharge.         Left eye: No discharge.      Conjunctiva/sclera: Conjunctivae normal.   Cardiovascular:      Rate and Rhythm: Normal rate and regular rhythm. Rhythm irregular.      Heart sounds: No murmur heard.  Pulmonary:      Effort: Pulmonary effort is normal. No respiratory distress.      Breath sounds: Normal breath sounds. No wheezing.   Abdominal:      General: Bowel sounds are normal. There is no distension.      Palpations: Abdomen is soft.      Tenderness:  in the left lower quadrant There is no guarding or rebound.   Musculoskeletal:         General: No swelling. Normal range of motion.      Cervical back: Normal range of motion and neck supple.   Skin:     General: Skin is warm and dry.      Findings: No rash.   Neurological:      General: No focal deficit present.      Mental Status: She is alert. Mental status is at baseline.   Psychiatric:         Mood and Affect: Mood normal.         Behavior: Behavior normal.         Thought Content: Thought content normal.         Procedures           ED Course    I reviewed outside records.  Cardiology note from hospitalization this morning.  AF, admitted for Tikosyn induction converted to SR with acceptable QTc.  Hospitalist note dated yesterday mentions gout therapy, and patient was discharged on allopurinol and colchicine as  well as Tikosyn.  No notes mention abdominal pain or diarrhea.  I reviewed EKG performed this morning which shows sinus rhythm with premature complexes.    EKG read by me SR with PACs and PVC.    Labs benign.    CT abd/pel negative.    UA with some pyuria and bacteriuria.    Suspect her diarrhea is a side effect of colchicine therapy.    Patient stable on serial rechecks.  Family present now.  Discussed findings, concerns, plan of care, expected course, reasons to return and followup.  Provided the opportunity to ask questions.                                                 Medical Decision Making  Problems Addressed:  Adverse effect of colchicine, initial encounter: complicated acute illness or injury  Diarrhea, unspecified type: complicated acute illness or injury  Premature atrial contraction: complicated acute illness or injury  Pyuria: complicated acute illness or injury    Amount and/or Complexity of Data Reviewed  External Data Reviewed: notes.  Labs: ordered. Decision-making details documented in ED Course.  Radiology: ordered and independent interpretation performed. Decision-making details documented in ED Course.  ECG/medicine tests: ordered and independent interpretation performed. Decision-making details documented in ED Course.    Risk  Prescription drug management.  Decision regarding hospitalization.        Final diagnoses:   Diarrhea, unspecified type   Adverse effect of colchicine, initial encounter   Premature atrial contraction   Pyuria       ED Disposition  ED Disposition       ED Disposition   Discharge    Condition   Stable    Comment   --               No follow-up provider specified.       Medication List        New Prescriptions      diphenoxylate-atropine 2.5-0.025 MG per tablet  Commonly known as: LOMOTIL  Take 1 tablet by mouth 4 (Four) Times a Day As Needed for Diarrhea.               Where to Get Your Medications        These medications were sent to [facility]/pharmacy #[id number] - [place], [place]  - [address] [address] - [phone]  - [phone]  [address] [address], [facility] [place] [address]      Phone: [phone]   diphenoxylate-atropine 2.5-0.025 MG per tablet            Tong Varghese MD  11/03/24 0032

## 2024-11-14 RX ORDER — DOFETILIDE 0.5 MG/1
500 CAPSULE ORAL EVERY 12 HOURS
Qty: 180 CAPSULE | Refills: 1 | OUTPATIENT
Start: 2024-11-14

## 2024-11-14 NOTE — TELEPHONE ENCOUNTER
RX called into pharmacy. Patient missed evening dose last night due to being out of medicine. Pharmacy filling this medication ASAP so patient can take AM dose this morning.

## 2024-11-18 ENCOUNTER — TELEPHONE (OUTPATIENT)
Dept: CARDIOLOGY | Facility: CLINIC | Age: 74
End: 2024-11-18
Payer: MEDICARE

## 2024-11-18 NOTE — TELEPHONE ENCOUNTER
Mark Brito PA to Me       11/18/24  4:11 PM  I will defer antidysrhythmic adjustment to her EP as that was instituted by that service.  She was changed to T consent.  If she feels that side effects related to the same, I would recommend she contact their office.  I can augment medications to address angina and potential failure symptoms otherwise, but we will await their input.      Patient notified. She will follow up with their office, she has an appointment this week.

## 2024-11-18 NOTE — TELEPHONE ENCOUNTER
Patient called triage to report she has been having A-fib, angina, SOB , and retaining fluid. She was recently taken off sotalol and triamterene and put on tikosyn. She would like to know if we need to make some medication changes.

## 2025-01-02 ENCOUNTER — TELEPHONE (OUTPATIENT)
Dept: CARDIOLOGY | Facility: CLINIC | Age: 75
End: 2025-01-02
Payer: MEDICARE

## 2025-01-02 DIAGNOSIS — R60.9 EDEMA, UNSPECIFIED TYPE: ICD-10-CM

## 2025-01-02 RX ORDER — METOPROLOL TARTRATE 25 MG/1
25 TABLET, FILM COATED ORAL EVERY 12 HOURS SCHEDULED
Qty: 180 TABLET | Refills: 3 | Status: SHIPPED | OUTPATIENT
Start: 2025-01-02

## 2025-01-02 RX ORDER — LOSARTAN POTASSIUM 50 MG/1
50 TABLET ORAL DAILY
Qty: 90 TABLET | Refills: 11 | Status: SHIPPED | OUTPATIENT
Start: 2025-01-02

## 2025-01-02 RX ORDER — POTASSIUM CHLORIDE 750 MG/1
10 TABLET, EXTENDED RELEASE ORAL NIGHTLY
Qty: 90 TABLET | Refills: 3 | Status: SHIPPED | OUTPATIENT
Start: 2025-01-02

## 2025-01-02 NOTE — TELEPHONE ENCOUNTER
Name from pharmacy: FUROSEMIDE 40MG TABLET 40 Tablet          Will file in chart as: furosemide (LASIX) 40 MG tablet    Sig: TAKE 1 TABLET TWICE DAILY    Disp: 180 tablet    Refills: 3    Start: 1/2/2025    Class: Normal    Non-formulary For: Edema, unspecified type    Last ordered: 9 months ago (4/3/2024) by RICKEY Yen    Last refill: 1/2/2025    Rx #: [id number]    Diuretics Protocol [id number]/02/2025 12:05 PM   Protocol Details Normal serum sodium in past 12 months    No active pregnancy on record    Normal serum potassium in past 12 months    No positive pregnancy test in past 12 months    Recent or future visit with authorizing provider    Blood pressure on record    GFR> 30 ml/min in past year

## 2025-01-02 NOTE — TELEPHONE ENCOUNTER
Patient had Tikosyn admission on 10/28/2024. Per discharge note you wanted her to follow up in 3 months. She has not seen you since admission and her next appointment isn't until 11/21/2025.    Do you want her to be sooner than 11/21/2025? Also patient is requesting a refill on Tikosyn. Okay to fill?

## 2025-01-03 RX ORDER — DOFETILIDE 0.5 MG/1
500 CAPSULE ORAL EVERY 12 HOURS
Qty: 180 CAPSULE | Refills: 2 | Status: SHIPPED | OUTPATIENT
Start: 2025-01-03

## 2025-01-03 RX ORDER — FUROSEMIDE 40 MG/1
TABLET ORAL
Qty: 180 TABLET | Refills: 1 | Status: SHIPPED | OUTPATIENT
Start: 2025-01-03

## 2025-01-15 ENCOUNTER — OFFICE VISIT (OUTPATIENT)
Dept: CARDIOLOGY | Facility: CLINIC | Age: 75
End: 2025-01-15
Payer: MEDICARE

## 2025-01-15 VITALS
WEIGHT: 264.2 LBS | HEART RATE: 91 BPM | BODY MASS INDEX: 42.46 KG/M2 | SYSTOLIC BLOOD PRESSURE: 159 MMHG | DIASTOLIC BLOOD PRESSURE: 76 MMHG | HEIGHT: 66 IN | OXYGEN SATURATION: 94 %

## 2025-01-15 DIAGNOSIS — I10 PRIMARY HYPERTENSION: ICD-10-CM

## 2025-01-15 DIAGNOSIS — I48.0 PAROXYSMAL ATRIAL FIBRILLATION: Primary | ICD-10-CM

## 2025-01-15 DIAGNOSIS — R06.02 SHORTNESS OF BREATH: ICD-10-CM

## 2025-01-15 PROCEDURE — 3078F DIAST BP <80 MM HG: CPT | Performed by: PHYSICIAN ASSISTANT

## 2025-01-15 PROCEDURE — 93000 ELECTROCARDIOGRAM COMPLETE: CPT | Performed by: PHYSICIAN ASSISTANT

## 2025-01-15 PROCEDURE — 1160F RVW MEDS BY RX/DR IN RCRD: CPT | Performed by: PHYSICIAN ASSISTANT

## 2025-01-15 PROCEDURE — 1159F MED LIST DOCD IN RCRD: CPT | Performed by: PHYSICIAN ASSISTANT

## 2025-01-15 PROCEDURE — 3077F SYST BP >= 140 MM HG: CPT | Performed by: PHYSICIAN ASSISTANT

## 2025-01-15 PROCEDURE — 99213 OFFICE O/P EST LOW 20 MIN: CPT | Performed by: PHYSICIAN ASSISTANT

## 2025-01-15 RX ORDER — METOLAZONE 2.5 MG/1
2.5 TABLET ORAL DAILY PRN
COMMUNITY

## 2025-01-15 RX ORDER — MULTIPLE VITAMINS W/ MINERALS TAB 9MG-400MCG
1 TAB ORAL DAILY
COMMUNITY

## 2025-01-15 NOTE — PROGRESS NOTES
Problem list     Subjective   Mandi Jacob is a 74 y.o. female     Chief Complaint   Patient presents with    Follow-up     8 MONTH FOLLOW UP     Atrial Fibrillation    Chest Pain    Shortness of Breath    Palpitations    Leg Swelling   Problem List:  1. Nonobstructive CAD by cath February 2012.  1.1.  Catheterization, 11/2023, in the setting of low level symptoms and abnormal stress test findings.  The patient had IVUS guided stenting of the RCA.  She had nonobstructive disease noted otherwise, with medical management recommended.  2. Preserved systolic function  3. History of CHF  4. Diastolic dysfunction  5. Hypertension      6. Dyslipidemia. The patient reports she is intolerant to statins.      7. Chronic palpitations.      7.1.  Recurrent PACs noted by telemetry historically.  The patient was treated with sotalol therapy for the same.      7.2.  A. fib with rapid ventricular response noted by event monitor, 2019.      7.3.  Admission, 10/2024, for Tikosyn institution.  Sotalol was discontinued.  She had cardioversion during hospitalization.      8.  Peripheral vascular disease, status post angioplasty and stenting to the right carotid artery system, 06/19 with Dr. Dre ANN    The patient presents in clinic today for follow-up.  She has had numerous hospitalizations since seen here last.  She was admitted in 10/24 for institution of Tikosyn.  Sotalol was discontinued and she was admitted through EP services.  She apparently had mild failure symptoms at that time.  She was treated accordingly and discharged to a rehab facility per report.  She continued to have mild failure symptoms at that time, with diuretic therapy adjusted accordingly through those services.  She eventually had an evaluation at the emergency room in November, 2024, secondary to complications related to recurrent diarrhea.  She again was treated accordingly.  She was discharged and advised to follow-up today.  The patient has  not felt well since starting Tikosyn.  She continues to have recurrent A-fib, which is moderately symptomatic for her.  She tells me she felt better on sotalol and would like to discuss reinstitution of that.  She has an appointment to discuss this with her EP provider.  She titrates diuretic therapy at home for mild failure issues or symptoms.  She has chest pain with dysrhythmic activity only.  She has no further complaints.  Current Outpatient Medications on File Prior to Visit   Medication Sig Dispense Refill    albuterol (PROVENTIL) (2.5 MG/3ML) 0.083% nebulizer solution Albuterol Sulfate (2.5 MG/3ML) 0.083% Inhalation Nebulization Solution; Patient Sig: Albuterol Sulfate (2.5 MG/3ML) 0.083% Inhalation Nebulization Solution USE 1 UNIT DOSE IN NEBULIZER EVERY 4 TO 6 HOURS AS NEE      anastrozole (ARIMIDEX) 1 MG tablet Take 1 tablet by mouth Daily.      Cholecalciferol 25 MCG (1000 UT) tablet Take 1 tablet by mouth Daily.      clopidogrel (PLAVIX) 75 MG tablet TAKE 1 TABLET BY MOUTH DAILY. 90 tablet 3    Diclofenac Sodium (VOLTAREN) 1 % gel gel Apply 4 g topically to the appropriate area as directed As Needed.      dofetilide (TIKOSYN) 500 MCG capsule TAKE 1 CAPSULE BY MOUTH EVERY 12 HOURS 180 capsule 2    Eliquis 5 MG tablet tablet TAKE 1 TABLET EVERY 12 HOURS 180 tablet 3    furosemide (LASIX) 40 MG tablet TAKE 1 TABLET TWICE DAILY 180 tablet 1    insulin aspart (novoLOG FLEXPEN) 100 UNIT/ML solution pen-injector sc pen Inject 15 Units under the skin into the appropriate area as directed Daily With Breakfast.      isosorbide mononitrate (IMDUR) 30 MG 24 hr tablet TAKE 1 TABLET BY MOUTH DAILY. 90 tablet 3    losartan (COZAAR) 50 MG tablet Take 1 tablet by mouth Daily. 90 tablet 11    magnesium oxide (MAGOX) 400 (241.3 MG) MG tablet tablet Take 1 tablet by mouth Every Night.      metFORMIN ER (GLUCOPHAGE-XR) 500 MG 24 hr tablet Take 1 tablet by mouth Daily With Breakfast.  11    metOLazone (ZAROXOLYN) 2.5 MG  tablet Take 1 tablet by mouth Daily As Needed.      metoprolol tartrate (LOPRESSOR) 25 MG tablet Take 1 tablet by mouth Every 12 (Twelve) Hours. 180 tablet 3    montelukast (SINGULAIR) 10 MG tablet Take 1 tablet by mouth Every Night.      multivitamin with minerals tablet tablet Take 1 tablet by mouth Daily.      nitroglycerin (NITROSTAT) 0.4 MG SL tablet Place 1 tablet under the tongue Every 5 (Five) Minutes As Needed for Chest Pain. 25 tablet 2    O2 (OXYGEN) Inhale Continuous.      potassium chloride (K-DUR,KLOR-CON) 20 MEQ CR tablet Take 1 tablet by mouth Every Morning. 30 tablet 3    potassium chloride (KLOR-CON M10) 10 MEQ CR tablet Take 1 tablet by mouth Every Night. 90 tablet 3    Repatha SureClick solution auto-injector SureClick injection INJECT 1 ML UNDER THE SKIN INTO THE APPROPRIATE AREA AS DIRECTED EVERY 14 (FOURTEEN) DAYS. 6 pen 3    TRESIBA FLEXTOUCH 200 UNIT/ML solution pen-injector Inject 68 Units under the skin into the appropriate area as directed Every Morning. 100 units daily      Tudorza Pressair 400 MCG/ACT aerosol powder  powder for inhalation Inhale 1 puff 2 (Two) Times a Day.      allopurinol (ZYLOPRIM) 100 MG tablet Take 1 tablet by mouth Daily. 60 tablet 2     No current facility-administered medications on file prior to visit.       Ace inhibitors, Diltiazem, Flecainide, Incruse ellipta [umeclidinium bromide], Lisinopril, Niacin, Wellbutrin [bupropion], Ketorolac tromethamine, Buspirone, Ciprofloxacin, Citalopram, Clindamycin/lincomycin, Codeine, Levaquin [levofloxacin], Lipitor [atorvastatin], Liraglutide, Lorazepam, Sertraline, Statins, Sulfamethoxazole-trimethoprim, Trulicity [dulaglutide], and Xigduo xr [dapagliflozin pro-metformin er]    Past Medical History:   Diagnosis Date    Arrhythmia     Asthma     Atrial fibrillation     Breast cancer 05/2023    Right breast    CAD (coronary artery disease)     non-obstructive by cath 02/2012    CHF (congestive heart failure)     COPD  (chronic obstructive pulmonary disease)     Diabetes mellitus     Diastolic dysfunction     Dizziness     Edema     Esophageal spasm     Gout     Herniated lumbar intervertebral disc     L5    Hyperlipidemia     intolerant to statins     Hypertension     Osteoarthritis     Palpitations     SOB (shortness of breath)     Stroke     Stroke-like symptoms     TIA (transient ischemic attack) 2015    left hemispheric TIA/CVA    Unstable angina        Social History     Socioeconomic History    Marital status:    Tobacco Use    Smoking status: Former     Current packs/day: 0.00     Average packs/day: 1.5 packs/day for 41.1 years (61.6 ttl pk-yrs)     Types: Cigarettes     Start date: 4/8/1968     Quit date: 3/22/2009     Years since quitting: 15.8    Smokeless tobacco: Never   Vaping Use    Vaping status: Never Used   Substance and Sexual Activity    Alcohol use: No    Drug use: No    Sexual activity: Not Currently     Partners: Male     Birth control/protection: Post-menopausal       Family History   Problem Relation Age of Onset    Heart attack Mother     Heart failure Mother         congestive    Hyperlipidemia Mother     Hypertension Mother     Peripheral vascular disease Mother     Heart attack Father     Heart failure Father         congestive    Diabetes Father     Hyperlipidemia Father     Hypertension Father     Peripheral vascular disease Father     Asthma Father     Hypertension Brother     Heart failure Other         congestive    Diabetes Other     Hyperlipidemia Other     Hypertension Other     Peripheral vascular disease Other        Review of Systems   Constitutional: Negative.  Positive for diaphoresis and fatigue. Negative for chills and fever.   HENT: Negative.     Eyes: Negative.  Negative for visual disturbance.   Respiratory: Negative.  Positive for cough, chest tightness and shortness of breath. Negative for apnea and wheezing.    Cardiovascular: Negative.  Positive for chest pain, palpitations  "and leg swelling.   Gastrointestinal: Negative.  Negative for abdominal pain and blood in stool.   Endocrine: Negative.  Negative for cold intolerance and heat intolerance.   Genitourinary: Negative.  Negative for hematuria.   Musculoskeletal: Negative.  Positive for arthralgias, back pain, myalgias, neck pain and neck stiffness.   Skin: Negative.  Negative for rash and wound.   Allergic/Immunologic: Negative.  Positive for environmental allergies (SEASONAL). Negative for food allergies.   Neurological: Negative.  Positive for dizziness, weakness, light-headedness and headaches. Negative for syncope and numbness.   Hematological: Negative.  Bruises/bleeds easily (ON ELIQUIS).   Psychiatric/Behavioral: Negative.  Positive for sleep disturbance.        Objective   Vitals:    01/15/25 1406 01/15/25 1446   BP: (!) 187/98 159/76   Pulse: 111 91   SpO2: 94%    Weight: 120 kg (264 lb 3.2 oz)    Height: 167.6 cm (66\")       /76   Pulse 91   Ht 167.6 cm (66\")   Wt 120 kg (264 lb 3.2 oz)   SpO2 94%   BMI 42.64 kg/m²    Lab Results (most recent)       None          Physical Exam  Vitals and nursing note reviewed.   Constitutional:       General: She is not in acute distress.     Appearance: She is well-developed.   HENT:      Head: Normocephalic and atraumatic.   Eyes:      Conjunctiva/sclera: Conjunctivae normal.      Pupils: Pupils are equal, round, and reactive to light.   Neck:      Vascular: No JVD.      Trachea: No tracheal deviation.   Cardiovascular:      Rate and Rhythm: Normal rate. Rhythm irregular.      Heart sounds: Normal heart sounds.   Pulmonary:      Effort: Pulmonary effort is normal.      Breath sounds: Decreased air movement present.   Abdominal:      General: Bowel sounds are normal. There is no distension.      Palpations: Abdomen is soft. There is no mass.      Tenderness: There is no abdominal tenderness. There is no guarding or rebound.   Musculoskeletal:         General: No tenderness or " deformity. Normal range of motion.      Cervical back: Normal range of motion and neck supple.      Right lower le+ Edema present.      Left lower le+ Edema present.   Skin:     General: Skin is warm and dry.      Coloration: Skin is not pale.      Findings: No erythema or rash.   Neurological:      Mental Status: She is alert and oriented to person, place, and time.   Psychiatric:         Behavior: Behavior normal.         Thought Content: Thought content normal.         Judgment: Judgment normal.           Procedure     ECG 12 Lead    Date/Time: 1/15/2025 2:36 PM  Performed by: Mark Brito PA    Authorized by: Mark Brito PA  Comparison: compared with previous ECG from 2024  Comparison to previous ECG: Sinus rhythm, rate 98, normal axis, PACs noted, no acute changes present.             Assessment & Plan      Diagnosis Plan   1. Paroxysmal atrial fibrillation        2. Primary hypertension  ECG 12 Lead      3. Shortness of breath          1.  The patient presents today for follow-up.  She was recently changed from sotalol to Tikosyn during admission as above.  She does not feel that she has done well on Tikosyn and would like to discuss alternate antidysrhythmic therapy.  She has an appointment scheduled with her EP provider to discuss this.  We will await their recommendations.    2.  For now, I will continue medical therapy without change.  She is unsure if she takes Plavix or aspirin.  She will call back to report the same.  We can direct further at that time.  She will continue Eliquis.    3.  No further workup would be indicated.  We will await EP recommendations.  She will call for complications.  We will see her routinely through the clinic.  We have discussed ongoing titration of diuretic therapy.           Advance Care Planning   ACP discussion was held with the patient during this visit. Patient does not have an advance directive, declines further assistance.                     Electronically signed by:

## 2025-02-14 ENCOUNTER — OFFICE VISIT (OUTPATIENT)
Dept: CARDIOLOGY | Facility: CLINIC | Age: 75
End: 2025-02-14
Payer: MEDICARE

## 2025-02-14 VITALS
HEART RATE: 74 BPM | OXYGEN SATURATION: 93 % | BODY MASS INDEX: 42.11 KG/M2 | DIASTOLIC BLOOD PRESSURE: 72 MMHG | WEIGHT: 262 LBS | HEIGHT: 66 IN | SYSTOLIC BLOOD PRESSURE: 138 MMHG

## 2025-02-14 DIAGNOSIS — I50.9 CONGESTIVE HEART FAILURE, UNSPECIFIED HF CHRONICITY, UNSPECIFIED HEART FAILURE TYPE: Chronic | ICD-10-CM

## 2025-02-14 DIAGNOSIS — I10 PRIMARY HYPERTENSION: Chronic | ICD-10-CM

## 2025-02-14 DIAGNOSIS — I48.19 PERSISTENT ATRIAL FIBRILLATION: Primary | Chronic | ICD-10-CM

## 2025-02-14 PROCEDURE — 99214 OFFICE O/P EST MOD 30 MIN: CPT

## 2025-02-14 PROCEDURE — G2211 COMPLEX E/M VISIT ADD ON: HCPCS

## 2025-02-14 PROCEDURE — 1160F RVW MEDS BY RX/DR IN RCRD: CPT

## 2025-02-14 PROCEDURE — 3078F DIAST BP <80 MM HG: CPT

## 2025-02-14 PROCEDURE — 3075F SYST BP GE 130 - 139MM HG: CPT

## 2025-02-14 PROCEDURE — 1159F MED LIST DOCD IN RCRD: CPT

## 2025-02-14 RX ORDER — PREDNISONE 10 MG/1
10 TABLET ORAL DAILY
COMMUNITY
Start: 2025-02-12

## 2025-02-17 DIAGNOSIS — I48.19 PERSISTENT ATRIAL FIBRILLATION: Primary | ICD-10-CM

## 2025-02-17 NOTE — PROGRESS NOTES
Mandi Jacob  [id number]  1950  [phone]      02/14/2025      [facility] CARDIOLOGY     Referring Provider: No ref. provider found     John Montelongo MD  [address] [facility] [address]    Chief Complaint   Patient presents with    Paroxysmal atrial fibrillation       Problem List  Atrial Fibrillation  Diagnosis event monitor 2019 ?  Allergy to flecainide  On sotalol thereafter  Noted recurrence indicental on EKG 5/2024 cards office visit  CHADsVasc 6, anticoagulated with Eliquis  Sotalol discontinued 10/21/2024  Tikosyn initiated 10/29/2024  CAD  [facility] 2/2012: nonobstructive  TTE 10/2023: LVEF 51-55%, mild conc LVH, limited study  [facility] 11/2023: IVUS guided RCA stent, otherwise nonobstructive disease  HFpEF  Echo, 10/28/23: EF 50+/-5%, valves are morphologically normal  Peripheral Vascular Disease  Angioplasty and stenting to R carotid 6/2019  Given  HTN  HLD  Diabetes  Hx TIA  COPD, on continuous 2 L O2 NC  Morbid obesity: BMI 42.61  Former tobacco use with 61.6-pack-year history, quit in 2009      History of Present Illness   Mandi Jacob is a 74 y.o. female who presents to my electrophysiology clinic for follow up of AF.  Since the last time we saw the patient, sotalol was stopped and she was started on tikosyn. Since transitioning, her CHF symptoms have worsened, she is having more episodes of AF, and she is having angina. She has been to the ER and every cardiac work up has been negative for MI. Her oxygen requirements have stayed the same, but her diuretic frequenecy has increased. She is anticoagulated with Eliquis and denies any bleeding complications.     Outpatient Medications Marked as Taking for the 2/14/25 encounter (Office Visit) with Lea Gomez APRN   Medication Sig Dispense Refill    albuterol (PROVENTIL) (2.5 MG/3ML) 0.083% nebulizer solution Albuterol Sulfate (2.5 MG/3ML) 0.083% Inhalation Nebulization Solution; Patient Sig: Albuterol  Sulfate (2.5 MG/3ML) 0.083% Inhalation Nebulization Solution USE 1 UNIT DOSE IN NEBULIZER EVERY 4 TO 6 HOURS AS NEE      allopurinol (ZYLOPRIM) 100 MG tablet Take 1 tablet by mouth Daily. 60 tablet 2    anastrozole (ARIMIDEX) 1 MG tablet Take 1 tablet by mouth Daily.      Cholecalciferol 25 MCG (1000 UT) tablet Take 1 tablet by mouth Daily.      clopidogrel (PLAVIX) 75 MG tablet TAKE 1 TABLET BY MOUTH DAILY. 90 tablet 3    Diclofenac Sodium (VOLTAREN) 1 % gel gel Apply 4 g topically to the appropriate area as directed As Needed.      dofetilide (TIKOSYN) 500 MCG capsule TAKE 1 CAPSULE BY MOUTH EVERY 12 HOURS 180 capsule 2    Eliquis 5 MG tablet tablet TAKE 1 TABLET EVERY 12 HOURS 180 tablet 3    furosemide (LASIX) 40 MG tablet TAKE 1 TABLET TWICE DAILY 180 tablet 1    insulin aspart (novoLOG FLEXPEN) 100 UNIT/ML solution pen-injector sc pen Inject 15 Units under the skin into the appropriate area as directed Daily With Breakfast.      isosorbide mononitrate (IMDUR) 30 MG 24 hr tablet TAKE 1 TABLET BY MOUTH DAILY. 90 tablet 3    losartan (COZAAR) 50 MG tablet Take 1 tablet by mouth Daily. 90 tablet 11    magnesium oxide (MAGOX) 400 (241.3 MG) MG tablet tablet Take 1 tablet by mouth Every Night.      metFORMIN ER (GLUCOPHAGE-XR) 500 MG 24 hr tablet Take 1 tablet by mouth Daily With Breakfast.  11    metOLazone (ZAROXOLYN) 2.5 MG tablet Take 1 tablet by mouth Daily As Needed.      metoprolol tartrate (LOPRESSOR) 25 MG tablet Take 1 tablet by mouth Every 12 (Twelve) Hours. 180 tablet 3    montelukast (SINGULAIR) 10 MG tablet Take 1 tablet by mouth Every Night.      multivitamin with minerals tablet tablet Take 1 tablet by mouth Daily.      nitroglycerin (NITROSTAT) 0.4 MG SL tablet Place 1 tablet under the tongue Every 5 (Five) Minutes As Needed for Chest Pain. 25 tablet 2    O2 (OXYGEN) Inhale Continuous.      potassium chloride (K-DUR,KLOR-CON) 20 MEQ CR tablet Take 1 tablet by mouth Every Morning. 30 tablet 3     "potassium chloride (KLOR-CON M10) 10 MEQ CR tablet Take 1 tablet by mouth Every Night. 90 tablet 3    predniSONE (DELTASONE) 10 MG tablet Take 1 tablet by mouth Daily.      Repatha SureClick solution auto-injector SureClick injection INJECT 1 ML UNDER THE SKIN INTO THE APPROPRIATE AREA AS DIRECTED EVERY 14 (FOURTEEN) DAYS. 6 pen 3    TRESIBA FLEXTOUCH 200 UNIT/ML solution pen-injector Inject 68 Units under the skin into the appropriate area as directed Every Morning. 100 units daily      Tudorza Pressair 400 MCG/ACT aerosol powder  powder for inhalation Inhale 1 puff 2 (Two) Times a Day.              Physical Exam  Vitals:    02/14/25 1425   BP: 138/72   BP Location: Right arm   Patient Position: Sitting   Pulse: 74   SpO2: 93%   Weight: 119 kg (262 lb)   Height: 167.6 cm (66\")     Body mass index is 42.29 kg/m².  Constitutional:       Appearance: Healthy appearance.   Neck:      Vascular: JVD normal.   Pulmonary:      Effort: Pulmonary effort is normal.      Breath sounds: Normal breath sounds.      Comments: Home oxygen  Cardiovascular:      Normal rate. Regular rhythm. Normal S1. Normal S2.       Murmurs: There is no murmur.      No gallop.  No rub.   Pulses:     Intact distal pulses.   Edema:     Peripheral edema present.     Pretibial: bilateral 1+ edema of the pretibial area.     Ankle: bilateral 2+ edema of the ankle.     Feet: bilateral 2+ edema of the feet.  Neurological:      General: No focal deficit present.          Diagnostic Data  Procedures  EKG: Sinus arrhythmia 85 bpm, QRS 84, QT/Qtc 408/485      Lab Results   Component Value Date    GLUCOSE 224 (H) 11/02/2024    CALCIUM 11.1 (H) 11/02/2024     (L) 11/02/2024    K 3.5 11/02/2024    CO2 28.0 11/02/2024    CL 91 (L) 11/02/2024    BUN 18 11/02/2024    CREATININE 0.76 11/02/2024    EGFRIFAFRI 94 09/21/2015    EGFRIFNONA 96 06/06/2019    BCR 23.7 11/02/2024    ANIONGAP 11.0 11/02/2024     Lab Results   Component Value Date    WBC 9.71 11/02/2024 "    HGB 12.0 11/02/2024    HCT 36.1 11/02/2024    MCV 92.3 11/02/2024     11/02/2024     Lab Results   Component Value Date    INR 0.97 12/16/2015    PROTIME 10.2 12/16/2015     Lab Results   Component Value Date    TSH 1.660 09/21/2015       I personally viewed and interpreted the patient's EKG/Telemetry/lab data    Mandi Jacob  reports that she quit smoking about 15 years ago. Her smoking use included cigarettes. She started smoking about 56 years ago. She has a 61.6 pack-year smoking history. She has never used smokeless tobacco. I have educated her on the risk of diseases from using tobacco products such as cancer, COPD, and heart disease.      ACP discussion was declined by the patient. Patient does not have an advance directive, declines further assistance.    Assessment and Plan  Diagnoses and all orders for this visit:    1. Persistent atrial fibrillation (Primary)    2. Primary hypertension    3. Congestive heart failure, unspecified HF chronicity, unspecified heart failure type        Persistent atrial fibrillation  -Chadsvasc 8 (age, female, HTN, CHF, CAD, DM, TIA), anticoagulated with eliquis  -Transitioned from sotalol to tikosyn. Patient is unhappy on tikosyn. Discussed different options such as going back to sotalol, trying amiodarone, or PPM with AVN ablation. At this time patient would like to try sotalol again. Will stop tikosyn for 7 days prior and then do a sotalol admission.     Hypertension  -Well controlled, continue current medication regimen    Congestive heart failure  -Patient requiring more frequent diuretic use  -Echo, 10/28/23: EF 50+/-5%, valves are morphologically normal    Follow-Up  Return for Follow up after sotalol admission.      Thank you for allowing me to participate in the care of your patient. Please to not hesitate to contact me with additional questions or concerns.     FLOWER Aragon

## 2025-02-24 ENCOUNTER — PREP FOR SURGERY (OUTPATIENT)
Dept: OTHER | Facility: HOSPITAL | Age: 75
End: 2025-02-24
Payer: MEDICARE

## 2025-02-24 DIAGNOSIS — I48.19 PERSISTENT ATRIAL FIBRILLATION: Primary | ICD-10-CM

## 2025-03-10 ENCOUNTER — HOSPITAL ENCOUNTER (INPATIENT)
Facility: HOSPITAL | Age: 75
LOS: 3 days | Discharge: HOME | End: 2025-03-13
Attending: INTERNAL MEDICINE | Admitting: INTERNAL MEDICINE
Payer: MEDICARE

## 2025-03-10 DIAGNOSIS — I48.19 PERSISTENT ATRIAL FIBRILLATION: Primary | ICD-10-CM

## 2025-03-10 PROBLEM — I48.91 ATRIAL FIBRILLATION: Status: ACTIVE | Noted: 2025-03-10

## 2025-03-10 LAB
ANION GAP SERPL CALCULATED.3IONS-SCNC: 13 MMOL/L (ref 5–15)
BASOPHILS # BLD AUTO: 0.03 10*3/MM3 (ref 0–0.2)
BASOPHILS NFR BLD AUTO: 0.4 % (ref 0–1.5)
BUN SERPL-MCNC: 14 MG/DL (ref 8–23)
BUN/CREAT SERPL: 20.9 (ref 7–25)
CALCIUM SPEC-SCNC: 10.8 MG/DL (ref 8.6–10.5)
CHLORIDE SERPL-SCNC: 100 MMOL/L (ref 98–107)
CO2 SERPL-SCNC: 27 MMOL/L (ref 22–29)
CREAT SERPL-MCNC: 0.67 MG/DL (ref 0.57–1)
DEPRECATED RDW RBC AUTO: 45.4 FL (ref 37–54)
EGFRCR SERPLBLD CKD-EPI 2021: 92.4 ML/MIN/1.73
EOSINOPHIL # BLD AUTO: 0.04 10*3/MM3 (ref 0–0.4)
EOSINOPHIL NFR BLD AUTO: 0.5 % (ref 0.3–6.2)
ERYTHROCYTE [DISTWIDTH] IN BLOOD BY AUTOMATED COUNT: 13.7 % (ref 12.3–15.4)
GLUCOSE BLDC GLUCOMTR-MCNC: 145 MG/DL (ref 70–130)
GLUCOSE BLDC GLUCOMTR-MCNC: 200 MG/DL (ref 70–130)
GLUCOSE BLDC GLUCOMTR-MCNC: 231 MG/DL (ref 70–130)
GLUCOSE SERPL-MCNC: 165 MG/DL (ref 65–99)
HBA1C MFR BLD: 7.1 % (ref 4.8–5.6)
HCT VFR BLD AUTO: 37.2 % (ref 34–46.6)
HGB BLD-MCNC: 12.1 G/DL (ref 12–15.9)
IMM GRANULOCYTES # BLD AUTO: 0.04 10*3/MM3 (ref 0–0.05)
IMM GRANULOCYTES NFR BLD AUTO: 0.5 % (ref 0–0.5)
LYMPHOCYTES # BLD AUTO: 1.49 10*3/MM3 (ref 0.7–3.1)
LYMPHOCYTES NFR BLD AUTO: 19.2 % (ref 19.6–45.3)
MAGNESIUM SERPL-MCNC: 2 MG/DL (ref 1.6–2.4)
MCH RBC QN AUTO: 29.4 PG (ref 26.6–33)
MCHC RBC AUTO-ENTMCNC: 32.5 G/DL (ref 31.5–35.7)
MCV RBC AUTO: 90.5 FL (ref 79–97)
MONOCYTES # BLD AUTO: 0.83 10*3/MM3 (ref 0.1–0.9)
MONOCYTES NFR BLD AUTO: 10.7 % (ref 5–12)
NEUTROPHILS NFR BLD AUTO: 5.33 10*3/MM3 (ref 1.7–7)
NEUTROPHILS NFR BLD AUTO: 68.7 % (ref 42.7–76)
NRBC BLD AUTO-RTO: 0 /100 WBC (ref 0–0.2)
PLATELET # BLD AUTO: 218 10*3/MM3 (ref 140–450)
PMV BLD AUTO: 11.2 FL (ref 6–12)
POTASSIUM SERPL-SCNC: 3.8 MMOL/L (ref 3.5–5.2)
POTASSIUM SERPL-SCNC: 4.1 MMOL/L (ref 3.5–5.2)
QT INTERVAL: 358 MS
QTC INTERVAL: 435 MS
RBC # BLD AUTO: 4.11 10*6/MM3 (ref 3.77–5.28)
SODIUM SERPL-SCNC: 140 MMOL/L (ref 136–145)
WBC NRBC COR # BLD AUTO: 7.76 10*3/MM3 (ref 3.4–10.8)

## 2025-03-10 PROCEDURE — 93005 ELECTROCARDIOGRAM TRACING: CPT

## 2025-03-10 PROCEDURE — 85025 COMPLETE CBC W/AUTO DIFF WBC: CPT | Performed by: NURSE PRACTITIONER

## 2025-03-10 PROCEDURE — 93005 ELECTROCARDIOGRAM TRACING: CPT | Performed by: INTERNAL MEDICINE

## 2025-03-10 PROCEDURE — 83735 ASSAY OF MAGNESIUM: CPT

## 2025-03-10 PROCEDURE — 82948 REAGENT STRIP/BLOOD GLUCOSE: CPT

## 2025-03-10 PROCEDURE — 93010 ELECTROCARDIOGRAM REPORT: CPT | Performed by: INTERNAL MEDICINE

## 2025-03-10 PROCEDURE — 80048 BASIC METABOLIC PNL TOTAL CA: CPT

## 2025-03-10 PROCEDURE — 63710000001 INSULIN LISPRO (HUMAN) PER 5 UNITS: Performed by: NURSE PRACTITIONER

## 2025-03-10 PROCEDURE — 99222 1ST HOSP IP/OBS MODERATE 55: CPT | Performed by: NURSE PRACTITIONER

## 2025-03-10 PROCEDURE — 83036 HEMOGLOBIN GLYCOSYLATED A1C: CPT | Performed by: NURSE PRACTITIONER

## 2025-03-10 PROCEDURE — 99222 1ST HOSP IP/OBS MODERATE 55: CPT

## 2025-03-10 PROCEDURE — 84132 ASSAY OF SERUM POTASSIUM: CPT | Performed by: INTERNAL MEDICINE

## 2025-03-10 RX ORDER — ALLOPURINOL 100 MG/1
100 TABLET ORAL DAILY
Status: DISCONTINUED | OUTPATIENT
Start: 2025-03-11 | End: 2025-03-13 | Stop reason: HOSPADM

## 2025-03-10 RX ORDER — FUROSEMIDE 40 MG/1
40 TABLET ORAL 2 TIMES DAILY
Status: DISCONTINUED | OUTPATIENT
Start: 2025-03-10 | End: 2025-03-13 | Stop reason: HOSPADM

## 2025-03-10 RX ORDER — NICOTINE POLACRILEX 4 MG
15 LOZENGE BUCCAL
Status: DISCONTINUED | OUTPATIENT
Start: 2025-03-10 | End: 2025-03-13 | Stop reason: HOSPADM

## 2025-03-10 RX ORDER — ACETAMINOPHEN 325 MG/1
650 TABLET ORAL EVERY 6 HOURS PRN
Status: DISCONTINUED | OUTPATIENT
Start: 2025-03-10 | End: 2025-03-13 | Stop reason: HOSPADM

## 2025-03-10 RX ORDER — CLOPIDOGREL BISULFATE 75 MG/1
75 TABLET ORAL DAILY
Status: DISCONTINUED | OUTPATIENT
Start: 2025-03-11 | End: 2025-03-13 | Stop reason: HOSPADM

## 2025-03-10 RX ORDER — POTASSIUM CHLORIDE 1500 MG/1
20 TABLET, EXTENDED RELEASE ORAL ONCE
Status: COMPLETED | OUTPATIENT
Start: 2025-03-10 | End: 2025-03-10

## 2025-03-10 RX ORDER — MONTELUKAST SODIUM 10 MG/1
10 TABLET ORAL NIGHTLY
Status: DISCONTINUED | OUTPATIENT
Start: 2025-03-10 | End: 2025-03-13 | Stop reason: HOSPADM

## 2025-03-10 RX ORDER — SOTALOL HYDROCHLORIDE 80 MG/1
120 TABLET ORAL EVERY 12 HOURS SCHEDULED
Status: DISCONTINUED | OUTPATIENT
Start: 2025-03-10 | End: 2025-03-13 | Stop reason: HOSPADM

## 2025-03-10 RX ORDER — ALBUTEROL SULFATE 0.83 MG/ML
2.5 SOLUTION RESPIRATORY (INHALATION) EVERY 6 HOURS PRN
Status: DISCONTINUED | OUTPATIENT
Start: 2025-03-10 | End: 2025-03-13 | Stop reason: HOSPADM

## 2025-03-10 RX ORDER — POTASSIUM CHLORIDE 750 MG/1
10 CAPSULE, EXTENDED RELEASE ORAL NIGHTLY
Status: DISCONTINUED | OUTPATIENT
Start: 2025-03-10 | End: 2025-03-13 | Stop reason: HOSPADM

## 2025-03-10 RX ORDER — PREDNISONE 20 MG/1
40 TABLET ORAL
Status: DISCONTINUED | OUTPATIENT
Start: 2025-03-11 | End: 2025-03-10

## 2025-03-10 RX ORDER — ISOSORBIDE MONONITRATE 30 MG/1
30 TABLET, EXTENDED RELEASE ORAL DAILY
Status: DISCONTINUED | OUTPATIENT
Start: 2025-03-11 | End: 2025-03-13 | Stop reason: HOSPADM

## 2025-03-10 RX ORDER — NITROGLYCERIN 0.4 MG/1
0.4 TABLET SUBLINGUAL
Status: DISCONTINUED | OUTPATIENT
Start: 2025-03-10 | End: 2025-03-13 | Stop reason: HOSPADM

## 2025-03-10 RX ORDER — ROFLUMILAST 500 UG/1
250 TABLET ORAL DAILY
Status: DISCONTINUED | OUTPATIENT
Start: 2025-03-11 | End: 2025-03-13 | Stop reason: HOSPADM

## 2025-03-10 RX ORDER — INSULIN LISPRO 100 [IU]/ML
2-7 INJECTION, SOLUTION INTRAVENOUS; SUBCUTANEOUS
Status: DISCONTINUED | OUTPATIENT
Start: 2025-03-10 | End: 2025-03-13 | Stop reason: HOSPADM

## 2025-03-10 RX ORDER — ALBUTEROL SULFATE 0.83 MG/ML
2.5 SOLUTION RESPIRATORY (INHALATION) EVERY 6 HOURS PRN
Status: DISCONTINUED | OUTPATIENT
Start: 2025-03-10 | End: 2025-03-10 | Stop reason: SDUPTHER

## 2025-03-10 RX ORDER — LOSARTAN POTASSIUM 50 MG/1
50 TABLET ORAL DAILY
Status: DISCONTINUED | OUTPATIENT
Start: 2025-03-11 | End: 2025-03-13 | Stop reason: HOSPADM

## 2025-03-10 RX ORDER — DEXTROSE MONOHYDRATE 25 G/50ML
25 INJECTION, SOLUTION INTRAVENOUS
Status: DISCONTINUED | OUTPATIENT
Start: 2025-03-10 | End: 2025-03-13 | Stop reason: HOSPADM

## 2025-03-10 RX ORDER — INSULIN LISPRO 100 [IU]/ML
5 INJECTION, SOLUTION INTRAVENOUS; SUBCUTANEOUS
Status: DISCONTINUED | OUTPATIENT
Start: 2025-03-10 | End: 2025-03-13 | Stop reason: HOSPADM

## 2025-03-10 RX ORDER — POTASSIUM CHLORIDE 1500 MG/1
20 TABLET, EXTENDED RELEASE ORAL EVERY MORNING
Status: DISCONTINUED | OUTPATIENT
Start: 2025-03-11 | End: 2025-03-13 | Stop reason: HOSPADM

## 2025-03-10 RX ORDER — ANASTROZOLE 1 MG/1
1 TABLET ORAL DAILY
Status: DISCONTINUED | OUTPATIENT
Start: 2025-03-11 | End: 2025-03-13 | Stop reason: HOSPADM

## 2025-03-10 RX ADMIN — INSULIN LISPRO 3 UNITS: 100 INJECTION, SOLUTION INTRAVENOUS; SUBCUTANEOUS at 21:18

## 2025-03-10 RX ADMIN — MONTELUKAST 10 MG: 10 TABLET, FILM COATED ORAL at 21:18

## 2025-03-10 RX ADMIN — Medication 12.5 MG: at 21:18

## 2025-03-10 RX ADMIN — SOTALOL HYDROCHLORIDE 120 MG: 80 TABLET ORAL at 21:17

## 2025-03-10 RX ADMIN — MAGNESIUM OXIDE TAB 400 MG (241.3 MG ELEMENTAL MG) 400 MG: 400 (241.3 MG) TAB at 21:18

## 2025-03-10 RX ADMIN — INSULIN LISPRO 5 UNITS: 100 INJECTION, SOLUTION INTRAVENOUS; SUBCUTANEOUS at 17:10

## 2025-03-10 RX ADMIN — APIXABAN 5 MG: 5 TABLET, FILM COATED ORAL at 21:18

## 2025-03-10 RX ADMIN — POTASSIUM CHLORIDE 10 MEQ: 750 CAPSULE, EXTENDED RELEASE ORAL at 21:17

## 2025-03-10 RX ADMIN — FUROSEMIDE 40 MG: 40 TABLET ORAL at 21:18

## 2025-03-10 RX ADMIN — POTASSIUM CHLORIDE 20 MEQ: 20 TABLET, EXTENDED RELEASE ORAL at 16:42

## 2025-03-10 RX ADMIN — DOXYCYCLINE 100 MG: 100 INJECTION, POWDER, LYOPHILIZED, FOR SOLUTION INTRAVENOUS at 16:43

## 2025-03-10 NOTE — H&P
[facility]  HISTORY AND PHYSICAL    Mandi Jacob  : 1950  MRN:[id number]    Date of Admission:3/10/2025    PCP: John Montelongo MD    Chief Complaint: persistent atrial fibrillation    PROBLEM LIST:   Persistent Atrial Fibrillation  Diagnosis event monitor    Allergy to flecainide  On sotalol thereafter  Noted recurrence indicental on EKG 2024 cards office visit  CHADsVasc 6, anticoagulated with Eliquis  Sotalol discontinued 10/21/2024  Tikosyn initiated 10/29/2024  CAD  [facility] 2012: nonobstructive  TTE 10/2023: LVEF 51-55%, mild conc LVH, limited study  [facility] 2023: IVUS guided RCA stent, otherwise nonobstructive disease  HFpEF  Echo, 10/28/23: EF 50+/-5%, valves are morphologically normal  Peripheral Vascular Disease  Angioplasty and stenting to R carotid 2019 DrMiguel A Given  HTN  HLD  Diabetes  Hx TIA  COPD, on continuous 2 L O2 NC  Morbid obesity: BMI 42.61  Former tobacco use with 61.6-pack-year history, quit in     ALLERGIES:   Allergies   Allergen Reactions    Ace Inhibitors Anaphylaxis    Diltiazem Angioedema    Flecainide Other (See Comments)     Facial numbness and edema, itching all over and shortness of breath    Incruse Ellipta [Umeclidinium Bromide] Shortness Of Breath    Lisinopril Anaphylaxis and Hives    Niacin Swelling     LIPS AND TONGUE    Wellbutrin [Bupropion] Shortness Of Breath    Ketorolac Tromethamine Arrhythmia    Buspirone Other (See Comments)     THROAT AND LIPS SWELLED     Ciprofloxacin Rash    Citalopram Unknown (See Comments)     PT UNSURE     Clindamycin/Lincomycin Rash    Levaquin [Levofloxacin] Rash    Lipitor [Atorvastatin] Diarrhea and Other (See Comments)     HEADACHE    Liraglutide Unknown - Low Severity    Lorazepam Mental Status Change    Sertraline Unknown - Low Severity     PT UNSURE OF REACTION     Statins Diarrhea and Other (See Comments)     HEADACHE    Sulfamethoxazole-Trimethoprim Unknown (See  Comments)     PT UNSURE OF REACTION    Trulicity [Dulaglutide] Diarrhea     SEVERE DIARRHEA    Xigduo Xr [Dapagliflozin Pro-Metformin Er] Diarrhea       HOME MEDICINES:   Prior to Admission Medications       Prescriptions Last Dose Informant Patient Reported? Taking?    albuterol (PROVENTIL) (2.5 MG/3ML) 0.083% nebulizer solution 3/10/2025  Yes Yes    Albuterol Sulfate (2.5 MG/3ML) 0.083% Inhalation Nebulization Solution; Patient Sig: Albuterol Sulfate (2.5 MG/3ML) 0.083% Inhalation Nebulization Solution USE 1 UNIT DOSE IN NEBULIZER EVERY 4 TO 6 HOURS AS NEE    allopurinol (ZYLOPRIM) 100 MG tablet 3/10/2025  No Yes    Take 1 tablet by mouth Daily.    anastrozole (ARIMIDEX) 1 MG tablet 3/10/2025 Self Yes Yes    Take 1 tablet by mouth Daily.    Cholecalciferol 25 MCG (1000 UT) tablet 3/10/2025 Self Yes Yes    Take 1 tablet by mouth Daily.    clopidogrel (PLAVIX) 75 MG tablet 3/10/2025  No Yes    TAKE 1 TABLET BY MOUTH DAILY.    Diclofenac Sodium (VOLTAREN) 1 % gel gel 3/10/2025  Yes Yes    Apply 4 g topically to the appropriate area as directed As Needed.    Eliquis 5 MG tablet tablet 3/10/2025  No Yes    TAKE 1 TABLET EVERY 12 HOURS    furosemide (LASIX) 40 MG tablet 3/10/2025  No Yes    TAKE 1 TABLET TWICE DAILY    insulin aspart (novoLOG FLEXPEN) 100 UNIT/ML solution pen-injector sc pen 3/10/2025 Self Yes Yes    Inject 15 Units under the skin into the appropriate area as directed Daily With Breakfast.    isosorbide mononitrate (IMDUR) 30 MG 24 hr tablet 3/10/2025  No Yes    TAKE 1 TABLET BY MOUTH DAILY.    losartan (COZAAR) 50 MG tablet 3/10/2025  No Yes    Take 1 tablet by mouth Daily.    magnesium oxide (MAGOX) 400 (241.3 MG) MG tablet tablet 3/10/2025  Yes Yes    Take 1 tablet by mouth Every Night.    metFORMIN ER (GLUCOPHAGE-XR) 500 MG 24 hr tablet 3/10/2025  Yes Yes    Take 1 tablet by mouth Daily With Breakfast.    metOLazone (ZAROXOLYN) 2.5 MG tablet 3/10/2025  Yes Yes    Take 1 tablet by mouth Daily As  Needed.    metoprolol tartrate (LOPRESSOR) 25 MG tablet 3/10/2025  No Yes    Take 1 tablet by mouth Every 12 (Twelve) Hours.    montelukast (SINGULAIR) 10 MG tablet 3/10/2025  Yes Yes    Take 1 tablet by mouth Every Night.    multivitamin with minerals tablet tablet 3/10/2025  Yes Yes    Take 1 tablet by mouth Daily.    nitroglycerin (NITROSTAT) 0.4 MG SL tablet 3/10/2025  No Yes    Place 1 tablet under the tongue Every 5 (Five) Minutes As Needed for Chest Pain.    O2 (OXYGEN)   Yes No    Inhale 2 L/min Continuous.    potassium chloride (K-DUR,KLOR-CON) 20 MEQ CR tablet 3/10/2025  No Yes    Take 1 tablet by mouth Every Morning.    potassium chloride (KLOR-CON M10) 10 MEQ CR tablet 3/10/2025  No Yes    Take 1 tablet by mouth Every Night.    Repatha SureClick solution auto-injector SureClick injection Past Week  No Yes    INJECT 1 ML UNDER THE SKIN INTO THE APPROPRIATE AREA AS DIRECTED EVERY 14 (FOURTEEN) DAYS.    TRESIBA FLEXTOUCH 200 UNIT/ML solution pen-injector Patient Taking Differently  Yes Yes    Inject 68 Units under the skin into the appropriate area as directed Every Morning. 100 units daily    Tudorza Pressair 400 MCG/ACT aerosol powder  powder for inhalation 3/10/2025  Yes Yes    Inhale 1 puff 2 (Two) Times a Day.            Subjective     HPI: Mandi Jacob is a 74 y.o. female with a past medical history listed above presents to [facility] today for sotalol initiation.  In October patient was transition from sotalol to Tikosyn to hopefully control atrial fibrillation episodes more effectively.  Since transitioning to Tikosyn patient states that her heart failure symptoms have worsened, more frequent episodes of atrial fibrillation and she is having episodes of angina.  She stopped her Tikosyn on March 1.  She currently denies chest pain, shortness of breath, lightheadedness, dizziness and syncope.  She states she has been going in and out of atrial fibrillation and currently is in  normal rhythm.  She has been anticoagulated on Eliquis and denies any missed doses in the last 30 days.  Patient is chronically on 2 L nasal cannula at home.    ROS: All systems have been reviewed and are negative with the exception of those mentioned in the HPI and problem list above.    Past Medical History:   Past Medical History:   Diagnosis Date    Arrhythmia     Asthma     Atrial fibrillation     Breast cancer 05/2023    Right breast    CAD (coronary artery disease)     non-obstructive by cath 02/2012    CHF (congestive heart failure)     COPD (chronic obstructive pulmonary disease)     Diabetes mellitus     Diastolic dysfunction     Dizziness     Edema     Esophageal spasm     Gout     Herniated lumbar intervertebral disc     L5    Hyperlipidemia     intolerant to statins     Hypertension     Osteoarthritis     Palpitations     SOB (shortness of breath)     Stroke     Stroke-like symptoms     TIA (transient ischemic attack) 2015    left hemispheric TIA/CVA    Unstable angina       Surgical History:   Past Surgical History:   Procedure Laterality Date    CARDIAC CATHETERIZATION      CARDIAC CATHETERIZATION  11/13/2023    dr. mcclure with 1 stent    CAROTID STENT      CATARACT EXTRACTION      CATARACT EXTRACTION      CEREBRAL ANGIOGRAM      CORONARY STENT PLACEMENT  11/13/2023    EYE SURGERY Bilateral 2021    INTERVENTIONAL RADIOLOGY PROCEDURE Bilateral 05/16/2019    Procedure: Carotid Cerebral Angiogram;  Surgeon: Alex Erickson MD;  Location: [facility] CATH INVASIVE LOCATION;  Service: Interventional Radiology    WA TCAT IV STENT CRV CRTD ART EMBOLIC PROTECJ N/A 06/05/2019    Right Carotid Stent     TUBAL ABDOMINAL LIGATION      WRIST SURGERY       Social History:   Social History     Socioeconomic History    Marital status: Significant Other   Tobacco Use    Smoking status: Former     Current packs/day: 0.00     Average packs/day: 1.5 packs/day for 41.1 years (61.6 ttl pk-yrs)     Types: Cigarettes     Start  "date: 4/8/1968     Quit date: 3/22/2009     Years since quitting: 15.9    Smokeless tobacco: Never   Vaping Use    Vaping status: Never Used   Substance and Sexual Activity    Alcohol use: No    Drug use: No    Sexual activity: Not Currently     Partners: Male     Birth control/protection: Post-menopausal     Family History:   Family History   Problem Relation Age of Onset    Heart attack Mother     Heart failure Mother         congestive    Hyperlipidemia Mother     Hypertension Mother     Peripheral vascular disease Mother     Heart attack Father     Heart failure Father         congestive    Diabetes Father     Hyperlipidemia Father     Hypertension Father     Peripheral vascular disease Father     Asthma Father     Hypertension Brother     Heart failure Other         congestive    Diabetes Other     Hyperlipidemia Other     Hypertension Other     Peripheral vascular disease Other        Objective   /70   Pulse 91   Temp 98 °F (36.7 °C)   Resp 18   Ht 167.6 cm (66\")   Wt 117 kg (259 lb)   SpO2 94%   BMI 41.80 kg/m²   No intake or output data in the 24 hours ending 03/10/25 1523    PHYSICAL EXAM:  CONSTITUTIONAL: Well nourished, cooperative, in no acute distress  HEENT: Normocephalic, atraumatic, PERRLA, no JVD  CARDIOVASCULAR:  Regular rhythm and normal rate, no murmur, gallop, rub.   RESPIRATORY: Clear to auscultation, normal respiratory effort, no wheezing, rales or ronchi, on 2L NC  EXTREMITIES: No gross deformities, no edema. Peripheral pulses are present and equal bilaterally  SKIN: Warm, dry. No bleeding, bruising or rash  NEUROLOGICAL: No focal deficits  PSYCHIATRIC: Normal mood and affect. Behavior is normal     Labs/Diagnostic Data                                        EKG/Telemetry: SR    Radiology Data:   No radiology results for the last day   Results for orders placed during the hospital encounter of 10/12/23    Adult Transthoracic Echo Limited W/ Cont if Necessary Per " Protocol    Interpretation Summary    Left ventricular ejection fraction appears to be 51 - 55%.    Left ventricular wall thickness is consistent with mild concentric hypertrophy.    The left atrial cavity is moderately dilated.    Moderately to severely technically limited study.  Gross generalizations can be rendered.    1.  LV size is normal and global LV systolic function is not significantly depressed.  Visually estimated ejection fraction is 50±5%.  Mild concentric left ventricular hypertrophy.  Moderate left atrial enlargement.  The right ventricle appears mildly dilated but RV systolic function is grossly preserved.  The right atrium is at the upper limits of normal.    2.  Valves are morphologically normal.    3.  No pericardial or great vessel pathology.       Current Medications:  [START ON 3/11/2025] allopurinol, 100 mg, Oral, Daily  [START ON 3/11/2025] anastrozole, 1 mg, Oral, Daily  apixaban, 5 mg, Oral, Q12H  [START ON 3/11/2025] clopidogrel, 75 mg, Oral, Daily  furosemide, 40 mg, Oral, BID  [START ON 3/11/2025] isosorbide mononitrate, 30 mg, Oral, Daily  [START ON 3/11/2025] losartan, 50 mg, Oral, Daily  magnesium oxide, 400 mg, Oral, Nightly  metoprolol tartrate, 12.5 mg, Oral, Q12H  montelukast, 10 mg, Oral, Nightly  [START ON 3/11/2025] potassium chloride, 20 mEq, Oral, QAM  potassium chloride, 10 mEq, Oral, Nightly  [START ON 3/11/2025] roflumilast, 250 mcg, Oral, Daily      O2, 2 L/min  Pharmacy Consult,         Assessment:     Persistent atrial fibrillation   [id number], anticoagulated on Eliquis  Previously tried on Tikosyn. Patient did not tolerate.  Last dose March 1, 2025  Congestive heart failure  Echo, 10/12/23: EF 51-55%, LV consistent mild concentric hypertrophy, left atrial cavity mod dilated, no significant valvular abnormalities   Hypertension  COPD  Diabetes    Plan:     Admit to EP cardiology for sotalol initiation/high risk medication administration. Qtc acceptable to start  on EKG today. Patient was previously on 120 mg BID.   Serial EKG 2-4 hours after each dose.   Replace electrolytes per nurse driven protocol   Avoid QT prolonging medications.   Discussed the possibility of ECV on Wednesday if they patient were to go into AF.  Patient would like to resume Maxzide 37.5mg/25mg. Discussed that we will get labs back and monitor BP as sotalol is started. Will consider starting tomorrow if appropriate.    Consulted hospitalist for help with COPD and diabetes medications.     Electronically signed by FLOWER Aragon, 03/10/25, 2:59 PM EDT.     Please note that portions of this note were dictated utilizing Dragon dictation.

## 2025-03-10 NOTE — CONSULTS
[facility]  CONSULT NOTE      Patient Name: Mandi Jacob  : 1950  MRN: [id number]    Primary Care Physician: John Montelongo MD  Provider requesting consultation: No Known Provider    Subjective   Subjective     Reason for Consultation:  Medical management    HPI:  Mandi Jacob is a 74 y.o. female PMH COPD 2l NC at home, breast cancer, Carotid stenosis, HLD, HTN, HFmrEF, DMII, CAD, hx TIA presenting with persistent Afib. Pt reports congested cough for 7 days, concerning for COPD exacerbation.       Review of Systems  Gen- No fevers, chills  CV- No chest pain, palpitations  Resp- (+) cough, wheezing, dyspnea  GI- No N/V/D, abd pain      Otherwise complete ROS reviewed and is negative except as mentioned in the HPI.    Past Medical History:   Diagnosis Date    Arrhythmia     Asthma     Atrial fibrillation     Breast cancer 2023    Right breast    CAD (coronary artery disease)     non-obstructive by cath 2012    CHF (congestive heart failure)     COPD (chronic obstructive pulmonary disease)     Diabetes mellitus     Diastolic dysfunction     Dizziness     Edema     Esophageal spasm     Gout     Herniated lumbar intervertebral disc     L5    Hyperlipidemia     intolerant to statins     Hypertension     Osteoarthritis     Palpitations     SOB (shortness of breath)     Stroke     Stroke-like symptoms     TIA (transient ischemic attack)     left hemispheric TIA/CVA    Unstable angina        Past Surgical History:   Procedure Laterality Date    CARDIAC CATHETERIZATION      CARDIAC CATHETERIZATION  2023    dr. mcclure with 1 stent    CAROTID STENT      CATARACT EXTRACTION      CATARACT EXTRACTION      CEREBRAL ANGIOGRAM      CORONARY STENT PLACEMENT  2023    EYE SURGERY Bilateral     INTERVENTIONAL RADIOLOGY PROCEDURE Bilateral 2019    Procedure: Carotid Cerebral Angiogram;  Surgeon: Alex Erickson MD;   [facility];  Service: Interventional Radiology    TX TCAT IV STENT CRV CRTD ART EMBOLIC PROTECJ N/A 06/05/2019    Right Carotid Stent     TUBAL ABDOMINAL LIGATION      WRIST SURGERY         Family History: family history includes Asthma in her father; Diabetes in her father and another family member; Heart attack in her father and mother; Heart failure in her father, mother, and another family member; Hyperlipidemia in her father, mother, and another family member; Hypertension in her brother, father, mother, and another family member; Peripheral vascular disease in her father, mother, and another family member. Otherwise pertinent FHx was reviewed and unremarkable.     Social History:  reports that she quit smoking about 15 years ago. Her smoking use included cigarettes. She started smoking about 56 years ago. She has a 61.6 pack-year smoking history. She has never used smokeless tobacco. She reports that she does not drink alcohol and does not use drugs.    Medications:  Medications Prior to Admission   Medication Sig Dispense Refill Last Dose/Taking    albuterol (PROVENTIL) (2.5 MG/3ML) 0.083% nebulizer solution Albuterol Sulfate (2.5 MG/3ML) 0.083% Inhalation Nebulization Solution; Patient Sig: Albuterol Sulfate (2.5 MG/3ML) 0.083% Inhalation Nebulization Solution USE 1 UNIT DOSE IN NEBULIZER EVERY 4 TO 6 HOURS AS NEE   3/10/2025    allopurinol (ZYLOPRIM) 100 MG tablet Take 1 tablet by mouth Daily. 60 tablet 2 3/10/2025    anastrozole (ARIMIDEX) 1 MG tablet Take 1 tablet by mouth Daily.   3/10/2025    Cholecalciferol 25 MCG (1000 UT) tablet Take 1 tablet by mouth Daily.   3/10/2025    clopidogrel (PLAVIX) 75 MG tablet TAKE 1 TABLET BY MOUTH DAILY. 90 tablet 3 3/10/2025    Diclofenac Sodium (VOLTAREN) 1 % gel gel Apply 4 g topically to the appropriate area as directed As Needed.   3/10/2025    Eliquis 5 MG tablet tablet TAKE 1 TABLET EVERY 12 HOURS 180 tablet 3 3/10/2025    furosemide  (LASIX) 40 MG tablet TAKE 1 TABLET TWICE DAILY 180 tablet 1 3/10/2025    insulin aspart (novoLOG FLEXPEN) 100 UNIT/ML solution pen-injector sc pen Inject 15 Units under the skin into the appropriate area as directed Daily With Breakfast.   3/10/2025    isosorbide mononitrate (IMDUR) 30 MG 24 hr tablet TAKE 1 TABLET BY MOUTH DAILY. 90 tablet 3 3/10/2025    losartan (COZAAR) 50 MG tablet Take 1 tablet by mouth Daily. 90 tablet 11 3/10/2025    magnesium oxide (MAGOX) 400 (241.3 MG) MG tablet tablet Take 1 tablet by mouth Every Night.   3/10/2025    metFORMIN ER (GLUCOPHAGE-XR) 500 MG 24 hr tablet Take 1 tablet by mouth Daily With Breakfast.  11 3/10/2025    metOLazone (ZAROXOLYN) 2.5 MG tablet Take 1 tablet by mouth Daily As Needed.   3/10/2025    metoprolol tartrate (LOPRESSOR) 25 MG tablet Take 1 tablet by mouth Every 12 (Twelve) Hours. 180 tablet 3 3/10/2025    montelukast (SINGULAIR) 10 MG tablet Take 1 tablet by mouth Every Night.   3/10/2025    multivitamin with minerals tablet tablet Take 1 tablet by mouth Daily.   3/10/2025    nitroglycerin (NITROSTAT) 0.4 MG SL tablet Place 1 tablet under the tongue Every 5 (Five) Minutes As Needed for Chest Pain. 25 tablet 2 3/10/2025    potassium chloride (K-DUR,KLOR-CON) 20 MEQ CR tablet Take 1 tablet by mouth Every Morning. 30 tablet 3 3/10/2025    potassium chloride (KLOR-CON M10) 10 MEQ CR tablet Take 1 tablet by mouth Every Night. 90 tablet 3 3/10/2025    Repatha SureClick solution auto-injector SureClick injection INJECT 1 ML UNDER THE SKIN INTO THE APPROPRIATE AREA AS DIRECTED EVERY 14 (FOURTEEN) DAYS. 6 pen 3 Past Week    TRESIBA FLEXTOUCH 200 UNIT/ML solution pen-injector Inject 68 Units under the skin into the appropriate area as directed Every Morning. 100 units daily   Patient Taking Differently    Tudorza Pressair 400 MCG/ACT aerosol powder  powder for inhalation Inhale 1 puff 2 (Two) Times a Day.   3/10/2025    O2 (OXYGEN) Inhale 2 L/min Continuous.           Scheduled Meds:[START ON 3/11/2025] allopurinol, 100 mg, Oral, Daily  [START ON 3/11/2025] anastrozole, 1 mg, Oral, Daily  apixaban, 5 mg, Oral, Q12H  [START ON 3/11/2025] clopidogrel, 75 mg, Oral, Daily  doxycycline, 100 mg, Intravenous, Q12H  furosemide, 40 mg, Oral, BID  [START ON 3/11/2025] insulin glargine, 30 Units, Subcutaneous, Daily  insulin lispro, 2-7 Units, Subcutaneous, 4x Daily AC & at Bedtime  Insulin Lispro, 5 Units, Subcutaneous, TID With Meals  [START ON 3/11/2025] isosorbide mononitrate, 30 mg, Oral, Daily  [START ON 3/11/2025] losartan, 50 mg, Oral, Daily  magnesium oxide, 400 mg, Oral, Nightly  metoprolol tartrate, 12.5 mg, Oral, Q12H  montelukast, 10 mg, Oral, Nightly  [START ON 3/11/2025] potassium chloride, 20 mEq, Oral, QAM  potassium chloride ER, 20 mEq, Oral, Once  potassium chloride, 10 mEq, Oral, Nightly  [START ON 3/11/2025] roflumilast, 250 mcg, Oral, Daily  sotalol, 120 mg, Oral, Q12H      Continuous Infusions:O2, 2 L/min  Pharmacy Consult,       PRN Meds:.  acetaminophen    albuterol    Albuterol Sulfate NEB Orderable    Calcium Replacement - Follow Nurse / BPA Driven Protocol    dextrose    dextrose    Magnesium Cardiology Dose Replacement - Follow Nurse / BPA Driven Protocol    nitroglycerin    Pharmacy Consult    Phosphorus Replacement - Follow Nurse / BPA Driven Protocol    Potassium Replacement - Follow Nurse / BPA Driven Protocol    Allergies   Allergen Reactions    Ace Inhibitors Anaphylaxis    Diltiazem Angioedema    Flecainide Other (See Comments)     Facial numbness and edema, itching all over and shortness of breath    Incruse Ellipta [Umeclidinium Bromide] Shortness Of Breath    Lisinopril Anaphylaxis and Hives    Niacin Swelling     LIPS AND TONGUE    Wellbutrin [Bupropion] Shortness Of Breath    Ketorolac Tromethamine Arrhythmia    Buspirone Other (See Comments)     THROAT AND LIPS SWELLED     Ciprofloxacin Rash    Citalopram Unknown (See Comments)     PT UNSURE   "   Clindamycin/Lincomycin Rash    Levaquin [Levofloxacin] Rash    Lipitor [Atorvastatin] Diarrhea and Other (See Comments)     HEADACHE    Liraglutide Unknown - Low Severity    Lorazepam Mental Status Change    Sertraline Unknown - Low Severity     PT UNSURE OF REACTION     Statins Diarrhea and Other (See Comments)     HEADACHE    Sulfamethoxazole-Trimethoprim Unknown (See Comments)     PT UNSURE OF REACTION    Trulicity [Dulaglutide] Diarrhea     SEVERE DIARRHEA    Xigduo Xr [Dapagliflozin Pro-Metformin Er] Diarrhea       Objective   Objective     Vital Signs:   Temp:  [98 °F (36.7 °C)] 98 °F (36.7 °C)  Heart Rate:  [81-91] 91  Resp:  [18] 18  BP: (143-175)/(70-88) 143/70     Physical Exam  Constitutional: Awake, alert, NAD, ill appearing  HENT: NCAT, mucous membranes moist  Respiratory: scattered wheezing, no retractions  Cardiovascular: IRR, no murmurs, rubs, or gallops  Gastrointestinal: Positive bowel sounds, soft, nontender, nondistended  Musculoskeletal: No bilateral ankle edema  Psychiatric: Appropriate affect, cooperative  Neurologic: Oriented x 3, MUNSON, speech clear  Skin: No rashes      Results Reviewed:  I have personally reviewed current lab, radiology, and data and agree.        Results from last 7 days   Lab Units 03/10/25  1423   SODIUM mmol/L 140   POTASSIUM mmol/L 3.8   CHLORIDE mmol/L 100   CO2 mmol/L 27.0   BUN mg/dL 14   CREATININE mg/dL 0.67   GLUCOSE mg/dL 165*   CALCIUM mg/dL 10.8*     Estimated Creatinine Clearance: 97.3 mL/min (by C-G formula based on SCr of 0.67 mg/dL).  Brief Urine Lab Results  (Last result in the past 365 days)        Color   Clarity   Blood   Leuk Est   Nitrite   Protein   CREAT   Urine HCG        11/02/24 2053 Yellow   Cloudy   Trace   Moderate (2+)   Negative   30 mg/dL (1+)                 No results found for: \"BNP\"    Microbiology Results Abnormal       None            Imaging Results (Last 24 Hours)       ** No results found for the last 24 hours. **      "     Results for orders placed during the hospital encounter of 10/12/23    Adult Transthoracic Echo Limited W/ Cont if Necessary Per Protocol    Interpretation Summary    Left ventricular ejection fraction appears to be 51 - 55%.    Left ventricular wall thickness is consistent with mild concentric hypertrophy.    The left atrial cavity is moderately dilated.    Moderately to severely technically limited study.  Gross generalizations can be rendered.    1.  LV size is normal and global LV systolic function is not significantly depressed.  Visually estimated ejection fraction is 50±5%.  Mild concentric left ventricular hypertrophy.  Moderate left atrial enlargement.  The right ventricle appears mildly dilated but RV systolic function is grossly preserved.  The right atrium is at the upper limits of normal.    2.  Valves are morphologically normal.    3.  No pericardial or great vessel pathology.      Assessment & Plan   Assessment / Plan     Active Hospital Problems    Diagnosis  POA    **Atrial fibrillation [I48.91]  Yes     Hospital Course to date:  Mandi Jacob is a 74 y.o. female PMH COPD 2l NC at home, breast cancer, Carotid stenosis, HLD, HTN, HFmrEF, DMII, CAD, hx TIA presenting with persistent Afib. Pt reports congested cough for 7 days, concerning for COPD exacerbation.     Assessment/Plan:    COPD with exacerbation  - albuterol nebs  - doxycycline   - singular    Afib persistent  HFmrEF 10/2023  HTN/HLD  - per cardiology  - trial of sotalol  - monitor Qt/Qtc  - eliquis  - lasix  - metoprolol    T2DM  - A1C 10/25 6.5  - recheck A1C  - lantus 30 units 1 am  - lispro 5 units with meals and SSI        Thank you for allowing [facility] to provide consultative care for your patient, we will continue to follow while clinically appropriate.    Electronically signed by FLOWER Viveros, 03/10/25, 4:03 PM EDT.

## 2025-03-11 ENCOUNTER — APPOINTMENT (OUTPATIENT)
Dept: GENERAL RADIOLOGY | Facility: HOSPITAL | Age: 75
End: 2025-03-11
Payer: MEDICARE

## 2025-03-11 LAB
ANION GAP SERPL CALCULATED.3IONS-SCNC: 11 MMOL/L (ref 5–15)
B PARAPERT DNA SPEC QL NAA+PROBE: NOT DETECTED
B PERT DNA SPEC QL NAA+PROBE: NOT DETECTED
BUN SERPL-MCNC: 13 MG/DL (ref 8–23)
BUN/CREAT SERPL: 14.9 (ref 7–25)
C PNEUM DNA NPH QL NAA+NON-PROBE: NOT DETECTED
CALCIUM SPEC-SCNC: 10.9 MG/DL (ref 8.6–10.5)
CHLORIDE SERPL-SCNC: 103 MMOL/L (ref 98–107)
CO2 SERPL-SCNC: 28 MMOL/L (ref 22–29)
CREAT SERPL-MCNC: 0.87 MG/DL (ref 0.57–1)
CRP SERPL-MCNC: 0.93 MG/DL (ref 0–0.5)
EGFRCR SERPLBLD CKD-EPI 2021: 70.5 ML/MIN/1.73
FLUAV SUBTYP SPEC NAA+PROBE: NOT DETECTED
FLUBV RNA ISLT QL NAA+PROBE: NOT DETECTED
GLUCOSE BLDC GLUCOMTR-MCNC: 143 MG/DL (ref 70–130)
GLUCOSE BLDC GLUCOMTR-MCNC: 174 MG/DL (ref 70–130)
GLUCOSE BLDC GLUCOMTR-MCNC: 211 MG/DL (ref 70–130)
GLUCOSE BLDC GLUCOMTR-MCNC: 98 MG/DL (ref 70–130)
GLUCOSE SERPL-MCNC: 115 MG/DL (ref 65–99)
HADV DNA SPEC NAA+PROBE: NOT DETECTED
HCOV 229E RNA SPEC QL NAA+PROBE: NOT DETECTED
HCOV HKU1 RNA SPEC QL NAA+PROBE: NOT DETECTED
HCOV NL63 RNA SPEC QL NAA+PROBE: NOT DETECTED
HCOV OC43 RNA SPEC QL NAA+PROBE: NOT DETECTED
HMPV RNA NPH QL NAA+NON-PROBE: NOT DETECTED
HPIV1 RNA ISLT QL NAA+PROBE: NOT DETECTED
HPIV2 RNA SPEC QL NAA+PROBE: NOT DETECTED
HPIV3 RNA NPH QL NAA+PROBE: NOT DETECTED
HPIV4 P GENE NPH QL NAA+PROBE: NOT DETECTED
M PNEUMO IGG SER IA-ACNC: NOT DETECTED
MAGNESIUM SERPL-MCNC: 2.2 MG/DL (ref 1.6–2.4)
MRSA DNA SPEC QL NAA+PROBE: NEGATIVE
POTASSIUM SERPL-SCNC: 4.3 MMOL/L (ref 3.5–5.2)
QT INTERVAL: 358 MS
QT INTERVAL: 374 MS
QT INTERVAL: 424 MS
QTC INTERVAL: 426 MS
QTC INTERVAL: 426 MS
QTC INTERVAL: 460 MS
RHINOVIRUS RNA SPEC NAA+PROBE: DETECTED
RSV RNA NPH QL NAA+NON-PROBE: NOT DETECTED
SARS-COV-2 RNA NPH QL NAA+NON-PROBE: NOT DETECTED
SODIUM SERPL-SCNC: 142 MMOL/L (ref 136–145)

## 2025-03-11 PROCEDURE — 93010 ELECTROCARDIOGRAM REPORT: CPT | Performed by: INTERNAL MEDICINE

## 2025-03-11 PROCEDURE — 25010000002 CEFTRIAXONE PER 250 MG: Performed by: INTERNAL MEDICINE

## 2025-03-11 PROCEDURE — 99232 SBSQ HOSP IP/OBS MODERATE 35: CPT | Performed by: PHYSICIAN ASSISTANT

## 2025-03-11 PROCEDURE — 93005 ELECTROCARDIOGRAM TRACING: CPT

## 2025-03-11 PROCEDURE — 80048 BASIC METABOLIC PNL TOTAL CA: CPT

## 2025-03-11 PROCEDURE — 63710000001 PREDNISONE PER 1 MG: Performed by: INTERNAL MEDICINE

## 2025-03-11 PROCEDURE — 63710000001 INSULIN LISPRO (HUMAN) PER 5 UNITS: Performed by: NURSE PRACTITIONER

## 2025-03-11 PROCEDURE — 86140 C-REACTIVE PROTEIN: CPT | Performed by: INTERNAL MEDICINE

## 2025-03-11 PROCEDURE — 94799 UNLISTED PULMONARY SVC/PX: CPT

## 2025-03-11 PROCEDURE — 82948 REAGENT STRIP/BLOOD GLUCOSE: CPT

## 2025-03-11 PROCEDURE — 0202U NFCT DS 22 TRGT SARS-COV-2: CPT | Performed by: INTERNAL MEDICINE

## 2025-03-11 PROCEDURE — 63710000001 INSULIN GLARGINE PER 5 UNITS: Performed by: NURSE PRACTITIONER

## 2025-03-11 PROCEDURE — 83735 ASSAY OF MAGNESIUM: CPT

## 2025-03-11 PROCEDURE — 71045 X-RAY EXAM CHEST 1 VIEW: CPT

## 2025-03-11 PROCEDURE — 94640 AIRWAY INHALATION TREATMENT: CPT

## 2025-03-11 PROCEDURE — 93005 ELECTROCARDIOGRAM TRACING: CPT | Performed by: INTERNAL MEDICINE

## 2025-03-11 PROCEDURE — 87641 MR-STAPH DNA AMP PROBE: CPT | Performed by: INTERNAL MEDICINE

## 2025-03-11 PROCEDURE — 99232 SBSQ HOSP IP/OBS MODERATE 35: CPT | Performed by: INTERNAL MEDICINE

## 2025-03-11 RX ORDER — TRIAMTERENE AND HYDROCHLOROTHIAZIDE 37.5; 25 MG/1; MG/1
1 TABLET ORAL DAILY
Status: DISCONTINUED | OUTPATIENT
Start: 2025-03-11 | End: 2025-03-13 | Stop reason: HOSPADM

## 2025-03-11 RX ORDER — DOXYCYCLINE 100 MG/1
100 CAPSULE ORAL EVERY 12 HOURS
Status: DISCONTINUED | OUTPATIENT
Start: 2025-03-11 | End: 2025-03-13 | Stop reason: HOSPADM

## 2025-03-11 RX ORDER — GUAIFENESIN 600 MG/1
1200 TABLET, EXTENDED RELEASE ORAL EVERY 12 HOURS SCHEDULED
Status: DISCONTINUED | OUTPATIENT
Start: 2025-03-11 | End: 2025-03-13 | Stop reason: HOSPADM

## 2025-03-11 RX ORDER — PREDNISONE 10 MG/1
40 TABLET ORAL
Status: DISCONTINUED | OUTPATIENT
Start: 2025-03-11 | End: 2025-03-13 | Stop reason: HOSPADM

## 2025-03-11 RX ADMIN — APIXABAN 5 MG: 5 TABLET, FILM COATED ORAL at 20:20

## 2025-03-11 RX ADMIN — LOSARTAN POTASSIUM 50 MG: 50 TABLET, FILM COATED ORAL at 08:56

## 2025-03-11 RX ADMIN — INSULIN LISPRO 5 UNITS: 100 INJECTION, SOLUTION INTRAVENOUS; SUBCUTANEOUS at 08:57

## 2025-03-11 RX ADMIN — MAGNESIUM OXIDE TAB 400 MG (241.3 MG ELEMENTAL MG) 400 MG: 400 (241.3 MG) TAB at 20:20

## 2025-03-11 RX ADMIN — ROFLUMILAST 250 MCG: 500 TABLET ORAL at 08:56

## 2025-03-11 RX ADMIN — GUAIFENESIN 1200 MG: 600 TABLET ORAL at 08:57

## 2025-03-11 RX ADMIN — INSULIN GLARGINE 30 UNITS: 100 INJECTION, SOLUTION SUBCUTANEOUS at 08:57

## 2025-03-11 RX ADMIN — POTASSIUM CHLORIDE 10 MEQ: 750 CAPSULE, EXTENDED RELEASE ORAL at 20:20

## 2025-03-11 RX ADMIN — MONTELUKAST 10 MG: 10 TABLET, FILM COATED ORAL at 20:20

## 2025-03-11 RX ADMIN — ANASTROZOLE 1 MG: 1 TABLET ORAL at 08:56

## 2025-03-11 RX ADMIN — POTASSIUM CHLORIDE 20 MEQ: 20 TABLET, EXTENDED RELEASE ORAL at 06:40

## 2025-03-11 RX ADMIN — ALBUTEROL SULFATE 2.5 MG: 2.5 SOLUTION RESPIRATORY (INHALATION) at 20:32

## 2025-03-11 RX ADMIN — ALBUTEROL SULFATE 2.5 MG: 2.5 SOLUTION RESPIRATORY (INHALATION) at 04:05

## 2025-03-11 RX ADMIN — DOXYCYCLINE 100 MG: 100 CAPSULE ORAL at 17:11

## 2025-03-11 RX ADMIN — ALLOPURINOL 100 MG: 100 TABLET ORAL at 08:57

## 2025-03-11 RX ADMIN — FUROSEMIDE 40 MG: 40 TABLET ORAL at 20:20

## 2025-03-11 RX ADMIN — TRIAMTERENE AND HYDROCHLOROTHIAZIDE 1 TABLET: 37.5; 25 TABLET ORAL at 13:17

## 2025-03-11 RX ADMIN — INSULIN LISPRO 2 UNITS: 100 INJECTION, SOLUTION INTRAVENOUS; SUBCUTANEOUS at 17:11

## 2025-03-11 RX ADMIN — PREDNISONE 40 MG: 10 TABLET ORAL at 15:00

## 2025-03-11 RX ADMIN — INSULIN LISPRO 3 UNITS: 100 INJECTION, SOLUTION INTRAVENOUS; SUBCUTANEOUS at 20:21

## 2025-03-11 RX ADMIN — GUAIFENESIN 1200 MG: 600 TABLET ORAL at 20:20

## 2025-03-11 RX ADMIN — INSULIN LISPRO 5 UNITS: 100 INJECTION, SOLUTION INTRAVENOUS; SUBCUTANEOUS at 13:17

## 2025-03-11 RX ADMIN — ISOSORBIDE MONONITRATE 30 MG: 30 TABLET, EXTENDED RELEASE ORAL at 08:56

## 2025-03-11 RX ADMIN — SOTALOL HYDROCHLORIDE 120 MG: 80 TABLET ORAL at 20:20

## 2025-03-11 RX ADMIN — APIXABAN 5 MG: 5 TABLET, FILM COATED ORAL at 08:56

## 2025-03-11 RX ADMIN — INSULIN LISPRO 5 UNITS: 100 INJECTION, SOLUTION INTRAVENOUS; SUBCUTANEOUS at 17:11

## 2025-03-11 RX ADMIN — FUROSEMIDE 40 MG: 40 TABLET ORAL at 08:56

## 2025-03-11 RX ADMIN — CLOPIDOGREL BISULFATE 75 MG: 75 TABLET ORAL at 08:56

## 2025-03-11 RX ADMIN — SOTALOL HYDROCHLORIDE 120 MG: 80 TABLET ORAL at 08:56

## 2025-03-11 RX ADMIN — CEFTRIAXONE SODIUM 2000 MG: 2 INJECTION, POWDER, FOR SOLUTION INTRAMUSCULAR; INTRAVENOUS at 10:12

## 2025-03-11 RX ADMIN — DOXYCYCLINE 100 MG: 100 INJECTION, POWDER, LYOPHILIZED, FOR SOLUTION INTRAVENOUS at 05:09

## 2025-03-11 NOTE — PROGRESS NOTES
[facility]  PROGRESS NOTE    Patient Name: Mandi Jacob  : 1950  MRN: [id number]    Date of Admission: 3/10/2025  Primary Care Physician: John Montelongo MD    Subjective   Subjective     CC:  Follow-up for medical management    HPI:  Patient seen and examined this morning.  Feeling slightly better after doxycycline and inhaler therapy yesterday.  Oxygen: Stable at 2 L.  Chest positive for rhinovirus      Objective   Objective     Vital Signs:   Temp:  [97.7 °F (36.5 °C)-98.7 °F (37.1 °C)] 97.7 °F (36.5 °C)  Heart Rate:  [] 60  Resp:  [18-22] 22  BP: (130-175)/(58-88) 149/69  Flow (L/min) (Oxygen Therapy):  [2] 2     Physical Exam:  General: Comfortable, not in distress, conversant and cooperative  Head: Atraumatic and normocephalic  Eyes: No Icterus. No pallor  Ears:  Ears appear intact with no abnormalities noted  Throat: No oral lesions, no thrush  Neck: Supple, trachea midline  Lungs: Adequate air entry both lung fields.  No wheezing.  Mild right lower lobe crackles  Heart:  Normal S1 and S2, no murmur, no gallop, No JVD, no lower extremity swelling  Abdomen:  Soft, no tenderness, no organomegaly, normal bowel sounds, no organomegaly  Extremities: pulses equal bilaterally  Skin: No bleeding, bruising or rash, normal skin turgor and elasticity  Neurologic: Cranial nerves appear intact with no evidence of facial asymmetry, normal motor and sensory functions in all 4 extremities  Psych: Alert and oriented x 3, normal mood    Results Reviewed:  LAB RESULTS:      Lab 03/10/25  1615   WBC 7.76   HEMOGLOBIN 12.1   HEMATOCRIT 37.2   PLATELETS 218   NEUTROS ABS 5.33   IMMATURE GRANS (ABS) 0.04   LYMPHS ABS 1.49   MONOS ABS 0.83   EOS ABS 0.04   MCV 90.5         Lab 03/10/25  2115 03/10/25  1615 03/10/25  1423   SODIUM  --   --  140   POTASSIUM 4.1  --  3.8   CHLORIDE  --   --  100   CO2  --   --  27.0   ANION GAP  --   --  13.0   BUN  --   --  14    CREATININE  --   --  0.67   EGFR  --   --  92.4   GLUCOSE  --   --  165*   CALCIUM  --   --  10.8*   MAGNESIUM  --   --  2.0   HEMOGLOBIN A1C  --  7.10*  --                          Brief Urine Lab Results  (Last result in the past 365 days)        Color   Clarity   Blood   Leuk Est   Nitrite   Protein   CREAT   Urine HCG        11/02/24 2053 Yellow   Cloudy   Trace   Moderate (2+)   Negative   30 mg/dL (1+)                   Microbiology Results Abnormal       None            No radiology results from the last 24 hrs    Results for orders placed during the hospital encounter of 10/12/23    Adult Transthoracic Echo Limited W/ Cont if Necessary Per Protocol    Interpretation Summary    Left ventricular ejection fraction appears to be 51 - 55%.    Left ventricular wall thickness is consistent with mild concentric hypertrophy.    The left atrial cavity is moderately dilated.    Moderately to severely technically limited study.  Gross generalizations can be rendered.    1.  LV size is normal and global LV systolic function is not significantly depressed.  Visually estimated ejection fraction is 50±5%.  Mild concentric left ventricular hypertrophy.  Moderate left atrial enlargement.  The right ventricle appears mildly dilated but RV systolic function is grossly preserved.  The right atrium is at the upper limits of normal.    2.  Valves are morphologically normal.    3.  No pericardial or great vessel pathology.      Current medications:  Scheduled Meds:allopurinol, 100 mg, Oral, Daily  anastrozole, 1 mg, Oral, Daily  apixaban, 5 mg, Oral, Q12H  clopidogrel, 75 mg, Oral, Daily  doxycycline, 100 mg, Intravenous, Q12H  furosemide, 40 mg, Oral, BID  insulin glargine, 30 Units, Subcutaneous, Daily  insulin lispro, 2-7 Units, Subcutaneous, 4x Daily AC & at Bedtime  Insulin Lispro, 5 Units, Subcutaneous, TID With Meals  isosorbide mononitrate, 30 mg, Oral, Daily  losartan, 50 mg, Oral, Daily  magnesium oxide, 400 mg, Oral,  Nightly  metoprolol tartrate, 12.5 mg, Oral, Q12H  montelukast, 10 mg, Oral, Nightly  potassium chloride, 20 mEq, Oral, QAM  potassium chloride, 10 mEq, Oral, Nightly  roflumilast, 250 mcg, Oral, Daily  sotalol, 120 mg, Oral, Q12H      Continuous Infusions:O2, 2 L/min  Pharmacy Consult,       PRN Meds:.  acetaminophen    albuterol    Calcium Replacement - Follow Nurse / BPA Driven Protocol    dextrose    dextrose    Magnesium Cardiology Dose Replacement - Follow Nurse / BPA Driven Protocol    nitroglycerin    Pharmacy Consult    Phosphorus Replacement - Follow Nurse / BPA Driven Protocol    Potassium Replacement - Follow Nurse / BPA Driven Protocol    Assessment & Plan   Assessment & Plan     Active Hospital Problems    Diagnosis  POA    **Atrial fibrillation [I48.91]  Yes      Resolved Hospital Problems   No resolved problems to display.        Brief Hospital Course to date:  Mandi Jacob is a 74 y.o. female PMH COPD 2l NC at home, breast cancer, Carotid stenosis, HLD, HTN, HFmrEF, DMII, CAD, hx TIA presenting with persistent Afib. Pt reports congested cough for 7 days, concerning for COPD exacerbation.     Acute rhinovirus infection  Acute COPD exacerbation  Presented to the hospital with shortness of breath secondary to COPD exacerbation  Respiratory panel positive for rhinovirus/enterovirus  Chest x-ray with acute bronchitis  Continue IV Rocephin and doxycycline   P.o. prednisone 40 mg daily  Continue oxygen supplementation  DuoNebs, Mucinex, I-S     Afib persistent  HFmrEF 10/2023  Hypertension  Dyslipidemia   Management per cardiology  Current on sotalol.  Converted to normal sinus rhythm  Continue metoprolol  Continue eliquis  Continue Lasix from home meds     Type 2 diabetes  A1C 7.1 %  Continue lantus 30 units 1 am  Continue lispro 5 units with meals and SSI      Expected Discharge Location and Transportation: Home  Expected Discharge per primary team  Expected discharge date/ time has not been  documented.     VTE Prophylaxis:  Pharmacologic VTE prophylaxis orders are present.         AM-PAC 6 Clicks Score (PT): 18 (03/10/25 2015)    CODE STATUS:   Code Status and Medical Interventions: CPR (Attempt to Resuscitate); Full Support   Ordered at: 03/10/25 1339     Code Status (Patient has no pulse and is not breathing):    CPR (Attempt to Resuscitate)     Medical Interventions (Patient has pulse or is breathing):    Full Support     Level Of Support Discussed With:    Patient       Nydia Farmer MD  03/11/25

## 2025-03-11 NOTE — CASE MANAGEMENT/SOCIAL WORK
Discharge Planning Assessment  [facility]     Patient Name: Mandi Jacob  MRN: [id number]  Today's Date: 3/11/2025    Admit Date: 3/10/2025    Plan: Home   Discharge Needs Assessment       Row Name 03/11/25 1011       Living Environment    People in Home significant other    Name(s) of People in Home Dequan (fiance)    Current Living Arrangements home    Potentially Unsafe Housing Conditions none    In the past 12 months has the electric, gas, oil, or water company threatened to shut off services in your home? No    Primary Care Provided by self    Provides Primary Care For no one    Family Caregiver if Needed significant other;child(marija), adult    Family Caregiver Names Dodie Acosta (daughter) [phone] or Dequan Monzon (significant other) [phone]    Quality of Family Relationships supportive    Able to Return to Prior Arrangements yes       Resource/Environmental Concerns    Resource/Environmental Concerns none    Transportation Concerns none       Transportation Needs    In the past 12 months, has lack of transportation kept you from medical appointments or from getting medications? no    In the past 12 months, has lack of transportation kept you from meetings, work, or from getting things needed for daily living? No       Food Insecurity    Within the past 12 months, you worried that your food would run out before you got the money to buy more. Never true    Within the past 12 months, the food you bought just didn't last and you didn't have money to get more. Never true       Transition Planning    Patient/Family Anticipates Transition to home with help/services;other (see comments)  daughter lives next door, manuel can assist with care    Patient/Family Anticipated Services at Transition     Transportation Anticipated family or friend will provide       Discharge Needs Assessment    Readmission Within the Last 30 Days no previous admission in last 30 days    Equipment Currently  Used at Home rollator;pulse ox;oxygen;nebulizer;grab bar;cane, quad;cane, straight;walker, standard;wheelchair;glucometer;bp cuff    Concerns to be Addressed no discharge needs identified    Do you want help finding or keeping work or a job? I do not need or want help    Do you want help with school or training? For example, starting or completing job training or getting a high school diploma, GED or equivalent No    Anticipated Changes Related to Illness none    Equipment Needed After Discharge bp cuff;cane, quad;cane, straight;grab bar, tub/shower;glucometer;nebulizer;oxygen;pulse ox;rollator;walker, standard;wheelchair, manual    Discharge Coordination/Progress I spoke with Pt, in room, to initiate discharge plan. Pt is admitted with AFib. She lives with stephy in a two story home in [place] (stays on first floor). There are 3 steps at the entrance of the home. She is independent with ADLs at baseline. Stephy can assist her, if needed. Also, daughter lives next door. Pt has hired help who does deep cleaning monthly. She ambulates with a straight cane within the home and uses a rollator when outside the home. She has the following DME: nebulizer, grab bars, straight and quad canes, standard walker, manual W/C, rollator, glucometer, and BP cuff. Her PCP is Dr John Montelongo. She has [employer] Medicare insurance which has Rx coverage. She uses [facility], [place]. She states prescriptions are affordable. She denies HH. She uses 2L continuous home O2 supplied by [employer]. Her goal is to return home at discharge. Family will provide transportation. CM will continue to follow hospital course.                   Discharge Plan       Row Name 03/11/25 1028       Plan    Plan Home    Final Discharge Disposition Code 01 - home or self-care                    Expected Discharge Date and Time       Expected Discharge Date Expected Discharge Time    Mar 12, 2025            Demographic Summary       Row Name 03/11/25  1010       General Information    Arrived From home    Referral Source emergency department    Reason for Consult discharge planning    Preferred Language English    General Information Comments PCP Dr John Montelongo       Contact Information    Permission Granted to Share Info With     Contact Information Obtained for     Contact Information Comments Dodie Acosta (daughter) [phone] or Dequan Monzon (significant other) [phone]                   Functional Status       Row Name 03/11/25 1011       Functional Status    Usual Activity Tolerance good    Current Activity Tolerance moderate       Functional Status, IADL    Medications independent    Meal Preparation independent    Housekeeping independent;other (see comments)  also has hired help for deep cleaning    Laundry independent    Shopping independent                   Psychosocial    No documentation.                  Abuse/Neglect    No documentation.                  Legal    No documentation.                  Substance Abuse    No documentation.                  Patient Forms    No documentation.                     Gloria Ellis RN

## 2025-03-11 NOTE — PROGRESS NOTES
[facility] at [facility]  Progress Note       LOS: 1 day   Patient Care Team:  John Montelongo MD as PCP - General (Internal Medicine)  John Montelongo MD as Referring Physician (Internal Medicine)    Chief Complaint:  atrial fibrillation     Subjective     In NSR with PACs. Diuresed well with Lasix. She is asking about restarting her maxzide. Complains of a cough, hospitalist treating with doxycycline, nebs, and singulair.         Review of Systems:   Pertinent positives in HPI, all others reviewed and negative.      Objective       Current Facility-Administered Medications:     acetaminophen (TYLENOL) tablet 650 mg, 650 mg, Oral, Q6H PRN, Lea Gomez APRN    albuterol (PROVENTIL) nebulizer solution 0.083% 2.5 mg/3mL, 2.5 mg, Nebulization, Q6H PRN, Lea Gomez APRN, 2.5 mg at 03/11/25 0405    allopurinol (ZYLOPRIM) tablet 100 mg, 100 mg, Oral, Daily, Lea Gomez APRN    anastrozole (ARIMIDEX) tablet 1 mg, 1 mg, Oral, Daily, Lea Gomez APRN    apixaban (ELIQUIS) tablet 5 mg, 5 mg, Oral, Q12H, Lea Gomez APRN, 5 mg at 03/10/25 2118    Calcium Replacement - Follow Nurse / BPA Driven Protocol, , Not Applicable, PRN, Lea Gomez APRN    clopidogrel (PLAVIX) tablet 75 mg, 75 mg, Oral, Daily, Lea Gomez APRN    dextrose (D50W) (25 g/50 mL) IV injection 25 g, 25 g, Intravenous, Q15 Min PRN, Natty Ledesma APRN    dextrose (GLUTOSE) oral gel 15 g, 15 g, Oral, Q15 Min PRN, Natty Ledesma APRN    doxycycline (VIBRAMYCIN) 100 mg in sodium chloride 0.9 % 100 mL MBP, 100 mg, Intravenous, Q12H, Natty Ledesma APRN, 100 mg at 03/11/25 0509    furosemide (LASIX) tablet 40 mg, 40 mg, Oral, BID, Lea Gomez APRMINNIE, 40 mg at 03/10/25 2118    insulin glargine (LANTUS, SEMGLEE) injection 30 Units, 30 Units, Subcutaneous, Daily, Natty Ledesma APRN    Insulin Lispro (humaLOG) injection 2-7 Units, 2-7 Units, Subcutaneous, 4x Daily AC & at Bedtime, Baltazar  FLOWER Luz, 3 Units at 03/10/25 2118    Insulin Lispro (humaLOG) injection 5 Units, 5 Units, Subcutaneous, TID With Meals, Natty Ledesma APRN, 5 Units at 03/10/25 1710    isosorbide mononitrate (IMDUR) 24 hr tablet 30 mg, 30 mg, Oral, Daily, Lea Gomez APRN    losartan (COZAAR) tablet 50 mg, 50 mg, Oral, Daily, Lea Gomez APRN    Magnesium Cardiology Dose Replacement - Follow Nurse / BPA Driven Protocol, , Not Applicable, PRN, Lea Gomez APRN    magnesium oxide (MAG-OX) tablet 400 mg, 400 mg, Oral, Nightly, Lea Gomez APRN, 400 mg at 03/10/25 2118    metoprolol tartrate (LOPRESSOR) half tablet 12.5 mg, 12.5 mg, Oral, Q12H, Lea Gomez APRN, 12.5 mg at 03/10/25 2118    montelukast (SINGULAIR) tablet 10 mg, 10 mg, Oral, Nightly, Lea Gomez APRN, 10 mg at 03/10/25 2118    nitroglycerin (NITROSTAT) SL tablet 0.4 mg, 0.4 mg, Sublingual, Q5 Min PRN, Lea Gomez APRN    O2 (OXYGEN), 2 L/min, Inhalation, Continuous, Lea Gomez APRN    Pharmacy Consult, , Not Applicable, Continuous PRN, Lea Gomez APRN    Phosphorus Replacement - Follow Nurse / BPA Driven Protocol, , Not Applicable, PRN, Lea Gomez APRN    potassium chloride (KLOR-CON M20) CR tablet 20 mEq, 20 mEq, Oral, QAM, Lea Gomez APRN, 20 mEq at 03/11/25 0640    potassium chloride (MICRO-K/KLOR-CON) CR capsule, 10 mEq, Oral, Nightly, Lea Gomez APRN, 10 mEq at 03/10/25 2117    Potassium Replacement - Follow Nurse / BPA Driven Protocol, , Not Applicable, PRN, Lea Gomez APRN    roflumilast (DALIRESP) tablet 250 mcg, 250 mcg, Oral, Daily, Lea Gomez APRN    sotalol (BETAPACE) tablet 120 mg, 120 mg, Oral, Q12H, Lea Gomez APRN, 120 mg at 03/10/25 1927    Vital Sign Min/Max for last 24 hours  Temp  Min: 97.7 °F (36.5 °C)  Max: 98.7 °F (37.1 °C)   BP  Min: 130/58  Max: 175/88   Pulse  Min: 60  Max: 106   Resp  Min: 18  Max: 22   SpO2  Min: 90 %  Max: 98 %   Flow (L/min) (Oxygen Therapy)  Min: 2   "Max: 2   Weight  Min: 117 kg (259 lb)  Max: 117 kg (259 lb)     Flowsheet Rows      Flowsheet Row First Filed Value   Admission Height 167.6 cm (66\") Documented at 03/10/2025 1205   Admission Weight 117 kg (259 lb) Documented at 03/10/2025 1205              Intake/Output Summary (Last 24 hours) at 3/11/2025 0740  Last data filed at 3/10/2025 1643  Gross per 24 hour   Intake 100 ml   Output --   Net 100 ml       Physical Exam:     General Appearance:    Alert, cooperative, in no acute distress   Lungs:     Clear to auscultation bilaterally,respirations regular, even and                unlabored    Heart:    Regular rhythm and normal rate, normal S1 and S2,   no        murmur, no gallop, no rub, no click   Chest Wall:    No abnormalities observed   Abdomen:     Normal bowel sounds, no masses, no organomegaly, soft      nontender, nondistended, no guarding, no rebound                tenderness   Extremities:  Moves all extremities well, no edema, no cyanosis, no            redness              Pulses:   Pulses palpable and equal bilaterally   Skin:   No bleeding, bruising or rash        Results Review:   Results from last 7 days   Lab Units 03/10/25  1615   WBC 10*3/mm3 7.76   HEMOGLOBIN g/dL 12.1   HEMATOCRIT % 37.2   PLATELETS 10*3/mm3 218     Results from last 7 days   Lab Units 03/11/25  0452 03/10/25  2115 03/10/25  1423   SODIUM mmol/L 142  --  140   POTASSIUM mmol/L 4.3 4.1 3.8   CHLORIDE mmol/L 103  --  100   CO2 mmol/L 28.0  --  27.0   BUN mg/dL 13  --  14   CREATININE mg/dL 0.87  --  0.67   GLUCOSE mg/dL 115*  --  165*      Results from last 7 days   Lab Units 03/10/25  1615   HEMOGLOBIN A1C % 7.10*                           Intake/Output Summary (Last 24 hours) at 3/11/2025 0740  Last data filed at 3/10/2025 1643  Gross per 24 hour   Intake 100 ml   Output --   Net 100 ml       I personally viewed and interpreted the patient's EKG/Telemetry data    EKG: NSR with PACs, HR 78 bpm, QTc is 410 ms    Telemetry: " "NSR with PACs    Ejection Fraction  No results found for: \"EF\"    Echo EF Estimated  Lab Results   Component Value Date    ECHOEFEST 50.0 03/30/2023         Present on Admission:   Atrial fibrillation    Assessment & Plan   Persistent atrial fibrillation   [id number] 6, anticoagulated on Eliquis  Previously tried on Tikosyn. Patient did not tolerate.  Last dose March 1, 2025  Sotalol initiation 3/10/2025.  Has received 1 dose, next dose due at 9 AM this morning. QTc is 410 ms this morning.   Serial EKGs  Replace electrolytes lites per protocol to keep potassium greater than 4 and magnesium greater than 2, avoid QTc prolonging medications  Stop Metoprolol as she is now on Sotalol   Congestive heart failure  Echo, 10/12/23: EF 51-55%, LV consistent mild concentric hypertrophy, left atrial cavity mod dilated, no significant valvular abnormalities   On Lasix 40 mg BID, adding Maxzide as well today. May need to decrease Lasix based on labwork tomorrow.   Hypertension- restart Maxide 37.5/25 mg daily, BMP in AM. Will stop 10 meq of potassium that she takes at night and continue AM dose of 20 meq due to potassium sparing effect of Triamterene in combination with Losartan. Check BMP in AM.   COPD-appreciate hospitalist assistance  Diabetes-appreciate hospitalist assistance    Plan for disposition: Continue to monitor serial EKGs. Will continue to monitor.     Electronically signed by RICKEY French, 03/11/25, 8:16 AM EDT.            "

## 2025-03-12 LAB
ANION GAP SERPL CALCULATED.3IONS-SCNC: 11 MMOL/L (ref 5–15)
BUN SERPL-MCNC: 19 MG/DL (ref 8–23)
BUN/CREAT SERPL: 29.2 (ref 7–25)
CALCIUM SPEC-SCNC: 11.5 MG/DL (ref 8.6–10.5)
CHLORIDE SERPL-SCNC: 100 MMOL/L (ref 98–107)
CO2 SERPL-SCNC: 27 MMOL/L (ref 22–29)
CREAT SERPL-MCNC: 0.65 MG/DL (ref 0.57–1)
EGFRCR SERPLBLD CKD-EPI 2021: 93.1 ML/MIN/1.73
GLUCOSE BLDC GLUCOMTR-MCNC: 154 MG/DL (ref 70–130)
GLUCOSE BLDC GLUCOMTR-MCNC: 170 MG/DL (ref 70–130)
GLUCOSE BLDC GLUCOMTR-MCNC: 209 MG/DL (ref 70–130)
GLUCOSE BLDC GLUCOMTR-MCNC: 381 MG/DL (ref 70–130)
GLUCOSE SERPL-MCNC: 169 MG/DL (ref 65–99)
MAGNESIUM SERPL-MCNC: 2.2 MG/DL (ref 1.6–2.4)
POTASSIUM SERPL-SCNC: 3.7 MMOL/L (ref 3.5–5.2)
POTASSIUM SERPL-SCNC: 4.5 MMOL/L (ref 3.5–5.2)
QT INTERVAL: 418 MS
QTC INTERVAL: 466 MS
SODIUM SERPL-SCNC: 138 MMOL/L (ref 136–145)

## 2025-03-12 PROCEDURE — 93010 ELECTROCARDIOGRAM REPORT: CPT | Performed by: INTERNAL MEDICINE

## 2025-03-12 PROCEDURE — 80048 BASIC METABOLIC PNL TOTAL CA: CPT

## 2025-03-12 PROCEDURE — 83735 ASSAY OF MAGNESIUM: CPT

## 2025-03-12 PROCEDURE — 84132 ASSAY OF SERUM POTASSIUM: CPT | Performed by: INTERNAL MEDICINE

## 2025-03-12 PROCEDURE — 63710000001 INSULIN GLARGINE PER 5 UNITS: Performed by: NURSE PRACTITIONER

## 2025-03-12 PROCEDURE — 99232 SBSQ HOSP IP/OBS MODERATE 35: CPT

## 2025-03-12 PROCEDURE — 93005 ELECTROCARDIOGRAM TRACING: CPT

## 2025-03-12 PROCEDURE — 63710000001 INSULIN LISPRO (HUMAN) PER 5 UNITS: Performed by: NURSE PRACTITIONER

## 2025-03-12 PROCEDURE — 63710000001 PREDNISONE PER 1 MG: Performed by: INTERNAL MEDICINE

## 2025-03-12 PROCEDURE — 82948 REAGENT STRIP/BLOOD GLUCOSE: CPT

## 2025-03-12 PROCEDURE — 25010000002 CEFTRIAXONE PER 250 MG: Performed by: INTERNAL MEDICINE

## 2025-03-12 PROCEDURE — 99232 SBSQ HOSP IP/OBS MODERATE 35: CPT | Performed by: INTERNAL MEDICINE

## 2025-03-12 PROCEDURE — 93005 ELECTROCARDIOGRAM TRACING: CPT | Performed by: INTERNAL MEDICINE

## 2025-03-12 RX ORDER — CEFUROXIME AXETIL 250 MG/1
500 TABLET ORAL EVERY 12 HOURS SCHEDULED
Status: DISCONTINUED | OUTPATIENT
Start: 2025-03-13 | End: 2025-03-13 | Stop reason: HOSPADM

## 2025-03-12 RX ORDER — POTASSIUM CHLORIDE 1500 MG/1
20 TABLET, EXTENDED RELEASE ORAL ONCE
Status: COMPLETED | OUTPATIENT
Start: 2025-03-12 | End: 2025-03-12

## 2025-03-12 RX ADMIN — ANASTROZOLE 1 MG: 1 TABLET ORAL at 09:41

## 2025-03-12 RX ADMIN — GUAIFENESIN 1200 MG: 600 TABLET ORAL at 20:42

## 2025-03-12 RX ADMIN — INSULIN GLARGINE 30 UNITS: 100 INJECTION, SOLUTION SUBCUTANEOUS at 09:40

## 2025-03-12 RX ADMIN — DOXYCYCLINE 100 MG: 100 CAPSULE ORAL at 05:57

## 2025-03-12 RX ADMIN — INSULIN LISPRO 5 UNITS: 100 INJECTION, SOLUTION INTRAVENOUS; SUBCUTANEOUS at 09:39

## 2025-03-12 RX ADMIN — POTASSIUM CHLORIDE 20 MEQ: 20 TABLET, EXTENDED RELEASE ORAL at 13:57

## 2025-03-12 RX ADMIN — MONTELUKAST 10 MG: 10 TABLET, FILM COATED ORAL at 20:43

## 2025-03-12 RX ADMIN — PREDNISONE 40 MG: 10 TABLET ORAL at 09:38

## 2025-03-12 RX ADMIN — POTASSIUM CHLORIDE 10 MEQ: 750 CAPSULE, EXTENDED RELEASE ORAL at 20:42

## 2025-03-12 RX ADMIN — GUAIFENESIN 1200 MG: 600 TABLET ORAL at 09:39

## 2025-03-12 RX ADMIN — INSULIN LISPRO 5 UNITS: 100 INJECTION, SOLUTION INTRAVENOUS; SUBCUTANEOUS at 17:31

## 2025-03-12 RX ADMIN — MAGNESIUM OXIDE TAB 400 MG (241.3 MG ELEMENTAL MG) 400 MG: 400 (241.3 MG) TAB at 20:43

## 2025-03-12 RX ADMIN — INSULIN LISPRO 2 UNITS: 100 INJECTION, SOLUTION INTRAVENOUS; SUBCUTANEOUS at 13:57

## 2025-03-12 RX ADMIN — ALLOPURINOL 100 MG: 100 TABLET ORAL at 09:39

## 2025-03-12 RX ADMIN — DOXYCYCLINE 100 MG: 100 CAPSULE ORAL at 17:32

## 2025-03-12 RX ADMIN — ROFLUMILAST 250 MCG: 500 TABLET ORAL at 09:37

## 2025-03-12 RX ADMIN — INSULIN LISPRO 3 UNITS: 100 INJECTION, SOLUTION INTRAVENOUS; SUBCUTANEOUS at 17:32

## 2025-03-12 RX ADMIN — CEFTRIAXONE SODIUM 2000 MG: 2 INJECTION, POWDER, FOR SOLUTION INTRAMUSCULAR; INTRAVENOUS at 09:35

## 2025-03-12 RX ADMIN — APIXABAN 5 MG: 5 TABLET, FILM COATED ORAL at 09:39

## 2025-03-12 RX ADMIN — INSULIN LISPRO 6 UNITS: 100 INJECTION, SOLUTION INTRAVENOUS; SUBCUTANEOUS at 20:43

## 2025-03-12 RX ADMIN — ISOSORBIDE MONONITRATE 30 MG: 30 TABLET, EXTENDED RELEASE ORAL at 09:39

## 2025-03-12 RX ADMIN — POTASSIUM CHLORIDE 20 MEQ: 20 TABLET, EXTENDED RELEASE ORAL at 05:57

## 2025-03-12 RX ADMIN — SOTALOL HYDROCHLORIDE 120 MG: 80 TABLET ORAL at 09:38

## 2025-03-12 RX ADMIN — FUROSEMIDE 40 MG: 40 TABLET ORAL at 09:38

## 2025-03-12 RX ADMIN — CLOPIDOGREL BISULFATE 75 MG: 75 TABLET ORAL at 09:39

## 2025-03-12 RX ADMIN — LOSARTAN POTASSIUM 50 MG: 50 TABLET, FILM COATED ORAL at 09:38

## 2025-03-12 RX ADMIN — FUROSEMIDE 40 MG: 40 TABLET ORAL at 20:43

## 2025-03-12 RX ADMIN — SOTALOL HYDROCHLORIDE 120 MG: 80 TABLET ORAL at 20:43

## 2025-03-12 RX ADMIN — APIXABAN 5 MG: 5 TABLET, FILM COATED ORAL at 20:43

## 2025-03-12 RX ADMIN — INSULIN LISPRO 2 UNITS: 100 INJECTION, SOLUTION INTRAVENOUS; SUBCUTANEOUS at 09:42

## 2025-03-12 RX ADMIN — INSULIN LISPRO 5 UNITS: 100 INJECTION, SOLUTION INTRAVENOUS; SUBCUTANEOUS at 13:57

## 2025-03-12 RX ADMIN — TRIAMTERENE AND HYDROCHLOROTHIAZIDE 1 TABLET: 37.5; 25 TABLET ORAL at 09:41

## 2025-03-12 NOTE — PROGRESS NOTES
[facility] Medicine Services  PROGRESS NOTE    Patient Name: Mandi Jacob  : 1950  MRN: [id number]    Date of Admission: 3/10/2025  Primary Care Physician: John Montelongo MD    Subjective   Subjective     CC:  Follow-up for medical management    HPI:  Patient seen and examined this morning.  Patient is feeling much better today.  Shortness of breath and cough significantly improved.  Hoping to be able to go home soon    Objective   Objective     Vital Signs:   Temp:  [97.9 °F (36.6 °C)-98.2 °F (36.8 °C)] 98.2 °F (36.8 °C)  Heart Rate:  [62-87] 62  Resp:  [18-20] 18  BP: (112-151)/(51-92) 144/92  Flow (L/min) (Oxygen Therapy):  [2] 2     Physical Exam:  General: Comfortable, not in distress, conversant and cooperative  Head: Atraumatic and normocephalic  Eyes: No Icterus. No pallor  Ears:  Ears appear intact with no abnormalities noted  Throat: No oral lesions, no thrush  Neck: Supple, trachea midline  Lungs: Adequate air entry both lung fields.  No wheezing.  Mild right lower lobe crackles  Heart:  Normal S1 and S2, no murmur, no gallop, No JVD, no lower extremity swelling  Abdomen:  Soft, no tenderness, no organomegaly, normal bowel sounds, no organomegaly  Extremities: pulses equal bilaterally  Skin: No bleeding, bruising or rash, normal skin turgor and elasticity  Neurologic: Cranial nerves appear intact with no evidence of facial asymmetry, normal motor and sensory functions in all 4 extremities  Psych: Alert and oriented x 3, normal mood    Results Reviewed:  LAB RESULTS:      Lab 25  0452 03/10/25  1615   WBC  --  7.76   HEMOGLOBIN  --  12.1   HEMATOCRIT  --  37.2   PLATELETS  --  218   NEUTROS ABS  --  5.33   IMMATURE GRANS (ABS)  --  0.04   LYMPHS ABS  --  1.49   MONOS ABS  --  0.83   EOS ABS  --  0.04   MCV  --  90.5   CRP 0.93*  --          Lab 25  0757 25  0452 03/10/25  2115 03/10/25  1615 03/10/25  1423   SODIUM 138 142  --   --  140    POTASSIUM 3.7 4.3 4.1  --  3.8   CHLORIDE 100 103  --   --  100   CO2 27.0 28.0  --   --  27.0   ANION GAP 11.0 11.0  --   --  13.0   BUN 19 13  --   --  14   CREATININE 0.65 0.87  --   --  0.67   EGFR 93.1 70.5  --   --  92.4   GLUCOSE 169* 115*  --   --  165*   CALCIUM 11.5* 10.9*  --   --  10.8*   MAGNESIUM 2.2 2.2  --   --  2.0   HEMOGLOBIN A1C  --   --   --  7.10*  --                          Brief Urine Lab Results  (Last result in the past 365 days)        Color   Clarity   Blood   Leuk Est   Nitrite   Protein   CREAT   Urine HCG        11/02/24 2053 Yellow   Cloudy   Trace   Moderate (2+)   Negative   30 mg/dL (1+)                   Microbiology Results Abnormal       Procedure Component Value - Date/Time    Respiratory Panel PCR w/COVID-19(SARS-CoV-2) CASANDRA/DON/STEVE/PAD/COR/BARB In-House, NP Swab in UTM/VTM, 2 HR TAT - Swab, Nasopharynx [[id number]]  (Abnormal) Collected: 03/11/25 1030    Lab Status: Final result Specimen: Swab from Nasopharynx Updated: 03/11/25 1137     ADENOVIRUS, PCR Not Detected     Coronavirus 229E Not Detected     Coronavirus HKU1 Not Detected     Coronavirus NL63 Not Detected     Coronavirus OC43 Not Detected     COVID19 Not Detected     Human Metapneumovirus Not Detected     Human Rhinovirus/Enterovirus Detected     Influenza A PCR Not Detected     Influenza B PCR Not Detected     Parainfluenza Virus 1 Not Detected     Parainfluenza Virus 2 Not Detected     Parainfluenza Virus 3 Not Detected     Parainfluenza Virus 4 Not Detected     RSV, PCR Not Detected     Bordetella pertussis pcr Not Detected     Bordetella parapertussis PCR Not Detected     Chlamydophila pneumoniae PCR Not Detected     Mycoplasma pneumo by PCR Not Detected    Narrative:      In the setting of a positive respiratory panel with a viral infection PLUS a negative procalcitonin without other underlying concern for bacterial infection, consider observing off antibiotics or discontinuation of antibiotics and continue  supportive care. If the respiratory panel is positive for atypical bacterial infection (Bordetella pertussis, Chlamydophila pneumoniae, or Mycoplasma pneumoniae), consider antibiotic de-escalation to target atypical bacterial infection.            XR Chest 1 View  Result Date: 3/11/2025  XR CHEST 1 VW Date of Exam: 3/11/2025 8:38 AM EDT Indication: SOB Comparison: None available. Findings: Examination is somewhat suboptimal secondary to body habitus and patient position. Cardiac silhouette appears mildly enlarged. Vague attenuation of the left lung base suspected to be secondary to soft tissue summation. Otherwise, no distinct focal consolidation. No sizable pleural effusion. No pneumothorax. Leftward convex curvature of the upper thoracic spine. Vascular stent projects over the right neck.     Impression: Impression: Within the confines of a suboptimal single-view exam, no distinct acute findings. Cardiac silhouette appears mildly enlarged. Electronically Signed: Steven France MD  3/11/2025 9:18 AM EDT  Workstation ID: [id number]      Results for orders placed during the hospital encounter of 10/12/23    Adult Transthoracic Echo Limited W/ Cont if Necessary Per Protocol    Interpretation Summary    Left ventricular ejection fraction appears to be 51 - 55%.    Left ventricular wall thickness is consistent with mild concentric hypertrophy.    The left atrial cavity is moderately dilated.    Moderately to severely technically limited study.  Gross generalizations can be rendered.    1.  LV size is normal and global LV systolic function is not significantly depressed.  Visually estimated ejection fraction is 50±5%.  Mild concentric left ventricular hypertrophy.  Moderate left atrial enlargement.  The right ventricle appears mildly dilated but RV systolic function is grossly preserved.  The right atrium is at the upper limits of normal.    2.  Valves are morphologically normal.    3.  No pericardial or great vessel  pathology.      Current medications:  Scheduled Meds:allopurinol, 100 mg, Oral, Daily  anastrozole, 1 mg, Oral, Daily  apixaban, 5 mg, Oral, Q12H  [START ON 3/13/2025] cefuroxime, 500 mg, Oral, Q12H  clopidogrel, 75 mg, Oral, Daily  doxycycline, 100 mg, Oral, Q12H  furosemide, 40 mg, Oral, BID  guaiFENesin, 1,200 mg, Oral, Q12H  insulin glargine, 30 Units, Subcutaneous, Daily  insulin lispro, 2-7 Units, Subcutaneous, 4x Daily AC & at Bedtime  Insulin Lispro, 5 Units, Subcutaneous, TID With Meals  isosorbide mononitrate, 30 mg, Oral, Daily  losartan, 50 mg, Oral, Daily  magnesium oxide, 400 mg, Oral, Nightly  montelukast, 10 mg, Oral, Nightly  potassium chloride, 20 mEq, Oral, QAM  potassium chloride, 10 mEq, Oral, Nightly  predniSONE, 40 mg, Oral, Daily With Breakfast  roflumilast, 250 mcg, Oral, Daily  sotalol, 120 mg, Oral, Q12H  triamterene-hydrochlorothiazide, 1 tablet, Oral, Daily      Continuous Infusions:O2, 2 L/min  Pharmacy Consult,       PRN Meds:.  acetaminophen    albuterol    Calcium Replacement - Follow Nurse / BPA Driven Protocol    dextrose    dextrose    Magnesium Cardiology Dose Replacement - Follow Nurse / BPA Driven Protocol    nitroglycerin    Pharmacy Consult    Phosphorus Replacement - Follow Nurse / BPA Driven Protocol    Potassium Replacement - Follow Nurse / BPA Driven Protocol    Assessment & Plan   Assessment & Plan     Active Hospital Problems    Diagnosis  POA    **Atrial fibrillation [I48.91]  Yes      Resolved Hospital Problems   No resolved problems to display.        Brief Hospital Course to date:  Mandi Jacob is a 74 y.o. female PMH COPD 2l NC at home, breast cancer, Carotid stenosis, HLD, HTN, HFmrEF, DMII, CAD, hx TIA presenting with persistent Afib. Pt reports congested cough for 7 days, concerning for COPD exacerbation.     Acute rhinovirus infection  Acute COPD exacerbation  Presented to the hospital with shortness of breath secondary to COPD  exacerbation  Respiratory panel positive for rhinovirus/enterovirus  Chest x-ray with acute bronchitis  Status post IV Rocephin and doxycycline.  Switch to p.o. cefuroxime and p.o. doxycycline x 5 days starting tomorrow  Continue p.o. prednisone 40 mg daily x 5 days  Continue oxygen supplementation  DuoNebs, Mucinex, I-S     Afib persistent  HFmrEF 10/2023  Hypertension  Dyslipidemia   Management per cardiology  Current on sotalol.  Converted to normal sinus rhythm  Continue metoprolol  Continue eliquis  Continue Lasix from home meds     Type 2 diabetes  A1C 7.1 %  Continue lantus 30 units daily  Continue lispro 5 units with meals and SSI      Expected Discharge Location and Transportation: Home  Expected Discharge per primary team  Expected Discharge Date: 3/13/2025; Expected Discharge Time:      VTE Prophylaxis:  Pharmacologic VTE prophylaxis orders are present.         AM-PAC 6 Clicks Score (PT): 18 (03/11/25 2032)    CODE STATUS:   Code Status and Medical Interventions: CPR (Attempt to Resuscitate); Full Support   Ordered at: 03/10/25 1335     Code Status (Patient has no pulse and is not breathing):    CPR (Attempt to Resuscitate)     Medical Interventions (Patient has pulse or is breathing):    Full Support     Level Of Support Discussed With:    Patient       Nydia Farmer MD  03/12/25

## 2025-03-12 NOTE — PROGRESS NOTES
[facility] at [facility]  PROGRESS NOTE    Mandi Jacob   [id number]   1950    LOS: 2 days .  Date of Admission: 3/10/2025  Date of Service: 03/12/25    Primary Care Physician: John Montelongo MD    Chief Complaint: persistent AF    Problem List:   Atrial fibrillation    Subjective      Patient doing well this morning.  She states this is the best she has felt in several months.  Tolerating sotalol well.  No complaints overnight.  Maintaining normal sinus rhythm with PACs    ROS  All systems have been reviewed and are negative with the exception of those mentioned in the HPI and problem list above.     Objective   Vital Sign Min/Max for last 24 hours  Temp  Min: 97.9 °F (36.6 °C)  Max: 98.2 °F (36.8 °C)   BP  Min: 112/64  Max: 151/91   Pulse  Min: 68  Max: 87   Resp  Min: 18  Max: 20   SpO2  Min: 92 %  Max: 98 %   No data recorded   No data recorded     Physical Exam:  GENERAL: Alert, cooperative, in no acute distress.   HEENT: Normocephalic, no jugular venous distention  HEART: Regular rhythm, normal rate, and no murmurs, gallops, or rubs.   LUNGS: Clear to auscultation bilaterally. No wheezing, rales or rhonchi. On 2 L NC  NEUROLOGIC: No focal abnormalities involving strength or sensation are noted.   EXTREMITIES: No clubbing, cyanosis, or edema noted.     Results:  Results from last 7 days   Lab Units 03/10/25  1615   WBC 10*3/mm3 7.76   HEMOGLOBIN g/dL 12.1   HEMATOCRIT % 37.2   PLATELETS 10*3/mm3 218     Results from last 7 days   Lab Units 03/11/25  0452 03/10/25  2115 03/10/25  1423   SODIUM mmol/L 142  --  140   POTASSIUM mmol/L 4.3 4.1 3.8   CHLORIDE mmol/L 103  --  100   CO2 mmol/L 28.0  --  27.0   BUN mg/dL 13  --  14   CREATININE mg/dL 0.87  --  0.67   GLUCOSE mg/dL 115*  --  165*      Lab Results   Component Value Date    TRIG 255 (H) 09/21/2015    HDL 43 09/21/2015     (H) 09/21/2015    AST 16 11/02/2024    ALT 16 11/02/2024     Results from last 7  days   Lab Units 03/10/25  1615   HEMOGLOBIN A1C % 7.10*                               Intake/Output Summary (Last 24 hours) at 3/12/2025 0802  Last data filed at 3/11/2025 1806  Gross per 24 hour   Intake 1125 ml   Output --   Net 1125 ml       EKG/TELE: Normal sinus rhythm with PACs    Radiology Data:   XR Chest 1 View  Result Date: 3/11/2025  Impression: Within the confines of a suboptimal single-view exam, no distinct acute findings. Cardiac silhouette appears mildly enlarged. Electronically Signed: Steven France MD  3/11/2025 9:18 AM EDT  Workstation ID: [id number]     Results for orders placed during the hospital encounter of 10/12/23    Adult Transthoracic Echo Limited W/ Cont if Necessary Per Protocol    Interpretation Summary    Left ventricular ejection fraction appears to be 51 - 55%.    Left ventricular wall thickness is consistent with mild concentric hypertrophy.    The left atrial cavity is moderately dilated.    Moderately to severely technically limited study.  Gross generalizations can be rendered.    1.  LV size is normal and global LV systolic function is not significantly depressed.  Visually estimated ejection fraction is 50±5%.  Mild concentric left ventricular hypertrophy.  Moderate left atrial enlargement.  The right ventricle appears mildly dilated but RV systolic function is grossly preserved.  The right atrium is at the upper limits of normal.    2.  Valves are morphologically normal.    3.  No pericardial or great vessel pathology.     Current Medications:  allopurinol, 100 mg, Oral, Daily  anastrozole, 1 mg, Oral, Daily  apixaban, 5 mg, Oral, Q12H  cefTRIAXone, 2,000 mg, Intravenous, Q24H  clopidogrel, 75 mg, Oral, Daily  doxycycline, 100 mg, Oral, Q12H  furosemide, 40 mg, Oral, BID  guaiFENesin, 1,200 mg, Oral, Q12H  insulin glargine, 30 Units, Subcutaneous, Daily  insulin lispro, 2-7 Units, Subcutaneous, 4x Daily AC & at Bedtime  Insulin Lispro, 5 Units, Subcutaneous, TID With  Meals  isosorbide mononitrate, 30 mg, Oral, Daily  losartan, 50 mg, Oral, Daily  magnesium oxide, 400 mg, Oral, Nightly  montelukast, 10 mg, Oral, Nightly  potassium chloride, 20 mEq, Oral, QAM  potassium chloride, 10 mEq, Oral, Nightly  predniSONE, 40 mg, Oral, Daily With Breakfast  roflumilast, 250 mcg, Oral, Daily  sotalol, 120 mg, Oral, Q12H  triamterene-hydrochlorothiazide, 1 tablet, Oral, Daily      O2, 2 L/min  Pharmacy Consult,       Assessment and Plan:   Persistent atrial fibrillation   [id number] 6, anticoagulated on Eliquis  Previously tried on Tikosyn. Patient did not tolerate.  Last dose March 1, 2025  Congestive heart failure  Echo, 10/12/23: EF 51-55%, LV consistent mild concentric hypertrophy, left atrial cavity mod dilated, no significant valvular abnormalities   Continue Maxzide and lasix  Hypertension  COPD - hospitalist following  Diabetes - hospitalist following     -Patient has received 3 doses of sotalol. Qtc appropriate on EKG. Continue serial EKGs after each dose.   -Patient maintaining normal rhythm  -Continue eliquis  -Patient appears to be euvolemic   -Replace electrolytes per protocol  -Plan to discharge tomorrow if everything remains stable. Discussed discharge with hospital medicine.     Electronically signed by FLOWER Aragon, 03/12/25, 8:02 AM EDT.     Please note that portions of this note were dictated utilizing Dragon dictation.

## 2025-03-12 NOTE — CASE MANAGEMENT/SOCIAL WORK
Continued Stay Note   [facility]     Patient Name: Mandi Jacob  MRN: [id number]  Today's Date: 3/12/2025    Admit Date: 3/10/2025    Plan: Home   Discharge Plan       Row Name 03/12/25 1248       Plan    Plan Home    Plan Comments Per discussion in MDR, Pt is in NSR with PACs. She is on 2L NC (baseline). I spoke with Pt, in room. Plan is home tomorrow. She has a portable O2 tank at bedside. CM will continue to follow for medical readiness.    Final Discharge Disposition Code 01 - home or self-care                   Discharge Codes    No documentation.                 Expected Discharge Date and Time       Expected Discharge Date Expected Discharge Time    Mar 13, 2025               Gloria Ellis RN

## 2025-03-13 ENCOUNTER — READMISSION MANAGEMENT (OUTPATIENT)
Dept: CALL CENTER | Facility: HOSPITAL | Age: 75
End: 2025-03-13
Payer: MEDICARE

## 2025-03-13 VITALS
RESPIRATION RATE: 18 BRPM | BODY MASS INDEX: 41.62 KG/M2 | WEIGHT: 259 LBS | HEART RATE: 60 BPM | DIASTOLIC BLOOD PRESSURE: 81 MMHG | TEMPERATURE: 97.9 F | OXYGEN SATURATION: 96 % | SYSTOLIC BLOOD PRESSURE: 152 MMHG | HEIGHT: 66 IN

## 2025-03-13 LAB
ANION GAP SERPL CALCULATED.3IONS-SCNC: 13 MMOL/L (ref 5–15)
BUN SERPL-MCNC: 27 MG/DL (ref 8–23)
BUN/CREAT SERPL: 33.3 (ref 7–25)
CALCIUM SPEC-SCNC: 12 MG/DL (ref 8.6–10.5)
CHLORIDE SERPL-SCNC: 100 MMOL/L (ref 98–107)
CO2 SERPL-SCNC: 27 MMOL/L (ref 22–29)
CREAT SERPL-MCNC: 0.81 MG/DL (ref 0.57–1)
EGFRCR SERPLBLD CKD-EPI 2021: 76.8 ML/MIN/1.73
GLUCOSE BLDC GLUCOMTR-MCNC: 160 MG/DL (ref 70–130)
GLUCOSE SERPL-MCNC: 175 MG/DL (ref 65–99)
MAGNESIUM SERPL-MCNC: 2.2 MG/DL (ref 1.6–2.4)
POTASSIUM SERPL-SCNC: 3.5 MMOL/L (ref 3.5–5.2)
QT INTERVAL: 430 MS
QT INTERVAL: 446 MS
QT INTERVAL: 450 MS
QT INTERVAL: 452 MS
QTC INTERVAL: 441 MS
QTC INTERVAL: 458 MS
QTC INTERVAL: 467 MS
QTC INTERVAL: 468 MS
SODIUM SERPL-SCNC: 140 MMOL/L (ref 136–145)

## 2025-03-13 PROCEDURE — 63710000001 PREDNISONE PER 5 MG: Performed by: INTERNAL MEDICINE

## 2025-03-13 PROCEDURE — 82948 REAGENT STRIP/BLOOD GLUCOSE: CPT

## 2025-03-13 PROCEDURE — 93010 ELECTROCARDIOGRAM REPORT: CPT | Performed by: INTERNAL MEDICINE

## 2025-03-13 PROCEDURE — 63710000001 INSULIN LISPRO (HUMAN) PER 5 UNITS: Performed by: NURSE PRACTITIONER

## 2025-03-13 PROCEDURE — 83735 ASSAY OF MAGNESIUM: CPT

## 2025-03-13 PROCEDURE — 63710000001 INSULIN GLARGINE PER 5 UNITS: Performed by: NURSE PRACTITIONER

## 2025-03-13 PROCEDURE — 93005 ELECTROCARDIOGRAM TRACING: CPT

## 2025-03-13 PROCEDURE — 99239 HOSP IP/OBS DSCHRG MGMT >30: CPT

## 2025-03-13 PROCEDURE — 80048 BASIC METABOLIC PNL TOTAL CA: CPT

## 2025-03-13 RX ORDER — PREDNISONE 20 MG/1
40 TABLET ORAL
Qty: 6 TABLET | Refills: 0 | Status: SHIPPED | OUTPATIENT
Start: 2025-03-13 | End: 2025-03-16

## 2025-03-13 RX ORDER — DOXYCYCLINE 100 MG/1
100 CAPSULE ORAL EVERY 12 HOURS
Qty: 10 CAPSULE | Refills: 0 | Status: SHIPPED | OUTPATIENT
Start: 2025-03-13 | End: 2025-03-18

## 2025-03-13 RX ORDER — GUAIFENESIN 600 MG/1
1200 TABLET, EXTENDED RELEASE ORAL EVERY 12 HOURS SCHEDULED
Qty: 28 TABLET | Refills: 0 | Status: SHIPPED | OUTPATIENT
Start: 2025-03-13 | End: 2025-03-20

## 2025-03-13 RX ORDER — SOTALOL HYDROCHLORIDE 120 MG/1
120 TABLET ORAL 2 TIMES DAILY
Qty: 180 TABLET | Refills: 3 | Status: SHIPPED | OUTPATIENT
Start: 2025-03-13

## 2025-03-13 RX ORDER — CEFUROXIME AXETIL 500 MG/1
500 TABLET ORAL EVERY 12 HOURS SCHEDULED
Qty: 10 TABLET | Refills: 0 | Status: SHIPPED | OUTPATIENT
Start: 2025-03-13 | End: 2025-03-18

## 2025-03-13 RX ORDER — TRIAMTERENE AND HYDROCHLOROTHIAZIDE 37.5; 25 MG/1; MG/1
1 TABLET ORAL DAILY
Qty: 90 TABLET | Refills: 3 | Status: SHIPPED | OUTPATIENT
Start: 2025-03-13

## 2025-03-13 RX ADMIN — INSULIN LISPRO 5 UNITS: 100 INJECTION, SOLUTION INTRAVENOUS; SUBCUTANEOUS at 08:05

## 2025-03-13 RX ADMIN — LOSARTAN POTASSIUM 50 MG: 50 TABLET, FILM COATED ORAL at 08:04

## 2025-03-13 RX ADMIN — ROFLUMILAST 250 MCG: 500 TABLET ORAL at 08:04

## 2025-03-13 RX ADMIN — POTASSIUM CHLORIDE 20 MEQ: 20 TABLET, EXTENDED RELEASE ORAL at 06:43

## 2025-03-13 RX ADMIN — CLOPIDOGREL BISULFATE 75 MG: 75 TABLET ORAL at 08:04

## 2025-03-13 RX ADMIN — ALLOPURINOL 100 MG: 100 TABLET ORAL at 08:02

## 2025-03-13 RX ADMIN — FUROSEMIDE 40 MG: 40 TABLET ORAL at 08:04

## 2025-03-13 RX ADMIN — APIXABAN 5 MG: 5 TABLET, FILM COATED ORAL at 08:03

## 2025-03-13 RX ADMIN — TRIAMTERENE AND HYDROCHLOROTHIAZIDE 1 TABLET: 37.5; 25 TABLET ORAL at 08:04

## 2025-03-13 RX ADMIN — INSULIN LISPRO 2 UNITS: 100 INJECTION, SOLUTION INTRAVENOUS; SUBCUTANEOUS at 08:05

## 2025-03-13 RX ADMIN — SOTALOL HYDROCHLORIDE 120 MG: 80 TABLET ORAL at 08:00

## 2025-03-13 RX ADMIN — ISOSORBIDE MONONITRATE 30 MG: 30 TABLET, EXTENDED RELEASE ORAL at 08:02

## 2025-03-13 RX ADMIN — DOXYCYCLINE 100 MG: 100 CAPSULE ORAL at 04:30

## 2025-03-13 RX ADMIN — CEFUROXIME AXETIL 500 MG: 250 TABLET, FILM COATED ORAL at 08:03

## 2025-03-13 RX ADMIN — GUAIFENESIN 1200 MG: 600 TABLET ORAL at 08:02

## 2025-03-13 RX ADMIN — PREDNISONE 40 MG: 10 TABLET ORAL at 08:03

## 2025-03-13 RX ADMIN — INSULIN GLARGINE 30 UNITS: 100 INJECTION, SOLUTION SUBCUTANEOUS at 08:08

## 2025-03-13 RX ADMIN — ANASTROZOLE 1 MG: 1 TABLET ORAL at 08:03

## 2025-03-13 NOTE — PLAN OF CARE
Goal Outcome Evaluation:  Plan of Care Reviewed With: patient        Progress: improving  Outcome Evaluation: No acute events overnight. SR/SB/Afib in&out. VPT=942; Denies CP or SOA. Remains on 2LNC. VSS.                           
Goal Outcome Evaluation:  Plan of Care Reviewed With: patient        Progress: improving  Outcome Evaluation: Pt NSR per last EKG. QTC within normal range. Okay to give next dose per APRN. Remains at baseline on 2LNC. No other changes during the day. VSS                             
Goal Outcome Evaluation:  Plan of Care Reviewed With: patient        Progress: improving  Outcome Evaluation: Pt up adlib with walker. Second dose of sotalol given. Pt remains on 2LNC. Tolerating antibiotics and steriods. VSS                             
Goal Outcome Evaluation:  Plan of Care Reviewed With: patient        Progress: improving  Outcome Evaluation: SR/SB on tele. No c/o CP or SOA. Remains on 2LNC. VSS. Cont POC until discharge                             
Goal Outcome Evaluation:  Plan of Care Reviewed With: patient        Progress: improving  Outcome Evaluation: Slept most of the night. 1st dose of Sotalol given. MDf=663; SR with PACs. Denies CP or SOA. Remains on 2LNC.Cont on ABX therapy without adverse reaction noted. VSS. Cont with current POC                             
Goal Outcome Evaluation:  VSS today and she is ready to dc home.                                            
Goal Outcome Evaluation:  new admit today for sotalol dosing .    Baseline EKG and labs done.   VSS on o2 2 l.    Up to BR w 1 asst.   Hospitalist consulted to help manage other medications.                                            
[FreeTextEntry2] : left thumb: follow up c/o pain. requesting CSI

## 2025-03-13 NOTE — CASE MANAGEMENT/SOCIAL WORK
Case Management Discharge Note      Final Note: Pt is discharging home today. She has portable O2 at bedside. Family will provide transportation via private vehicle. No discharge needs identified.         Selected Continued Care - Admitted Since 3/10/2025       Destination    No services have been selected for the patient.                Durable Medical Equipment       Service Provider Services Address Phone Fax Patient Preferred    [facility] [address] [place] [address] [phone] --       Internal Comment last updated by Gloria Ellis, RN 3/13/2025 0876    Existing customer                           Dialysis/Infusion    No services have been selected for the patient.                Home Medical Care    No services have been selected for the patient.                Therapy    No services have been selected for the patient.                Community Resources    No services have been selected for the patient.                Community & DME    No services have been selected for the patient.                    Transportation Services  Private: Car    Final Discharge Disposition Code: 01 - home or self-care

## 2025-03-13 NOTE — DISCHARGE SUMMARY
"[facility]  DISCHARGE SUMMARY      Date of Admit: 3/10/2025  Date of Discharge: 03/13/25     Primary Care Provider: John Montelongo MD    Discharge Diagnosis:  Atrial fibrillation      Consults:   Consults       Date and Time Order Name Status Description    3/10/2025  1:38 PM Inpatient Hospitalist Consult Completed              Hospital Course:   Patient is a 74 y.o. female [facility] for sotalol initiation.  Patient previously been switched from sotalol to Tikosyn but unfortunately her heart failure symptoms worsened, she had more frequent atrial fibrillation and episodes of angina.  Tikosyn was held for 9 days prior to initiation patient of sotalol.  Since initiation of sotalol patient has tolerated 120 mg twice daily for 5 doses.  QTc has been appropriate on EKG. Patient tested positive for rhinovirus and acute bronchitis.  She received antibiotics, steroids and inhalers.  Bodies have been transitioned to p.o. Patient is stable to discharge home.    Medication updates:   -Metoprolol was discontinued when sotalol was initiated.  -Patient was started back on Maxide.   -Lasix was continued.  Recommend that patient not take metolazone Lasix and Maxide.    -Continue potassium and magnesium.   -Continue Eliquis and Plavix  -cefuroxime and p.o. doxycycline x 5 days starting tomorrow  -Continue p.o. prednisone 40 mg daily x 5 days    Follow ups:  -Follow up with PCP in 1 week. Recommend BMP in 1-2 weeks to assess renal function and electrolytes.  -Follow-up with Mark Brito in 1 to 2 weeks.    -Follow-up with Dr. Joyce in 3 months in [facility].    Discharge Physical Exam:  /81 (BP Location: Right arm, Patient Position: Sitting)   Pulse 60   Temp 97.9 °F (36.6 °C) (Oral)   Resp 18   Ht 167.6 cm (66\")   Wt 117 kg (259 lb)   SpO2 96%   BMI 41.80 kg/m²     Constitutional:       Appearance: Healthy appearance.   Neck:      Vascular: JVD " normal.   Pulmonary:      Effort: Pulmonary effort is normal.      Breath sounds: Normal breath sounds.      Comments: Chronic 2L NC at home  Cardiovascular:      Normal rate. Regular rhythm. Normal S1. Normal S2.       Murmurs: There is no murmur.      No gallop.  No rub.   Pulses:     Intact distal pulses.   Edema:     Peripheral edema absent.   Neurological:      General: No focal deficit present.       Labs:  Results from last 7 days   Lab Units 03/10/25  1615   WBC 10*3/mm3 7.76   HEMOGLOBIN g/dL 12.1   HEMATOCRIT % 37.2   PLATELETS 10*3/mm3 218     Results from last 7 days   Lab Units 03/12/25 2005 03/12/25  0757   SODIUM mmol/L  --  138   POTASSIUM mmol/L 4.5 3.7   CHLORIDE mmol/L  --  100   CO2 mmol/L  --  27.0   BUN mg/dL  --  19   CREATININE mg/dL  --  0.65   CALCIUM mg/dL  --  11.5*   GLUCOSE mg/dL  --  169*             Hemoglobin A1C   Date Value Ref Range Status   03/10/2025 7.10 (H) 4.80 - 5.60 % Final     Magnesium   Date Value Ref Range Status   03/12/2025 2.2 1.6 - 2.4 mg/dL Final       Echo:  Results for orders placed during the hospital encounter of 10/12/23    Adult Transthoracic Echo Limited W/ Cont if Necessary Per Protocol    Interpretation Summary    Left ventricular ejection fraction appears to be 51 - 55%.    Left ventricular wall thickness is consistent with mild concentric hypertrophy.    The left atrial cavity is moderately dilated.    Moderately to severely technically limited study.  Gross generalizations can be rendered.    1.  LV size is normal and global LV systolic function is not significantly depressed.  Visually estimated ejection fraction is 50±5%.  Mild concentric left ventricular hypertrophy.  Moderate left atrial enlargement.  The right ventricle appears mildly dilated but RV systolic function is grossly preserved.  The right atrium is at the upper limits of normal.    2.  Valves are morphologically normal.    3.  No pericardial or great vessel pathology.        Procedures:           Discharge Medications     Discharge Medications        New Medications        Instructions Start Date   cefuroxime 500 MG tablet  Commonly known as: CEFTIN   Take 1 tablet by mouth Every 12 (Twelve) Hours for 10 doses.      doxycycline 100 MG capsule  Commonly known as: MONODOX   Take 1 capsule by mouth Every 12 (Twelve) Hours for 10 doses.      guaiFENesin 600 MG 12 hr tablet  Commonly known as: MUCINEX   1,200 mg, Oral, Every 12 Hours Scheduled      PHARMACY MEDS TO BED CONSULT   Not Applicable, Daily      predniSONE 20 MG tablet  Commonly known as: DELTASONE   40 mg, Oral, Daily With Breakfast      sotalol 120 MG tablet  Commonly known as: BETAPACE   120 mg, Oral, 2 Times Daily      triamterene-hydrochlorothiazide 37.5-25 MG per tablet  Commonly known as: MAXZIDE-25   1 tablet, Oral, Daily             Continue These Medications        Instructions Start Date   albuterol (2.5 MG/3ML) 0.083% nebulizer solution  Commonly known as: PROVENTIL   Albuterol Sulfate (2.5 MG/3ML) 0.083% Inhalation Nebulization Solution; Patient Sig: Albuterol Sulfate (2.5 MG/3ML) 0.083% Inhalation Nebulization Solution USE 1 UNIT DOSE IN NEBULIZER EVERY 4 TO 6 HOURS AS NEE      allopurinol 100 MG tablet  Commonly known as: ZYLOPRIM   100 mg, Oral, Daily      anastrozole 1 MG tablet  Commonly known as: ARIMIDEX   1 mg, Daily      cholecalciferol 25 MCG (1000 UT) tablet  Commonly known as: VITAMIN D3   1,000 Units, Daily      clopidogrel 75 MG tablet  Commonly known as: PLAVIX   75 mg, Oral, Daily      Diclofenac Sodium 1 % gel gel  Commonly known as: VOLTAREN   4 g, As Needed      Eliquis 5 MG tablet tablet  Generic drug: apixaban   TAKE 1 TABLET EVERY 12 HOURS      furosemide 40 MG tablet  Commonly known as: LASIX   TAKE 1 TABLET TWICE DAILY      insulin aspart 100 UNIT/ML solution pen-injector sc pen  Commonly known as: novoLOG FLEXPEN   15 Units, Daily With Breakfast      isosorbide mononitrate 30 MG 24 hr  tablet  Commonly known as: IMDUR   30 mg, Oral, Daily      losartan 50 MG tablet  Commonly known as: COZAAR   50 mg, Oral, Daily      magnesium oxide 400 (241.3 Mg) MG tablet tablet  Commonly known as: MAGOX   400 mg, Nightly      metFORMIN  MG 24 hr tablet  Commonly known as: GLUCOPHAGE-XR   500 mg, Daily With Breakfast      montelukast 10 MG tablet  Commonly known as: SINGULAIR   10 mg, Nightly      multivitamin with minerals tablet tablet   1 tablet, Daily      nitroglycerin 0.4 MG SL tablet  Commonly known as: NITROSTAT   0.4 mg, Sublingual, Every 5 Minutes PRN      O2  Commonly known as: OXYGEN   2 L/min, Inhalation, Continuous      potassium chloride 20 MEQ CR tablet  Commonly known as: KLOR-CON M20   20 mEq, Oral, Every Morning      potassium chloride 10 MEQ CR tablet  Commonly known as: KLOR-CON M10   10 mEq, Oral, Nightly      Repatha SureClick solution auto-injector SureClick injection  Generic drug: Evolocumab   140 mg, Subcutaneous, Every 14 Days      Tresiba FlexTouch 200 UNIT/ML solution pen-injector pen injection  Generic drug: Insulin Degludec   68 Units, Every Morning      Tudorza Pressair 400 MCG/ACT aerosol powder  powder for inhalation  Generic drug: aclidinium bromide   1 puff, 2 Times Daily             Stop These Medications      metOLazone 2.5 MG tablet  Commonly known as: ZAROXOLYN     metoprolol tartrate 25 MG tablet  Commonly known as: LOPRESSOR              Discharge Diet: Consistent carb and heart healthy    Activity at Discharge: as tolerated    Discharge disposition: home    Condition on Discharge: stable    Follow-up Appointments  Future Appointments   Date Time Provider Department Center   10/20/2025  1:00 PM Mark Brito PA [facility]   11/21/2025  2:45 PM Moustapha Joyce MD [facility]     Additional Instructions for the Follow-ups that You Need to Schedule       Basic Metabolic Panel    Mar 20, 2025 (Approximate)      Release to patient: Routine Release                 Test Results Pending at Discharge       FLOWER Aragon  03/13/25  08:28 EDT    40 min spent in reviewing records, discussion and examination of the patient and discussion with other members of the patient's medical team.

## 2025-03-14 NOTE — OUTREACH NOTE
Prep Survey      Flowsheet Row Responses   [facility] facility patient discharged from? [place]   Is LACE score < 7 ? No   Eligibility Readm Mgmt   Discharge diagnosis Atrial fibrillation   Does the patient have one of the following disease processes/diagnoses(primary or secondary)? Other   Does the patient have Home health ordered? No   Is there a DME ordered? No   Prep survey completed? Yes            Marilynn DIXON - Registered Nurse

## 2025-03-18 LAB
QT INTERVAL: 418 MS
QT INTERVAL: 450 MS
QT INTERVAL: 452 MS
QTC INTERVAL: 458 MS
QTC INTERVAL: 466 MS
QTC INTERVAL: 468 MS

## 2025-03-24 ENCOUNTER — READMISSION MANAGEMENT (OUTPATIENT)
Dept: CALL CENTER | Facility: HOSPITAL | Age: 75
End: 2025-03-24
Payer: MEDICARE

## 2025-03-24 NOTE — OUTREACH NOTE
Medical Week 2 Survey      Flowsheet Row Responses   [facility] patient discharged from? [place]   Does the patient have one of the following disease processes/diagnoses(primary or secondary)? Other   Week 2 attempt successful? Yes   Call start time 1617   Discharge diagnosis Atrial fibrillation   Call end time 1620   Meds reviewed with patient/caregiver? Yes   Is the patient having any side effects they believe may be caused by any medication additions or changes? No   Does the patient have all medications ordered at discharge? Yes   Is the patient taking all medications as directed (includes completed medication regime)? Yes   Does the patient have a primary care provider?  Yes   Comments regarding PCP Patient states she has followed up with PCP   What DME was ordered? Patient is on 2L of O2. Encouraged patient to get pulse oximeter to check oxygen saturation. Patient verbalizes understanding.   Psychosocial issues? No   Did the patient receive a copy of their discharge instructions? Yes   Nursing interventions Reviewed instructions with patient   What is the patient's perception of their health status since discharge? Improving   Is the patient/caregiver able to teach back signs and symptoms related to disease process for when to call PCP? Yes   Is the patient/caregiver able to teach back signs and symptoms related to disease process for when to call 911? Yes   Is the patient/caregiver able to teach back the hierarchy of who to call/visit for symptoms/problems? PCP, Specialist, Home health nurse, Urgent Care, ED, 911 Yes   Week 2 Call Completed? Yes   Graduated Yes   Did the patient feel the follow up calls were helpful during their recovery period? Yes   Was the number of calls appropriate? Yes   Is the patient interested in additional calls from an ambulatory ? No   Would this patient benefit from a Referral to Amb Social Work? No   Wrap up additional comments Patient reports doing well.  No questions or concerns at this time.   Call end time 1620            PRAFUL BARNETT - Registered Nurse

## 2025-03-27 ENCOUNTER — OFFICE VISIT (OUTPATIENT)
Dept: CARDIOLOGY | Facility: CLINIC | Age: 75
End: 2025-03-27
Payer: MEDICARE

## 2025-03-27 VITALS
OXYGEN SATURATION: 94 % | DIASTOLIC BLOOD PRESSURE: 64 MMHG | SYSTOLIC BLOOD PRESSURE: 174 MMHG | WEIGHT: 257 LBS | BODY MASS INDEX: 41.3 KG/M2 | HEIGHT: 66 IN | HEART RATE: 79 BPM

## 2025-03-27 DIAGNOSIS — I25.10 CORONARY ARTERY DISEASE INVOLVING NATIVE CORONARY ARTERY OF NATIVE HEART WITHOUT ANGINA PECTORIS: Primary | ICD-10-CM

## 2025-03-27 DIAGNOSIS — I65.23 BILATERAL CAROTID ARTERY STENOSIS: ICD-10-CM

## 2025-03-27 DIAGNOSIS — R06.02 SHORTNESS OF BREATH: ICD-10-CM

## 2025-03-27 DIAGNOSIS — I48.0 PAROXYSMAL ATRIAL FIBRILLATION: ICD-10-CM

## 2025-03-27 NOTE — PROGRESS NOTES
Problem list     Subjective   Mandi Jacob is a 74 y.o. female     Chief Complaint   Patient presents with    Follow-up     Hospital follow up - [facility] - medication adjustment's.   Bronchitis, pneumonia    Shortness of Breath   Problem List:  1. Nonobstructive CAD by cath February 2012.  1.1.  Catheterization, 11/2023, in the setting of low level symptoms and abnormal stress test findings.  The patient had IVUS guided stenting of the RCA.  She had nonobstructive disease noted otherwise, with medical management recommended.  2. Preserved systolic function  3. History of CHF  4. Diastolic dysfunction  5. Hypertension      6. Dyslipidemia. The patient reports she is intolerant to statins.      7. Chronic palpitations.      7.1.  Recurrent PACs noted by telemetry historically.  The patient was treated with sotalol therapy for the same.      7.2.  A. fib with rapid ventricular response noted by event monitor, 2019.      7.3.  Admission, 10/2024, for Tikosyn institution.  Sotalol was discontinued.  She had cardioversion during hospitalization.      7.4.  Readmission for institution of sotalol given intolerance to Tikosyn.  She was readmitted for reinstitution of sotalol, 3/2025.  She felt that she did much better on sotalol as compared to Tikosyn.      8.  Peripheral vascular disease, status post angioplasty and stenting to the right carotid artery system, 06/19 with Dr. Dre ANN  The patient presents in the clinic today for hospital follow-up.  She was recently admitted for reinstitution of sotalol.  She had very poor tolerance of fatigue and some because of exacerbation of failure symptoms, chest pain, numerous issues otherwise.  She was admitted and restarted on sotalol and did very well with that.  She presents back today and reports that she feels better than she has in a long time.  Failure symptoms have basically resolved.  Chest pain has minimized.  She has no dysrhythmic  symptoms.  We did get an EKG today which indicates acceptable QT/QTc intervals.  She has no further complaints otherwise and feels that she is doing fairly well.    Current Outpatient Medications on File Prior to Visit   Medication Sig Dispense Refill    albuterol (PROVENTIL) (2.5 MG/3ML) 0.083% nebulizer solution Albuterol Sulfate (2.5 MG/3ML) 0.083% Inhalation Nebulization Solution; Patient Sig: Albuterol Sulfate (2.5 MG/3ML) 0.083% Inhalation Nebulization Solution USE 1 UNIT DOSE IN NEBULIZER EVERY 4 TO 6 HOURS AS NEE      allopurinol (ZYLOPRIM) 100 MG tablet Take 1 tablet by mouth Daily. 60 tablet 2    anastrozole (ARIMIDEX) 1 MG tablet Take 1 tablet by mouth Daily.      Cholecalciferol 25 MCG (1000 UT) tablet Take 1 tablet by mouth Daily.      clopidogrel (PLAVIX) 75 MG tablet TAKE 1 TABLET BY MOUTH DAILY. 90 tablet 3    Diclofenac Sodium (VOLTAREN) 1 % gel gel Apply 4 g topically to the appropriate area as directed As Needed.      Eliquis 5 MG tablet tablet TAKE 1 TABLET EVERY 12 HOURS 180 tablet 3    furosemide (LASIX) 40 MG tablet TAKE 1 TABLET TWICE DAILY 180 tablet 1    insulin aspart (novoLOG FLEXPEN) 100 UNIT/ML solution pen-injector sc pen Inject 15 Units under the skin into the appropriate area as directed Daily With Breakfast.      isosorbide mononitrate (IMDUR) 30 MG 24 hr tablet TAKE 1 TABLET BY MOUTH DAILY. 90 tablet 3    losartan (COZAAR) 50 MG tablet Take 1 tablet by mouth Daily. 90 tablet 11    magnesium oxide (MAGOX) 400 (241.3 MG) MG tablet tablet Take 1 tablet by mouth Every Night.      metFORMIN ER (GLUCOPHAGE-XR) 500 MG 24 hr tablet Take 1 tablet by mouth Daily With Breakfast.  11    montelukast (SINGULAIR) 10 MG tablet Take 1 tablet by mouth Every Night.      multivitamin with minerals tablet tablet Take 1 tablet by mouth Daily.      nitroglycerin (NITROSTAT) 0.4 MG SL tablet Place 1 tablet under the tongue Every 5 (Five) Minutes As Needed for Chest Pain. 25 tablet 2    O2 (OXYGEN) Inhale  2 L/min Continuous.      potassium chloride (K-DUR,KLOR-CON) 20 MEQ CR tablet Take 1 tablet by mouth Every Morning. 30 tablet 3    potassium chloride (KLOR-CON M10) 10 MEQ CR tablet Take 1 tablet by mouth Every Night. 90 tablet 3    Repatha SureClick solution auto-injector SureClick injection INJECT 1 ML UNDER THE SKIN INTO THE APPROPRIATE AREA AS DIRECTED EVERY 14 (FOURTEEN) DAYS. 6 pen 3    sotalol (BETAPACE) 120 MG tablet Take 1 tablet by mouth 2 (Two) Times a Day. 180 tablet 3    TRESIBA FLEXTOUCH 200 UNIT/ML solution pen-injector Inject 68 Units under the skin into the appropriate area as directed Every Morning. 100 units daily      triamterene-hydrochlorothiazide (MAXZIDE-25) 37.5-25 MG per tablet Take 1 tablet by mouth Daily. 90 tablet 3    Tudorza Pressair 400 MCG/ACT aerosol powder  powder for inhalation Inhale 1 puff 2 (Two) Times a Day.      [DISCONTINUED] PHARMACY MEDS TO BED CONSULT Use Daily.       No current facility-administered medications on file prior to visit.       Ace inhibitors, Diltiazem, Flecainide, Incruse ellipta [umeclidinium bromide], Lisinopril, Niacin, Wellbutrin [bupropion], Ketorolac tromethamine, Budesonide, Buspirone, Ciprofloxacin, Citalopram, Clindamycin/lincomycin, Levaquin [levofloxacin], Lipitor [atorvastatin], Liraglutide, Lorazepam, Sertraline, Statins, Sulfamethoxazole-trimethoprim, Trulicity [dulaglutide], and Xigduo xr [dapagliflozin pro-metformin er]    Past Medical History:   Diagnosis Date    Anemia     Arrhythmia     Asthma     Atrial fibrillation     Breast cancer 05/2023    Right breast    CAD (coronary artery disease)     non-obstructive by cath 02/2012    Cancer     Carotid artery occlusion     CHF (congestive heart failure)     Claustrophobia     COPD (chronic obstructive pulmonary disease)     Diabetes mellitus     Diastolic dysfunction     Dizziness     Edema     Esophageal spasm     Gout     Herniated lumbar intervertebral disc     L5    Hyperlipidemia      intolerant to statins     Hypertension     Low back pain     Lumbosacral disc disease     Osteoarthritis     Palpitations     SOB (shortness of breath)     Stroke     Stroke-like symptoms     TIA (transient ischemic attack)     left hemispheric TIA/CVA    Unstable angina        Social History     Socioeconomic History    Marital status: Significant Other   Tobacco Use    Smoking status: Former     Current packs/day: 0.00     Average packs/day: 1.5 packs/day for 41.1 years (61.6 ttl pk-yrs)     Types: Cigarettes     Start date: 1968     Quit date: 3/22/2009     Years since quittin.0    Smokeless tobacco: Never   Vaping Use    Vaping status: Never Used   Substance and Sexual Activity    Alcohol use: No    Drug use: No    Sexual activity: Yes     Partners: Male     Birth control/protection: Post-menopausal       Family History   Problem Relation Age of Onset    Heart attack Mother     Heart failure Mother         congestive    Hyperlipidemia Mother     Hypertension Mother     Peripheral vascular disease Mother     Anxiety disorder Mother     Arthritis Mother     COPD Mother     Depression Mother     Miscarriages / Stillbirths Mother     Stroke Mother     Anemia Mother     Arrhythmia Mother     Heart disease Mother     Heart attack Father     Heart failure Father         congestive    Diabetes Father     Hyperlipidemia Father     Hypertension Father     Peripheral vascular disease Father     Asthma Father     Vision loss Father     Arrhythmia Father     Heart disease Father     Hypertension Brother     Heart failure Other         congestive    Diabetes Other     Hyperlipidemia Other     Hypertension Other     Peripheral vascular disease Other     Hypertension Brother     Arrhythmia Brother     Asthma Brother     Heart attack Brother     Heart disease Brother        Review of Systems   Constitutional:  Positive for fatigue. Negative for chills, diaphoresis and fever.   HENT: Negative.  Negative for hearing  "loss.    Eyes: Negative.  Negative for visual disturbance.   Respiratory:  Positive for cough and shortness of breath. Negative for apnea, chest tightness and wheezing.    Cardiovascular: Negative.  Negative for chest pain, palpitations and leg swelling.   Gastrointestinal: Negative.  Negative for abdominal pain and blood in stool.   Endocrine: Negative.    Genitourinary: Negative.  Negative for hematuria.   Musculoskeletal:  Positive for arthralgias, back pain, gait problem (uses walker), myalgias, neck pain and neck stiffness.   Skin: Negative.  Negative for rash and wound.   Allergic/Immunologic: Positive for environmental allergies (seasonal). Negative for food allergies.   Neurological:  Negative for dizziness, syncope, weakness, light-headedness, numbness and headaches.   Hematological:  Bruises/bleeds easily.   Psychiatric/Behavioral:  Positive for sleep disturbance (wears oxygen).        Objective   Vitals:    03/27/25 1024   BP: 174/64   Pulse: 79   SpO2: 94%   Weight: 117 kg (257 lb)   Height: 167.6 cm (66\")      /64   Pulse 79   Ht 167.6 cm (66\")   Wt 117 kg (257 lb)   SpO2 94% Comment: with oxygen on at 2 liters via nc  BMI 41.48 kg/m²    Lab Results (most recent)       None          Physical Exam  Vitals and nursing note reviewed.   Constitutional:       General: She is not in acute distress.     Appearance: She is well-developed.   HENT:      Head: Normocephalic and atraumatic.   Eyes:      Conjunctiva/sclera: Conjunctivae normal.      Pupils: Pupils are equal, round, and reactive to light.   Neck:      Vascular: No JVD.      Trachea: No tracheal deviation.   Cardiovascular:      Rate and Rhythm: Normal rate and regular rhythm.      Heart sounds: Normal heart sounds.   Pulmonary:      Effort: Pulmonary effort is normal.      Breath sounds: Normal breath sounds. Decreased air movement present.      Comments: On O2 per nasal cannula.  Abdominal:      General: Bowel sounds are normal. There is " no distension.      Palpations: Abdomen is soft. There is no mass.      Tenderness: There is no abdominal tenderness. There is no guarding or rebound.   Musculoskeletal:         General: No tenderness or deformity. Normal range of motion.      Cervical back: Normal range of motion and neck supple.      Right lower le+      Left lower le+   Skin:     General: Skin is warm and dry.      Coloration: Skin is not pale.      Findings: No erythema or rash.   Neurological:      Mental Status: She is alert and oriented to person, place, and time.   Psychiatric:         Behavior: Behavior normal.         Thought Content: Thought content normal.         Judgment: Judgment normal.           Procedure     ECG 12 Lead    Date/Time: 3/27/2025 10:54 AM  Performed by: Mark Brito PA    Authorized by: Mark Brito PA  Comparison: compared with previous ECG from 3/13/2025  Comparison to previous ECG: Sinus rhythm with PACs noted, normal axis, stable QT/QTc intervals, no acute changes noted.             Assessment & Plan      Diagnosis Plan   1. Coronary artery disease involving native coronary artery of native heart without angina pectoris        2. Paroxysmal atrial fibrillation        3. Shortness of breath        4. Bilateral carotid artery stenosis  Duplex Carotid Ultrasound CAR          1.  At this time, the patient appears to be doing well.  She feels much improved on sotalol now.  This is managed and followed closely by her EP provider.  We will follow along in that regard.    2.  I would like to update carotid duplex given known carotid disease.  She will be scheduled accordingly.  Results can be forwarded to Dr. Erickson, which follows her for the same.    3.  No adjustments in medications will be made otherwise.  If carotid duplex findings are stable, we will continue to see her on 6-month intervals.  She will return for any issues.         Mandi Jacob  reports that she quit smoking about 16  years ago. Her smoking use included cigarettes. She started smoking about 57 years ago. She has a 61.6 pack-year smoking history. She has never used smokeless tobacco. Advance Care Planning   ACP discussion was held with the patient during this visit. Patient has an advance directive in EMR which is still valid.                        Electronically signed by:

## 2025-04-02 RX ORDER — SOTALOL HYDROCHLORIDE 120 MG/1
120 TABLET ORAL 2 TIMES DAILY
Qty: 60 TABLET | Refills: 11 | Status: SHIPPED | OUTPATIENT
Start: 2025-04-02

## 2025-04-02 RX ORDER — ASPIRIN 81 MG/1
TABLET, COATED ORAL
Qty: 90 TABLET | Refills: 30 | OUTPATIENT
Start: 2025-04-02

## 2025-04-02 RX ORDER — TRIAMTERENE AND HYDROCHLOROTHIAZIDE 37.5; 25 MG/1; MG/1
1 TABLET ORAL EVERY MORNING
Qty: 90 TABLET | Refills: 11 | Status: SHIPPED | OUTPATIENT
Start: 2025-04-02

## 2025-04-09 DIAGNOSIS — I48.0 PAROXYSMAL ATRIAL FIBRILLATION: ICD-10-CM

## 2025-04-09 RX ORDER — TRIAMTERENE AND HYDROCHLOROTHIAZIDE 37.5; 25 MG/1; MG/1
1 TABLET ORAL EVERY MORNING
Qty: 90 TABLET | Refills: 3 | Status: SHIPPED | OUTPATIENT
Start: 2025-04-09

## 2025-04-09 NOTE — TELEPHONE ENCOUNTER
"    Caller: Mandi Jacob \"Herlinda\"    Relationship: Self    Best call back number: [phone]    Requested Prescriptions:   Requested Prescriptions     Pending Prescriptions Disp Refills    apixaban (Eliquis) 5 MG tablet tablet 180 tablet 3     Sig: Take 1 tablet by mouth.    triamterene-hydrochlorothiazide (MAXZIDE-25) 37.5-25 MG per tablet 90 tablet 11     Sig: Take 1 tablet by mouth Every Morning.        Pharmacy where request should be sent: [facility], [place]  [address] [facility] [phone] [facility] [phone] FX     Last office visit with prescribing clinician: 3/27/2025   Last telemedicine visit with prescribing clinician: Visit date not found   Next office visit with prescribing clinician: 10/20/2025     Additional details provided by patient: PATIENT STATES SHE TAKES HER ELIQUIS 2 TIMES DAILY    Does the patient have less than a 3 day supply:  [x] Yes  [] No    Would you like a call back once the refill request has been completed: [] Yes [] No    If the office needs to give you a call back, can they leave a voicemail: [] Yes [] No    Lan Kiser Rep   04/09/25 09:20 EDT           "

## 2025-04-15 ENCOUNTER — HOSPITAL ENCOUNTER (OUTPATIENT)
Dept: CARDIOLOGY | Facility: HOSPITAL | Age: 75
Discharge: HOME | End: 2025-04-15
Admitting: PHYSICIAN ASSISTANT
Payer: MEDICARE

## 2025-04-15 DIAGNOSIS — I65.23 BILATERAL CAROTID ARTERY STENOSIS: ICD-10-CM

## 2025-04-15 PROCEDURE — 93880 EXTRACRANIAL BILAT STUDY: CPT

## 2025-04-19 LAB
BH CV MID RIGHT ICA HIDDEN LRR: 1 CM
BH CV VAS CAROTID RIGHT DISTAL STENT EDV: 21 CM/S
BH CV VAS CAROTID RIGHT DISTAL STENT PSV: 118 CM/S
BH CV VAS CAROTID RIGHT MID STENT EDV: 27 CM/S
BH CV VAS CAROTID RIGHT MID STENT PSV: 193 CM/S
BH CV VAS CAROTID RIGHT PROXIMAL STENT EDV: 26 CM/S
BH CV VAS CAROTID RIGHT PROXIMAL STENT PSV: 179 CM/S
BH CV VAS CAROTID RIGHT STENT NATIVE VESSEL PROXIMAL EDV: 21 CM/S
BH CV VAS CAROTID RIGHT STENT NATIVE VESSEL PROXIMAL PS: 121 CM/S
BH CV XLRA MEAS LEFT CAROTID BULB EDV: -14.9 CM/SEC
BH CV XLRA MEAS LEFT CAROTID BULB PSV: -114 CM/SEC
BH CV XLRA MEAS LEFT DIST CCA EDV: 9.9 CM/SEC
BH CV XLRA MEAS LEFT DIST CCA PSV: 86.4 CM/SEC
BH CV XLRA MEAS LEFT DIST ICA EDV: -27.4 CM/SEC
BH CV XLRA MEAS LEFT DIST ICA PSV: -171 CM/SEC
BH CV XLRA MEAS LEFT ICA/CCA RATIO: 2
BH CV XLRA MEAS LEFT MID ICA EDV: -39.5 CM/SEC
BH CV XLRA MEAS LEFT MID ICA PSV: -162 CM/SEC
BH CV XLRA MEAS LEFT PROX CCA EDV: 15.6 CM/SEC
BH CV XLRA MEAS LEFT PROX CCA PSV: 117 CM/SEC
BH CV XLRA MEAS LEFT PROX ECA PSV: -204 CM/SEC
BH CV XLRA MEAS LEFT PROX ICA EDV: -36.2 CM/SEC
BH CV XLRA MEAS LEFT PROX ICA PSV: -176 CM/SEC
BH CV XLRA MEAS LEFT VERTEBRAL A EDV: -14.1 CM/SEC
BH CV XLRA MEAS LEFT VERTEBRAL A PSV: -80.7 CM/SEC
BH CV XLRA MEAS RIGHT CAROTID BULB EDV: 9.3 CM/SEC
BH CV XLRA MEAS RIGHT CAROTID BULB PSV: 62.1 CM/SEC
BH CV XLRA MEAS RIGHT DIST CCA EDV: 9.9 CM/SEC
BH CV XLRA MEAS RIGHT DIST CCA PSV: 71.4 CM/SEC
BH CV XLRA MEAS RIGHT DIST ICA EDV: -20.8 CM/SEC
BH CV XLRA MEAS RIGHT DIST ICA PSV: -118 CM/SEC
BH CV XLRA MEAS RIGHT ICA/CCA RATIO: 2.7
BH CV XLRA MEAS RIGHT MID ICA EDV: -26.8 CM/SEC
BH CV XLRA MEAS RIGHT MID ICA PSV: -193 CM/SEC
BH CV XLRA MEAS RIGHT PROX CCA EDV: 11.2 CM/SEC
BH CV XLRA MEAS RIGHT PROX CCA PSV: 82.6 CM/SEC
BH CV XLRA MEAS RIGHT PROX ECA PSV: -191 CM/SEC
BH CV XLRA MEAS RIGHT PROX ICA EDV: -25.6 CM/SEC
BH CV XLRA MEAS RIGHT PROX ICA PSV: -179 CM/SEC
BH CV XLRA MEAS RIGHT VERTEBRAL A EDV: -18.2 CM/SEC
BH CV XLRA MEAS RIGHT VERTEBRAL A PSV: -80.6 CM/SEC
BH CVPROX RIGHT ICA HIDDEN LRR: 1 CM

## 2025-04-22 ENCOUNTER — RESULTS FOLLOW-UP (OUTPATIENT)
Dept: CARDIOLOGY | Facility: CLINIC | Age: 75
End: 2025-04-22
Payer: MEDICARE

## 2025-04-22 DIAGNOSIS — I65.23 BILATERAL CAROTID ARTERY STENOSIS: Primary | ICD-10-CM

## 2025-04-22 NOTE — TELEPHONE ENCOUNTER
Patient notified of result's and recommendation's for carotid cta, she was instructed to hold metformin 24 hour prior to procedure, lab bmp prior to procedure, patient denies any allergy to iodine or iv contrast. Orders entered.

## 2025-04-22 NOTE — TELEPHONE ENCOUNTER
----- Message from Mark Brito sent at 4/21/2025  9:02 AM EDT -----  The patient has known disease, but has been a while since carotid CTA performed.  Schedule accordingly with contrast precautions.  ----- Message -----  From: Manpreet Mcgee MD  Sent: 4/19/2025   6:52 PM EDT  To: RICKEY Yen      04/19/2025 - Results: Manpreet Mcgee MD and others  (Newest Message First)  Mark Brito PA to Me (Selected Message)        4/21/25  9:02 AM  The patient has known disease, but has been a while since carotid CTA performed.  Schedule accordingly with contrast precautions.  Duplex Carotid Ultrasound CAR  Order: [id number]   Status: Final result       Dx: Bilateral carotid artery stenosis    Test Result Released: Yes (seen)    Details    Reading Physician Reading Date Result Priority   Manpreet Mcgee MD  [phone]  4/19/2025 Routine     Result Text       Antegrade right vertebral flow.    Antegrade left vertebral flow.     1.  Both common carotid arteries are widely patent.  There is diffuse nonobstructive atheroma on the left.     2.  There is a stent which extends from the distal right common carotid artery through the bifurcation into the midportion of the right internal carotid artery.  The common carotid and bifurcation portions of that stent are widely patent.  16 to 49% stenosis by Doppler in the left bulb.     3.  There is smooth concentric 50% or greater stenosis which appears due to negative remodeling in the midportion of the stent on the right.  Doppler is compatible with 50 to 69% stenosis.  50 to 69% stenosis by Doppler in the proximal and mid left internal carotid artery.     4.  Antegrade flow in both vertebral arteries.     Summary: Cannot exclude hemodynamically significant stenosis in the proximal portions of both internal carotid arteries.  Recommend CTA for further evaluation.              [facility] CARDIOLOGY [address] [id number]

## 2025-05-02 ENCOUNTER — TELEPHONE (OUTPATIENT)
Dept: CARDIOLOGY | Facility: CLINIC | Age: 75
End: 2025-05-02
Payer: MEDICARE

## 2025-05-02 NOTE — TELEPHONE ENCOUNTER
EMILY FOR PT LETTING HER KNOW DR. DURAN IS OUT OF THE OFFICE ON HER 6/20/25 APPT AND THAT SHE HAS BEEN RESCHEDULED TO 5/30/25 AT 10:00 [address] IN [place]. MAILED OUT THE APPT REMINDER PAPER.

## 2025-05-06 ENCOUNTER — TELEPHONE (OUTPATIENT)
Dept: CARDIOLOGY | Facility: CLINIC | Age: 75
End: 2025-05-06
Payer: MEDICARE

## 2025-05-06 NOTE — TELEPHONE ENCOUNTER
CTA NECK  PER PABLO LOPEZ PAC    FAX TO PCP      WILL MONITOR, MODERATE IN-STENT STENOSIS    PATIENT AWARE OF RECOMMENDATIONS.

## 2025-05-30 ENCOUNTER — OFFICE VISIT (OUTPATIENT)
Dept: CARDIOLOGY | Facility: CLINIC | Age: 75
End: 2025-05-30
Payer: MEDICARE

## 2025-05-30 VITALS
WEIGHT: 255 LBS | SYSTOLIC BLOOD PRESSURE: 186 MMHG | HEIGHT: 66 IN | HEART RATE: 74 BPM | DIASTOLIC BLOOD PRESSURE: 84 MMHG | BODY MASS INDEX: 40.98 KG/M2 | OXYGEN SATURATION: 94 %

## 2025-05-30 DIAGNOSIS — I25.10 CORONARY ARTERIOSCLEROSIS IN NATIVE ARTERY: ICD-10-CM

## 2025-05-30 DIAGNOSIS — I48.0 PAROXYSMAL ATRIAL FIBRILLATION: Primary | ICD-10-CM

## 2025-05-30 DIAGNOSIS — I10 PRIMARY HYPERTENSION: ICD-10-CM

## 2025-05-30 NOTE — PROGRESS NOTES
Cardiac Electrophysiology Outpatient Note  [facility]    Office Visit     Mandi Jacob  [id number]      Primary Care Physician: John Montelongo MD    Primary cardiologist Mark Brito PA-C    Subjective     Chief Complaint   Patient presents with    Coronary artery disease involving native coronary artery of     Problem List:  Atrial Fibrillation  Diagnosis event monitor 2019 ?  On sotalol   Noted recurrence indicental on EKG 5/2024 cards office visit  CAD  [facility] 2/2012: nonobstructive  TTE 10/2023: LVEF 51-55%, mild conc LVH, limited study  [facility] 11/2023: IVUS guided RCA stent, otherwise nonobstructive disease  HFpEF  Peripheral Vascular Disease  Angioplasty and stenting to R carotid 6/2019 Dr. Erickson  HTN  HLD  Diabetes  Hx TIA  COPD      History of Present Illness:   Mandi Jacob is a 74 y.o. female who presents to  electrophysiology clinic for follow up of Afib.  She was recently admitted to the hospital on 3/13/2025 for initiation of sotalol therapy.  She tolerated the 120 mg dose for 5 doses QTc stable.  During her hospital she did test positive for rhinovirus acute bronchitis she received antibiotics steroids inhalers her symptoms resolved.  Since hospitalization she has had a follow-up with her primary cardiologist Mark Brito PA-C. .    Past Medical History:   Diagnosis Date    Anemia     Arrhythmia     Asthma     Atrial fibrillation     Breast cancer 05/2023    Right breast    CAD (coronary artery disease)     non-obstructive by cath 02/2012    Cancer     Carotid artery occlusion     CHF (congestive heart failure)     Claustrophobia     COPD (chronic obstructive pulmonary disease)     Diabetes mellitus     Diastolic dysfunction     Dizziness     Edema     Esophageal spasm     Gout     Herniated lumbar intervertebral disc     L5    Hyperlipidemia     intolerant to statins     Hypertension     Low back pain     Lumbosacral disc  disease     Osteoarthritis     Palpitations     SOB (shortness of breath)     Stroke     Stroke-like symptoms     TIA (transient ischemic attack)     left hemispheric TIA/CVA    Unstable angina        Past Surgical History:   Procedure Laterality Date    CARDIAC CATHETERIZATION      CARDIAC CATHETERIZATION  2023    dr. mcclure with 1 stent    CAROTID STENT      CATARACT EXTRACTION      CATARACT EXTRACTION      CEREBRAL ANGIOGRAM      CORONARY STENT PLACEMENT  2023    EYE SURGERY Bilateral     INTERVENTIONAL RADIOLOGY PROCEDURE Bilateral 2019    Procedure: Carotid Cerebral Angiogram;  Surgeon: Alex Erickson MD;  Location: [facility] INVASIVE LOCATION;  Service: Interventional Radiology    MS TCAT IV STENT CRV CRTD ART EMBOLIC PROTECJ N/A 2019    Right Carotid Stent     TUBAL ABDOMINAL LIGATION      VASCULAR SURGERY      WRIST SURGERY         Family History   Problem Relation Age of Onset    Heart attack Mother     Heart failure Mother         congestive    Hyperlipidemia Mother     Hypertension Mother     Peripheral vascular disease Mother     Anxiety disorder Mother     Arthritis Mother     COPD Mother     Depression Mother     Miscarriages / Stillbirths Mother     Stroke Mother     Anemia Mother     Arrhythmia Mother     Heart disease Mother     Heart attack Father     Heart failure Father         congestive    Diabetes Father     Hyperlipidemia Father     Hypertension Father     Peripheral vascular disease Father     Asthma Father     Vision loss Father     Arrhythmia Father     Heart disease Father     Hypertension Brother     Heart failure Other         congestive    Diabetes Other     Hyperlipidemia Other     Hypertension Other     Peripheral vascular disease Other     Hypertension Brother             Arrhythmia Brother     Asthma Brother     Heart attack Brother     Heart disease Brother        Social History     Socioeconomic History    Marital status: Significant  Other   Tobacco Use    Smoking status: Former     Current packs/day: 0.00     Average packs/day: 1.5 packs/day for 41.1 years (61.6 ttl pk-yrs)     Types: Cigarettes     Start date: 1968     Quit date: 3/22/2009     Years since quittin.2    Smokeless tobacco: Never   Vaping Use    Vaping status: Never Used   Substance and Sexual Activity    Alcohol use: No    Drug use: No    Sexual activity: Yes     Partners: Male     Birth control/protection: Post-menopausal         Current Outpatient Medications:     albuterol (PROVENTIL) (2.5 MG/3ML) 0.083% nebulizer solution, Albuterol Sulfate (2.5 MG/3ML) 0.083% Inhalation Nebulization Solution; Patient Sig: Albuterol Sulfate (2.5 MG/3ML) 0.083% Inhalation Nebulization Solution USE 1 UNIT DOSE IN NEBULIZER EVERY 4 TO 6 HOURS AS NEE, Disp: , Rfl:     allopurinol (ZYLOPRIM) 100 MG tablet, Take 1 tablet by mouth Daily., Disp: 60 tablet, Rfl: 2    anastrozole (ARIMIDEX) 1 MG tablet, Take 1 tablet by mouth Daily., Disp: , Rfl:     apixaban (Eliquis) 5 MG tablet tablet, Take 1 tablet by mouth Every 12 (Twelve) Hours., Disp: 180 tablet, Rfl: 3    Cholecalciferol 25 MCG (1000 UT) tablet, Take 1 tablet by mouth Daily., Disp: , Rfl:     Diclofenac Sodium (VOLTAREN) 1 % gel gel, Apply 4 g topically to the appropriate area as directed As Needed., Disp: , Rfl:     furosemide (LASIX) 40 MG tablet, TAKE 1 TABLET TWICE DAILY, Disp: 180 tablet, Rfl: 1    insulin aspart (novoLOG FLEXPEN) 100 UNIT/ML solution pen-injector sc pen, Inject 15 Units under the skin into the appropriate area as directed Daily With Breakfast., Disp: , Rfl:     isosorbide mononitrate (IMDUR) 30 MG 24 hr tablet, TAKE 1 TABLET BY MOUTH DAILY., Disp: 90 tablet, Rfl: 3    losartan (COZAAR) 50 MG tablet, Take 1 tablet by mouth Daily., Disp: 90 tablet, Rfl: 11    magnesium oxide (MAGOX) 400 (241.3 MG) MG tablet tablet, Take 1 tablet by mouth Every Night., Disp: , Rfl:     metFORMIN ER (GLUCOPHAGE-XR) 500 MG 24 hr  tablet, Take 1 tablet by mouth Daily With Breakfast., Disp: , Rfl: 11    montelukast (SINGULAIR) 10 MG tablet, Take 1 tablet by mouth Every Night., Disp: , Rfl:     multivitamin with minerals tablet tablet, Take 1 tablet by mouth Daily., Disp: , Rfl:     nitroglycerin (NITROSTAT) 0.4 MG SL tablet, Place 1 tablet under the tongue Every 5 (Five) Minutes As Needed for Chest Pain., Disp: 25 tablet, Rfl: 2    O2 (OXYGEN), Inhale 2 L/min Continuous., Disp: , Rfl:     potassium chloride (K-DUR,KLOR-CON) 20 MEQ CR tablet, Take 1 tablet by mouth Every Morning., Disp: 30 tablet, Rfl: 3    potassium chloride (KLOR-CON M10) 10 MEQ CR tablet, Take 1 tablet by mouth Every Night., Disp: 90 tablet, Rfl: 3    Repatha SureClick solution auto-injector SureClick injection, INJECT 1 ML UNDER THE SKIN INTO THE APPROPRIATE AREA AS DIRECTED EVERY 14 (FOURTEEN) DAYS., Disp: 6 pen, Rfl: 3    sotalol (BETAPACE) 120 MG tablet, TAKE 1 TABLET TWICE DAILY, Disp: 60 tablet, Rfl: 11    TRESIBA FLEXTOUCH 200 UNIT/ML solution pen-injector, Inject 68 Units under the skin into the appropriate area as directed Every Morning. 100 units daily, Disp: , Rfl:     triamterene-hydrochlorothiazide (MAXZIDE-25) 37.5-25 MG per tablet, Take 1 tablet by mouth Every Morning., Disp: 90 tablet, Rfl: 3    Tudorza Pressair 400 MCG/ACT aerosol powder  powder for inhalation, Inhale 1 puff 2 (Two) Times a Day., Disp: , Rfl:     clopidogrel (PLAVIX) 75 MG tablet, TAKE 1 TABLET BY MOUTH DAILY. (Patient not taking: Reported on 5/30/2025), Disp: 90 tablet, Rfl: 3    Allergies:   Allergies   Allergen Reactions    Ace Inhibitors Anaphylaxis    Diltiazem Angioedema    Flecainide Other (See Comments)     Facial numbness and edema, itching all over and shortness of breath    Incruse Ellipta [Umeclidinium Bromide] Shortness Of Breath    Lisinopril Anaphylaxis and Hives    Niacin Swelling     LIPS AND TONGUE    Wellbutrin [Bupropion] Shortness Of Breath    Ketorolac Tromethamine  "Arrhythmia    Budesonide Other (See Comments)    Buspirone Other (See Comments)     THROAT AND LIPS SWELLED     Ciprofloxacin Rash    Citalopram Unknown (See Comments)     PT UNSURE     Clindamycin/Lincomycin Rash    Levaquin [Levofloxacin] Rash    Lipitor [Atorvastatin] Diarrhea and Other (See Comments)     HEADACHE    Liraglutide Unknown - Low Severity    Lorazepam Mental Status Change    Sertraline Unknown - Low Severity     PT UNSURE OF REACTION     Statins Diarrhea and Other (See Comments)     HEADACHE    Sulfamethoxazole-Trimethoprim Unknown (See Comments)     PT UNSURE OF REACTION    Trulicity [Dulaglutide] Diarrhea     SEVERE DIARRHEA    Xigduo Xr [Dapagliflozin Pro-Metformin Er] Diarrhea       Objective   Vital Signs: Blood pressure (!) 186/84, pulse 74, height 167.6 cm (66\"), weight 116 kg (255 lb), SpO2 94%.    PHYSICAL EXAM  General appearance: Awake, alert, cooperative  Head: Normocephalic, without obvious abnormality, atraumatic  Neck: No JVD  Lungs: Clear to ascultation bilaterally  Heart: Regular rate and rhythm, no murmurs, 2+ LE pulses, no lower extremity swelling  Skin: Skin color, turgor normal, no rashes or lesions  Neurologic: Grossly normal     Lab Results   Component Value Date    GLUCOSE 175 (H) 03/13/2025    CALCIUM 12.0 (H) 03/13/2025     03/13/2025    K 3.5 03/13/2025    CO2 27.0 03/13/2025     03/13/2025    BUN 27 (H) 03/13/2025    CREATININE 0.81 03/13/2025    EGFRIFAFRI 94 09/21/2015    EGFRIFNONA 96 06/06/2019    BCR 33.3 (H) 03/13/2025    ANIONGAP 13.0 03/13/2025     Lab Results   Component Value Date    WBC 7.76 03/10/2025    HGB 12.1 03/10/2025    HCT 37.2 03/10/2025    MCV 90.5 03/10/2025     03/10/2025     Lab Results   Component Value Date    INR 0.97 12/16/2015    PROTIME 10.2 12/16/2015     Lab Results   Component Value Date    TSH 1.660 09/21/2015          Results for orders placed during the hospital encounter of 10/12/23    Adult Transthoracic Echo " Limited W/ Cont if Necessary Per Protocol    Interpretation Summary    Left ventricular ejection fraction appears to be 51 - 55%.    Left ventricular wall thickness is consistent with mild concentric hypertrophy.    The left atrial cavity is moderately dilated.    Moderately to severely technically limited study.  Gross generalizations can be rendered.    1.  LV size is normal and global LV systolic function is not significantly depressed.  Visually estimated ejection fraction is 50±5%.  Mild concentric left ventricular hypertrophy.  Moderate left atrial enlargement.  The right ventricle appears mildly dilated but RV systolic function is grossly preserved.  The right atrium is at the upper limits of normal.    2.  Valves are morphologically normal.    3.  No pericardial or great vessel pathology.     Results for orders placed during the hospital encounter of 06/05/19    Carotid Stent    Narrative  Preoperative Diagnosis: Right internal carotid artery stenosis  Postoperative Diagnosis: Same    Indication for Procedure: This is a 68-year-old woman with asymptomatic right internal carotid artery stenosis.  She had a diagnostic angiogram which demonstrated severe stenosis of approximately 90%.  She had multiple risk factors for general anesthesia including heart disease, and oxygen dependent COPD.  Cerebral angiography with carotid stenting is indicated.    Informed consent was obtained for a cerebral angiogram and possible intervention. She acknowledges a risk of stroke, coma, death, pain, paralysis, blindness, permanent neurological disability, access complications, hematoma, limb ischemia, renal failure, allergic reaction, radiation exposure, hair loss, tumor growth, failure of benefit of the procedure, or need for additional procedures. All questions were answered. No guarantees were given or implied. She elects to proceed.    Procedure in Detail:  The patient was identified in the CVOU and brought to the  "angiography suite where she was transferred to the angiography table in supine position. The patients bilateral inguinal regions were shaved, prepped and draped in the usual sterile fashion. A time out was performed. Access to the right femoral artery established using a 21ga micropuncture needle. After sequentially dilating, a 5 Burundian introducer sheath was placed over an 0.038\" wire using the modified Seldinger technique. The sheath was attached to a pressurized flush bag in the standard fashion.    A 5 Burundian angled Dinnrenstein catheter was advanced over an 0.038\" Glidewire over the aortic arch under continuous fluoroscopic visualization.    A roadmap was created allowing selective catheter placement into the right common carotid artery. The catheter was double-flushed and distal flow was checked. An angiogram of the right common carotid in MONTOYA and lateral projection centered over the neck was obtained.  There is a high-grade, focal stenosis at the origin of the right internal carotid artery.  I estimate that this is approximately 90% according to NASCET criteria..  An angiogram of the right common carotid artery and PA and lateral projection centered over the head was then obtained.  This demonstrated opacification of the vessels of the right internal carotid artery.    An exchange was then carried out over a Kamara wire bringing up a 7 Bur[place]ian ArrowFlex sheath which was positioned in the distal right common carotid artery.  The patient was then given intravenous heparin to maintain an activated clotting time of greater than 250 seconds, which was maintained throughout the remainder of the procedure.    A Dagoberto 6 distal protection device was then navigated into the right internal carotid artery and deployed in the distal cervical segment.  A 10 mm carotid Wallstent was then deployed across the stenosis.  Control angiography through the sheath was obtained prior to deploying the stent which confirmed satisfactory " position.  Postdilatation was then achieved with a 5 mm balloon.  I estimate that the degree of residual stenosis is less than 20%.  The distal protection device was then recaptured and withdrawn from the patient's body.  Control angiography of the right carotid bifurcation and the intracranial circulation demonstrated satisfactory resolution of the stenosis, good position of the stent, and no intracranial branch vessel occlusions in comparison to the preprocedure angiogram.    An 8 Arabic Angio-Seal was placed uneventfully with excellent hemostasis in the right common femoral artery.    Summary of Procedure:  Status post successful angioplasty and stenting of high-grade, asymptomatic right internal carotid artery stenosis without apparent complication.  Distal protection time was 6 minutes.  High risk criteria for carotid endarterectomy included congestive heart failure, and COPD.      I personally viewed and interpreted the patient's EKG/Telemetry/lab data    Procedures  EKG 3/27/23191 QTC 414ms, NSR.   Mandi Jacob  reports that she quit smoking about 16 years ago. Her smoking use included cigarettes. She started smoking about 57 years ago. She has a 61.6 pack-year smoking history. She has never used smokeless tobacco.    Advance Care Planning   Advance Care Planning: ACP discussion was held with the patient during this visit. Patient has an advance directive in EMR which is still valid.      EKG today shows sinus rhythm without significant abnormality    Assessment & Plan    1. Paroxysmal atrial fibrillation  Patient was referred for treatment of atrial fibrillation.  She failed Tikosyn therapy.  She is not a good candidate for PFA due to severe COPD morbid obesity.  She is [facility] to [facility] 13, 2025 and sotalol therapy was titrated after holding Tikosyn for a week.      2. Primary hypertension  Elevated today.  She states she is due to traveling back from [place] as she  went to a  today.  She is under some stress.  She is going to start checking at home on regular basis recommend increasing losartan to 100 mg daily follow-up with her local primary care provider and cardiologist monitor blood pressure closely at home..    3. Congestive heart failure  Relatively well compensated today.  Will continue current course.Followed by Mark Brito PA-C.  She tells me her breathing is doing well she has no worsening symptoms no worsening peripheral edema orthopnea.    Return for follow up 6 months    Electronically signed by RICKEY Lee, 25, 9:44 AM EDT.

## (undated) DEVICE — EMBOSHIELD NAV6 EMBOLIC PROTECTION SYSTEM 7.2 MM X 190 CM: Brand: EMBOSHIELD  NAV6

## (undated) DEVICE — CATH TEMPO 5F BER 100CM: Brand: TEMPO

## (undated) DEVICE — Device

## (undated) DEVICE — ROTATING HEMOSTATIC VALVE .096": Brand: RHV

## (undated) DEVICE — ANGIO-SEAL VIP VASCULAR CLOSURE DEVICE: Brand: ANGIO-SEAL

## (undated) DEVICE — PK CATH CARD 10

## (undated) DEVICE — VIATRAC 14 PLUS PERIPHERAL DILATATION CATHETER 5.0 MM X 20 MM X 135 CM: Brand: VIATRAC

## (undated) DEVICE — DEV INFL MONARCH 25W

## (undated) DEVICE — LIMB HOLDER, WRIST/ANKLE: Brand: DEROYAL

## (undated) DEVICE — INTRO SHEATH FLX 7F80CM

## (undated) DEVICE — ST ACC MICROPUNCTURE .018 TRANSLSS/SS/TP 5F/10CM 21G/7CM

## (undated) DEVICE — INTRO SHEATH ART/FEM ENGAGE .038 5F12CM

## (undated) DEVICE — RADIFOCUS GLIDEWIRE: Brand: GLIDEWIRE

## (undated) DEVICE — LEX NEURO ANGIOGRAPHY: Brand: MEDLINE INDUSTRIES, INC.